# Patient Record
Sex: MALE | Race: WHITE | NOT HISPANIC OR LATINO | Employment: OTHER | URBAN - METROPOLITAN AREA
[De-identification: names, ages, dates, MRNs, and addresses within clinical notes are randomized per-mention and may not be internally consistent; named-entity substitution may affect disease eponyms.]

---

## 2023-04-28 LAB
APPEARANCE, URINE: ABNORMAL
BILIRUBIN, URINE: NEGATIVE
BLOOD, URINE: ABNORMAL
COLOR, URINE: ABNORMAL
GLUCOSE, URINE: NEGATIVE MG/DL
KETONES, URINE: NEGATIVE MG/DL
LEUKOCYTE ESTERASE, URINE: NEGATIVE
NITRITE, URINE: NEGATIVE
PH, URINE: 5 (ref 5–8)
PROTEIN, URINE: ABNORMAL MG/DL
RBC, URINE: 6375 /HPF (ref 0–5)
SPECIFIC GRAVITY, URINE: 1.02 (ref 1–1.03)
UROBILINOGEN, URINE: <2 MG/DL (ref 0–1.9)
WBC, URINE: ABNORMAL /HPF (ref 0–5)

## 2023-04-29 LAB — URINE CULTURE: NORMAL

## 2023-05-09 LAB
CREATININE (MG/DL) IN SER/PLAS: 0.81 MG/DL (ref 0.5–1.3)
GFR MALE: 88 ML/MIN/1.73M2

## 2023-05-31 ENCOUNTER — OFFICE VISIT (OUTPATIENT)
Dept: PRIMARY CARE | Facility: CLINIC | Age: 83
End: 2023-05-31
Payer: MEDICARE

## 2023-05-31 VITALS
DIASTOLIC BLOOD PRESSURE: 61 MMHG | HEART RATE: 60 BPM | SYSTOLIC BLOOD PRESSURE: 108 MMHG | BODY MASS INDEX: 27.84 KG/M2 | OXYGEN SATURATION: 94 % | WEIGHT: 194 LBS | TEMPERATURE: 97.7 F

## 2023-05-31 DIAGNOSIS — R73.9 HYPERGLYCEMIA: ICD-10-CM

## 2023-05-31 DIAGNOSIS — E78.00 ELEVATED LDL CHOLESTEROL LEVEL: ICD-10-CM

## 2023-05-31 DIAGNOSIS — D64.9 ANEMIA, UNSPECIFIED TYPE: ICD-10-CM

## 2023-05-31 DIAGNOSIS — I48.11 LONGSTANDING PERSISTENT ATRIAL FIBRILLATION (MULTI): Primary | ICD-10-CM

## 2023-05-31 DIAGNOSIS — E55.9 VITAMIN D DEFICIENCY: ICD-10-CM

## 2023-05-31 DIAGNOSIS — I77.810 AORTIC ROOT DILATATION (CMS-HCC): ICD-10-CM

## 2023-05-31 PROBLEM — I48.91 ATRIAL FIBRILLATION (MULTI): Status: ACTIVE | Noted: 2023-05-31

## 2023-05-31 PROBLEM — R97.20 ELEVATED PSA: Status: ACTIVE | Noted: 2023-05-31

## 2023-05-31 PROBLEM — R39.9 LOWER URINARY TRACT SYMPTOMS: Status: ACTIVE | Noted: 2023-05-31

## 2023-05-31 PROBLEM — R39.198 OTHER DIFFICULTIES WITH MICTURITION: Status: ACTIVE | Noted: 2023-05-31

## 2023-05-31 PROBLEM — R31.9 HEMATURIA: Status: ACTIVE | Noted: 2023-05-31

## 2023-05-31 PROBLEM — R97.20 INCREASED PROSTATE SPECIFIC ANTIGEN (PSA) VELOCITY: Status: ACTIVE | Noted: 2023-05-31

## 2023-05-31 PROBLEM — R39.9 UTI SYMPTOMS: Status: ACTIVE | Noted: 2023-05-31

## 2023-05-31 PROBLEM — H61.23 BILATERAL IMPACTED CERUMEN: Status: ACTIVE | Noted: 2023-05-31

## 2023-05-31 PROBLEM — R31.21 ASYMPTOMATIC MICROSCOPIC HEMATURIA: Status: ACTIVE | Noted: 2023-05-31

## 2023-05-31 PROBLEM — N39.0 SEPSIS SECONDARY TO UTI (MULTI): Status: ACTIVE | Noted: 2023-05-31

## 2023-05-31 PROBLEM — A41.9 SEPSIS SECONDARY TO UTI (MULTI): Status: ACTIVE | Noted: 2023-05-31

## 2023-05-31 PROBLEM — I25.10 CORONARY ARTERY DISEASE: Status: ACTIVE | Noted: 2023-05-31

## 2023-05-31 PROCEDURE — 1160F RVW MEDS BY RX/DR IN RCRD: CPT | Performed by: FAMILY MEDICINE

## 2023-05-31 PROCEDURE — 1036F TOBACCO NON-USER: CPT | Performed by: FAMILY MEDICINE

## 2023-05-31 PROCEDURE — 99214 OFFICE O/P EST MOD 30 MIN: CPT | Performed by: FAMILY MEDICINE

## 2023-05-31 PROCEDURE — 1159F MED LIST DOCD IN RCRD: CPT | Performed by: FAMILY MEDICINE

## 2023-05-31 RX ORDER — TAMSULOSIN HYDROCHLORIDE 0.4 MG/1
2 CAPSULE ORAL DAILY
COMMUNITY
Start: 2020-02-13 | End: 2023-11-08 | Stop reason: SDUPTHER

## 2023-05-31 RX ORDER — METOPROLOL TARTRATE 100 MG/1
1 TABLET ORAL 2 TIMES DAILY
COMMUNITY
End: 2024-01-22 | Stop reason: SDUPTHER

## 2023-05-31 RX ORDER — METOPROLOL TARTRATE 100 MG/1
100 TABLET ORAL
COMMUNITY
End: 2023-05-31 | Stop reason: SDUPTHER

## 2023-05-31 RX ORDER — DILTIAZEM HYDROCHLORIDE 240 MG/1
CAPSULE, EXTENDED RELEASE ORAL
COMMUNITY
Start: 2021-08-26 | End: 2023-05-31 | Stop reason: SDUPTHER

## 2023-05-31 RX ORDER — WARFARIN 2.5 MG/1
TABLET ORAL
COMMUNITY
End: 2023-05-31 | Stop reason: DRUGHIGH

## 2023-05-31 RX ORDER — DILTIAZEM HYDROCHLORIDE 240 MG/1
240 CAPSULE, COATED, EXTENDED RELEASE ORAL DAILY
COMMUNITY
Start: 2023-05-01

## 2023-05-31 RX ORDER — WARFARIN SODIUM 5 MG/1
1 TABLET ORAL DAILY
COMMUNITY
Start: 2012-10-11

## 2023-05-31 RX ORDER — ATORVASTATIN CALCIUM 40 MG/1
40 TABLET, FILM COATED ORAL NIGHTLY
COMMUNITY
End: 2024-02-23 | Stop reason: SDUPTHER

## 2023-05-31 RX ORDER — ASPIRIN 81 MG/1
1 TABLET ORAL DAILY
COMMUNITY
Start: 2019-07-10

## 2023-05-31 ASSESSMENT — LIFESTYLE VARIABLES
AUDIT-C TOTAL SCORE: 4
HOW OFTEN DO YOU HAVE SIX OR MORE DRINKS ON ONE OCCASION: MONTHLY
SKIP TO QUESTIONS 9-10: 0
HOW MANY STANDARD DRINKS CONTAINING ALCOHOL DO YOU HAVE ON A TYPICAL DAY: 1 OR 2
HOW OFTEN DO YOU HAVE A DRINK CONTAINING ALCOHOL: 2-4 TIMES A MONTH

## 2023-05-31 ASSESSMENT — ENCOUNTER SYMPTOMS
DEPRESSION: 0
LOSS OF SENSATION IN FEET: 0
OCCASIONAL FEELINGS OF UNSTEADINESS: 0

## 2023-05-31 ASSESSMENT — PATIENT HEALTH QUESTIONNAIRE - PHQ9
2. FEELING DOWN, DEPRESSED OR HOPELESS: NOT AT ALL
SUM OF ALL RESPONSES TO PHQ9 QUESTIONS 1 & 2: 0
1. LITTLE INTEREST OR PLEASURE IN DOING THINGS: NOT AT ALL

## 2023-05-31 NOTE — PROGRESS NOTES
Subjective   Patient ID: Ming Bunch is a 82 y.o. male who presents for FUV.    HPI   He complains of increased fatigue over the last few months. No sob or chest pain.  Just wears out earlier in the evening.  Due for labs    He went to urology for hematuria. He had lab test and imaging done. He increased water intake when he saw blood in his urine.     He does not monitor his blood pressure at home. He sees Dr Lee for Cardiology. He has a follow up appointment in October and will complete an echocardiogram at that time.  He saw NP in Jan.  He reports that he drinks a lot of water. He declines purchasing a blood pressure cuff for home monitoring, he has an old one of his father's which he has never used.     He denies need for medication refill at this time. He calls in when needs refills    Rechecked bp at end of pt visit in bp same as MA result, HR 64    Review of Systems    Objective   /61   Pulse 60   Temp 36.5 °C (97.7 °F)   Wt 88 kg (194 lb)   SpO2 94%   BMI 27.84 kg/m²     Physical Exam  Constitutional:       Appearance: Normal appearance.   Cardiovascular:      Rate and Rhythm: Normal rate. Rhythm irregular.      Pulses: Normal pulses.      Heart sounds: Normal heart sounds.      Comments: Irregular HR with controlled rate  Pulmonary:      Effort: Pulmonary effort is normal.      Breath sounds: Normal breath sounds.   Musculoskeletal:         General: Normal range of motion.      Cervical back: Normal range of motion and neck supple.   Skin:     General: Skin is warm and dry.   Neurological:      Mental Status: He is alert and oriented to person, place, and time.   Psychiatric:         Mood and Affect: Mood normal.         Thought Content: Thought content normal.         Judgment: Judgment normal.         Assessment/Plan   Problem List Items Addressed This Visit          Circulatory    Atrial fibrillation (CMS/HCC) - Primary     Seing cardiology regularly, compliant with anticoagulation and  monitoring         Relevant Medications    dilTIAZem CD (Cardizem CD) 240 mg 24 hr capsule    metoprolol tartrate (Lopressor) 100 mg tablet    Other Relevant Orders    TSH with reflex to Free T4 if abnormal    Aortic root dilatation (CMS/HCC)     Following q 9 months by cardiology            Endocrine/Metabolic    Vitamin D deficiency    Relevant Orders    Vitamin D, Total       Hematologic    Anemia    Relevant Orders    CBC and Auto Differential       Other    Elevated LDL cholesterol level    Relevant Orders    Lipid Panel    Hyperglycemia    Relevant Orders    Comprehensive Metabolic Panel    Hemoglobin A1C     Health Maintenance  Provided order for 6 month labs with fasting. Will call with result.     Fatigue  Advised to discuss low blood pressure and fatigue due to beta blocker, pt not very concerned but would like more energy, will discuss at next Cardiology appointment also and checking labs today     Follow up in 6 months, unless a visit is needed sooner          Scribe Attestation  By signing my name below, I, Kiya Neri , Emmanuelle   attest that this documentation has been prepared under the direction and in the presence of Kellee Burris DO.

## 2023-05-31 NOTE — PATIENT INSTRUCTIONS
Health Maintenance  Provided order for 6 month labs with fasting. Will call with result.   Patient will call this office, if he needs any medication refills.     Fatigue  Advised to discuss low blood pressure and fatigue at next Cardiology appointment.

## 2023-06-05 ENCOUNTER — LAB (OUTPATIENT)
Dept: LAB | Facility: LAB | Age: 83
End: 2023-06-05
Payer: MEDICARE

## 2023-06-05 DIAGNOSIS — D64.9 ANEMIA, UNSPECIFIED TYPE: ICD-10-CM

## 2023-06-05 DIAGNOSIS — I48.11 LONGSTANDING PERSISTENT ATRIAL FIBRILLATION (MULTI): ICD-10-CM

## 2023-06-05 DIAGNOSIS — E78.00 ELEVATED LDL CHOLESTEROL LEVEL: ICD-10-CM

## 2023-06-05 DIAGNOSIS — E55.9 VITAMIN D DEFICIENCY: ICD-10-CM

## 2023-06-05 DIAGNOSIS — R73.9 HYPERGLYCEMIA: ICD-10-CM

## 2023-06-05 LAB
ALANINE AMINOTRANSFERASE (SGPT) (U/L) IN SER/PLAS: 14 U/L (ref 10–52)
ALBUMIN (G/DL) IN SER/PLAS: 3.8 G/DL (ref 3.4–5)
ALKALINE PHOSPHATASE (U/L) IN SER/PLAS: 81 U/L (ref 33–136)
ANION GAP IN SER/PLAS: 10 MMOL/L (ref 10–20)
ASPARTATE AMINOTRANSFERASE (SGOT) (U/L) IN SER/PLAS: 8 U/L (ref 9–39)
BASOPHILS (10*3/UL) IN BLOOD BY AUTOMATED COUNT: 0.03 X10E9/L (ref 0–0.1)
BASOPHILS/100 LEUKOCYTES IN BLOOD BY AUTOMATED COUNT: 0.6 % (ref 0–2)
BILIRUBIN TOTAL (MG/DL) IN SER/PLAS: 1.1 MG/DL (ref 0–1.2)
CALCIDIOL (25 OH VITAMIN D3) (NG/ML) IN SER/PLAS: 51 NG/ML
CALCIUM (MG/DL) IN SER/PLAS: 8.8 MG/DL (ref 8.6–10.6)
CARBON DIOXIDE, TOTAL (MMOL/L) IN SER/PLAS: 29 MMOL/L (ref 21–32)
CHLORIDE (MMOL/L) IN SER/PLAS: 102 MMOL/L (ref 98–107)
CHOLESTEROL (MG/DL) IN SER/PLAS: 110 MG/DL (ref 0–199)
CHOLESTEROL IN HDL (MG/DL) IN SER/PLAS: 59.8 MG/DL
CHOLESTEROL/HDL RATIO: 1.8
CREATININE (MG/DL) IN SER/PLAS: 0.84 MG/DL (ref 0.5–1.3)
EOSINOPHILS (10*3/UL) IN BLOOD BY AUTOMATED COUNT: 0.08 X10E9/L (ref 0–0.4)
EOSINOPHILS/100 LEUKOCYTES IN BLOOD BY AUTOMATED COUNT: 1.7 % (ref 0–6)
ERYTHROCYTE DISTRIBUTION WIDTH (RATIO) BY AUTOMATED COUNT: 13.2 % (ref 11.5–14.5)
ERYTHROCYTE MEAN CORPUSCULAR HEMOGLOBIN CONCENTRATION (G/DL) BY AUTOMATED: 32.3 G/DL (ref 32–36)
ERYTHROCYTE MEAN CORPUSCULAR VOLUME (FL) BY AUTOMATED COUNT: 103 FL (ref 80–100)
ERYTHROCYTES (10*6/UL) IN BLOOD BY AUTOMATED COUNT: 3.9 X10E12/L (ref 4.5–5.9)
ESTIMATED AVERAGE GLUCOSE FOR HBA1C: 108 MG/DL
GFR MALE: 87 ML/MIN/1.73M2
GLUCOSE (MG/DL) IN SER/PLAS: 93 MG/DL (ref 74–99)
HEMATOCRIT (%) IN BLOOD BY AUTOMATED COUNT: 40.3 % (ref 41–52)
HEMOGLOBIN (G/DL) IN BLOOD: 13 G/DL (ref 13.5–17.5)
HEMOGLOBIN A1C/HEMOGLOBIN TOTAL IN BLOOD: 5.4 %
IMMATURE GRANULOCYTES/100 LEUKOCYTES IN BLOOD BY AUTOMATED COUNT: 0.2 % (ref 0–0.9)
LDL: 37 MG/DL (ref 0–99)
LEUKOCYTES (10*3/UL) IN BLOOD BY AUTOMATED COUNT: 4.6 X10E9/L (ref 4.4–11.3)
LYMPHOCYTES (10*3/UL) IN BLOOD BY AUTOMATED COUNT: 1.31 X10E9/L (ref 0.8–3)
LYMPHOCYTES/100 LEUKOCYTES IN BLOOD BY AUTOMATED COUNT: 28.3 % (ref 13–44)
MONOCYTES (10*3/UL) IN BLOOD BY AUTOMATED COUNT: 0.52 X10E9/L (ref 0.05–0.8)
MONOCYTES/100 LEUKOCYTES IN BLOOD BY AUTOMATED COUNT: 11.2 % (ref 2–10)
NEUTROPHILS (10*3/UL) IN BLOOD BY AUTOMATED COUNT: 2.68 X10E9/L (ref 1.6–5.5)
NEUTROPHILS/100 LEUKOCYTES IN BLOOD BY AUTOMATED COUNT: 58 % (ref 40–80)
NRBC (PER 100 WBCS) BY AUTOMATED COUNT: 0 /100 WBC (ref 0–0)
PLATELETS (10*3/UL) IN BLOOD AUTOMATED COUNT: 130 X10E9/L (ref 150–450)
POTASSIUM (MMOL/L) IN SER/PLAS: 4 MMOL/L (ref 3.5–5.3)
PROTEIN TOTAL: 6.6 G/DL (ref 6.4–8.2)
SODIUM (MMOL/L) IN SER/PLAS: 137 MMOL/L (ref 136–145)
THYROTROPIN (MIU/L) IN SER/PLAS BY DETECTION LIMIT <= 0.05 MIU/L: 2.73 MIU/L (ref 0.44–3.98)
TRIGLYCERIDE (MG/DL) IN SER/PLAS: 65 MG/DL (ref 0–149)
UREA NITROGEN (MG/DL) IN SER/PLAS: 18 MG/DL (ref 6–23)
VLDL: 13 MG/DL (ref 0–40)

## 2023-06-05 PROCEDURE — 80061 LIPID PANEL: CPT

## 2023-06-05 PROCEDURE — 84443 ASSAY THYROID STIM HORMONE: CPT

## 2023-06-05 PROCEDURE — 82306 VITAMIN D 25 HYDROXY: CPT

## 2023-06-05 PROCEDURE — 83036 HEMOGLOBIN GLYCOSYLATED A1C: CPT

## 2023-06-05 PROCEDURE — 80053 COMPREHEN METABOLIC PANEL: CPT

## 2023-06-05 PROCEDURE — 85025 COMPLETE CBC W/AUTO DIFF WBC: CPT

## 2023-06-05 PROCEDURE — 36415 COLL VENOUS BLD VENIPUNCTURE: CPT

## 2023-06-08 ENCOUNTER — TELEPHONE (OUTPATIENT)
Dept: PRIMARY CARE | Facility: CLINIC | Age: 83
End: 2023-06-08
Payer: MEDICARE
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
## 2023-06-12 DIAGNOSIS — D64.9 ANEMIA, UNSPECIFIED TYPE: Primary | ICD-10-CM

## 2023-06-21 ENCOUNTER — LAB (OUTPATIENT)
Dept: LAB | Facility: LAB | Age: 83
End: 2023-06-21
Payer: MEDICARE

## 2023-06-21 DIAGNOSIS — D64.9 ANEMIA, UNSPECIFIED TYPE: ICD-10-CM

## 2023-06-21 LAB
BASOPHILS (10*3/UL) IN BLOOD BY AUTOMATED COUNT: 0.03 X10E9/L (ref 0–0.1)
BASOPHILS/100 LEUKOCYTES IN BLOOD BY AUTOMATED COUNT: 0.5 % (ref 0–2)
COBALAMIN (VITAMIN B12) (PG/ML) IN SER/PLAS: 339 PG/ML (ref 211–911)
EOSINOPHILS (10*3/UL) IN BLOOD BY AUTOMATED COUNT: 0.06 X10E9/L (ref 0–0.4)
EOSINOPHILS/100 LEUKOCYTES IN BLOOD BY AUTOMATED COUNT: 1.1 % (ref 0–6)
ERYTHROCYTE DISTRIBUTION WIDTH (RATIO) BY AUTOMATED COUNT: 15.6 % (ref 11.5–14.5)
ERYTHROCYTE MEAN CORPUSCULAR HEMOGLOBIN CONCENTRATION (G/DL) BY AUTOMATED: 32.2 G/DL (ref 32–36)
ERYTHROCYTE MEAN CORPUSCULAR VOLUME (FL) BY AUTOMATED COUNT: 106 FL (ref 80–100)
ERYTHROCYTES (10*6/UL) IN BLOOD BY AUTOMATED COUNT: 3.47 X10E12/L (ref 4.5–5.9)
FERRITIN (UG/LL) IN SER/PLAS: 201 UG/L (ref 20–300)
HEMATOCRIT (%) IN BLOOD BY AUTOMATED COUNT: 36.7 % (ref 41–52)
HEMOGLOBIN (G/DL) IN BLOOD: 11.8 G/DL (ref 13.5–17.5)
IMMATURE GRANULOCYTES/100 LEUKOCYTES IN BLOOD BY AUTOMATED COUNT: 0.4 % (ref 0–0.9)
IRON (UG/DL) IN SER/PLAS: 68 UG/DL (ref 35–150)
IRON BINDING CAPACITY (UG/DL) IN SER/PLAS: 338 UG/DL (ref 240–445)
IRON SATURATION (%) IN SER/PLAS: 20 % (ref 25–45)
LEUKOCYTES (10*3/UL) IN BLOOD BY AUTOMATED COUNT: 5.6 X10E9/L (ref 4.4–11.3)
LYMPHOCYTES (10*3/UL) IN BLOOD BY AUTOMATED COUNT: 1.3 X10E9/L (ref 0.8–3)
LYMPHOCYTES/100 LEUKOCYTES IN BLOOD BY AUTOMATED COUNT: 23.1 % (ref 13–44)
MONOCYTES (10*3/UL) IN BLOOD BY AUTOMATED COUNT: 0.62 X10E9/L (ref 0.05–0.8)
MONOCYTES/100 LEUKOCYTES IN BLOOD BY AUTOMATED COUNT: 11 % (ref 2–10)
NEUTROPHILS (10*3/UL) IN BLOOD BY AUTOMATED COUNT: 3.6 X10E9/L (ref 1.6–5.5)
NEUTROPHILS/100 LEUKOCYTES IN BLOOD BY AUTOMATED COUNT: 63.9 % (ref 40–80)
NRBC (PER 100 WBCS) BY AUTOMATED COUNT: 0 /100 WBC (ref 0–0)
PLATELETS (10*3/UL) IN BLOOD AUTOMATED COUNT: 260 X10E9/L (ref 150–450)

## 2023-06-21 PROCEDURE — 83550 IRON BINDING TEST: CPT

## 2023-06-21 PROCEDURE — 36415 COLL VENOUS BLD VENIPUNCTURE: CPT

## 2023-06-21 PROCEDURE — 82607 VITAMIN B-12: CPT

## 2023-06-21 PROCEDURE — 82728 ASSAY OF FERRITIN: CPT

## 2023-06-21 PROCEDURE — 83540 ASSAY OF IRON: CPT

## 2023-06-21 PROCEDURE — 85025 COMPLETE CBC W/AUTO DIFF WBC: CPT

## 2023-07-06 ENCOUNTER — APPOINTMENT (OUTPATIENT)
Dept: PRIMARY CARE | Facility: CLINIC | Age: 83
End: 2023-07-06
Payer: MEDICARE

## 2023-07-06 RX ORDER — TRAMADOL HYDROCHLORIDE 50 MG/1
50 TABLET ORAL EVERY 6 HOURS PRN
COMMUNITY
Start: 2015-01-27 | End: 2023-07-07 | Stop reason: ALTCHOICE

## 2023-07-07 ENCOUNTER — OFFICE VISIT (OUTPATIENT)
Dept: PRIMARY CARE | Facility: CLINIC | Age: 83
End: 2023-07-07
Payer: MEDICARE

## 2023-07-07 VITALS
WEIGHT: 191 LBS | BODY MASS INDEX: 27.41 KG/M2 | SYSTOLIC BLOOD PRESSURE: 107 MMHG | HEART RATE: 67 BPM | TEMPERATURE: 97.5 F | DIASTOLIC BLOOD PRESSURE: 67 MMHG | OXYGEN SATURATION: 94 %
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
## 2023-07-07 DIAGNOSIS — Z12.11 COLON CANCER SCREENING: ICD-10-CM

## 2023-07-07 DIAGNOSIS — I48.91 ATRIAL FIBRILLATION, UNSPECIFIED TYPE (MULTI): ICD-10-CM

## 2023-07-07 DIAGNOSIS — D64.9 ANEMIA, UNSPECIFIED TYPE: ICD-10-CM

## 2023-07-07 DIAGNOSIS — R53.83 FATIGUE, UNSPECIFIED TYPE: ICD-10-CM

## 2023-07-07 PROCEDURE — 1126F AMNT PAIN NOTED NONE PRSNT: CPT | Performed by: FAMILY MEDICINE

## 2023-07-07 PROCEDURE — 99214 OFFICE O/P EST MOD 30 MIN: CPT | Performed by: FAMILY MEDICINE

## 2023-07-07 PROCEDURE — 1160F RVW MEDS BY RX/DR IN RCRD: CPT | Performed by: FAMILY MEDICINE

## 2023-07-07 PROCEDURE — 1036F TOBACCO NON-USER: CPT | Performed by: FAMILY MEDICINE

## 2023-07-07 PROCEDURE — 1159F MED LIST DOCD IN RCRD: CPT | Performed by: FAMILY MEDICINE
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
## 2023-07-07 NOTE — PATIENT INSTRUCTIONS
ER Follow up.   Will repeat labs in August. Will call with results.   Discussed Anemia likely due to blood loss during fall.   Provided order for Cologuard. Will call with results.     Fatigue  Advised discussing with Cardiology.   Do not drink caffeine in the afternoon or evening.   Try not to nap during the day.

## 2023-07-18 LAB — NONINV COLON CA DNA+OCC BLD SCRN STL QL: NEGATIVE

## 2023-07-19 ENCOUNTER — TELEPHONE (OUTPATIENT)
Dept: PRIMARY CARE | Facility: CLINIC | Age: 83
End: 2023-07-19
Payer: MEDICARE

## 2023-07-19 NOTE — TELEPHONE ENCOUNTER
----- Message from Kellee Burris DO sent at 7/19/2023 12:03 PM EDT -----  Report to pt his cologuard is negative

## 2023-08-23 ENCOUNTER — LAB (OUTPATIENT)
Dept: LAB | Facility: LAB | Age: 83
End: 2023-08-23
Payer: MEDICARE

## 2023-08-23 DIAGNOSIS — D64.9 ANEMIA, UNSPECIFIED TYPE: ICD-10-CM

## 2023-08-23 LAB
BASOPHILS (10*3/UL) IN BLOOD BY AUTOMATED COUNT: 0.05 X10E9/L (ref 0–0.1)
BASOPHILS/100 LEUKOCYTES IN BLOOD BY AUTOMATED COUNT: 0.8 % (ref 0–2)
EOSINOPHILS (10*3/UL) IN BLOOD BY AUTOMATED COUNT: 0.1 X10E9/L (ref 0–0.4)
EOSINOPHILS/100 LEUKOCYTES IN BLOOD BY AUTOMATED COUNT: 1.6 % (ref 0–6)
ERYTHROCYTE DISTRIBUTION WIDTH (RATIO) BY AUTOMATED COUNT: 13.3 % (ref 11.5–14.5)
ERYTHROCYTE MEAN CORPUSCULAR HEMOGLOBIN CONCENTRATION (G/DL) BY AUTOMATED: 31.7 G/DL (ref 32–36)
ERYTHROCYTE MEAN CORPUSCULAR VOLUME (FL) BY AUTOMATED COUNT: 106 FL (ref 80–100)
ERYTHROCYTES (10*6/UL) IN BLOOD BY AUTOMATED COUNT: 3.93 X10E12/L (ref 4.5–5.9)
HEMATOCRIT (%) IN BLOOD BY AUTOMATED COUNT: 41.7 % (ref 41–52)
HEMOGLOBIN (G/DL) IN BLOOD: 13.2 G/DL (ref 13.5–17.5)
IMMATURE GRANULOCYTES/100 LEUKOCYTES IN BLOOD BY AUTOMATED COUNT: 0.3 % (ref 0–0.9)
LEUKOCYTES (10*3/UL) IN BLOOD BY AUTOMATED COUNT: 6.3 X10E9/L (ref 4.4–11.3)
LYMPHOCYTES (10*3/UL) IN BLOOD BY AUTOMATED COUNT: 1.41 X10E9/L (ref 0.8–3)
LYMPHOCYTES/100 LEUKOCYTES IN BLOOD BY AUTOMATED COUNT: 22.6 % (ref 13–44)
MONOCYTES (10*3/UL) IN BLOOD BY AUTOMATED COUNT: 0.71 X10E9/L (ref 0.05–0.8)
MONOCYTES/100 LEUKOCYTES IN BLOOD BY AUTOMATED COUNT: 11.4 % (ref 2–10)
NEUTROPHILS (10*3/UL) IN BLOOD BY AUTOMATED COUNT: 3.96 X10E9/L (ref 1.6–5.5)
NEUTROPHILS/100 LEUKOCYTES IN BLOOD BY AUTOMATED COUNT: 63.3 % (ref 40–80)
NRBC (PER 100 WBCS) BY AUTOMATED COUNT: 0 /100 WBC (ref 0–0)
PLATELETS (10*3/UL) IN BLOOD AUTOMATED COUNT: 153 X10E9/L (ref 150–450)

## 2023-08-23 PROCEDURE — 36415 COLL VENOUS BLD VENIPUNCTURE: CPT

## 2023-08-23 PROCEDURE — 85025 COMPLETE CBC W/AUTO DIFF WBC: CPT

## 2023-09-20 DIAGNOSIS — I48.91 ATRIAL FIBRILLATION, UNSPECIFIED TYPE (MULTI): Primary | ICD-10-CM

## 2023-09-24 PROBLEM — Z87.438 HISTORY OF BPH: Status: ACTIVE | Noted: 2023-09-24

## 2023-09-24 PROBLEM — Z86.2 HISTORY OF THROMBOCYTOPENIA: Status: ACTIVE | Noted: 2023-09-24

## 2023-09-24 PROBLEM — R06.02 SHORTNESS OF BREATH ON EXERTION: Status: ACTIVE | Noted: 2023-09-24

## 2023-09-24 PROBLEM — Z79.01 ON WARFARIN THERAPY: Status: ACTIVE | Noted: 2023-09-24

## 2023-09-24 PROBLEM — R39.9 UTI SYMPTOMS: Status: RESOLVED | Noted: 2023-05-31 | Resolved: 2023-09-24

## 2023-10-18 ENCOUNTER — ANTICOAGULATION - WARFARIN VISIT (OUTPATIENT)
Dept: CARDIOLOGY | Facility: CLINIC | Age: 83
End: 2023-10-18
Payer: MEDICARE

## 2023-10-18 DIAGNOSIS — I48.91 ATRIAL FIBRILLATION, UNSPECIFIED TYPE (MULTI): Primary | ICD-10-CM

## 2023-10-18 LAB
POC INR: 2.4
POC PROTHROMBIN TIME: NORMAL

## 2023-10-18 PROCEDURE — 99211 OFF/OP EST MAY X REQ PHY/QHP: CPT | Performed by: INTERNAL MEDICINE

## 2023-10-18 PROCEDURE — 85610 PROTHROMBIN TIME: CPT | Mod: PO

## 2023-10-18 PROCEDURE — 85610 PROTHROMBIN TIME: CPT | Mod: QW | Performed by: INTERNAL MEDICINE

## 2023-10-18 NOTE — PROGRESS NOTES
Patient identification verified with 2 identifiers.    Location: [facility] - [id number] [address] [phone] option #1     Referring Physician: MEERA VILLATORO  Enrollment/ Re-enrollment date:    INR Goal: 2.0-3.0  INR monitoring is per [facility] protocol.  Anticoagulation Medication: warfarin  Indication: atrial fibrillation    Subjective   Bleeding signs/symptoms: No    Bruising: No   Major bleeding event: No  Thrombosis signs/symptoms: No  Thromboembolic event: No  Missed doses: No  Extra doses: No  Medication changes: No  Dietary changes: No  Change in health: No  Change in activity: No  Alcohol: No  Other concerns: No    Upcoming Surgeries:  Does the Patient Have any upcoming surgeries that require interruption in anticoagulation therapy? no  Does the patient require bridging? no      Anticoagulation Summary  As of 10/18/2023      INR goal:  2.0-3.0   TTR:  100.0 % (2.6 wk)   INR used for dosin.40 (10/18/2023)   Weekly warfarin total:  30 mg               Assessment/Plan   Therapeutic     1. New dose: no change    2. Next INR: 1 month      Education provided to patient during the visit:  Patient instructed to call in interim with questions, concerns and changes.   Patient educated on interactions between medications and warfarin.

## 2023-10-24 ENCOUNTER — OFFICE VISIT (OUTPATIENT)
Dept: CARDIOLOGY | Facility: CLINIC | Age: 83
End: 2023-10-24
Payer: MEDICARE

## 2023-10-24 ENCOUNTER — HOSPITAL ENCOUNTER (OUTPATIENT)
Dept: CARDIOLOGY | Facility: CLINIC | Age: 83
Discharge: HOME | End: 2023-10-24
Payer: MEDICARE

## 2023-10-24 VITALS
DIASTOLIC BLOOD PRESSURE: 77 MMHG | OXYGEN SATURATION: 93 % | BODY MASS INDEX: 25.86 KG/M2 | HEIGHT: 70 IN | SYSTOLIC BLOOD PRESSURE: 113 MMHG | WEIGHT: 180.6 LBS | HEART RATE: 65 BPM

## 2023-10-24 DIAGNOSIS — I48.91 ATRIAL FIBRILLATION, UNSPECIFIED TYPE (MULTI): Primary | ICD-10-CM

## 2023-10-24 DIAGNOSIS — I48.91 ATRIAL FIBRILLATION, UNSPECIFIED TYPE (MULTI): ICD-10-CM

## 2023-10-24 PROCEDURE — 1126F AMNT PAIN NOTED NONE PRSNT: CPT | Performed by: NURSE PRACTITIONER

## 2023-10-24 PROCEDURE — 1159F MED LIST DOCD IN RCRD: CPT | Performed by: NURSE PRACTITIONER

## 2023-10-24 PROCEDURE — 99214 OFFICE O/P EST MOD 30 MIN: CPT | Mod: PO | Performed by: NURSE PRACTITIONER

## 2023-10-24 PROCEDURE — 93306 TTE W/DOPPLER COMPLETE: CPT

## 2023-10-24 PROCEDURE — 1036F TOBACCO NON-USER: CPT | Performed by: NURSE PRACTITIONER

## 2023-10-24 PROCEDURE — 93306 TTE W/DOPPLER COMPLETE: CPT | Performed by: INTERNAL MEDICINE

## 2023-10-24 PROCEDURE — 1160F RVW MEDS BY RX/DR IN RCRD: CPT | Performed by: NURSE PRACTITIONER

## 2023-10-24 PROCEDURE — 99214 OFFICE O/P EST MOD 30 MIN: CPT | Performed by: NURSE PRACTITIONER

## 2023-10-24 ASSESSMENT — ENCOUNTER SYMPTOMS
OCCASIONAL FEELINGS OF UNSTEADINESS: 0
GASTROINTESTINAL NEGATIVE: 1
LOSS OF SENSATION IN FEET: 0
DEPRESSION: 0
RESPIRATORY NEGATIVE: 1
MUSCULOSKELETAL NEGATIVE: 1
CONSTITUTIONAL NEGATIVE: 1
CARDIOVASCULAR NEGATIVE: 1
NEUROLOGICAL NEGATIVE: 1

## 2023-10-24 ASSESSMENT — PAIN SCALES - GENERAL: PAINLEVEL: 0-NO PAIN

## 2023-10-24 NOTE — PROGRESS NOTES
"Chief Complaint:   Follow up    History Of Present Illness:    .Mr Bunch had an echo today which is not yet read.  He returns for follow up. Denies chest pain, sob, palpitations or pedal edema.          Last Recorded Vitals:  Blood pressure 113/77, pulse 65, height 1.778 m (5' 10\"), weight 81.9 kg (180 lb 9.6 oz), SpO2 93 %.     Past Medical History:  Past Medical History:   Diagnosis Date    Personal history of diseases of the blood and blood-forming organs and certain disorders involving the immune mechanism 06/09/2021    History of thrombocytopenia    Shortness of breath 02/19/2020    Shortness of breath on exertion        Past Surgical History:  Past Surgical History:   Procedure Laterality Date    APPENDECTOMY  09/11/2013    Appendectomy    COLONOSCOPY  06/25/2013    Complete Colonoscopy    OTHER SURGICAL HISTORY  04/22/2015    Removal Of Lesion    PILONIDAL CYST DRAINAGE  09/11/2013    Pilonidal Cyst Resection       Social History:  Social History     Socioeconomic History    Marital status:      Spouse name: None    Number of children: None    Years of education: None    Highest education level: None   Occupational History    None   Tobacco Use    Smoking status: Never    Smokeless tobacco: Never   Substance and Sexual Activity    Alcohol use: Yes    Drug use: Not Currently    Sexual activity: None   Other Topics Concern    None   Social History Narrative    None     Social Determinants of Health     Financial Resource Strain: Not on file   Food Insecurity: Not on file   Transportation Needs: Not on file   Physical Activity: Not on file   Stress: Not on file   Social Connections: Not on file   Intimate Partner Violence: Not on file   Housing Stability: Not on file       Family History:  Family History   Problem Relation Name Age of Onset    Heart failure Mother      Atrial fibrillation Sister           Allergies:  Patient has no known allergies.    Outpatient Medications:  Current Outpatient " Medications   Medication Sig Dispense Refill    aspirin 81 mg EC tablet Take 1 tablet (81 mg) by mouth once daily.      atorvastatin (Lipitor) 40 mg tablet Take 1 tablet (40 mg) by mouth once daily at bedtime.      dilTIAZem CD (Cardizem CD) 240 mg 24 hr capsule Take 1 capsule (240 mg) by mouth once daily.      metoprolol tartrate (Lopressor) 100 mg tablet Take 1 tablet (100 mg) by mouth twice a day.      tamsulosin (Flomax) 0.4 mg 24 hr capsule Take 2 capsules (0.8 mg) by mouth once daily.      warfarin (Coumadin) 5 mg tablet Take 1 tablet (5 mg) by mouth once daily.       No current facility-administered medications for this visit.        Physical Exam:  Cardiovascular:      PMI at left midclavicular line. Normal rate. Regular rhythm. Normal S1. Normal S2.       Murmurs: There is no murmur.      No gallop.  No click. No rub.   Pulses:     Intact distal pulses.   Edema:     Peripheral edema absent.         ROS:  Review of Systems   Constitutional: Negative.   Cardiovascular: Negative.    Respiratory: Negative.     Skin: Negative.    Musculoskeletal: Negative.    Gastrointestinal: Negative.    Genitourinary: Negative.    Neurological: Negative.           Last Labs:  CBC -  Lab Results   Component Value Date    WBC 6.3 08/23/2023    HGB 13.2 (L) 08/23/2023    HCT 41.7 08/23/2023     (H) 08/23/2023     08/23/2023       CMP -  Lab Results   Component Value Date    CALCIUM 8.8 06/05/2023    PROT 6.6 06/05/2023    ALBUMIN 3.8 06/05/2023    AST 8 (L) 06/05/2023    ALT 14 06/05/2023    ALKPHOS 81 06/05/2023    BILITOT 1.1 06/05/2023       LIPID PANEL -   Lab Results   Component Value Date    CHOL 110 06/05/2023    TRIG 65 06/05/2023    HDL 59.8 06/05/2023    CHHDL 1.8 06/05/2023    LDLF 37 06/05/2023    VLDL 13 06/05/2023       RENAL FUNCTION PANEL -   Lab Results   Component Value Date    GLUCOSE 93 06/05/2023     06/05/2023    K 4.0 06/05/2023     06/05/2023    CO2 29 06/05/2023    ANIONGAP 10  06/05/2023    BUN 18 06/05/2023    CREATININE 0.84 06/05/2023    GFRMALE 87 06/05/2023    CALCIUM 8.8 06/05/2023    ALBUMIN 3.8 06/05/2023        Lab Results   Component Value Date    HGBA1C 5.4 06/05/2023         Assessment/Plan   Problem List Items Addressed This Visit    None    Impressions     Assessment:     1. Low-grade CAD by cardiac cath, 09/15/2007. The patient is doing well with no unusual chest discomfort. At the time of his catheterization, he was found to have normal coronary arteries other than for minor 20% narrowing.of the diagonal branch. This patient was seen at urgent care on 9/7/2015 with atypical chest pain that occurred when every he would take a deep breath. EKG evidently did not demonstrate any ST segment abnormality. His chest x-ray showed mild interstitial prominence. The amylase and lipase values were normal and his liver function panel was also normal. Cardiac enzymes were negative. The SMA panel included a creatinine of 0.77 a CBC included hematocrit of 43.3. At this point will postpone repeat nuclear stress testing. Patient denies chest pain at today's office visit.   2. Chronic atrial fibrillation. At the time of a previous visit the patient was noted to have a somewhat rapid ventricular response to his atrial fibrillation despite being on metoprolol 100 mg twice a day. At that time the metoprolol dose was increased to 150 mg twice a day. Since that time the patient has remained generally fatigued with some vague muscle aching. Due to the perceived side effects the dose of metoprolol will be reduced back to 100 mg twice a day and he was started on Cardizem  mg daily to assist in ventricular rate control. e subsequently had the dose of Cardizem CD reduced to 120 mg daily. e did have an echocardiogram performed 11/4/2014 which demonstrated normal LV chamber size with low normal LV ejection fraction of 55%. There was moderate to severe dilatation of the left atrium. There is  moderate dilatation of the right-sided chambers with mild mitral valve regurgitation mild to moderate tricuspid valve regurgitation and mild pulmonary hypertension with an estimated PA systolic pressure of 40 mmHg. There is also mild dilatation of the aortic root and ascending thoracic aorta. The presence of underlying COPD would explain the mild to moderate right-sided chamber dilatation. The patient did have a repeat echocardiogram performed today on 01/23/2018 which demonstrates an LV ejection fraction of approximately 55% with mild aortic valve insufficiency, mild to moderate mitral valve regurgitation, moderate tricuspid regurgitation, mild pulmonic insufficiency along with moderate left atrial enlargement and mild to moderate right atrial enlargement. The patient had been essentially asymptomatic until he was admitted to [facility] in 2/2020 with a urinary tract infection with bacteremia. He was noted to have Proteus mirabilis bacteremia with acute emphysematous cystitis. During that admission he had atrial fibrillation rapid ventricular rate of all 110â€“1 140/m. Ultimately he was placed on a combination of both diltiazem and metoprolol. The dose of diltiazem was increased from 120-240 mg daily and he was kept on metoprolol tartrate 100 mg twice a day. He did have an echocardiogram that showed an LV ejection fraction of 55â€“60 percent with moderate left atrial enlargement 1+ aortic valve insufficiency and moderately dilated aortic root at 4.1 cm. The ventricular rate to the atrial fibrillation is now well controlled on the present combination of the Cardizem  mg daily along with metoprolol tartrate 100 mg twice daily.  He has declined trying metoprolol succinate.  3. Coumadin anticoagulation with one episode of significant epistaxis. The patient has had no recurrent episodes of epistaxis since his last visit. The patient prefers to remain on Coumadin rather than switching to a direct oral  anticoagulant.  4. Hyperlipidemia. The patient did have lab work performed on 11//2022 which included a lipid panel with cholesterol 119 LDL 51 HDL 57 triglyceride 52. The patient's panel is improved now on atorvastatin 40 mg daily rather than the previous simvastatin 40 mg daily.   5. Remote former smoker. Patient discontinued smoking 35 years ago.  6.? COPD.  7. History of urosepsis 2/2020. This patient was admitted to [facility] from 02/02/2020â€“02/05/2020 with sepsis due to a urinary tract infection. The patient presented with hematuria and burning upon urination. The patient initially had a Kirby catheter placed in the office which had subsequently been removed. At the time of his admission to [facility] he was noted to be in his atrial fibrillation but with a more rapid ventricular response. Serial troponins were negative. The patient had already been on Coumadin anticoagulation. The patient was placed on IV Zosyn and IV vancomycin for the urosepsis. He was initially placed on both diltiazem and metoprolol for ventricular rate control and the dose of diltiazem was increased from 120 mg daily to 240 mg daily. He also had an echocardiogram on 02/04/2020 that showed an LV ejection fraction 55â€“60 percent with moderate left atrial enlargement with 1+ aortic valve insufficiency and a moderately dilated aortic root at 4.1 cm. The patient was found to have Proteus mirabilis bacteremia with acute emphysematous cystitis. He ultimately was discharged home on ampicillin was also started on tamsulosin. The patient had a recent PSA of 2.36 on 5/20/2020. He had a visit with urology and had a postvoid residual of urine of only 29 cc on 7/9/2020.  8. Hx of covid-19 vaccine #1 and #2.       Kellen See, APRN-CNP

## 2023-10-25 LAB — EJECTION FRACTION APICAL 4 CHAMBER: 55.9

## 2023-11-08 DIAGNOSIS — N40.0 BPH (BENIGN PROSTATIC HYPERPLASIA): Primary | ICD-10-CM

## 2023-11-08 RX ORDER — TAMSULOSIN HYDROCHLORIDE 0.4 MG/1
0.8 CAPSULE ORAL DAILY
Qty: 180 CAPSULE | Refills: 3 | Status: SHIPPED | OUTPATIENT
Start: 2023-11-08 | End: 2023-11-09 | Stop reason: SDUPTHER

## 2023-11-09 DIAGNOSIS — N40.0 BPH (BENIGN PROSTATIC HYPERPLASIA): ICD-10-CM

## 2023-11-09 RX ORDER — TAMSULOSIN HYDROCHLORIDE 0.4 MG/1
0.8 CAPSULE ORAL DAILY
Qty: 180 CAPSULE | Refills: 3 | Status: SHIPPED | OUTPATIENT
Start: 2023-11-09 | End: 2024-11-08

## 2023-11-15 ENCOUNTER — ANTICOAGULATION - WARFARIN VISIT (OUTPATIENT)
Dept: CARDIOLOGY | Facility: CLINIC | Age: 83
End: 2023-11-15
Payer: MEDICARE

## 2023-11-15 DIAGNOSIS — I48.91 ATRIAL FIBRILLATION, UNSPECIFIED TYPE (MULTI): ICD-10-CM

## 2023-11-15 LAB
POC INR: 2.3
POC PROTHROMBIN TIME: NORMAL

## 2023-11-15 PROCEDURE — 99211 OFF/OP EST MAY X REQ PHY/QHP: CPT | Performed by: INTERNAL MEDICINE

## 2023-11-15 PROCEDURE — 85610 PROTHROMBIN TIME: CPT | Mod: QW | Performed by: INTERNAL MEDICINE

## 2023-11-15 NOTE — PROGRESS NOTES
Patient identification verified with 2 identifiers.    Location: [facility] at [facility] [address] [address] [phone] option #1     Referring Physician: SHAUNA  Enrollment/ Re-enrollment date: 24   INR Goal: 2.0-3.0  INR monitoring is per [facility] protocol.  Anticoagulation Medication: warfarin  Indication: Atrial Fibrillation/Atrial Flutter    Subjective   Bleeding signs/symptoms: No    Bruising: No   Major bleeding event: No  Thrombosis signs/symptoms: No  Thromboembolic event: No  Missed doses: No  Extra doses: No  Medication changes: No  Dietary changes: No  Change in health: No  Change in activity: No  Alcohol: No  Other concerns: No    Upcoming Surgeries:  Does the Patient Have any upcoming surgeries that require interruption in anticoagulation therapy? no  Does the patient require bridging? no      Anticoagulation Summary  As of 11/15/2023      INR goal:  2.0-3.0   TTR:  100.0 % (1.5 mo)   INR used for dosin.30 (11/15/2023)   Weekly warfarin total:  30 mg               Assessment/Plan   Therapeutic     1. New dose: no change    2. Next INR: 1 month      Education provided to patient during the visit:  Patient instructed to call in interim with questions, concerns and changes.

## 2023-11-17 ENCOUNTER — TELEPHONE (OUTPATIENT)
Dept: PRIMARY CARE | Facility: CLINIC | Age: 83
End: 2023-11-17

## 2023-11-17 ENCOUNTER — OFFICE VISIT (OUTPATIENT)
Dept: PRIMARY CARE | Facility: CLINIC | Age: 83
End: 2023-11-17
Payer: MEDICARE

## 2023-11-17 VITALS
WEIGHT: 196 LBS | DIASTOLIC BLOOD PRESSURE: 58 MMHG | SYSTOLIC BLOOD PRESSURE: 106 MMHG | OXYGEN SATURATION: 95 % | HEART RATE: 77 BPM | HEIGHT: 69 IN | BODY MASS INDEX: 29.03 KG/M2 | TEMPERATURE: 97.2 F

## 2023-11-17 DIAGNOSIS — R73.9 HYPERGLYCEMIA: ICD-10-CM

## 2023-11-17 DIAGNOSIS — Z00.00 MEDICARE ANNUAL WELLNESS VISIT, SUBSEQUENT: Primary | ICD-10-CM

## 2023-11-17 DIAGNOSIS — D64.9 ANEMIA, UNSPECIFIED TYPE: ICD-10-CM

## 2023-11-17 DIAGNOSIS — E78.00 ELEVATED LDL CHOLESTEROL LEVEL: ICD-10-CM

## 2023-11-17 DIAGNOSIS — I48.0 PAROXYSMAL ATRIAL FIBRILLATION (MULTI): ICD-10-CM

## 2023-11-17 PROCEDURE — 1126F AMNT PAIN NOTED NONE PRSNT: CPT | Performed by: FAMILY MEDICINE

## 2023-11-17 PROCEDURE — 1036F TOBACCO NON-USER: CPT | Performed by: FAMILY MEDICINE

## 2023-11-17 PROCEDURE — 1160F RVW MEDS BY RX/DR IN RCRD: CPT | Performed by: FAMILY MEDICINE

## 2023-11-17 PROCEDURE — 1159F MED LIST DOCD IN RCRD: CPT | Performed by: FAMILY MEDICINE

## 2023-11-17 PROCEDURE — 1170F FXNL STATUS ASSESSED: CPT | Performed by: FAMILY MEDICINE

## 2023-11-17 PROCEDURE — G0439 PPPS, SUBSEQ VISIT: HCPCS | Performed by: FAMILY MEDICINE

## 2023-11-17 ASSESSMENT — ACTIVITIES OF DAILY LIVING (ADL)
DRESSING: INDEPENDENT
DOING_HOUSEWORK: INDEPENDENT
GROCERY_SHOPPING: INDEPENDENT
MANAGING_FINANCES: INDEPENDENT
TAKING_MEDICATION: INDEPENDENT
BATHING: INDEPENDENT

## 2023-11-17 ASSESSMENT — PATIENT HEALTH QUESTIONNAIRE - PHQ9
1. LITTLE INTEREST OR PLEASURE IN DOING THINGS: NOT AT ALL
2. FEELING DOWN, DEPRESSED OR HOPELESS: NOT AT ALL
SUM OF ALL RESPONSES TO PHQ9 QUESTIONS 1 AND 2: 0

## 2023-11-17 NOTE — TELEPHONE ENCOUNTER
Pt asks why he needs to come back in 3 months in Feb instead of 6 months like he usually does? He will be on vacation in feb. Thank you!

## 2023-11-17 NOTE — PROGRESS NOTES
Subjective   Patient ID: Ming Bunch is a 82 y.o. male who presents for Medicare Annual Wellness Visit Subsequent.  Medicare Wellness Billing Compliance Satisfied    *This is a visual tool to show completion of required items on the day of the visit. Green checks will only appear on the date of visit.    Review all medications by prescribing practitioner or clinical pharmacist (such as prescriptions, OTCs, herbal therapies and supplements) documented in the medical record    Past Medical, Surgical, and Family History reviewed and updated in chart    Tobacco Use Reviewed    Alcohol Use Reviewed    Illicit Drug Use Reviewed    PHQ2/9    Falls in Last Year Reviewed    Home Safety Risk Factors Reviewed    Cognitive Impairment Reviewed    Patient Self Assessment and Health Status    Current Diet Reviewed    Exercise Frequency    ADL - Hearing Impairment    ADL - Bathing    ADL - Dressing    ADL - Walks in Home    IADL - Managing Finances    IADL - Grocery Shopping    IADL - Taking Medications    IADL - Doing Housework      HPI   The patient presents to the clinic for an annual medicare wellness. He has past medical history of atrial fibrillation, hypertension, CAD, and hyperlipidemia.    The patient states that he does not require any medication refills today.    The patient's blood pressure was 106/58 when checked in the clinic today. He has been using metoprolol tartrate 100 mg and diltiazem  mg to control his hypertension. He denies any side-effects to the medication and he does not report any symptoms associated with low BP (due to medication).    He reports that his wife requires physical therapy for weakness in her right shoulder/side of body.    His most recent labs were done in June 2023. He reports that he had the flu vaccine and RSV vaccine on 09/26/2023. He declined to receive the COVID-19 booster vaccine and/or Shingles vaccine.    Review of Systems    Objective   /58    "Pulse 77   Temp 36.2 °C (97.2 °F)   Ht 1.746 m (5' 8.75\")   Wt 88.9 kg (196 lb)   SpO2 95%   BMI 29.16 kg/m²     Physical Exam  Constitutional:       Appearance: Normal appearance.   HENT:      Head: Normocephalic.   Neck:      Vascular: No carotid bruit.   Cardiovascular:      Rate and Rhythm: Normal rate and regular rhythm.      Pulses: Normal pulses.      Heart sounds: Normal heart sounds.   Pulmonary:      Effort: Pulmonary effort is normal.      Breath sounds: Normal breath sounds.   Abdominal:      General: Bowel sounds are normal.      Palpations: Abdomen is soft.   Musculoskeletal:         General: Normal range of motion.      Cervical back: Normal range of motion.      Right lower leg: No edema.      Left lower leg: No edema.   Lymphadenopathy:      Cervical: No cervical adenopathy.   Skin:     General: Skin is warm and dry.   Neurological:      Mental Status: He is alert and oriented to person, place, and time.   Psychiatric:         Mood and Affect: Mood normal.         Thought Content: Thought content normal.         Judgment: Judgment normal.         Assessment/Plan          Labs (CBC, CMP, A1C, lipid panel) were ordered for the patient to complete before his next visit.    The patient's current medication and immunization lists were updated.    Discussed diet and exercise and safety to help pt meet his goals.  He does a lot to take care of his wife s/p CVA    In accordance with Medicare, a comprehensive physical exam was conducted on the patient.     The patient will follow-up in February 2024, unless otherwise needed.    Scribe Attestation  By signing my name below, IRacquel Scribe   attest that this documentation has been prepared under the direction and in the presence of Kellee Burris DO.    "

## 2023-11-19 ASSESSMENT — PATIENT HEALTH QUESTIONNAIRE - PHQ9
SUM OF ALL RESPONSES TO PHQ9 QUESTIONS 1 AND 2: 0
1. LITTLE INTEREST OR PLEASURE IN DOING THINGS: NOT AT ALL
2. FEELING DOWN, DEPRESSED OR HOPELESS: NOT AT ALL

## 2023-11-19 ASSESSMENT — ACTIVITIES OF DAILY LIVING (ADL)
DOING_HOUSEWORK: INDEPENDENT
TAKING_MEDICATION: INDEPENDENT
DRESSING: INDEPENDENT
MANAGING_FINANCES: INDEPENDENT
GROCERY_SHOPPING: INDEPENDENT
BATHING: INDEPENDENT

## 2023-12-13 ENCOUNTER — ANTICOAGULATION - WARFARIN VISIT (OUTPATIENT)
Dept: CARDIOLOGY | Facility: CLINIC | Age: 83
End: 2023-12-13
Payer: MEDICARE

## 2023-12-13 DIAGNOSIS — I48.91 ATRIAL FIBRILLATION, UNSPECIFIED TYPE (MULTI): ICD-10-CM

## 2023-12-13 LAB
POC INR: 2.2
POC PROTHROMBIN TIME: NORMAL

## 2023-12-13 PROCEDURE — 85610 PROTHROMBIN TIME: CPT | Mod: QW

## 2023-12-13 NOTE — PROGRESS NOTES
Patient identification verified with 2 identifiers.    Location: [facility] at [facility] - [facility] [address] [address] [phone] option #1     Referring Physician: SHAUNA  Enrollment/ Re-enrollment date: 24   INR Goal: 2.0-3.0  INR monitoring is per [facility] protocol.  Anticoagulation Medication: warfarin  Indication: Atrial Fibrillation/Atrial Flutter    Subjective   Bleeding signs/symptoms: No    Bruising: No   Major bleeding event: No  Thrombosis signs/symptoms: No  Thromboembolic event: No  Missed doses: No  Extra doses: No  Medication changes: No  Dietary changes: No  Change in health: No  Change in activity: No  Alcohol: No  Other concerns: No    Upcoming Surgeries:  Does the Patient Have any upcoming surgeries that require interruption in anticoagulation therapy? no  Does the patient require bridging? no      Anticoagulation Summary  As of 2023      INR goal:  2.0-3.0   TTR:  100.0 % (2.5 mo)   INR used for dosin.20 (2023)   Weekly warfarin total:  30 mg               Assessment/Plan   Therapeutic     1. New dose: no change    2. Next INR: 5 weeks as pt will be out of town in 4 weeks    Education provided to patient during the visit:  Patient instructed to call in interim with questions, concerns and changes.

## 2024-01-05 ENCOUNTER — LAB (OUTPATIENT)
Dept: LAB | Facility: LAB | Age: 84
End: 2024-01-05
Payer: MEDICARE

## 2024-01-05 DIAGNOSIS — D64.9 ANEMIA, UNSPECIFIED TYPE: ICD-10-CM

## 2024-01-05 DIAGNOSIS — R73.9 HYPERGLYCEMIA: ICD-10-CM

## 2024-01-05 LAB
BASOPHILS # BLD AUTO: 0.03 X10*3/UL (ref 0–0.1)
BASOPHILS NFR BLD AUTO: 0.6 %
EOSINOPHIL # BLD AUTO: 0.07 X10*3/UL (ref 0–0.4)
EOSINOPHIL NFR BLD AUTO: 1.4 %
ERYTHROCYTE [DISTWIDTH] IN BLOOD BY AUTOMATED COUNT: 13.8 % (ref 11.5–14.5)
EST. AVERAGE GLUCOSE BLD GHB EST-MCNC: 103 MG/DL
HBA1C MFR BLD: 5.2 %
HCT VFR BLD AUTO: 39.5 % (ref 41–52)
HGB BLD-MCNC: 13 G/DL (ref 13.5–17.5)
IMM GRANULOCYTES # BLD AUTO: 0.01 X10*3/UL (ref 0–0.5)
IMM GRANULOCYTES NFR BLD AUTO: 0.2 % (ref 0–0.9)
LYMPHOCYTES # BLD AUTO: 1.48 X10*3/UL (ref 0.8–3)
LYMPHOCYTES NFR BLD AUTO: 29.2 %
MCH RBC QN AUTO: 33.9 PG (ref 26–34)
MCHC RBC AUTO-ENTMCNC: 32.9 G/DL (ref 32–36)
MCV RBC AUTO: 103 FL (ref 80–100)
MONOCYTES # BLD AUTO: 0.51 X10*3/UL (ref 0.05–0.8)
MONOCYTES NFR BLD AUTO: 10.1 %
NEUTROPHILS # BLD AUTO: 2.96 X10*3/UL (ref 1.6–5.5)
NEUTROPHILS NFR BLD AUTO: 58.5 %
NRBC BLD-RTO: 0 /100 WBCS (ref 0–0)
PLATELET # BLD AUTO: 110 X10*3/UL (ref 150–450)
RBC # BLD AUTO: 3.84 X10*6/UL (ref 4.5–5.9)
WBC # BLD AUTO: 5.1 X10*3/UL (ref 4.4–11.3)

## 2024-01-05 PROCEDURE — 85025 COMPLETE CBC W/AUTO DIFF WBC: CPT

## 2024-01-05 PROCEDURE — 83036 HEMOGLOBIN GLYCOSYLATED A1C: CPT

## 2024-01-05 PROCEDURE — 36415 COLL VENOUS BLD VENIPUNCTURE: CPT

## 2024-01-07 DIAGNOSIS — D64.9 ANEMIA, UNSPECIFIED TYPE: Primary | ICD-10-CM

## 2024-01-10 ENCOUNTER — TELEPHONE (OUTPATIENT)
Dept: PRIMARY CARE | Facility: CLINIC | Age: 84
End: 2024-01-10
Payer: MEDICARE

## 2024-01-10 NOTE — TELEPHONE ENCOUNTER
----- Message from Kellee Burris DO sent at 1/7/2024  9:18 PM EST -----  Report to pt his hgb is up to 13.0 so still slightly anemic and his platelets are low.  Placed order for him to be redrawn in Feb prior to my seeing him 3/1/2024 and if still low will have him see hematologist

## 2024-01-17 ENCOUNTER — ANTICOAGULATION - WARFARIN VISIT (OUTPATIENT)
Dept: CARDIOLOGY | Facility: CLINIC | Age: 84
End: 2024-01-17
Payer: MEDICARE

## 2024-01-17 DIAGNOSIS — I48.91 ATRIAL FIBRILLATION, UNSPECIFIED TYPE (MULTI): ICD-10-CM

## 2024-01-17 LAB
POC INR: 2.4
POC PROTHROMBIN TIME: NORMAL

## 2024-01-17 PROCEDURE — 99211 OFF/OP EST MAY X REQ PHY/QHP: CPT | Performed by: INTERNAL MEDICINE

## 2024-01-17 PROCEDURE — 85610 PROTHROMBIN TIME: CPT | Mod: QW

## 2024-01-17 NOTE — PROGRESS NOTES
Patient identification verified with 2 identifiers.    Location: [facility] at [facility] - [facility] [address] [address] [phone] option #1     Referring Physician: SHAUNA  Enrollment/ Re-enrollment date: 24   INR Goal: 2.0-3.0  INR monitoring is per [facility] protocol.  Anticoagulation Medication: warfarin  Indication: Atrial Fibrillation/Atrial Flutter    Subjective   Bleeding signs/symptoms: No    Bruising: No   Major bleeding event: No  Thrombosis signs/symptoms: No  Thromboembolic event: No  Missed doses: No  Extra doses: No  Medication changes: No  Dietary changes: No  Change in health: No  Change in activity: No  Alcohol: No  Other concerns: No    Upcoming Surgeries:  Does the Patient Have any upcoming surgeries that require interruption in anticoagulation therapy? no  Does the patient require bridging? no      Anticoagulation Summary  As of 2024      INR goal:  2.0-3.0   TTR:  100.0 % (3.6 mo)   INR used for dosin.40 (2024)   Weekly warfarin total:  30 mg               Assessment/Plan   Therapeutic     1. New dose: no change    2. Next INR: 4 weeks     Education provided to patient during the visit:  Patient instructed to call in interim with questions, concerns and changes.

## 2024-01-22 DIAGNOSIS — I48.91 ATRIAL FIBRILLATION, UNSPECIFIED TYPE (MULTI): ICD-10-CM

## 2024-01-22 RX ORDER — METOPROLOL TARTRATE 100 MG/1
100 TABLET ORAL 2 TIMES DAILY
Qty: 90 TABLET | Refills: 3 | Status: SHIPPED | OUTPATIENT
Start: 2024-01-22 | End: 2024-01-23 | Stop reason: SDUPTHER

## 2024-01-23 DIAGNOSIS — I48.91 ATRIAL FIBRILLATION, UNSPECIFIED TYPE (MULTI): ICD-10-CM

## 2024-01-23 RX ORDER — METOPROLOL TARTRATE 100 MG/1
100 TABLET ORAL 2 TIMES DAILY
Qty: 180 TABLET | Refills: 3 | Status: SHIPPED | OUTPATIENT
Start: 2024-01-23

## 2024-02-08 ENCOUNTER — LAB (OUTPATIENT)
Dept: LAB | Facility: LAB | Age: 84
End: 2024-02-08
Payer: MEDICARE

## 2024-02-08 DIAGNOSIS — E78.00 ELEVATED LDL CHOLESTEROL LEVEL: ICD-10-CM

## 2024-02-08 DIAGNOSIS — D64.9 ANEMIA, UNSPECIFIED TYPE: ICD-10-CM

## 2024-02-08 DIAGNOSIS — R73.9 HYPERGLYCEMIA: ICD-10-CM

## 2024-02-08 LAB
ALBUMIN SERPL BCP-MCNC: 3.7 G/DL (ref 3.4–5)
ALP SERPL-CCNC: 120 U/L (ref 33–136)
ALT SERPL W P-5'-P-CCNC: 19 U/L (ref 10–52)
ANION GAP SERPL CALC-SCNC: 11 MMOL/L (ref 10–20)
AST SERPL W P-5'-P-CCNC: 8 U/L (ref 9–39)
BASOPHILS # BLD AUTO: 0.03 X10*3/UL (ref 0–0.1)
BASOPHILS NFR BLD AUTO: 0.5 %
BILIRUB SERPL-MCNC: 1.3 MG/DL (ref 0–1.2)
BUN SERPL-MCNC: 13 MG/DL (ref 6–23)
CALCIUM SERPL-MCNC: 9.1 MG/DL (ref 8.6–10.6)
CHLORIDE SERPL-SCNC: 102 MMOL/L (ref 98–107)
CHOLEST SERPL-MCNC: 109 MG/DL (ref 0–199)
CHOLESTEROL/HDL RATIO: 1.8
CO2 SERPL-SCNC: 30 MMOL/L (ref 21–32)
CREAT SERPL-MCNC: 0.75 MG/DL (ref 0.5–1.3)
EGFRCR SERPLBLD CKD-EPI 2021: 90 ML/MIN/1.73M*2
EOSINOPHIL # BLD AUTO: 0.07 X10*3/UL (ref 0–0.4)
EOSINOPHIL NFR BLD AUTO: 1.2 %
ERYTHROCYTE [DISTWIDTH] IN BLOOD BY AUTOMATED COUNT: 13.8 % (ref 11.5–14.5)
GLUCOSE SERPL-MCNC: 96 MG/DL (ref 74–99)
HCT VFR BLD AUTO: 39.9 % (ref 41–52)
HDLC SERPL-MCNC: 60 MG/DL
HGB BLD-MCNC: 12.9 G/DL (ref 13.5–17.5)
IMM GRANULOCYTES # BLD AUTO: 0.03 X10*3/UL (ref 0–0.5)
IMM GRANULOCYTES NFR BLD AUTO: 0.5 % (ref 0–0.9)
LDLC SERPL CALC-MCNC: 39 MG/DL
LYMPHOCYTES # BLD AUTO: 1.45 X10*3/UL (ref 0.8–3)
LYMPHOCYTES NFR BLD AUTO: 25.6 %
MCH RBC QN AUTO: 32.9 PG (ref 26–34)
MCHC RBC AUTO-ENTMCNC: 32.3 G/DL (ref 32–36)
MCV RBC AUTO: 102 FL (ref 80–100)
MONOCYTES # BLD AUTO: 0.55 X10*3/UL (ref 0.05–0.8)
MONOCYTES NFR BLD AUTO: 9.7 %
NEUTROPHILS # BLD AUTO: 3.53 X10*3/UL (ref 1.6–5.5)
NEUTROPHILS NFR BLD AUTO: 62.5 %
NON HDL CHOLESTEROL: 49 MG/DL (ref 0–149)
NRBC BLD-RTO: 0 /100 WBCS (ref 0–0)
PLATELET # BLD AUTO: 111 X10*3/UL (ref 150–450)
POTASSIUM SERPL-SCNC: 4 MMOL/L (ref 3.5–5.3)
PROT SERPL-MCNC: 6.7 G/DL (ref 6.4–8.2)
RBC # BLD AUTO: 3.92 X10*6/UL (ref 4.5–5.9)
SODIUM SERPL-SCNC: 139 MMOL/L (ref 136–145)
TRIGL SERPL-MCNC: 51 MG/DL (ref 0–149)
VLDL: 10 MG/DL (ref 0–40)
WBC # BLD AUTO: 5.7 X10*3/UL (ref 4.4–11.3)

## 2024-02-08 PROCEDURE — 85025 COMPLETE CBC W/AUTO DIFF WBC: CPT

## 2024-02-08 PROCEDURE — 80053 COMPREHEN METABOLIC PANEL: CPT

## 2024-02-08 PROCEDURE — 36415 COLL VENOUS BLD VENIPUNCTURE: CPT

## 2024-02-08 PROCEDURE — 80061 LIPID PANEL: CPT

## 2024-02-14 ENCOUNTER — ANTICOAGULATION - WARFARIN VISIT (OUTPATIENT)
Dept: CARDIOLOGY | Facility: CLINIC | Age: 84
End: 2024-02-14
Payer: MEDICARE

## 2024-02-14 DIAGNOSIS — I48.91 ATRIAL FIBRILLATION, UNSPECIFIED TYPE (MULTI): ICD-10-CM

## 2024-02-14 LAB
POC INR: 2.1
POC PROTHROMBIN TIME: NORMAL

## 2024-02-14 PROCEDURE — 85610 PROTHROMBIN TIME: CPT | Mod: QW

## 2024-02-14 PROCEDURE — 99211 OFF/OP EST MAY X REQ PHY/QHP: CPT | Performed by: INTERNAL MEDICINE

## 2024-02-14 NOTE — PROGRESS NOTES
Patient identification verified with 2 identifiers.    Location: [facility] at [facility] - [facility] [address] [address] [phone] option #1     Referring Physician: SHAUNA  Enrollment/ Re-enrollment date: 24   INR Goal: 2.0-3.0  INR monitoring is per [facility] protocol.  Anticoagulation Medication: warfarin  Indication: Atrial Fibrillation/Atrial Flutter    Subjective   Bleeding signs/symptoms: No    Bruising: No   Major bleeding event: No  Thrombosis signs/symptoms: No  Thromboembolic event: No  Missed doses: No  Extra doses: No  Medication changes: No  Dietary changes: No  Change in health: No  Change in activity: No  Alcohol: No  Other concerns: No    Upcoming Surgeries:  Does the Patient Have any upcoming surgeries that require interruption in anticoagulation therapy? no  Does the patient require bridging? no      Anticoagulation Summary  As of 2024      INR goal:  2.0-3.0   TTR:  100.0 % (4.6 mo)   INR used for dosin.10 (2024)   Weekly warfarin total:  30 mg               Assessment/Plan   Therapeutic     1. New dose: no change    2. Next INR: 4 weeks     Education provided to patient during the visit:  Patient instructed to call in interim with questions, concerns and changes.

## 2024-02-23 DIAGNOSIS — E78.00 ELEVATED LDL CHOLESTEROL LEVEL: Primary | ICD-10-CM

## 2024-02-23 RX ORDER — ATORVASTATIN CALCIUM 40 MG/1
40 TABLET, FILM COATED ORAL NIGHTLY
Qty: 90 TABLET | Refills: 3 | Status: SHIPPED | OUTPATIENT
Start: 2024-02-23 | End: 2025-02-22

## 2024-03-01 ENCOUNTER — OFFICE VISIT (OUTPATIENT)
Dept: PRIMARY CARE | Facility: CLINIC | Age: 84
End: 2024-03-01
Payer: MEDICARE

## 2024-03-01 VITALS
HEIGHT: 70 IN | WEIGHT: 190 LBS | RESPIRATION RATE: 20 BRPM | DIASTOLIC BLOOD PRESSURE: 80 MMHG | HEART RATE: 74 BPM | SYSTOLIC BLOOD PRESSURE: 126 MMHG | OXYGEN SATURATION: 94 % | TEMPERATURE: 97.5 F | BODY MASS INDEX: 27.2 KG/M2

## 2024-03-01 DIAGNOSIS — R97.20 ELEVATED PSA: ICD-10-CM

## 2024-03-01 DIAGNOSIS — Z79.01 ON WARFARIN THERAPY: ICD-10-CM

## 2024-03-01 DIAGNOSIS — D64.9 ANEMIA, UNSPECIFIED TYPE: ICD-10-CM

## 2024-03-01 DIAGNOSIS — R73.9 HYPERGLYCEMIA: ICD-10-CM

## 2024-03-01 DIAGNOSIS — R39.9 LOWER URINARY TRACT SYMPTOMS: ICD-10-CM

## 2024-03-01 DIAGNOSIS — J06.9 UPPER RESPIRATORY TRACT INFECTION, UNSPECIFIED TYPE: Primary | ICD-10-CM

## 2024-03-01 DIAGNOSIS — R05.1 ACUTE COUGH: ICD-10-CM

## 2024-03-01 DIAGNOSIS — I48.91 ATRIAL FIBRILLATION, UNSPECIFIED TYPE (MULTI): ICD-10-CM

## 2024-03-01 DIAGNOSIS — D69.6 LOW PLATELET COUNT (CMS-HCC): ICD-10-CM

## 2024-03-01 DIAGNOSIS — I77.810 AORTIC ROOT DILATATION (CMS-HCC): ICD-10-CM

## 2024-03-01 LAB
POC BINAX EXPIRATION: NORMAL
POC BINAX NOW COVID SERIAL NUMBER: NORMAL
POC SARS-COV-2 AG BINAX: NORMAL

## 2024-03-01 PROCEDURE — 1159F MED LIST DOCD IN RCRD: CPT | Performed by: FAMILY MEDICINE

## 2024-03-01 PROCEDURE — 1126F AMNT PAIN NOTED NONE PRSNT: CPT | Performed by: FAMILY MEDICINE

## 2024-03-01 PROCEDURE — 87811 SARS-COV-2 COVID19 W/OPTIC: CPT | Performed by: FAMILY MEDICINE

## 2024-03-01 PROCEDURE — 99214 OFFICE O/P EST MOD 30 MIN: CPT | Performed by: FAMILY MEDICINE

## 2024-03-01 PROCEDURE — 1036F TOBACCO NON-USER: CPT | Performed by: FAMILY MEDICINE

## 2024-03-01 PROCEDURE — 1123F ACP DISCUSS/DSCN MKR DOCD: CPT | Performed by: FAMILY MEDICINE

## 2024-03-01 ASSESSMENT — PATIENT HEALTH QUESTIONNAIRE - PHQ9
2. FEELING DOWN, DEPRESSED OR HOPELESS: NOT AT ALL
SUM OF ALL RESPONSES TO PHQ9 QUESTIONS 1 AND 2: 0
1. LITTLE INTEREST OR PLEASURE IN DOING THINGS: NOT AT ALL

## 2024-03-01 ASSESSMENT — PAIN SCALES - GENERAL: PAINLEVEL: 0-NO PAIN

## 2024-03-01 NOTE — PROGRESS NOTES
"Subjective   Patient ID: Ming Bunch is a 83 y.o. male who presents for 6 month f/up.    HPI   The patient presents to the clinic for a 6-month follow-up. He has past medical history of atrial fibrillation, CAD, thrombocytopenia, BPH, and anemia.    The patient had labs done on 02/08/2024 and requests for them to be reviewed.  His lipids are in good range, tolerating statin well    The patient has mild cold-like (URI-like) symptoms, prior onset 3 days ago. He states that his symptoms started with right eye irritation a few days prior which he hought was allergies and went away.  . He reports symptoms of raspy voice and some post nasal drainage and a little clear nasal drainage over past 3 days.. He is not taking any OTC medication to treat these symptoms at this time. A COVID-19 test was conducted on the patient in the clinic today and the result was negative.  Has not had chills or fever, no cough or chest congestion or sob or wheezing    His blood pressure (126/80) was within normal range when checked in the clinic today. He continues on metoprolol tartrate 100 mg (2 times daily) and diltiazem  mg (once daily). He also continues on warfarin 5 mg daily except 1/2 tab on Wed and Sat. He states that all of these medications are working well and he denies any side-effects. He continues to visit his cardiology specialist (WENDY See)/ Dr Lee regularly.    He sees urology regularly also.  On Tamulosin and owrks well per pt    His most recent Cologuard screening was done in July 2023 and was neg.     Review of Systems    Objective   /80   Pulse 74   Temp 36.4 °C (97.5 °F)   Resp 20   Ht 1.778 m (5' 10\")   Wt 86.2 kg (190 lb)   SpO2 94%   BMI 27.26 kg/m²     Physical Exam  Constitutional:       Appearance: Normal appearance.   HENT:      Head: Normocephalic.      Right Ear: Tympanic membrane normal.      Left Ear: Tympanic membrane normal.      Mouth/Throat:      Comments: PP w mild erythema " and small amount PND  Cardiovascular:      Rate and Rhythm: Normal rate and regular rhythm.      Pulses: Normal pulses.      Heart sounds: Normal heart sounds.   Pulmonary:      Effort: Pulmonary effort is normal.      Breath sounds: Normal breath sounds.   Abdominal:      General: Bowel sounds are normal.      Palpations: Abdomen is soft. There is no mass.      Tenderness: There is no abdominal tenderness.   Musculoskeletal:         General: Normal range of motion.      Cervical back: Normal range of motion.      Right lower leg: No edema.      Left lower leg: No edema.   Lymphadenopathy:      Cervical: No cervical adenopathy.   Skin:     General: Skin is warm and dry.   Neurological:      General: No focal deficit present.      Mental Status: He is alert and oriented to person, place, and time.   Psychiatric:         Mood and Affect: Mood normal.         Thought Content: Thought content normal.         Judgment: Judgment normal.         Assessment/Plan   Problem List Items Addressed This Visit             ICD-10-CM    Anemia D64.9    Atrial fibrillation (CMS/HCC) I48.91    Elevated PSA R97.20    Hyperglycemia R73.9    Lower urinary tract symptoms R39.9    Aortic root dilatation (CMS/HCC) I77.810     Sees cardiology regularly and following for pt         On warfarin therapy Z79.01    Low platelet count (CMS/HCC) D69.6     Rechecked with recent labs, has been stable.          Other Visit Diagnoses         Codes    Upper respiratory tract infection, unspecified type    -  Primary J06.9    Acute cough     R05.1    Relevant Orders    POCT Bin[employer]NOW Covid-19 Ag Card manually resulted (Completed)               The patient's labs (02/08/2024) were reviewed. Chemistries were WNL with FBS of 96. Kidney function markers were WNL. Liver enzymes were mostly WNL, but AST (8) was below normal range and bilirubin (1.3) was slightly elevated. Cholesterol results were WNL (total cholesterol 109, HDL 60, LDL 39, triglycerides 51).  Blood count was mostly WNL, but patient had some mild anemia (RBC 3.92, hemoglobin 12.9, hematocrit 39.9, platelets 111).    He does not require medication refills at this time. He was instructed to continue taking current medication as previously directed.    In regards to concerns with raspy voice and URI-like symptoms, the patient was advised to try taking Mucinex DM or Robitussin DM to alleviate symptoms especially if cough starts.  He feels he is getting better, discussed w pt symptoms viral and if they do not resovle over next 5-7 days or if worsen or change will have him back and recheck.  He will call if needed. Continue monitoring symptoms for improvement/exacerbation.    Did covid test in office and was negative    He will follow-up in 6 months and will do medicare wellness at that time also unless otherwise needed.    Scribe Attestation  By signing my name below, I, Emmanuelle Hamilton   attest that this documentation has been prepared under the direction and in the presence of Kellee Burris DO.

## 2024-03-13 ENCOUNTER — ANTICOAGULATION - WARFARIN VISIT (OUTPATIENT)
Dept: CARDIOLOGY | Facility: CLINIC | Age: 84
End: 2024-03-13
Payer: MEDICARE

## 2024-03-13 DIAGNOSIS — I48.91 ATRIAL FIBRILLATION, UNSPECIFIED TYPE (MULTI): Primary | ICD-10-CM

## 2024-03-13 LAB
POC INR: 2.8
POC PROTHROMBIN TIME: NORMAL

## 2024-03-13 PROCEDURE — 99211 OFF/OP EST MAY X REQ PHY/QHP: CPT | Performed by: INTERNAL MEDICINE

## 2024-03-13 PROCEDURE — 85610 PROTHROMBIN TIME: CPT | Mod: QW

## 2024-03-13 NOTE — PROGRESS NOTES
Patient identification verified with 2 identifiers.    Location: [facility] at [facility] - [facility] [address] [address] [phone] option #1     Referring Physician: SHAUNA  Enrollment/ Re-enrollment date: 24  (sent 3/13)  INR Goal: 2.0-3.0  INR monitoring is per [facility] protocol.  Anticoagulation Medication: warfarin  Indication: Atrial Fibrillation/Atrial Flutter    Subjective   Bleeding signs/symptoms: No    Bruising: No   Major bleeding event: No  Thrombosis signs/symptoms: No  Thromboembolic event: No  Missed doses: No  Extra doses: No  Medication changes: No  Dietary changes: No  Change in health: No  Change in activity: No  Alcohol: No  Other concerns: No    Upcoming Surgeries:  Does the Patient Have any upcoming surgeries that require interruption in anticoagulation therapy? no  Does the patient require bridging? no      Anticoagulation Summary  As of 3/13/2024      INR goal:  2.0-3.0   TTR:  100.0 % (5.5 mo)   INR used for dosin.80 (3/13/2024)   Weekly warfarin total:  30 mg               Assessment/Plan   Therapeutic     1. New dose: no change    2. Next INR: 4 weeks     Education provided to patient during the visit:  Patient instructed to call in interim with questions, concerns and changes.

## 2024-04-10 ENCOUNTER — ANTICOAGULATION - WARFARIN VISIT (OUTPATIENT)
Dept: CARDIOLOGY | Facility: CLINIC | Age: 84
End: 2024-04-10
Payer: MEDICARE

## 2024-04-10 DIAGNOSIS — I48.91 ATRIAL FIBRILLATION, UNSPECIFIED TYPE (MULTI): Primary | ICD-10-CM

## 2024-04-10 LAB
POC INR: 2.1
POC PROTHROMBIN TIME: NORMAL

## 2024-04-10 PROCEDURE — 85610 PROTHROMBIN TIME: CPT | Mod: QW

## 2024-04-10 PROCEDURE — 99211 OFF/OP EST MAY X REQ PHY/QHP: CPT | Performed by: INTERNAL MEDICINE

## 2024-04-10 NOTE — PROGRESS NOTES
Patient identification verified with 2 identifiers.    Location: [facility] at [facility] - [facility] [address] [address] [phone] option #1     Referring Physician: SHAUNA  Enrollment/ Re-enrollment date: 24  (sent 3/13)  INR Goal: 2.0-3.0  INR monitoring is per [facility] protocol.  Anticoagulation Medication: warfarin  Indication: Atrial Fibrillation/Atrial Flutter    Subjective   Bleeding signs/symptoms:      Bruising:     Major bleeding event:    Thrombosis signs/symptoms:    Thromboembolic event:    Missed doses:    Extra doses:    Medication changes:    Dietary changes:    Change in health:    Change in activity:    Alcohol:    Other concerns:      Upcoming Surgeries:  Does the Patient Have any upcoming surgeries that require interruption in anticoagulation therapy? no  Does the patient require bridging? no      Anticoagulation Summary  As of 4/10/2024      INR goal:  2.0-3.0   TTR:  100.0% (6.4 mo)   INR used for dosin.10 (4/10/2024)   Weekly warfarin total:  30 mg               Assessment/Plan   Therapeutic     1. New dose: no change    2. Next INR: 4 weeks     Education provided to patient during the visit:  Patient instructed to call in interim with questions, concerns and changes.

## 2024-05-08 ENCOUNTER — ANTICOAGULATION - WARFARIN VISIT (OUTPATIENT)
Dept: CARDIOLOGY | Facility: CLINIC | Age: 84
End: 2024-05-08
Payer: MEDICARE

## 2024-05-08 DIAGNOSIS — I48.91 ATRIAL FIBRILLATION, UNSPECIFIED TYPE (MULTI): Primary | ICD-10-CM

## 2024-05-08 LAB
POC INR: 1.9
POC PROTHROMBIN TIME: NORMAL

## 2024-05-08 PROCEDURE — 99211 OFF/OP EST MAY X REQ PHY/QHP: CPT

## 2024-05-08 NOTE — PROGRESS NOTES
Patient identification verified with 2 identifiers.    Location: [facility] at [facility] - [facility] [address] [address] [phone] option #1     Referring Physician: SHAUNA  Enrollment/ Re-enrollment date: 24  (sent 3/13)  INR Goal: 2.0-3.0  INR monitoring is per [facility] protocol.  Anticoagulation Medication: warfarin  Indication: Atrial Fibrillation/Atrial Flutter    Subjective   Bleeding signs/symptoms: No    Bruising: No   Major bleeding event: No  Thrombosis signs/symptoms: No  Thromboembolic event: No  Missed doses: No  Extra doses: No  Medication changes: No  Dietary changes: No  Change in health: No  Change in activity: No  Alcohol: No  Other concerns: No    Upcoming Surgeries:  Does the Patient Have any upcoming surgeries that require interruption in anticoagulation therapy? no  Does the patient require bridging? no      Anticoagulation Summary  As of 2024      INR goal:  2.0-3.0   TTR:  93.6% (7.4 mo)   INR used for dosin.90 (2024)   Weekly warfarin total:  30 mg               Assessment/Plan   Subtherapeutic   1. New dose: no change as pt has been monthly on this dose   2. Next INR: 1 week     Education provided to patient during the visit:  Patient instructed to call in interim with questions, concerns and changes.

## 2024-05-15 ENCOUNTER — ANTICOAGULATION - WARFARIN VISIT (OUTPATIENT)
Dept: CARDIOLOGY | Facility: CLINIC | Age: 84
End: 2024-05-15
Payer: MEDICARE

## 2024-05-15 DIAGNOSIS — I48.91 ATRIAL FIBRILLATION, UNSPECIFIED TYPE (MULTI): Primary | ICD-10-CM

## 2024-05-15 LAB
POC INR: 2.2
POC PROTHROMBIN TIME: NORMAL

## 2024-05-15 PROCEDURE — 99211 OFF/OP EST MAY X REQ PHY/QHP: CPT

## 2024-05-15 PROCEDURE — 85610 PROTHROMBIN TIME: CPT | Mod: QW

## 2024-05-15 NOTE — PROGRESS NOTES
Patient identification verified with 2 identifiers.    Location: [facility] at [facility] - [facility] [address] [address] [phone] option #1     Referring Physician: DR. VILLATORO  Enrollment/ Re-enrollment date: 24 RENEWAL ESENT ON 5/15/24   INR Goal: 2.0-3.0  INR monitoring is per [facility] protocol.  Anticoagulation Medication: warfarin  Indication: Atrial Fibrillation/Atrial Flutter    Subjective   Bleeding signs/symptoms: No    Bruising: No   Major bleeding event: No  Thrombosis signs/symptoms: No  Thromboembolic event: No  Missed doses: No  Extra doses: No  Medication changes: No  Dietary changes: No  Change in health: No  Change in activity: No  Alcohol: No  Other concerns: No    Upcoming Procedures:  Does the Patient Have any upcoming procedures that require interruption in anticoagulation therapy? no  Does the patient require bridging? no      Anticoagulation Summary  As of 5/15/2024      INR goal:  2.0-3.0   TTR:  92.7% (7.6 mo)   INR used for dosin.20 (5/15/2024)   Weekly warfarin total:  30 mg               Assessment/Plan   Therapeutic     1. New dose: no change    2. Next INR: 1 month      Education provided to patient during the visit:  Patient instructed to call in interim with questions, concerns and changes.   Patient educated on dietary consistency in vitamin k consumption.

## 2024-06-12 ENCOUNTER — ANTICOAGULATION - WARFARIN VISIT (OUTPATIENT)
Dept: CARDIOLOGY | Facility: CLINIC | Age: 84
End: 2024-06-12
Payer: MEDICARE

## 2024-06-12 DIAGNOSIS — I48.91 ATRIAL FIBRILLATION, UNSPECIFIED TYPE (MULTI): Primary | ICD-10-CM

## 2024-06-12 LAB
POC INR: 2.6
POC PROTHROMBIN TIME: NORMAL

## 2024-06-12 PROCEDURE — 99211 OFF/OP EST MAY X REQ PHY/QHP: CPT

## 2024-06-12 PROCEDURE — 85610 PROTHROMBIN TIME: CPT | Mod: QW

## 2024-06-12 NOTE — PROGRESS NOTES
Patient identification verified with 2 identifiers.    Location: [facility] at [facility] - [facility] [address] [address] [phone] option #1     Referring Physician: DR. VILLATORO  Enrollment/ Re-enrollment date: 25   INR Goal: 2.0-3.0  INR monitoring is per [facility] protocol.  Anticoagulation Medication: warfarin  Indication: Atrial Fibrillation/Atrial Flutter    Subjective   Bleeding signs/symptoms: No    Bruising: No   Major bleeding event: No  Thrombosis signs/symptoms: No  Thromboembolic event: No  Missed doses: No  Extra doses: No  Medication changes: No  Dietary changes: No  Change in health: No  Change in activity: No  Alcohol: No  Other concerns: No    Upcoming Procedures:  Does the Patient Have any upcoming procedures that require interruption in anticoagulation therapy? no  Does the patient require bridging? no      Anticoagulation Summary  As of 2024      INR goal:  2.0-3.0   TTR:  93.5% (8.5 mo)   INR used for dosin.60 (2024)   Weekly warfarin total:  30 mg               Assessment/Plan   Therapeutic     1. New dose: no change    2. Next INR: 1 month      Education provided to patient during the visit:  Patient instructed to call in interim with questions, concerns and changes.   Patient educated on dietary consistency in vitamin k consumption.

## 2024-07-10 ENCOUNTER — ANTICOAGULATION - WARFARIN VISIT (OUTPATIENT)
Dept: CARDIOLOGY | Facility: CLINIC | Age: 84
End: 2024-07-10
Payer: MEDICARE

## 2024-07-10 ENCOUNTER — APPOINTMENT (OUTPATIENT)
Dept: UROLOGY | Facility: HOSPITAL | Age: 84
End: 2024-07-10
Payer: MEDICARE

## 2024-07-10 DIAGNOSIS — I48.91 ATRIAL FIBRILLATION, UNSPECIFIED TYPE (MULTI): Primary | ICD-10-CM

## 2024-07-10 LAB
POC INR: 2
POC PROTHROMBIN TIME: NORMAL

## 2024-07-10 PROCEDURE — 99211 OFF/OP EST MAY X REQ PHY/QHP: CPT

## 2024-07-10 PROCEDURE — 85610 PROTHROMBIN TIME: CPT | Mod: QW

## 2024-07-10 NOTE — PROGRESS NOTES
Patient identification verified with 2 identifiers.    Location: [facility] at [facility] - [address] [address] [phone] option #1     Referring Physician: MEERA VILLATORO  Enrollment/ Re-enrollment date: 2025   INR Goal: 2.0-3.0  INR monitoring is per [facility] protocol.  Anticoagulation Medication: warfarin  Indication: Atrial Fibrillation/Atrial Flutter    Subjective   Bleeding signs/symptoms: No    Bruising: No   Major bleeding event: No  Thrombosis signs/symptoms: No  Thromboembolic event: No  Missed doses: No  Extra doses: No  Medication changes: No  Dietary changes: No  Change in health: No  Change in activity: No  Alcohol: No  Other concerns: No    Upcoming Procedures:  Does the Patient Have any upcoming procedures that require interruption in anticoagulation therapy? no  Does the patient require bridging? no      Anticoagulation Summary  As of 7/10/2024      INR goal:  2.0-3.0   TTR:  94.2% (9.5 mo)   INR used for dosin.00 (7/10/2024)   Weekly warfarin total:  30 mg               Assessment/Plan   Therapeutic     1. New dose: no change    2. Next INR: 5 WEEKS, PT WILL BE OUT OF STATE      Education provided to patient during the visit:  Patient instructed to call in interim with questions, concerns and changes.   Patient educated on interactions between medications and warfarin.   Patient educated on dietary consistency in vitamin k consumption.   Patient educated on affects of alcohol consumption while taking warfarin.   Patient educated on signs of bleeding/clotting.

## 2024-07-24 ENCOUNTER — OFFICE VISIT (OUTPATIENT)
Dept: CARDIOLOGY | Facility: CLINIC | Age: 84
End: 2024-07-24
Payer: MEDICARE

## 2024-07-24 VITALS
BODY MASS INDEX: 27.28 KG/M2 | HEART RATE: 75 BPM | OXYGEN SATURATION: 97 % | SYSTOLIC BLOOD PRESSURE: 120 MMHG | HEIGHT: 69 IN | DIASTOLIC BLOOD PRESSURE: 71 MMHG | WEIGHT: 184.2 LBS

## 2024-07-24 DIAGNOSIS — I48.91 ATRIAL FIBRILLATION, UNSPECIFIED TYPE (MULTI): Primary | ICD-10-CM

## 2024-07-24 DIAGNOSIS — R94.31 ABNORMAL ELECTROCARDIOGRAM (ECG) (EKG): ICD-10-CM

## 2024-07-24 PROCEDURE — 99214 OFFICE O/P EST MOD 30 MIN: CPT | Performed by: NURSE PRACTITIONER

## 2024-07-24 PROCEDURE — 1126F AMNT PAIN NOTED NONE PRSNT: CPT | Performed by: NURSE PRACTITIONER

## 2024-07-24 PROCEDURE — 1159F MED LIST DOCD IN RCRD: CPT | Performed by: NURSE PRACTITIONER

## 2024-07-24 PROCEDURE — 1123F ACP DISCUSS/DSCN MKR DOCD: CPT | Performed by: NURSE PRACTITIONER

## 2024-07-24 PROCEDURE — 1036F TOBACCO NON-USER: CPT | Performed by: NURSE PRACTITIONER

## 2024-07-24 PROCEDURE — 1160F RVW MEDS BY RX/DR IN RCRD: CPT | Performed by: NURSE PRACTITIONER

## 2024-07-24 ASSESSMENT — ENCOUNTER SYMPTOMS
NEUROLOGICAL NEGATIVE: 1
DEPRESSION: 0
CARDIOVASCULAR NEGATIVE: 1
RESPIRATORY NEGATIVE: 1
OCCASIONAL FEELINGS OF UNSTEADINESS: 0
GASTROINTESTINAL NEGATIVE: 1
LOSS OF SENSATION IN FEET: 0
MUSCULOSKELETAL NEGATIVE: 1

## 2024-07-24 ASSESSMENT — PAIN SCALES - GENERAL: PAINLEVEL: 0-NO PAIN

## 2024-07-24 NOTE — PROGRESS NOTES
"Chief Complaint:   Follow-up    History Of Present Illness:    .Mr Bunch returns in follow up.  Denies chest pain, sob, palpitations or pedal edema.  He is fatigued, but plays 18 holes of golf at least once a week.          Last Recorded Vitals:  Blood pressure 120/71, pulse 75, height 1.753 m (5' 9\"), weight 83.6 kg (184 lb 3.2 oz), SpO2 97%.     Past Medical History:  Past Medical History:   Diagnosis Date    Personal history of diseases of the blood and blood-forming organs and certain disorders involving the immune mechanism 06/09/2021    History of thrombocytopenia    Shortness of breath 02/19/2020    Shortness of breath on exertion        Past Surgical History:  Past Surgical History:   Procedure Laterality Date    APPENDECTOMY  09/11/2013    Appendectomy    COLONOSCOPY  06/25/2013    Complete Colonoscopy    OTHER SURGICAL HISTORY  04/22/2015    Removal Of Lesion    PILONIDAL CYST DRAINAGE  09/11/2013    Pilonidal Cyst Resection       Social History:  Social History     Socioeconomic History    Marital status:    Tobacco Use    Smoking status: Never    Smokeless tobacco: Never   Substance and Sexual Activity    Alcohol use: Yes     Alcohol/week: 10.0 standard drinks of alcohol     Types: 10 Standard drinks or equivalent per week    Drug use: Not Currently       Family History:  Family History   Problem Relation Name Age of Onset    Heart failure Mother      Atrial fibrillation Sister           Allergies:  Patient has no known allergies.    Outpatient Medications:  Current Outpatient Medications   Medication Sig Dispense Refill    aspirin 81 mg EC tablet Take 1 tablet (81 mg) by mouth once daily.      atorvastatin (Lipitor) 40 mg tablet Take 1 tablet (40 mg) by mouth once daily at bedtime. 90 tablet 3    dilTIAZem CD (Cardizem CD) 240 mg 24 hr capsule Take 1 capsule (240 mg) by mouth once daily.      metoprolol tartrate (Lopressor) 100 mg tablet Take 1 tablet (100 mg) by mouth 2 times a day. 180 tablet " 3    tamsulosin (Flomax) 0.4 mg 24 hr capsule Take 2 capsules (0.8 mg) by mouth once daily. 180 capsule 3    warfarin (Coumadin) 5 mg tablet Take 1 tablet (5 mg) by mouth once daily.       No current facility-administered medications for this visit.        Physical Exam:  Cardiovascular:      PMI at left midclavicular line. Normal rate. Regular rhythm. Normal S1. Normal S2.       Murmurs: There is no murmur.      No gallop.  No click. No rub.   Pulses:     Intact distal pulses.   Edema:     Peripheral edema absent.         ROS:  Review of Systems   Constitutional: Positive for malaise/fatigue.   Cardiovascular: Negative.    Respiratory: Negative.     Skin: Negative.    Musculoskeletal: Negative.    Gastrointestinal: Negative.    Genitourinary: Negative.    Neurological: Negative.           Last Labs:  CBC -  Lab Results   Component Value Date    WBC 5.7 02/08/2024    HGB 12.9 (L) 02/08/2024    HCT 39.9 (L) 02/08/2024     (H) 02/08/2024     (L) 02/08/2024       CMP -  Lab Results   Component Value Date    CALCIUM 9.1 02/08/2024    PROT 6.7 02/08/2024    ALBUMIN 3.7 02/08/2024    AST 8 (L) 02/08/2024    ALT 19 02/08/2024    ALKPHOS 120 02/08/2024    BILITOT 1.3 (H) 02/08/2024       LIPID PANEL -   Lab Results   Component Value Date    CHOL 109 02/08/2024    TRIG 51 02/08/2024    HDL 60.0 02/08/2024    CHHDL 1.8 02/08/2024    LDLF 37 06/05/2023    VLDL 10 02/08/2024    NHDL 49 02/08/2024       RENAL FUNCTION PANEL -   Lab Results   Component Value Date    GLUCOSE 96 02/08/2024     02/08/2024    K 4.0 02/08/2024     02/08/2024    CO2 30 02/08/2024    ANIONGAP 11 02/08/2024    BUN 13 02/08/2024    CREATININE 0.75 02/08/2024    GFRMALE 87 06/05/2023    CALCIUM 9.1 02/08/2024    ALBUMIN 3.7 02/08/2024        Lab Results   Component Value Date    HGBA1C 5.2 01/05/2024         Assessment/Plan   Problem List Items Addressed This Visit    None      Assessment:     1. Low-grade CAD by cardiac cath,  09/15/2007. The patient is doing well with no unusual chest discomfort. At the time of his catheterization, he was found to have normal coronary arteries other than for minor 20% narrowing.of the diagonal branch. This patient was seen at urgent care on 9/7/2015 with atypical chest pain that occurred when every he would take a deep breath. EKG evidently did not demonstrate any ST segment abnormality. His chest x-ray showed mild interstitial prominence. The amylase and lipase values were normal and his liver function panel was also normal. Cardiac enzymes were negative. The SMA panel included a creatinine of 0.77 a CBC included hematocrit of 43.3. At this point will postpone repeat nuclear stress testing. Patient denies chest pain at today's office visit.   2. Chronic atrial fibrillation. At the time of a previous visit the patient was noted to have a somewhat rapid ventricular response to his atrial fibrillation despite being on metoprolol 100 mg twice a day. At that time the metoprolol dose was increased to 150 mg twice a day. Since that time the patient has remained generally fatigued with some vague muscle aching. Due to the perceived side effects the dose of metoprolol will be reduced back to 100 mg twice a day and he was started on Cardizem  mg daily to assist in ventricular rate control. e subsequently had the dose of Cardizem CD reduced to 120 mg daily. e did have an echocardiogram performed 11/4/2014 which demonstrated normal LV chamber size with low normal LV ejection fraction of 55%. There was moderate to severe dilatation of the left atrium. There is moderate dilatation of the right-sided chambers with mild mitral valve regurgitation mild to moderate tricuspid valve regurgitation and mild pulmonary hypertension with an estimated PA systolic pressure of 40 mmHg. There is also mild dilatation of the aortic root and ascending thoracic aorta. The presence of underlying COPD would explain the mild to  moderate right-sided chamber dilatation. The patient did have a repeat echocardiogram performed today on 01/23/2018 which demonstrates an LV ejection fraction of approximately 55% with mild aortic valve insufficiency, mild to moderate mitral valve regurgitation, moderate tricuspid regurgitation, mild pulmonic insufficiency along with moderate left atrial enlargement and mild to moderate right atrial enlargement. The patient had been essentially asymptomatic until he was admitted to [facility] in 2/2020 with a urinary tract infection with bacteremia. He was noted to have Proteus mirabilis bacteremia with acute emphysematous cystitis. During that admission he had atrial fibrillation rapid ventricular rate of all 110â€“1 140/m. Ultimately he was placed on a combination of both diltiazem and metoprolol. The dose of diltiazem was increased from 120-240 mg daily and he was kept on metoprolol tartrate 100 mg twice a day. He did have an echocardiogram that showed an LV ejection fraction of 55â€“60 percent with moderate left atrial enlargement 1+ aortic valve insufficiency and moderately dilated aortic root at 4.1 cm. The ventricular rate to the atrial fibrillation is now well controlled on the present combination of the Cardizem  mg daily along with metoprolol tartrate 100 mg twice daily.  He has declined trying metoprolol succinate.  Echo 10/2023  CONCLUSIONS:   1. Left ventricular systolic function is low normal with a 50-55% estimated ejection fraction.   2. The left atrium is severely dilated.   3. The right atrium is severely dilated.   4. Moderate to severe mitral valve regurgitation.   5. The tricuspid valve annulus appears dilated.   6. Mildly elevated RVSP.   7. Severe tricuspid regurgitation visualized.   8. Moderate aortic valve regurgitation.   9. The estimated PASP is 42 mmHg.  10. There is moderate dilatation of the aortic root.  11. There is moderate dilatation of the ascending aorta.  3.  Coumadin anticoagulation with one episode of significant epistaxis. The patient has had no recurrent episodes of epistaxis since his last visit. The patient prefers to remain on Coumadin rather than switching to a direct oral anticoagulant.  4. Hyperlipidemia. The patient did have lab work performed on 11//2022 which included a lipid panel with cholesterol 119 LDL 51 HDL 57 triglyceride 52. The patient's panel is improved now on atorvastatin 40 mg daily rather than the previous simvastatin 40 mg daily.   5. Remote former smoker. Patient discontinued smoking 35 years ago.  6.? COPD.  7. History of urosepsis 2/2020. This patient was admitted to [facility] from 02/02/2020â€“02/05/2020 with sepsis due to a urinary tract infection. The patient presented with hematuria and burning upon urination. The patient initially had a Kirby catheter placed in the office which had subsequently been removed. At the time of his admission to [facility] he was noted to be in his atrial fibrillation but with a more rapid ventricular response. Serial troponins were negative. The patient had already been on Coumadin anticoagulation. The patient was placed on IV Zosyn and IV vancomycin for the urosepsis. He was initially placed on both diltiazem and metoprolol for ventricular rate control and the dose of diltiazem was increased from 120 mg daily to 240 mg daily. He also had an echocardiogram on 02/04/2020 that showed an LV ejection fraction 55â€“60 percent with moderate left atrial enlargement with 1+ aortic valve insufficiency and a moderately dilated aortic root at 4.1 cm. The patient was found to have Proteus mirabilis bacteremia with acute emphysematous cystitis. He ultimately was discharged home on ampicillin was also started on tamsulosin. The patient had a recent PSA of 2.36 on 5/20/2020. He had a visit with urology and had a postvoid residual of urine of only 29 cc on 7/9/2020.  8. Hx of covid-19 vaccine #1 and  #2.        Kellen See, APRN-CNP

## 2024-08-05 ENCOUNTER — TELEPHONE (OUTPATIENT)
Dept: CARDIOLOGY | Facility: CLINIC | Age: 84
End: 2024-08-05
Payer: MEDICARE

## 2024-08-05 DIAGNOSIS — I48.91 ATRIAL FIBRILLATION, UNSPECIFIED TYPE (MULTI): Primary | ICD-10-CM

## 2024-08-05 RX ORDER — WARFARIN SODIUM 5 MG/1
5 TABLET ORAL EVERY EVENING
Qty: 90 TABLET | Refills: 3 | Status: SHIPPED | OUTPATIENT
Start: 2024-08-05 | End: 2025-08-05

## 2024-08-09 ENCOUNTER — APPOINTMENT (OUTPATIENT)
Dept: UROLOGY | Facility: CLINIC | Age: 84
End: 2024-08-09
Payer: MEDICARE

## 2024-08-09 VITALS — BODY MASS INDEX: 25.77 KG/M2 | HEIGHT: 70 IN | TEMPERATURE: 96.4 F | WEIGHT: 180 LBS

## 2024-08-09 DIAGNOSIS — N40.1 BENIGN PROSTATIC HYPERPLASIA WITH LOWER URINARY TRACT SYMPTOMS, SYMPTOM DETAILS UNSPECIFIED: ICD-10-CM

## 2024-08-09 DIAGNOSIS — R97.20 ELEVATED PSA: ICD-10-CM

## 2024-08-09 DIAGNOSIS — N40.0 BPH (BENIGN PROSTATIC HYPERPLASIA): ICD-10-CM

## 2024-08-09 DIAGNOSIS — R31.9 HEMATURIA, UNSPECIFIED TYPE: Primary | ICD-10-CM

## 2024-08-09 LAB
POC APPEARANCE, URINE: CLEAR
POC BILIRUBIN, URINE: NEGATIVE
POC BLOOD, URINE: ABNORMAL
POC COLOR, URINE: ABNORMAL
POC GLUCOSE, URINE: NEGATIVE MG/DL
POC KETONES, URINE: NEGATIVE MG/DL
POC LEUKOCYTES, URINE: NEGATIVE
POC NITRITE,URINE: NEGATIVE
POC PH, URINE: 6 PH
POC PROTEIN, URINE: ABNORMAL MG/DL
POC SPECIFIC GRAVITY, URINE: 1.02
POC UROBILINOGEN, URINE: 1 EU/DL

## 2024-08-09 PROCEDURE — 1160F RVW MEDS BY RX/DR IN RCRD: CPT | Performed by: NURSE PRACTITIONER

## 2024-08-09 PROCEDURE — 1123F ACP DISCUSS/DSCN MKR DOCD: CPT | Performed by: NURSE PRACTITIONER

## 2024-08-09 PROCEDURE — 1036F TOBACCO NON-USER: CPT | Performed by: NURSE PRACTITIONER

## 2024-08-09 PROCEDURE — 51798 US URINE CAPACITY MEASURE: CPT | Performed by: NURSE PRACTITIONER

## 2024-08-09 PROCEDURE — 81002 URINALYSIS NONAUTO W/O SCOPE: CPT | Performed by: NURSE PRACTITIONER

## 2024-08-09 PROCEDURE — G2211 COMPLEX E/M VISIT ADD ON: HCPCS | Performed by: NURSE PRACTITIONER

## 2024-08-09 PROCEDURE — 99213 OFFICE O/P EST LOW 20 MIN: CPT | Performed by: NURSE PRACTITIONER

## 2024-08-09 PROCEDURE — 1126F AMNT PAIN NOTED NONE PRSNT: CPT | Performed by: NURSE PRACTITIONER

## 2024-08-09 PROCEDURE — 1159F MED LIST DOCD IN RCRD: CPT | Performed by: NURSE PRACTITIONER

## 2024-08-09 RX ORDER — TAMSULOSIN HYDROCHLORIDE 0.4 MG/1
0.8 CAPSULE ORAL DAILY
Qty: 180 CAPSULE | Refills: 3 | Status: SHIPPED | OUTPATIENT
Start: 2024-08-09 | End: 2025-08-09

## 2024-08-09 ASSESSMENT — PAIN SCALES - GENERAL: PAINLEVEL: 0-NO PAIN

## 2024-08-09 NOTE — PROGRESS NOTES
[facility] [place]  Outpatient Clinic Note    Subjective   Ming Bunch is a 83 y.o. male    History of Present Illness     Patient presenting to clinic today for annual FUV. History of Elevated PSA, Gross Hematuria with benign workup, Afib on Warfarin, HLD, UTI's. He is taking tamsulosin 0.8 mg daily. He has no complaints or concerns today. Denies dysuria, nocturia, flank pain, and bothersome frequency or urgency. Patient denies fevers, chills, nausea, and vomiting. Patient is a former smoker.     Urinalysis, Microscopic 04/28/2023: RBC 6375/HPF  Urine Culture Negative   Cystoscopy with Dr. Juan 06/06/2023 with no cause for hematuria identified     CTU 05/23/2023 negative for stones, hydroureteronephrosis, tumor, or mass bilaterally.   Non GYN cytology Negative    Lab Results   Component Value Date    PSA 0.37 07/14/2022    PSA 0.38 07/13/2021    PSA 0.53 12/29/2020       Past Medical History and Surgical History   Past Medical History:   Diagnosis Date    Personal history of diseases of the blood and blood-forming organs and certain disorders involving the immune mechanism 06/09/2021    History of thrombocytopenia    Shortness of breath 02/19/2020    Shortness of breath on exertion     Past Surgical History:   Procedure Laterality Date    APPENDECTOMY  09/11/2013    Appendectomy    COLONOSCOPY  06/25/2013    Complete Colonoscopy    OTHER SURGICAL HISTORY  04/22/2015    Removal Of Lesion    PILONIDAL CYST DRAINAGE  09/11/2013    Pilonidal Cyst Resection       Medications  Current Outpatient Medications on File Prior to Visit   Medication Sig Dispense Refill    aspirin 81 mg EC tablet Take 1 tablet (81 mg) by mouth once daily.      atorvastatin (Lipitor) 40 mg tablet Take 1 tablet (40 mg) by mouth once daily at bedtime. 90 tablet 3    dilTIAZem CD (Cardizem CD) 240 mg 24 hr capsule Take 1 capsule (240 mg) by mouth once daily.      metoprolol tartrate (Lopressor) 100 mg tablet Take 1 tablet (100 mg) by mouth  2 times a day. 180 tablet 3    tamsulosin (Flomax) 0.4 mg 24 hr capsule Take 2 capsules (0.8 mg) by mouth once daily. 180 capsule 3    warfarin (Coumadin) 5 mg tablet Take 1 tablet (5 mg) by mouth once daily in the evening. 90 tablet 3    [DISCONTINUED] warfarin (Coumadin) 5 mg tablet Take 1 tablet (5 mg) by mouth once daily.       No current facility-administered medications on file prior to visit.       Objective   Physicial Exam  General: Well developed, well nourished, alert and cooperative, appears in no acute distress  Eyes: Non-injected conjunctiva, sclera clear, no proptosis  Cardiac: Extremities are warm and well perfused. No edema, cyanosis or pallor.   Lungs: Breathing is easy, non-labored. Speaking in clear and complete sentences. Normal diaphragmatic movement.  MSK: Ambulatory with steady gait, unassisted  Neuro: alert and oriented to person, place and time  Psych: Demonstrates good judgement and reason, without hallucinations, abnormal affect or abnormal behaviors.  Skin: no obvious lesions, no rashes.    Office Visit on 08/09/2024   Component Date Value Ref Range Status    POC Color, Urine 08/09/2024 Red-brown (A)  Straw, Yellow, Light-Yellow Final    POC Appearance, Urine 08/09/2024 Clear  Clear Final    POC Glucose, Urine 08/09/2024 NEGATIVE  NEGATIVE mg/dl Final    POC Bilirubin, Urine 08/09/2024 NEGATIVE  NEGATIVE Final    POC Ketones, Urine 08/09/2024 NEGATIVE  NEGATIVE mg/dl Final    POC Specific Gravity, Urine 08/09/2024 1.025  1.005 - 1.035 Final    POC Blood, Urine 08/09/2024 LARGE (3+) (A)  NEGATIVE Final    POC PH, Urine 08/09/2024 6.0  No Reference Range Established PH Final    POC Protein, Urine 08/09/2024 30 (1+)  NEGATIVE, 30 (1+) mg/dl Final    POC Urobilinogen, Urine 08/09/2024 1.0  0.2, 1.0 EU/DL Final    Poc Nitrite, Urine 08/09/2024 NEGATIVE  NEGATIVE Final    POC Leukocytes, Urine 08/09/2024 NEGATIVE  NEGATIVE Final      Review of Systems  All other systems have been reviewed and  are negative for complaint.      Assessment and Plan   - History of Gross Hematuria.   Hematuria work up unremarkable. Consider repeat workup in 3 to 5 years.   Continue Tamsulosin 0.8 mg daily      All questions and concerns were addressed. Patient verbalizes understanding and has no other questions at this time.     Sarah Kathleen-- RIGO SIMMS  Office Phone:  [phone]

## 2024-08-14 ENCOUNTER — ANTICOAGULATION - WARFARIN VISIT (OUTPATIENT)
Dept: CARDIOLOGY | Facility: CLINIC | Age: 84
End: 2024-08-14
Payer: MEDICARE

## 2024-08-14 DIAGNOSIS — I48.91 ATRIAL FIBRILLATION, UNSPECIFIED TYPE (MULTI): Primary | ICD-10-CM

## 2024-08-14 LAB
POC INR: 2.8
POC PROTHROMBIN TIME: NORMAL

## 2024-08-14 PROCEDURE — 99211 OFF/OP EST MAY X REQ PHY/QHP: CPT

## 2024-08-14 PROCEDURE — 85610 PROTHROMBIN TIME: CPT | Mod: QW

## 2024-08-14 NOTE — PROGRESS NOTES
Patient identification verified with 2 identifiers.    Location: [facility] at [facility] - [address] [address] [phone] option #1     Referring Physician: MEERA VILLATORO  Enrollment/ Re-enrollment date: 2025   INR Goal: 2.0-3.0  INR monitoring is per [facility] protocol.  Anticoagulation Medication: warfarin  Indication: Atrial Fibrillation/Atrial Flutter    Subjective   Bleeding signs/symptoms: No    Bruising: No   Major bleeding event: No  Thrombosis signs/symptoms: No  Thromboembolic event: No  Missed doses: No  Extra doses: No  Medication changes: No  Dietary changes: No  Change in health: No  Change in activity: No  Alcohol: Yes  Other concerns: No    Upcoming Procedures:  Does the Patient Have any upcoming procedures that require interruption in anticoagulation therapy? no  Does the patient require bridging? no      Anticoagulation Summary  As of 2024      INR goal:  2.0-3.0   TTR:  94.8% (10.6 mo)   INR used for dosin.80 (2024)   Weekly warfarin total:  30 mg               Assessment/Plan   Therapeutic     1. New dose: no change    2. Next INR: 1 month      Education provided to patient during the visit:  Patient instructed to call in interim with questions, concerns and changes.   Patient educated on interactions between medications and warfarin.   Patient educated on dietary consistency in vitamin k consumption.   Patient educated on affects of alcohol consumption while taking warfarin.   Patient educated on signs of bleeding/clotting.   Patient educated on compliance with dosing, follow up appointments, and prescribed plan of care.

## 2024-09-11 ENCOUNTER — ANTICOAGULATION - WARFARIN VISIT (OUTPATIENT)
Dept: CARDIOLOGY | Facility: CLINIC | Age: 84
End: 2024-09-11
Payer: MEDICARE

## 2024-09-11 DIAGNOSIS — I48.91 ATRIAL FIBRILLATION, UNSPECIFIED TYPE (MULTI): Primary | ICD-10-CM

## 2024-09-11 LAB
POC INR: 2.6
POC PROTHROMBIN TIME: NORMAL

## 2024-09-11 PROCEDURE — 85610 PROTHROMBIN TIME: CPT | Mod: QW

## 2024-09-11 PROCEDURE — 99211 OFF/OP EST MAY X REQ PHY/QHP: CPT

## 2024-09-11 NOTE — PROGRESS NOTES
Patient identification verified with 2 identifiers.    Location: [facility] at [facility] - [address] [address] [phone] option #1     Referring Physician: MEERA VILLATORO  Enrollment/ Re-enrollment date: 2025   INR Goal: 2.0-3.0  INR monitoring is per [facility] protocol.  Anticoagulation Medication: warfarin  Indication: Atrial Fibrillation/Atrial Flutter    Subjective   Bleeding signs/symptoms: No    Bruising: No   Major bleeding event: No  Thrombosis signs/symptoms: No  Thromboembolic event: No  Missed doses: No  Extra doses: No  Medication changes: No  Dietary changes: No  Change in health: No  Change in activity: No  Alcohol: No  Other concerns: No    Upcoming Procedures:  Does the Patient Have any upcoming procedures that require interruption in anticoagulation therapy? no  Does the patient require bridging? no      Anticoagulation Summary  As of 2024      INR goal:  2.0-3.0   TTR:  95.2% (11.6 mo)   INR used for dosin.60 (2024)   Weekly warfarin total:  30 mg               Assessment/Plan   Therapeutic     1. New dose: no change    2. Next INR: 1 month      Education provided to patient during the visit:  Patient instructed to call in interim with questions, concerns and changes.   Patient educated on interactions between medications and warfarin.   Patient educated on dietary consistency in vitamin k consumption.   Patient educated on affects of alcohol consumption while taking warfarin.   Patient educated on signs of bleeding/clotting.   Patient educated on compliance with dosing, follow up appointments, and prescribed plan of care.

## 2024-09-25 ENCOUNTER — APPOINTMENT (OUTPATIENT)
Dept: PRIMARY CARE | Facility: CLINIC | Age: 84
End: 2024-09-25
Payer: MEDICARE

## 2024-09-25 VITALS
HEIGHT: 70 IN | WEIGHT: 188 LBS | TEMPERATURE: 97.5 F | BODY MASS INDEX: 26.92 KG/M2 | SYSTOLIC BLOOD PRESSURE: 108 MMHG | DIASTOLIC BLOOD PRESSURE: 66 MMHG | HEART RATE: 79 BPM | OXYGEN SATURATION: 96 % | RESPIRATION RATE: 18 BRPM

## 2024-09-25 DIAGNOSIS — E78.5 HYPERLIPIDEMIA, UNSPECIFIED HYPERLIPIDEMIA TYPE: ICD-10-CM

## 2024-09-25 DIAGNOSIS — I10 ESSENTIAL HYPERTENSION: ICD-10-CM

## 2024-09-25 DIAGNOSIS — Z87.438 HISTORY OF BPH: ICD-10-CM

## 2024-09-25 DIAGNOSIS — Z79.01 ON WARFARIN THERAPY: ICD-10-CM

## 2024-09-25 DIAGNOSIS — R35.1 NOCTURIA: ICD-10-CM

## 2024-09-25 DIAGNOSIS — Z23 ENCOUNTER FOR IMMUNIZATION: ICD-10-CM

## 2024-09-25 DIAGNOSIS — Z00.00 MEDICARE ANNUAL WELLNESS VISIT, SUBSEQUENT: Primary | ICD-10-CM

## 2024-09-25 DIAGNOSIS — E55.9 VITAMIN D DEFICIENCY: ICD-10-CM

## 2024-09-25 DIAGNOSIS — R53.83 FATIGUE, UNSPECIFIED TYPE: ICD-10-CM

## 2024-09-25 DIAGNOSIS — I48.91 ATRIAL FIBRILLATION, UNSPECIFIED TYPE (MULTI): ICD-10-CM

## 2024-09-25 PROCEDURE — 3074F SYST BP LT 130 MM HG: CPT | Performed by: FAMILY MEDICINE

## 2024-09-25 PROCEDURE — 99214 OFFICE O/P EST MOD 30 MIN: CPT | Performed by: FAMILY MEDICINE

## 2024-09-25 PROCEDURE — 1036F TOBACCO NON-USER: CPT | Performed by: FAMILY MEDICINE

## 2024-09-25 PROCEDURE — 1126F AMNT PAIN NOTED NONE PRSNT: CPT | Performed by: FAMILY MEDICINE

## 2024-09-25 PROCEDURE — 1123F ACP DISCUSS/DSCN MKR DOCD: CPT | Performed by: FAMILY MEDICINE

## 2024-09-25 PROCEDURE — 1159F MED LIST DOCD IN RCRD: CPT | Performed by: FAMILY MEDICINE

## 2024-09-25 PROCEDURE — 3078F DIAST BP <80 MM HG: CPT | Performed by: FAMILY MEDICINE

## 2024-09-25 PROCEDURE — 1170F FXNL STATUS ASSESSED: CPT | Performed by: FAMILY MEDICINE

## 2024-09-25 PROCEDURE — 1160F RVW MEDS BY RX/DR IN RCRD: CPT | Performed by: FAMILY MEDICINE

## 2024-09-25 PROCEDURE — G0008 ADMIN INFLUENZA VIRUS VAC: HCPCS | Performed by: FAMILY MEDICINE

## 2024-09-25 PROCEDURE — 90662 IIV NO PRSV INCREASED AG IM: CPT | Performed by: FAMILY MEDICINE

## 2024-09-25 ASSESSMENT — ANXIETY QUESTIONNAIRES
2. NOT BEING ABLE TO STOP OR CONTROL WORRYING: NOT AT ALL
7. FEELING AFRAID AS IF SOMETHING AWFUL MIGHT HAPPEN: NOT AT ALL
5. BEING SO RESTLESS THAT IT IS HARD TO SIT STILL: NOT AT ALL
IF YOU CHECKED OFF ANY PROBLEMS ON THIS QUESTIONNAIRE, HOW DIFFICULT HAVE THESE PROBLEMS MADE IT FOR YOU TO DO YOUR WORK, TAKE CARE OF THINGS AT HOME, OR GET ALONG WITH OTHER PEOPLE: NOT DIFFICULT AT ALL
6. BECOMING EASILY ANNOYED OR IRRITABLE: SEVERAL DAYS
1. FEELING NERVOUS, ANXIOUS, OR ON EDGE: NOT AT ALL
3. WORRYING TOO MUCH ABOUT DIFFERENT THINGS: NOT AT ALL
4. TROUBLE RELAXING: NOT AT ALL
GAD7 TOTAL SCORE: 1

## 2024-09-25 ASSESSMENT — ENCOUNTER SYMPTOMS
DEPRESSION: 0
LOSS OF SENSATION IN FEET: 0
OCCASIONAL FEELINGS OF UNSTEADINESS: 0

## 2024-09-25 ASSESSMENT — PATIENT HEALTH QUESTIONNAIRE - PHQ9
3. TROUBLE FALLING OR STAYING ASLEEP OR SLEEPING TOO MUCH: NOT AT ALL
1. LITTLE INTEREST OR PLEASURE IN DOING THINGS: NOT AT ALL
SUM OF ALL RESPONSES TO PHQ9 QUESTIONS 1 AND 2: 0
9. THOUGHTS THAT YOU WOULD BE BETTER OFF DEAD, OR OF HURTING YOURSELF: NOT AT ALL
8. MOVING OR SPEAKING SO SLOWLY THAT OTHER PEOPLE COULD HAVE NOTICED. OR THE OPPOSITE, BEING SO FIGETY OR RESTLESS THAT YOU HAVE BEEN MOVING AROUND A LOT MORE THAN USUAL: NOT AT ALL
7. TROUBLE CONCENTRATING ON THINGS, SUCH AS READING THE NEWSPAPER OR WATCHING TELEVISION: NOT AT ALL
10. IF YOU CHECKED OFF ANY PROBLEMS, HOW DIFFICULT HAVE THESE PROBLEMS MADE IT FOR YOU TO DO YOUR WORK, TAKE CARE OF THINGS AT HOME, OR GET ALONG WITH OTHER PEOPLE: NOT DIFFICULT AT ALL
6. FEELING BAD ABOUT YOURSELF - OR THAT YOU ARE A FAILURE OR HAVE LET YOURSELF OR YOUR FAMILY DOWN: NOT AT ALL
2. FEELING DOWN, DEPRESSED OR HOPELESS: NOT AT ALL
5. POOR APPETITE OR OVEREATING: NOT AT ALL
SUM OF ALL RESPONSES TO PHQ9 QUESTIONS 1 AND 2: 0
SUM OF ALL RESPONSES TO PHQ QUESTIONS 1-9: 0
1. LITTLE INTEREST OR PLEASURE IN DOING THINGS: NOT AT ALL
4. FEELING TIRED OR HAVING LITTLE ENERGY: NOT AT ALL
2. FEELING DOWN, DEPRESSED OR HOPELESS: NOT AT ALL

## 2024-09-25 ASSESSMENT — ACTIVITIES OF DAILY LIVING (ADL)
TAKING_MEDICATION: INDEPENDENT
BATHING: INDEPENDENT
MANAGING_FINANCES: INDEPENDENT
GROCERY_SHOPPING: INDEPENDENT
DOING_HOUSEWORK: INDEPENDENT
DRESSING: INDEPENDENT

## 2024-09-25 ASSESSMENT — PAIN SCALES - GENERAL: PAINLEVEL: 0-NO PAIN

## 2024-09-25 NOTE — PROGRESS NOTES
Medicare Wellness Billing Compliance Satisfied    *This is a visual tool to show completion of required items on the day of the visit. Green checks will only appear on the date of visit.    Review all medications by prescribing practitioner or clinical pharmacist (such as prescriptions, OTCs, herbal therapies and supplements) documented in the medical record    Past Medical, Surgical, and Family History reviewed and updated in chart    Tobacco Use Reviewed    Alcohol Use Reviewed    Illicit Drug Use Reviewed    PHQ2/9    Falls in Last Year Reviewed    Home Safety Risk Factors Reviewed    Cognitive Impairment Reviewed    Patient Self Assessment and Health Status    Current Diet Reviewed    Exercise Frequency    ADL - Hearing Impairment    ADL - Bathing    ADL - Dressing    ADL - Walks in Home    IADL - Managing Finances    IADL - Grocery Shopping    IADL - Taking Medications    IADL - Doing Housework    Subjective   Patient ID: Ming Bunch is a 83 y.o. male who presents for Medicare Annual Wellness Visit Subsequent.    HPI   The patient presents to the clinic for an annual medicare wellness visit. He has past medical history of atrial fibrillation, CAD, thrombocytopenia, BPH, and anemia.     He denies any medical issues/concerns at this time other than fatigue, runs out of energy.    Wife 's dementia has progressed, has made things more difficult.  Dealing ok w it per pt.  Pt active does all care at home in and outside of house.      The patient does report lack of energy. He wonders if this condition is associated with his age (83 years old). He usually sleeps well at night, but notes that he wakes up to urinate ~1-2 times per night (nocturia). The patient is on tamsulosin 0.8 mg daily for treatment of BPH (monitored by his urology specialist). He continues to consult with his urology specialist (WENDY Kathleen) regularly.  Was seen in August and had refills for 1 year    His blood pressure  "(108/66) was within normal range when checked in the clinic today. He denies any chest pain, SOB, and/or dizziness symptoms. Notably, he is on metoprolol tartrate 100 mg (2 times daily) and warfarin 5 mg daily for treatment of chronic atrial fibrillation (monitored by his cardiology specialist). He may also be on diltiazem  mg daily for treatment of chronic atrial fibrillation but the patient is unsure if he is taking diltiazem CD medication on a regular basis (he will double check at home and with cardiology).  This is the only med on his chart that I see does not have 1 year refill given by cardiolgy/NP  He continues to consult with his cardiology specialist (WENDY See) regularly and Dr Lee    He does his PT/INR monthly at least.  No bleeding.  Denies racing heart or palpitations    Has not had labs, will return fasting and have them done, also will get PSA, it appears last had done in 2022 and was good range    His most recent Cologuard screening was done in July 2023 and was negative.      Disucssed immunizations, giving HD flu today.  Will get covid at [facility].  He declines shingles vaccine.  He did RSV in 2023.    Review of Systems    Objective   /66   Pulse 79   Temp 36.4 °C (97.5 °F)   Resp 18   Ht 1.765 m (5' 9.5\")   Wt 85.3 kg (188 lb)   SpO2 96%   BMI 27.36 kg/m²     Physical Exam  Constitutional:       Appearance: Normal appearance.   HENT:      Head: Normocephalic.      Right Ear: Tympanic membrane normal.      Left Ear: Tympanic membrane normal.      Mouth/Throat:      Pharynx: Oropharynx is clear.   Neck:      Vascular: No carotid bruit.   Cardiovascular:      Rate and Rhythm: Normal rate and regular rhythm.      Pulses: Normal pulses.      Heart sounds: Normal heart sounds.      Comments: Hx of afib, reg S1S2 today  Pulmonary:      Effort: Pulmonary effort is normal.      Breath sounds: Normal breath sounds.   Abdominal:      General: Bowel sounds are normal.      " Palpations: Abdomen is soft. There is no mass.      Tenderness: There is no abdominal tenderness.   Musculoskeletal:         General: Normal range of motion.      Cervical back: Normal range of motion.      Right lower leg: No edema.      Left lower leg: No edema.   Lymphadenopathy:      Cervical: No cervical adenopathy.   Skin:     General: Skin is warm and dry.   Neurological:      Mental Status: He is alert and oriented to person, place, and time.   Psychiatric:         Mood and Affect: Mood normal.         Thought Content: Thought content normal.         Judgment: Judgment normal.         Assessment/Plan   Problem List Items Addressed This Visit             ICD-10-CM    Atrial fibrillation (Multi) I48.91    Vitamin D deficiency E55.9    Relevant Orders    Vitamin D 25-Hydroxy,Total (for eval of Vitamin D levels)    History of BPH Z87.438    On warfarin therapy Z79.01     Other Visit Diagnoses         Codes    Medicare annual wellness visit, subsequent    -  Primary Z00.00    Essential hypertension     I10    Relevant Orders    CBC and Auto Differential    Hyperlipidemia, unspecified hyperlipidemia type     E78.5    Relevant Orders    Comprehensive Metabolic Panel    Lipid Panel    Fatigue, unspecified type     R53.83    Relevant Orders    TSH with reflex to Free T4 if abnormal    Nocturia     R35.1    Relevant Orders    Prostate Specific Antigen    Encounter for immunization     Z23    Relevant Orders    Flu vaccine, trivalent, preservative free, HIGH-DOSE, age 65y+ (Fluzone) (Completed)               Labs (CBC, CMP, lipid panel, TSH, vitamin D and PSA) were ordered for the patient. He intends to complete his labs soon. The clinic will contact the patient upon receiving his lab results.    In regards to concerns with fatigue and lack of energy, the patient was advised that he will be re-evaluated upon receiving his lab results. For now, he was instructed to continue monitoring symptoms for  improvement/exacerbation.  Discussed curbing fluids a few hours prior to bed time also.      Prospective immunizations were discussed with the patient. He received his annual flu vaccine in the clinic today. He declined to receive his Shingles vaccine.  He is going to pharmacy for covid in the next week.  Had RSV and pneummonia vaccines in the past    He is very active, eating good diet most of the time.  Compliant with meds and follow up w his specialists. Will continue.    He will follow-up in 6 months, unless otherwise needed.    Scribe Attestation  By signing my name below, I, Emmanuelle Hamilton   attest that this documentation has been prepared under the direction and in the presence of Kellee Burris DO.

## 2024-10-01 ENCOUNTER — LAB (OUTPATIENT)
Dept: LAB | Facility: LAB | Age: 84
End: 2024-10-01
Payer: MEDICARE

## 2024-10-01 DIAGNOSIS — E55.9 VITAMIN D DEFICIENCY: ICD-10-CM

## 2024-10-01 DIAGNOSIS — R35.1 NOCTURIA: ICD-10-CM

## 2024-10-01 DIAGNOSIS — D64.9 ANEMIA, UNSPECIFIED TYPE: Primary | ICD-10-CM

## 2024-10-01 DIAGNOSIS — D64.9 ANEMIA, UNSPECIFIED TYPE: ICD-10-CM

## 2024-10-01 DIAGNOSIS — E78.5 HYPERLIPIDEMIA, UNSPECIFIED HYPERLIPIDEMIA TYPE: ICD-10-CM

## 2024-10-01 DIAGNOSIS — I10 ESSENTIAL HYPERTENSION: ICD-10-CM

## 2024-10-01 DIAGNOSIS — R53.83 FATIGUE, UNSPECIFIED TYPE: ICD-10-CM

## 2024-10-01 LAB
25(OH)D3 SERPL-MCNC: 74 NG/ML (ref 30–100)
ALBUMIN SERPL BCP-MCNC: 3.7 G/DL (ref 3.4–5)
ALP SERPL-CCNC: 104 U/L (ref 33–136)
ALT SERPL W P-5'-P-CCNC: 12 U/L (ref 10–52)
ANION GAP SERPL CALC-SCNC: 14 MMOL/L (ref 10–20)
AST SERPL W P-5'-P-CCNC: 7 U/L (ref 9–39)
BASOPHILS # BLD AUTO: 0.03 X10*3/UL (ref 0–0.1)
BASOPHILS NFR BLD AUTO: 0.6 %
BILIRUB SERPL-MCNC: 1.2 MG/DL (ref 0–1.2)
BUN SERPL-MCNC: 13 MG/DL (ref 6–23)
CALCIUM SERPL-MCNC: 8.5 MG/DL (ref 8.6–10.6)
CHLORIDE SERPL-SCNC: 104 MMOL/L (ref 98–107)
CHOLEST SERPL-MCNC: 103 MG/DL (ref 0–199)
CHOLESTEROL/HDL RATIO: 1.9
CO2 SERPL-SCNC: 27 MMOL/L (ref 21–32)
CREAT SERPL-MCNC: 0.74 MG/DL (ref 0.5–1.3)
EGFRCR SERPLBLD CKD-EPI 2021: 90 ML/MIN/1.73M*2
EOSINOPHIL # BLD AUTO: 0.11 X10*3/UL (ref 0–0.4)
EOSINOPHIL NFR BLD AUTO: 2.3 %
ERYTHROCYTE [DISTWIDTH] IN BLOOD BY AUTOMATED COUNT: 13.5 % (ref 11.5–14.5)
FERRITIN SERPL-MCNC: 29 NG/ML (ref 20–300)
FOLATE SERPL-MCNC: 14.8 NG/ML
GLUCOSE SERPL-MCNC: 82 MG/DL (ref 74–99)
HCT VFR BLD AUTO: 35.8 % (ref 41–52)
HDLC SERPL-MCNC: 53 MG/DL
HGB BLD-MCNC: 11.5 G/DL (ref 13.5–17.5)
HGB RETIC QN: 33 PG (ref 28–38)
IMM GRANULOCYTES # BLD AUTO: 0.02 X10*3/UL (ref 0–0.5)
IMM GRANULOCYTES NFR BLD AUTO: 0.4 % (ref 0–0.9)
IMMATURE RETIC FRACTION: 12.1 %
IRON SATN MFR SERPL: 22 % (ref 25–45)
IRON SERPL-MCNC: 75 UG/DL (ref 35–150)
LDLC SERPL CALC-MCNC: 39 MG/DL
LYMPHOCYTES # BLD AUTO: 1.54 X10*3/UL (ref 0.8–3)
LYMPHOCYTES NFR BLD AUTO: 32.2 %
MCH RBC QN AUTO: 33 PG (ref 26–34)
MCHC RBC AUTO-ENTMCNC: 32.1 G/DL (ref 32–36)
MCV RBC AUTO: 103 FL (ref 80–100)
MONOCYTES # BLD AUTO: 0.5 X10*3/UL (ref 0.05–0.8)
MONOCYTES NFR BLD AUTO: 10.4 %
NEUTROPHILS # BLD AUTO: 2.59 X10*3/UL (ref 1.6–5.5)
NEUTROPHILS NFR BLD AUTO: 54.1 %
NON HDL CHOLESTEROL: 50 MG/DL (ref 0–149)
NRBC BLD-RTO: 0 /100 WBCS (ref 0–0)
PLATELET # BLD AUTO: 162 X10*3/UL (ref 150–450)
POTASSIUM SERPL-SCNC: 3.8 MMOL/L (ref 3.5–5.3)
PROT SERPL-MCNC: 6.4 G/DL (ref 6.4–8.2)
PSA SERPL-MCNC: 0.26 NG/ML
RBC # BLD AUTO: 3.49 X10*6/UL (ref 4.5–5.9)
RETICS #: 0.06 X10*6/UL (ref 0.02–0.11)
RETICS/RBC NFR AUTO: 1.7 % (ref 0.5–2)
SODIUM SERPL-SCNC: 141 MMOL/L (ref 136–145)
TIBC SERPL-MCNC: 343 UG/DL (ref 240–445)
TRIGL SERPL-MCNC: 56 MG/DL (ref 0–149)
TSH SERPL-ACNC: 2.7 MIU/L (ref 0.44–3.98)
UIBC SERPL-MCNC: 268 UG/DL (ref 110–370)
VIT B12 SERPL-MCNC: 334 PG/ML (ref 211–911)
VLDL: 11 MG/DL (ref 0–40)
WBC # BLD AUTO: 4.8 X10*3/UL (ref 4.4–11.3)

## 2024-10-01 PROCEDURE — 82306 VITAMIN D 25 HYDROXY: CPT

## 2024-10-01 PROCEDURE — 36415 COLL VENOUS BLD VENIPUNCTURE: CPT

## 2024-10-01 PROCEDURE — 82746 ASSAY OF FOLIC ACID SERUM: CPT

## 2024-10-01 PROCEDURE — 82607 VITAMIN B-12: CPT

## 2024-10-01 PROCEDURE — 85045 AUTOMATED RETICULOCYTE COUNT: CPT

## 2024-10-01 PROCEDURE — 82728 ASSAY OF FERRITIN: CPT

## 2024-10-01 PROCEDURE — 80061 LIPID PANEL: CPT

## 2024-10-01 PROCEDURE — 83550 IRON BINDING TEST: CPT

## 2024-10-01 PROCEDURE — 85025 COMPLETE CBC W/AUTO DIFF WBC: CPT

## 2024-10-01 PROCEDURE — 84153 ASSAY OF PSA TOTAL: CPT

## 2024-10-01 PROCEDURE — 80053 COMPREHEN METABOLIC PANEL: CPT

## 2024-10-01 PROCEDURE — 83540 ASSAY OF IRON: CPT

## 2024-10-01 PROCEDURE — 84443 ASSAY THYROID STIM HORMONE: CPT

## 2024-10-07 DIAGNOSIS — Z00.00 PHYSICAL EXAM: ICD-10-CM

## 2024-10-07 DIAGNOSIS — D64.9 ANEMIA, UNSPECIFIED TYPE: ICD-10-CM

## 2024-10-07 DIAGNOSIS — I10 ESSENTIAL HYPERTENSION: Primary | ICD-10-CM

## 2024-10-08 ENCOUNTER — TELEPHONE (OUTPATIENT)
Dept: PRIMARY CARE | Facility: CLINIC | Age: 84
End: 2024-10-08
Payer: MEDICARE

## 2024-10-08 NOTE — TELEPHONE ENCOUNTER
----- Message from Kellee Burris sent at 10/7/2024  5:01 PM EDT -----  Report to patient that his iron level is normal.  His chemistries are normal.  TSH for thyroid is normal.  PSA for prostate is normal.  Cholesterol is in good range.  Recheck blood count in 4 to 6 weeks due to anemia, orders in the chart.

## 2024-10-09 ENCOUNTER — ANTICOAGULATION - WARFARIN VISIT (OUTPATIENT)
Dept: CARDIOLOGY | Facility: CLINIC | Age: 84
End: 2024-10-09
Payer: MEDICARE

## 2024-10-09 DIAGNOSIS — I48.91 ATRIAL FIBRILLATION, UNSPECIFIED TYPE (MULTI): Primary | ICD-10-CM

## 2024-10-09 LAB
POC INR: 2.7
POC PROTHROMBIN TIME: NORMAL

## 2024-10-09 PROCEDURE — 99211 OFF/OP EST MAY X REQ PHY/QHP: CPT

## 2024-10-09 PROCEDURE — 85610 PROTHROMBIN TIME: CPT | Mod: QW

## 2024-10-09 NOTE — PROGRESS NOTES
Patient identification verified with 2 identifiers.    Location: [facility] - [address] [address] [phone] option #1     Referring Physician: MEERA VILLATORO  Enrollment/ Re-enrollment date: 2025   INR Goal: 2.0-3.0  INR monitoring is per [facility] protocol.  Anticoagulation Medication: warfarin  Indication: Atrial Fibrillation/Atrial Flutter    Subjective   Bleeding signs/symptoms: No    Bruising: No   Major bleeding event: No  Thrombosis signs/symptoms: No  Thromboembolic event: No  Missed doses: No  Extra doses: No  Medication changes: No  Dietary changes: No  Change in health: No  Change in activity: No  Alcohol: No  Other concerns: No    Upcoming Procedures:  Does the Patient Have any upcoming procedures that require interruption in anticoagulation therapy? no  Does the patient require bridging? no      Anticoagulation Summary  As of 10/9/2024      INR goal:  2.0-3.0   TTR:  95.6% (1 y)   INR used for dosin.70 (10/9/2024)   Weekly warfarin total:  30 mg               Assessment/Plan   Therapeutic     1. New dose: no change    2. Next INR: 1 month      Education provided to patient during the visit:  Patient instructed to call in interim with questions, concerns and changes.   Patient educated on compliance with dosing, follow up appointments, and prescribed plan of care.

## 2024-10-29 ENCOUNTER — LAB (OUTPATIENT)
Dept: LAB | Facility: LAB | Age: 84
End: 2024-10-29
Payer: MEDICARE

## 2024-10-29 DIAGNOSIS — D64.9 ANEMIA, UNSPECIFIED TYPE: ICD-10-CM

## 2024-10-29 LAB
BASOPHILS # BLD AUTO: 0.04 X10*3/UL (ref 0–0.1)
BASOPHILS NFR BLD AUTO: 0.7 %
EOSINOPHIL # BLD AUTO: 0.11 X10*3/UL (ref 0–0.4)
EOSINOPHIL NFR BLD AUTO: 2 %
ERYTHROCYTE [DISTWIDTH] IN BLOOD BY AUTOMATED COUNT: 13.5 % (ref 11.5–14.5)
HCT VFR BLD AUTO: 34.7 % (ref 41–52)
HGB BLD-MCNC: 11 G/DL (ref 13.5–17.5)
IMM GRANULOCYTES # BLD AUTO: 0.01 X10*3/UL (ref 0–0.5)
IMM GRANULOCYTES NFR BLD AUTO: 0.2 % (ref 0–0.9)
LYMPHOCYTES # BLD AUTO: 1.53 X10*3/UL (ref 0.8–3)
LYMPHOCYTES NFR BLD AUTO: 28.1 %
MCH RBC QN AUTO: 31.8 PG (ref 26–34)
MCHC RBC AUTO-ENTMCNC: 31.7 G/DL (ref 32–36)
MCV RBC AUTO: 100 FL (ref 80–100)
MONOCYTES # BLD AUTO: 0.62 X10*3/UL (ref 0.05–0.8)
MONOCYTES NFR BLD AUTO: 11.4 %
NEUTROPHILS # BLD AUTO: 3.13 X10*3/UL (ref 1.6–5.5)
NEUTROPHILS NFR BLD AUTO: 57.6 %
NRBC BLD-RTO: 0 /100 WBCS (ref 0–0)
PLATELET # BLD AUTO: 163 X10*3/UL (ref 150–450)
RBC # BLD AUTO: 3.46 X10*6/UL (ref 4.5–5.9)
WBC # BLD AUTO: 5.4 X10*3/UL (ref 4.4–11.3)

## 2024-10-29 PROCEDURE — 36415 COLL VENOUS BLD VENIPUNCTURE: CPT

## 2024-10-29 PROCEDURE — 85025 COMPLETE CBC W/AUTO DIFF WBC: CPT

## 2024-11-06 ENCOUNTER — ANTICOAGULATION - WARFARIN VISIT (OUTPATIENT)
Dept: CARDIOLOGY | Facility: CLINIC | Age: 84
End: 2024-11-06
Payer: MEDICARE

## 2024-11-06 DIAGNOSIS — I48.91 ATRIAL FIBRILLATION, UNSPECIFIED TYPE (MULTI): Primary | ICD-10-CM

## 2024-11-06 LAB
POC INR: 2.5
POC PROTHROMBIN TIME: NORMAL

## 2024-11-06 PROCEDURE — 85610 PROTHROMBIN TIME: CPT | Mod: QW

## 2024-11-06 PROCEDURE — 99211 OFF/OP EST MAY X REQ PHY/QHP: CPT

## 2024-11-06 NOTE — PROGRESS NOTES
Patient identification verified with 2 identifiers.    Location: [facility] [address] [address] [phone] option #1     Referring Physician: MEERA VILLATORO  Enrollment/ Re-enrollment date: 2025   INR Goal: 2.0-3.0  INR monitoring is per [facility] protocol.  Anticoagulation Medication: warfarin  Indication: Atrial Fibrillation/Atrial Flutter    Subjective   Bleeding signs/symptoms: No    Bruising: No   Major bleeding event: No  Thrombosis signs/symptoms: No  Thromboembolic event: No  Missed doses: No  Extra doses: No  Medication changes: No  Dietary changes: No  Change in health: No  Change in activity: No  Alcohol: No  Other concerns: No    Upcoming Procedures:  Does the Patient Have any upcoming procedures that require interruption in anticoagulation therapy? no  Does the patient require bridging? no      Anticoagulation Summary  As of 2024      INR goal:  2.0-3.0   TTR:  95.9% (1.1 y)   INR used for dosin.50 (2024)   Weekly warfarin total:  30 mg               Assessment/Plan   Therapeutic     1. New dose: no change    2. Next INR: 1 month      Education provided to patient during the visit:  Patient instructed to call in interim with questions, concerns and changes.   Patient educated on compliance with dosing, follow up appointments, and prescribed plan of care.

## 2024-11-13 ENCOUNTER — APPOINTMENT (OUTPATIENT)
Dept: CARDIOLOGY | Facility: CLINIC | Age: 84
End: 2024-11-13
Payer: MEDICARE

## 2024-11-18 ENCOUNTER — APPOINTMENT (OUTPATIENT)
Dept: PRIMARY CARE | Facility: CLINIC | Age: 84
End: 2024-11-18
Payer: MEDICARE

## 2024-11-18 VITALS
RESPIRATION RATE: 18 BRPM | SYSTOLIC BLOOD PRESSURE: 110 MMHG | WEIGHT: 195 LBS | OXYGEN SATURATION: 98 % | BODY MASS INDEX: 27.92 KG/M2 | DIASTOLIC BLOOD PRESSURE: 68 MMHG | HEIGHT: 70 IN | HEART RATE: 87 BPM | TEMPERATURE: 96.2 F

## 2024-11-18 DIAGNOSIS — D64.9 ANEMIA, UNSPECIFIED TYPE: Primary | ICD-10-CM

## 2024-11-18 DIAGNOSIS — Z79.01 ON WARFARIN THERAPY: ICD-10-CM

## 2024-11-18 DIAGNOSIS — I48.91 ATRIAL FIBRILLATION, UNSPECIFIED TYPE (MULTI): ICD-10-CM

## 2024-11-18 DIAGNOSIS — R31.9 HEMATURIA, UNSPECIFIED TYPE: ICD-10-CM

## 2024-11-18 PROCEDURE — 1160F RVW MEDS BY RX/DR IN RCRD: CPT | Performed by: FAMILY MEDICINE

## 2024-11-18 PROCEDURE — 1036F TOBACCO NON-USER: CPT | Performed by: FAMILY MEDICINE

## 2024-11-18 PROCEDURE — 1123F ACP DISCUSS/DSCN MKR DOCD: CPT | Performed by: FAMILY MEDICINE

## 2024-11-18 PROCEDURE — 1159F MED LIST DOCD IN RCRD: CPT | Performed by: FAMILY MEDICINE

## 2024-11-18 PROCEDURE — G0439 PPPS, SUBSEQ VISIT: HCPCS | Performed by: FAMILY MEDICINE

## 2024-11-18 PROCEDURE — 99214 OFFICE O/P EST MOD 30 MIN: CPT | Performed by: FAMILY MEDICINE

## 2024-11-18 PROCEDURE — 1126F AMNT PAIN NOTED NONE PRSNT: CPT | Performed by: FAMILY MEDICINE

## 2024-11-18 PROCEDURE — 1158F ADVNC CARE PLAN TLK DOCD: CPT | Performed by: FAMILY MEDICINE

## 2024-11-18 PROCEDURE — 1170F FXNL STATUS ASSESSED: CPT | Performed by: FAMILY MEDICINE

## 2024-11-18 ASSESSMENT — ANXIETY QUESTIONNAIRES
4. TROUBLE RELAXING: NOT AT ALL
2. NOT BEING ABLE TO STOP OR CONTROL WORRYING: NOT AT ALL
6. BECOMING EASILY ANNOYED OR IRRITABLE: NOT AT ALL
7. FEELING AFRAID AS IF SOMETHING AWFUL MIGHT HAPPEN: NOT AT ALL
IF YOU CHECKED OFF ANY PROBLEMS ON THIS QUESTIONNAIRE, HOW DIFFICULT HAVE THESE PROBLEMS MADE IT FOR YOU TO DO YOUR WORK, TAKE CARE OF THINGS AT HOME, OR GET ALONG WITH OTHER PEOPLE: NOT DIFFICULT AT ALL
5. BEING SO RESTLESS THAT IT IS HARD TO SIT STILL: NOT AT ALL
1. FEELING NERVOUS, ANXIOUS, OR ON EDGE: NOT AT ALL
GAD7 TOTAL SCORE: 0
3. WORRYING TOO MUCH ABOUT DIFFERENT THINGS: NOT AT ALL

## 2024-11-18 ASSESSMENT — ACTIVITIES OF DAILY LIVING (ADL)
TAKING_MEDICATION: INDEPENDENT
DOING_HOUSEWORK: INDEPENDENT
MANAGING_FINANCES: INDEPENDENT
BATHING: INDEPENDENT
DRESSING: INDEPENDENT
GROCERY_SHOPPING: INDEPENDENT

## 2024-11-18 ASSESSMENT — PATIENT HEALTH QUESTIONNAIRE - PHQ9
7. TROUBLE CONCENTRATING ON THINGS, SUCH AS READING THE NEWSPAPER OR WATCHING TELEVISION: NOT AT ALL
8. MOVING OR SPEAKING SO SLOWLY THAT OTHER PEOPLE COULD HAVE NOTICED. OR THE OPPOSITE, BEING SO FIGETY OR RESTLESS THAT YOU HAVE BEEN MOVING AROUND A LOT MORE THAN USUAL: NOT AT ALL
3. TROUBLE FALLING OR STAYING ASLEEP OR SLEEPING TOO MUCH: NOT AT ALL
4. FEELING TIRED OR HAVING LITTLE ENERGY: NOT AT ALL
6. FEELING BAD ABOUT YOURSELF - OR THAT YOU ARE A FAILURE OR HAVE LET YOURSELF OR YOUR FAMILY DOWN: NOT AT ALL
2. FEELING DOWN, DEPRESSED OR HOPELESS: NOT AT ALL
SUM OF ALL RESPONSES TO PHQ QUESTIONS 1-9: 0
9. THOUGHTS THAT YOU WOULD BE BETTER OFF DEAD, OR OF HURTING YOURSELF: NOT AT ALL
SUM OF ALL RESPONSES TO PHQ9 QUESTIONS 1 AND 2: 0
1. LITTLE INTEREST OR PLEASURE IN DOING THINGS: NOT AT ALL
5. POOR APPETITE OR OVEREATING: NOT AT ALL

## 2024-11-18 ASSESSMENT — ENCOUNTER SYMPTOMS
LOSS OF SENSATION IN FEET: 0
DEPRESSION: 0
OCCASIONAL FEELINGS OF UNSTEADINESS: 0

## 2024-11-18 ASSESSMENT — PAIN SCALES - GENERAL: PAINLEVEL_OUTOF10: 0-NO PAIN

## 2024-11-18 NOTE — PROGRESS NOTES
Subjective   Reason for Visit: Ming Bunch is an 83 y.o. male here for a Medicare Wellness visit.     Past Medical, Surgical, and Family History reviewed and updated in chart.    Reviewed all medications by prescribing practitioner or clinical pharmacist (such as prescriptions, OTCs, herbal therapies and supplements) and documented in the medical record.    HPI  The patient presents to the clinic for an annual medicare wellness visit. He has past medical history of atrial fibrillation, CAD, thrombocytopenia, BPH, and anemia.    The patient had a CBC done on 10/29/2024 and he requests to have his CBC results reviewed. Consequently, the patient's CBC results (10/29/2024) were reviewed. The patient's CBC results revealed that he continues to suffer from anemia (RBC 3.46/hemoglobin 11/hematocrit 34.7). The patient's anemia has slowly worsened over time so he was informed that he is likely suffering from some form of blood loss. The patient reports that he noticed darker urine recently. He recalls that he consulted with a urology specialist (WENDY Kathleen) in early August 2024 for further evaluation of dark urine. Notably, a urinalysis done during the office visit with the urology specialist (08/09/2024) showed a large amount of blood in the urine (hematuria). It is of note that the patient is on tamsulosin 0.8 mg daily for treatment of BPH. His most recent Cologuard was done on 07/12/2023 (normal).   May need to send for colonoscopy.  He denies any acid reflux or heart burn or GI symptoms, feels well    He takes atorvastatin 40 mg nightly for treatment of hyperlipidemia/CAD. He is tolerating this dose of atorvastatin medication well. He denies any side-effects to his atorvastatin medication. His cholesterol profile was normal when checked in early October 2024.    His blood pressure (110/68) was within good range when checked in the clinic today. He continues on diltiazem  mg daily, warfarin 5 mg daily,  "and metoprolol tartrate 100 mg (2 times daily) for treatment of atrial fibrillation. He continues to consult with his cardiology specialist (WENDY See) regularly. He denies any chest pain and/or SOB symptoms.    The patient had a comprehensive lab work-up in early October 2024. His labs were mostly normal at the time.     Patient Care Team:  Kellee Burris DO as PCP - General  Kellee Burris DO as PCP - [facility] Attributed Provider     Review of Systems    Objective   Vitals:  /68   Pulse 87   Temp 35.7 °C (96.2 °F)   Resp 18   Ht 1.778 m (5' 10\")   Wt 88.5 kg (195 lb)   SpO2 98%   BMI 27.98 kg/m²       Physical Exam  Constitutional:       Appearance: Normal appearance.   Neck:      Vascular: No carotid bruit.   Cardiovascular:      Rate and Rhythm: Normal rate and regular rhythm.      Pulses: Normal pulses.      Heart sounds: Normal heart sounds.   Pulmonary:      Effort: Pulmonary effort is normal.      Breath sounds: Normal breath sounds.   Abdominal:      General: Bowel sounds are normal.      Palpations: Abdomen is soft. There is no mass.      Tenderness: There is no abdominal tenderness.   Musculoskeletal:         General: Normal range of motion.      Cervical back: Normal range of motion.      Right lower leg: No edema.      Left lower leg: No edema.   Lymphadenopathy:      Cervical: No cervical adenopathy.   Skin:     General: Skin is warm and dry.   Neurological:      Mental Status: He is alert and oriented to person, place, and time.   Psychiatric:         Mood and Affect: Mood normal.         Behavior: Behavior normal.         Thought Content: Thought content normal.         Judgment: Judgment normal.         Assessment & Plan  Anemia, unspecified type    Orders:    Urinalysis with Reflex Microscopic; Future    Hematuria, unspecified type    Orders:    Urinalysis with Reflex Microscopic; Future    Atrial fibrillation, unspecified type (Multi)         On warfarin " therapy                     In regards to concerns with anemia, a urinalysis was conducted on the patient in the clinic today although pt unable to go so order in chart for him to do at lab this week and will call w result as the patient continues to notice dark urine so it is possible that he is losing blood in the urine.  He was instructed to continue monitoring symptoms for improvement/exacerbation.  If still a lot of blood in urine will go back to urology.  Last iron levels normal, will recheck labs again in 1 month and have pt follow up.      He is up-to-date on  his immunizations.    He will follow-up in 3 months, unless otherwise needed.    Scribe Attestation  By signing my name below, I, Emmanuelle Hamilton   attest that this documentation has been prepared under the direction and in the presence of Kellee Burris DO.     No

## 2024-11-19 ENCOUNTER — LAB (OUTPATIENT)
Dept: LAB | Facility: LAB | Age: 84
End: 2024-11-19
Payer: MEDICARE

## 2024-11-19 DIAGNOSIS — D64.9 ANEMIA, UNSPECIFIED TYPE: ICD-10-CM

## 2024-11-19 DIAGNOSIS — R31.9 HEMATURIA, UNSPECIFIED TYPE: ICD-10-CM

## 2024-11-19 LAB
APPEARANCE UR: ABNORMAL
BILIRUB UR STRIP.AUTO-MCNC: NEGATIVE MG/DL
COLOR UR: ABNORMAL
GLUCOSE UR STRIP.AUTO-MCNC: NORMAL MG/DL
KETONES UR STRIP.AUTO-MCNC: ABNORMAL MG/DL
LEUKOCYTE ESTERASE UR QL STRIP.AUTO: NEGATIVE
MUCOUS THREADS #/AREA URNS AUTO: ABNORMAL /LPF
NITRITE UR QL STRIP.AUTO: NEGATIVE
PH UR STRIP.AUTO: 5 [PH]
PROT UR STRIP.AUTO-MCNC: ABNORMAL MG/DL
RBC # UR STRIP.AUTO: ABNORMAL /UL
RBC #/AREA URNS AUTO: >20 /HPF
SP GR UR STRIP.AUTO: 1.01
UROBILINOGEN UR STRIP.AUTO-MCNC: NORMAL MG/DL
WBC #/AREA URNS AUTO: ABNORMAL /HPF

## 2024-11-19 PROCEDURE — 81001 URINALYSIS AUTO W/SCOPE: CPT

## 2024-11-26 ENCOUNTER — TELEPHONE (OUTPATIENT)
Dept: PRIMARY CARE | Facility: CLINIC | Age: 84
End: 2024-11-26
Payer: MEDICARE

## 2024-11-26 NOTE — TELEPHONE ENCOUNTER
----- Message from Kellee Burris sent at 11/20/2024 12:51 PM EST -----  Report to pt he is still spilling a lot of blood in his urine and to follow up for this with urology as discussed

## 2024-12-04 ENCOUNTER — ANTICOAGULATION - WARFARIN VISIT (OUTPATIENT)
Dept: CARDIOLOGY | Facility: CLINIC | Age: 84
End: 2024-12-04
Payer: MEDICARE

## 2024-12-04 DIAGNOSIS — I48.91 ATRIAL FIBRILLATION, UNSPECIFIED TYPE (MULTI): Primary | ICD-10-CM

## 2024-12-04 LAB
POC INR: 2.9
POC PROTHROMBIN TIME: NORMAL

## 2024-12-04 PROCEDURE — 85610 PROTHROMBIN TIME: CPT | Mod: QW

## 2024-12-04 PROCEDURE — 99211 OFF/OP EST MAY X REQ PHY/QHP: CPT

## 2024-12-04 NOTE — PROGRESS NOTES
Patient identification verified with 2 identifiers.    Location: [facility] at [facility] - [address] [address] [phone] option #1     Referring Physician: MEERA VILLATORO  Enrollment/ Re-enrollment date: 2025   INR Goal: 2.0-3.0  INR monitoring is per [facility] protocol.  Anticoagulation Medication: warfarin  Indication: Atrial Fibrillation/Atrial Flutter    Subjective   Bleeding signs/symptoms: No    Bruising: No   Major bleeding event: No  Thrombosis signs/symptoms: No  Thromboembolic event: No  Missed doses: No  Extra doses: No  Medication changes: No  Dietary changes: No  Change in health: No  Change in activity: No  Alcohol: No  Other concerns: No    Upcoming Procedures:  Does the Patient Have any upcoming procedures that require interruption in anticoagulation therapy? no  Does the patient require bridging? no      Anticoagulation Summary  As of 2024      INR goal:  2.0-3.0   TTR:  96.2% (1.2 y)   INR used for dosin.90 (2024)   Weekly warfarin total:  30 mg               Assessment/Plan   Therapeutic     1. New dose: no change    2. Next INR: 1 month      Education provided to patient during the visit:  Patient instructed to call in interim with questions, concerns and changes.   Patient educated on compliance with dosing, follow up appointments, and prescribed plan of care.

## 2024-12-20 ENCOUNTER — APPOINTMENT (OUTPATIENT)
Dept: UROLOGY | Facility: CLINIC | Age: 84
End: 2024-12-20
Payer: MEDICARE

## 2024-12-20 DIAGNOSIS — R31.0 GROSS HEMATURIA: Primary | ICD-10-CM

## 2024-12-20 DIAGNOSIS — R31.9 HEMATURIA, UNSPECIFIED TYPE: ICD-10-CM

## 2024-12-20 DIAGNOSIS — N40.1 BENIGN PROSTATIC HYPERPLASIA WITH LOWER URINARY TRACT SYMPTOMS, SYMPTOM DETAILS UNSPECIFIED: ICD-10-CM

## 2024-12-20 LAB
POC APPEARANCE, URINE: CLEAR
POC BILIRUBIN, URINE: ABNORMAL
POC BLOOD, URINE: ABNORMAL
POC COLOR, URINE: ABNORMAL
POC GLUCOSE, URINE: ABNORMAL MG/DL
POC KETONES, URINE: ABNORMAL MG/DL
POC LEUKOCYTES, URINE: ABNORMAL
POC NITRITE,URINE: POSITIVE
POC PH, URINE: 6 PH
POC PROTEIN, URINE: ABNORMAL MG/DL
POC SPECIFIC GRAVITY, URINE: 1.02
POC UROBILINOGEN, URINE: 4 EU/DL

## 2024-12-20 PROCEDURE — 1159F MED LIST DOCD IN RCRD: CPT | Performed by: NURSE PRACTITIONER

## 2024-12-20 PROCEDURE — 81001 URINALYSIS AUTO W/SCOPE: CPT

## 2024-12-20 PROCEDURE — G2211 COMPLEX E/M VISIT ADD ON: HCPCS | Performed by: NURSE PRACTITIONER

## 2024-12-20 PROCEDURE — 1123F ACP DISCUSS/DSCN MKR DOCD: CPT | Performed by: NURSE PRACTITIONER

## 2024-12-20 PROCEDURE — 99213 OFFICE O/P EST LOW 20 MIN: CPT | Performed by: NURSE PRACTITIONER

## 2024-12-20 PROCEDURE — 81003 URINALYSIS AUTO W/O SCOPE: CPT | Performed by: NURSE PRACTITIONER

## 2024-12-20 PROCEDURE — 1036F TOBACCO NON-USER: CPT | Performed by: NURSE PRACTITIONER

## 2024-12-20 PROCEDURE — 87086 URINE CULTURE/COLONY COUNT: CPT

## 2024-12-20 PROCEDURE — 51798 US URINE CAPACITY MEASURE: CPT | Performed by: NURSE PRACTITIONER

## 2024-12-20 PROCEDURE — 1160F RVW MEDS BY RX/DR IN RCRD: CPT | Performed by: NURSE PRACTITIONER

## 2024-12-20 RX ORDER — TADALAFIL 5 MG/1
5 TABLET ORAL DAILY
Qty: 30 TABLET | Refills: 3 | Status: SHIPPED | OUTPATIENT
Start: 2024-12-20 | End: 2025-04-19

## 2024-12-20 NOTE — PROGRESS NOTES
"  [facility]  Outpatient Clinic Note    Subjective   Ming Bunch is a 84 y.o. male    History of Present Illness     Patient presenting to clinic today for FUV. History of Elevated PSA, Gross Hematuria with benign workup, Afib on Warfarin, HLD, UTI's. He is taking tamsulosin 0.8 mg daily. He has no complaints or concerns today. Denies dysuria, nocturia, flank pain, and bothersome frequency or urgency. Patient denies fevers, chills, nausea, and vomiting. Patient is a former smoker.   PVR today is 280 ml   Second void, unable to empty, feels \"bloated\"    Urinalysis, Microscopic 04/28/2023: RBC 6375/HPF  Urine Culture Negative   Cystoscopy with Dr. Juan 06/06/2023 with no cause for hematuria identified     CTU 05/23/2023 negative for stones, hydroureteronephrosis, tumor, or mass bilaterally.   Non GYN cytology Negative    Lab Results   Component Value Date    PSA 0.26 10/01/2024    PSA 0.37 07/14/2022    PSA 0.38 07/13/2021    PSA 0.53 12/29/2020       Past Medical History and Surgical History   Past Medical History:   Diagnosis Date    Personal history of diseases of the blood and blood-forming organs and certain disorders involving the immune mechanism 06/09/2021    History of thrombocytopenia    Shortness of breath 02/19/2020    Shortness of breath on exertion     Past Surgical History:   Procedure Laterality Date    APPENDECTOMY  09/11/2013    Appendectomy    COLONOSCOPY  06/25/2013    Complete Colonoscopy    OTHER SURGICAL HISTORY  04/22/2015    Removal Of Lesion    PILONIDAL CYST DRAINAGE  09/11/2013    Pilonidal Cyst Resection       Medications  Current Outpatient Medications on File Prior to Visit   Medication Sig Dispense Refill    atorvastatin (Lipitor) 40 mg tablet Take 1 tablet (40 mg) by mouth once daily at bedtime. 90 tablet 3    dilTIAZem CD (Cardizem CD) 240 mg 24 hr capsule Take 1 capsule (240 mg) by mouth once daily.      metoprolol tartrate (Lopressor) 100 mg tablet Take 1 tablet (100 " mg) by mouth 2 times a day. 180 tablet 3    tamsulosin (Flomax) 0.4 mg 24 hr capsule Take 2 capsules (0.8 mg) by mouth once daily. 180 capsule 3    warfarin (Coumadin) 5 mg tablet Take 1 tablet (5 mg) by mouth once daily in the evening. 90 tablet 3     No current facility-administered medications on file prior to visit.       Objective   Physicial Exam  General: Well developed, well nourished, alert and cooperative, appears in no acute distress  Eyes: Non-injected conjunctiva, sclera clear, no proptosis  Cardiac: Extremities are warm and well perfused. No edema, cyanosis or pallor.   Lungs: Breathing is easy, non-labored. Speaking in clear and complete sentences. Normal diaphragmatic movement.  MSK: Ambulatory with steady gait, unassisted  Neuro: alert and oriented to person, place and time  Psych: Demonstrates good judgement and reason, without hallucinations, abnormal affect or abnormal behaviors.  Skin: no obvious lesions, no rashes.    Office Visit on 12/20/2024   Component Date Value Ref Range Status    POC Color, Urine 12/20/2024 Red (A)  Straw, Yellow, Light-Yellow Final    POC Appearance, Urine 12/20/2024 Clear  Clear Final    POC Glucose, Urine 12/20/2024 100 (1+) (A)  NEGATIVE mg/dl Final    POC Bilirubin, Urine 12/20/2024 LARGE (3+) (A)  NEGATIVE Final    POC Ketones, Urine 12/20/2024 15 (1+) (A)  NEGATIVE mg/dl Final    POC Specific Gravity, Urine 12/20/2024 1.020  1.005 - 1.035 Final    POC Blood, Urine 12/20/2024 LARGE (3+) (A)  NEGATIVE Final    POC PH, Urine 12/20/2024 6.0  No Reference Range Established PH Final    POC Protein, Urine 12/20/2024 300 (3+) (A)  NEGATIVE mg/dl Final    POC Urobilinogen, Urine 12/20/2024 4.0 (A)  0.2, 1.0 EU/DL Final    Poc Nitrite, Urine 12/20/2024 POSITIVE (A)  NEGATIVE Final    POC Leukocytes, Urine 12/20/2024 LARGE (3+) (A)  NEGATIVE Final      Review of Systems  All other systems have been reviewed and are negative for complaint.      Assessment and Plan     1)  Gross Hematuria.   Hematuria work up unremarkable 2023  Sending Urine for Culture    2) BPH with Retention   We discussed urinary retention in great detail.  We discussed management of urinary retention to include indwelling catheter or CIC. We discussed risks of unmanaged urinary retention including irreversible kidney injury and increased risk of UTI.   Patient declines Catheter and CIC.     Today we discussed BPH. BPH is the benign growth of prostate tissue that may cause bothersome urinary symptoms. The mechanism of action as well as the risks, benefits, common side effects, and alternatives to all prescribed medications were discussed with the patient at length. The patient had the opportunity to ask questions and all questions were answered. Additionally, if you continue to experience bothersome symptoms despite medication, there are minimally invasive procedures to alleviate these symptoms.    Continue Tamsulosin 0.8 mg daily  Start low dose Tadalafil 5 mg daily, explained     RTC in 6 weeks for UA and PVR check     All questions and concerns were addressed. Patient verbalizes understanding and has no other questions at this time.     Sarah Kathleen-- RIGO SIMMS  Office Phone:  [phone]

## 2024-12-21 LAB
APPEARANCE UR: ABNORMAL
BILIRUB UR STRIP.AUTO-MCNC: NEGATIVE MG/DL
COLOR UR: ABNORMAL
GLUCOSE UR STRIP.AUTO-MCNC: NORMAL MG/DL
KETONES UR STRIP.AUTO-MCNC: ABNORMAL MG/DL
LEUKOCYTE ESTERASE UR QL STRIP.AUTO: ABNORMAL
MUCOUS THREADS #/AREA URNS AUTO: ABNORMAL /LPF
NITRITE UR QL STRIP.AUTO: NEGATIVE
PH UR STRIP.AUTO: 6 [PH]
PROT UR STRIP.AUTO-MCNC: ABNORMAL MG/DL
RBC # UR STRIP.AUTO: ABNORMAL /UL
RBC #/AREA URNS AUTO: >20 /HPF
SP GR UR STRIP.AUTO: 1.02
UROBILINOGEN UR STRIP.AUTO-MCNC: NORMAL MG/DL
WBC #/AREA URNS AUTO: ABNORMAL /HPF

## 2024-12-22 LAB — BACTERIA UR CULT: NORMAL

## 2024-12-31 ENCOUNTER — ANTICOAGULATION - WARFARIN VISIT (OUTPATIENT)
Dept: CARDIOLOGY | Facility: CLINIC | Age: 84
End: 2024-12-31
Payer: MEDICARE

## 2024-12-31 ENCOUNTER — OFFICE VISIT (OUTPATIENT)
Dept: URGENT CARE | Age: 84
End: 2024-12-31
Payer: MEDICARE

## 2024-12-31 VITALS — TEMPERATURE: 97.7 F | OXYGEN SATURATION: 96 % | HEART RATE: 112 BPM

## 2024-12-31 DIAGNOSIS — I48.91 ATRIAL FIBRILLATION, UNSPECIFIED TYPE (MULTI): Primary | ICD-10-CM

## 2024-12-31 DIAGNOSIS — K59.00 CONSTIPATION, UNSPECIFIED CONSTIPATION TYPE: Primary | ICD-10-CM

## 2024-12-31 LAB
POC INR: 2.2
POC PROTHROMBIN TIME: NORMAL

## 2024-12-31 PROCEDURE — 99211 OFF/OP EST MAY X REQ PHY/QHP: CPT

## 2024-12-31 PROCEDURE — 1123F ACP DISCUSS/DSCN MKR DOCD: CPT

## 2024-12-31 PROCEDURE — 1036F TOBACCO NON-USER: CPT

## 2024-12-31 PROCEDURE — 1159F MED LIST DOCD IN RCRD: CPT

## 2024-12-31 PROCEDURE — 85610 PROTHROMBIN TIME: CPT | Mod: QW

## 2024-12-31 PROCEDURE — 99213 OFFICE O/P EST LOW 20 MIN: CPT

## 2024-12-31 NOTE — PROGRESS NOTES
Subjective   History of Present Illness: Ming Bunch is a 84 y.o. male. They present today with a chief complaint of Constipation (3 days). Took dulcolax. Denies abdominal pain, N/V, hematochezia. Able to pass gas.            Past Medical History  Allergies as of 12/31/2024    (No Known Allergies)       (Not in a hospital admission)       Past Medical History:   Diagnosis Date    Personal history of diseases of the blood and blood-forming organs and certain disorders involving the immune mechanism 06/09/2021    History of thrombocytopenia    Shortness of breath 02/19/2020    Shortness of breath on exertion       Past Surgical History:   Procedure Laterality Date    APPENDECTOMY  09/11/2013    Appendectomy    COLONOSCOPY  06/25/2013    Complete Colonoscopy    OTHER SURGICAL HISTORY  04/22/2015    Removal Of Lesion    PILONIDAL CYST DRAINAGE  09/11/2013    Pilonidal Cyst Resection        reports that he has never smoked. He has never used smokeless tobacco. He reports current alcohol use of about 10.0 standard drinks of alcohol per week. He reports that he does not currently use drugs.    Review of Systems  Review of Systems                               Objective    Vitals:    12/31/24 1333   Pulse: (!) 112   Temp: 36.5 °C (97.7 °F)   SpO2: 96%     No LMP for male patient.    Physical Exam  Vitals reviewed.   HENT:      Head: Normocephalic and atraumatic.      Nose: Nose normal.      Mouth/Throat:      Mouth: Mucous membranes are moist.   Eyes:      Extraocular Movements: Extraocular movements intact.      Conjunctiva/sclera: Conjunctivae normal.      Pupils: Pupils are equal, round, and reactive to light.   Cardiovascular:      Rate and Rhythm: Normal rate and regular rhythm.   Pulmonary:      Effort: Pulmonary effort is normal.      Breath sounds: Normal breath sounds.   Abdominal:      General: Bowel sounds are normal.      Palpations: Abdomen is soft.      Tenderness: There is no abdominal tenderness.    Skin:     General: Skin is warm.   Neurological:      Mental Status: He is alert and oriented to person, place, and time.   Psychiatric:         Mood and Affect: Mood normal.         Behavior: Behavior normal.             Point of Care Test & Imaging Results from this visit  No results found for this visit on 12/31/24.   No results found.    Diagnostic study results (if any) were reviewed by Haile Ortiz PA-C.    Assessment/Plan   Allergies, medications, history, and pertinent labs/EKGs/Imaging reviewed by Haile Ortiz PA-C.     Medical Decision Making  - Advised pt to start on Miralax.   -         Patient is educated about their diagnoses.     -          Discussed medications benefits and adverse effects.     -          Answered all patient’s questions.     -          Patient will call 911 or go to the nearest ED if worsen symptoms .     -          Patient is agreeable to the plan of care and is deemed stable upon discharge.     -          Follow up with your primary care provider in two days.      Orders and Diagnoses  Diagnoses and all orders for this visit:  Constipation, unspecified constipation type      Medical Admin Record      Patient disposition: Home    Electronically signed by Haile Ortiz PA-C  1:58 PM

## 2024-12-31 NOTE — PROGRESS NOTES
Patient identification verified with 2 identifiers.    Location: [facility] at [facility] - [facility] [id number] [address] [phone] option #1     Referring Physician: DR. VILLATORO  Enrollment/ Re-enrollment date: 25   INR Goal: 2.0-3.0  INR monitoring is per [facility] protocol.  Anticoagulation Medication: warfarin  Indication: Atrial Fibrillation/Atrial Flutter    Subjective   Bleeding signs/symptoms: No    Bruising: No   Major bleeding event: No  Thrombosis signs/symptoms: No  Thromboembolic event: No  Missed doses: No  Extra doses: No  Medication changes: No  Dietary changes: No  Change in health: No  Change in activity: No  Alcohol: No  Other concerns: No    Upcoming Procedures:  Does the Patient Have any upcoming procedures that require interruption in anticoagulation therapy? no  Does the patient require bridging? no      Anticoagulation Summary  As of 2024      INR goal:  2.0-3.0   TTR:  96.4% (1.3 y)   INR used for dosin.20 (2024)   Weekly warfarin total:  30 mg               Assessment/Plan   Therapeutic     1. New dose: no change    2. Next INR: 1 month      Education provided to patient during the visit:  Patient instructed to call in interim with questions, concerns and changes.   Patient educated on dietary consistency in vitamin k consumption.   Patient educated on signs of bleeding/clotting.

## 2025-01-22 ENCOUNTER — TELEPHONE (OUTPATIENT)
Dept: CARDIOLOGY | Facility: CLINIC | Age: 85
End: 2025-01-22
Payer: MEDICARE

## 2025-01-22 DIAGNOSIS — I48.91 ATRIAL FIBRILLATION, UNSPECIFIED TYPE (MULTI): ICD-10-CM

## 2025-01-22 RX ORDER — METOPROLOL TARTRATE 100 MG/1
100 TABLET ORAL 2 TIMES DAILY
Qty: 180 TABLET | Refills: 3 | Status: SHIPPED | OUTPATIENT
Start: 2025-01-22

## 2025-01-24 ENCOUNTER — APPOINTMENT (OUTPATIENT)
Dept: UROLOGY | Facility: CLINIC | Age: 85
End: 2025-01-24
Payer: MEDICARE

## 2025-01-24 VITALS
HEART RATE: 88 BPM | DIASTOLIC BLOOD PRESSURE: 84 MMHG | TEMPERATURE: 97.7 F | BODY MASS INDEX: 29.49 KG/M2 | HEIGHT: 70 IN | WEIGHT: 206 LBS | SYSTOLIC BLOOD PRESSURE: 134 MMHG

## 2025-01-24 DIAGNOSIS — R31.0 GROSS HEMATURIA: Primary | ICD-10-CM

## 2025-01-24 PROCEDURE — 52000 CYSTOURETHROSCOPY: CPT | Performed by: SURGERY

## 2025-01-24 ASSESSMENT — PAIN SCALES - GENERAL: PAINLEVEL_OUTOF10: 0-NO PAIN

## 2025-01-24 NOTE — PROGRESS NOTES
Patient ID: Ming Bunch is a 84 y.o. male.  His is here for hematuria.  Had a CTU in 2023, normal upper tracts.  Cystoscopy at that time unremarkable.  He does have BPH w/ LUTS.          Cystoscopy    Date/Time: 1/24/2025 9:26 AM    Performed by: Valencia Rock MD  Authorized by: Valencia Rock MD    Procedure - Bladder Cystoscopy:     Procedure details: cystoscopy    Post-procedure:     Patient tolerance: Patient tolerated the procedure well with no immediate complications      Comments:      The patient was placed in a supine position.  His genitalia were prepped and draped.  We introduced viscous lidocaine per urethra.  A cystoscope was passed per urethra, and we entered the bladder.  The orifices were identified and appeared normal with clear efflux.  The bladder mucosa was inspected.  No tumors or stones seen.  Prostate is enlarged, mildly obstructive.  No strictures seen.          Plan:  Will follow up with our FLORINA Sarah Kathleen.  Consider Finasteride for Bph, or perhaps PAE.

## 2025-01-29 ENCOUNTER — ANTICOAGULATION - WARFARIN VISIT (OUTPATIENT)
Dept: CARDIOLOGY | Facility: CLINIC | Age: 85
End: 2025-01-29
Payer: MEDICARE

## 2025-01-29 DIAGNOSIS — I48.91 ATRIAL FIBRILLATION, UNSPECIFIED TYPE (MULTI): ICD-10-CM

## 2025-01-29 LAB
POC INR: 2.6
POC PROTHROMBIN TIME: NORMAL

## 2025-01-29 PROCEDURE — 85610 PROTHROMBIN TIME: CPT | Mod: QW

## 2025-01-29 PROCEDURE — 99211 OFF/OP EST MAY X REQ PHY/QHP: CPT

## 2025-01-29 NOTE — PROGRESS NOTES
Patient identification verified with 2 identifiers.    Location: [facility] at [facility] - [facility] [address] [address] [phone] option #1     Referring Physician: DR. VILLATORO  Enrollment/ Re-enrollment date: 25   INR Goal: 2.0-3.0  INR monitoring is per [facility] protocol.  Anticoagulation Medication: warfarin  Indication: Atrial Fibrillation/Atrial Flutter    Subjective   Bleeding signs/symptoms: No    Bruising: No   Major bleeding event: No  Thrombosis signs/symptoms: No  Thromboembolic event: No  Missed doses: No  Extra doses: No  Medication changes: No  Dietary changes: No  Change in health: No  Change in activity: No  Alcohol: No  Other concerns: No    Upcoming Procedures:  Does the Patient Have any upcoming procedures that require interruption in anticoagulation therapy? no  Does the patient require bridging? no      Anticoagulation Summary  As of 2024      INR goal:  2.0-3.0   TTR:  96.4% (1.3 y)   INR used for dosin.6   Weekly warfarin total:  30 mg               Assessment/Plan   Therapeutic     1. New dose: no change    2. Next INR: 1 month      Education provided to patient during the visit:  Patient instructed to call in interim with questions, concerns and changes.   Patient educated on dietary consistency in vitamin k consumption.   Patient educated on signs of bleeding/clotting.

## 2025-01-31 ENCOUNTER — HOSPITAL ENCOUNTER (EMERGENCY)
Facility: HOSPITAL | Age: 85
Discharge: HOME | End: 2025-01-31
Attending: EMERGENCY MEDICINE
Payer: MEDICARE

## 2025-01-31 ENCOUNTER — APPOINTMENT (OUTPATIENT)
Dept: UROLOGY | Facility: CLINIC | Age: 85
End: 2025-01-31
Payer: MEDICARE

## 2025-01-31 VITALS — HEIGHT: 69 IN | BODY MASS INDEX: 30.42 KG/M2 | TEMPERATURE: 97.6 F

## 2025-01-31 VITALS
RESPIRATION RATE: 18 BRPM | DIASTOLIC BLOOD PRESSURE: 91 MMHG | BODY MASS INDEX: 30.36 KG/M2 | HEART RATE: 95 BPM | WEIGHT: 205 LBS | TEMPERATURE: 97.1 F | SYSTOLIC BLOOD PRESSURE: 137 MMHG | HEIGHT: 69 IN | OXYGEN SATURATION: 96 %

## 2025-01-31 DIAGNOSIS — Z46.6 URINARY CATHETER (FOLEY) CHANGE REQUIRED: ICD-10-CM

## 2025-01-31 DIAGNOSIS — R33.8 BENIGN PROSTATIC HYPERPLASIA WITH URINARY RETENTION: Primary | ICD-10-CM

## 2025-01-31 DIAGNOSIS — R31.0 GROSS HEMATURIA: Primary | ICD-10-CM

## 2025-01-31 DIAGNOSIS — N40.1 BENIGN PROSTATIC HYPERPLASIA WITH URINARY RETENTION: Primary | ICD-10-CM

## 2025-01-31 DIAGNOSIS — R31.9 HEMATURIA, UNSPECIFIED TYPE: ICD-10-CM

## 2025-01-31 LAB
ALBUMIN SERPL BCP-MCNC: 3.8 G/DL (ref 3.4–5)
ALP SERPL-CCNC: 76 U/L (ref 33–136)
ALT SERPL W P-5'-P-CCNC: 9 U/L (ref 10–52)
ANION GAP SERPL CALC-SCNC: 11 MMOL/L (ref 10–20)
APPEARANCE UR: ABNORMAL
AST SERPL W P-5'-P-CCNC: 7 U/L (ref 9–39)
BASOPHILS # BLD AUTO: 0.04 X10*3/UL (ref 0–0.1)
BASOPHILS NFR BLD AUTO: 0.5 %
BILIRUB DIRECT SERPL-MCNC: 0.3 MG/DL (ref 0–0.3)
BILIRUB SERPL-MCNC: 1.2 MG/DL (ref 0–1.2)
BILIRUB UR STRIP.AUTO-MCNC: NEGATIVE MG/DL
BUN SERPL-MCNC: 14 MG/DL (ref 6–23)
CALCIUM SERPL-MCNC: 8.3 MG/DL (ref 8.6–10.3)
CHLORIDE SERPL-SCNC: 103 MMOL/L (ref 98–107)
CO2 SERPL-SCNC: 25 MMOL/L (ref 21–32)
COLOR UR: ABNORMAL
CREAT SERPL-MCNC: 0.75 MG/DL (ref 0.5–1.3)
EGFRCR SERPLBLD CKD-EPI 2021: 89 ML/MIN/1.73M*2
EOSINOPHIL # BLD AUTO: 0.04 X10*3/UL (ref 0–0.4)
EOSINOPHIL NFR BLD AUTO: 0.5 %
ERYTHROCYTE [DISTWIDTH] IN BLOOD BY AUTOMATED COUNT: 14.6 % (ref 11.5–14.5)
GLUCOSE SERPL-MCNC: 124 MG/DL (ref 74–99)
GLUCOSE UR STRIP.AUTO-MCNC: NEGATIVE MG/DL
HCT VFR BLD AUTO: 28.1 % (ref 41–52)
HGB BLD-MCNC: 8.8 G/DL (ref 13.5–17.5)
IMM GRANULOCYTES # BLD AUTO: 0.02 X10*3/UL (ref 0–0.5)
IMM GRANULOCYTES NFR BLD AUTO: 0.3 % (ref 0–0.9)
INR PPP: 2.9 (ref 0.9–1.1)
KETONES UR STRIP.AUTO-MCNC: NEGATIVE MG/DL
LEUKOCYTE ESTERASE UR QL STRIP.AUTO: ABNORMAL
LYMPHOCYTES # BLD AUTO: 1.29 X10*3/UL (ref 0.8–3)
LYMPHOCYTES NFR BLD AUTO: 16.5 %
MCH RBC QN AUTO: 28.7 PG (ref 26–34)
MCHC RBC AUTO-ENTMCNC: 31.3 G/DL (ref 32–36)
MCV RBC AUTO: 92 FL (ref 80–100)
MONOCYTES # BLD AUTO: 0.86 X10*3/UL (ref 0.05–0.8)
MONOCYTES NFR BLD AUTO: 11 %
NEUTROPHILS # BLD AUTO: 5.56 X10*3/UL (ref 1.6–5.5)
NEUTROPHILS NFR BLD AUTO: 71.2 %
NITRITE UR QL STRIP.AUTO: NEGATIVE
NRBC BLD-RTO: 0 /100 WBCS (ref 0–0)
PH UR STRIP.AUTO: 5.5 [PH]
PLATELET # BLD AUTO: 218 X10*3/UL (ref 150–450)
POC APPEARANCE, URINE: CLEAR
POC BILIRUBIN, URINE: ABNORMAL
POC BLOOD, URINE: ABNORMAL
POC COLOR, URINE: YELLOW
POC GLUCOSE, URINE: ABNORMAL MG/DL
POC KETONES, URINE: ABNORMAL MG/DL
POC LEUKOCYTES, URINE: ABNORMAL
POC NITRITE,URINE: POSITIVE
POC PH, URINE: 8.5 PH
POC PROTEIN, URINE: ABNORMAL MG/DL
POC SPECIFIC GRAVITY, URINE: 1.01
POC UROBILINOGEN, URINE: >=8 EU/DL
POTASSIUM SERPL-SCNC: 3.9 MMOL/L (ref 3.5–5.3)
PROT SERPL-MCNC: 6.5 G/DL (ref 6.4–8.2)
PROT UR STRIP.AUTO-MCNC: ABNORMAL MG/DL
PROTHROMBIN TIME: 33.2 SECONDS (ref 9.8–12.8)
RBC # BLD AUTO: 3.07 X10*6/UL (ref 4.5–5.9)
RBC # UR STRIP.AUTO: ABNORMAL /UL
RBC #/AREA URNS AUTO: >20 /HPF
SODIUM SERPL-SCNC: 135 MMOL/L (ref 136–145)
SP GR UR STRIP.AUTO: 1.03
UROBILINOGEN UR STRIP.AUTO-MCNC: ABNORMAL MG/DL
WBC # BLD AUTO: 7.8 X10*3/UL (ref 4.4–11.3)
WBC #/AREA URNS AUTO: ABNORMAL /HPF

## 2025-01-31 PROCEDURE — 51702 INSERT TEMP BLADDER CATH: CPT

## 2025-01-31 PROCEDURE — 81003 URINALYSIS AUTO W/O SCOPE: CPT | Performed by: NURSE PRACTITIONER

## 2025-01-31 PROCEDURE — 80076 HEPATIC FUNCTION PANEL: CPT | Performed by: EMERGENCY MEDICINE

## 2025-01-31 PROCEDURE — 85025 COMPLETE CBC W/AUTO DIFF WBC: CPT | Performed by: EMERGENCY MEDICINE

## 2025-01-31 PROCEDURE — 36415 COLL VENOUS BLD VENIPUNCTURE: CPT | Performed by: EMERGENCY MEDICINE

## 2025-01-31 PROCEDURE — 51798 US URINE CAPACITY MEASURE: CPT | Performed by: NURSE PRACTITIONER

## 2025-01-31 PROCEDURE — 1159F MED LIST DOCD IN RCRD: CPT | Performed by: NURSE PRACTITIONER

## 2025-01-31 PROCEDURE — 1123F ACP DISCUSS/DSCN MKR DOCD: CPT | Performed by: NURSE PRACTITIONER

## 2025-01-31 PROCEDURE — 1160F RVW MEDS BY RX/DR IN RCRD: CPT | Performed by: NURSE PRACTITIONER

## 2025-01-31 PROCEDURE — 1126F AMNT PAIN NOTED NONE PRSNT: CPT | Performed by: NURSE PRACTITIONER

## 2025-01-31 PROCEDURE — 81001 URINALYSIS AUTO W/SCOPE: CPT | Performed by: EMERGENCY MEDICINE

## 2025-01-31 PROCEDURE — 99283 EMERGENCY DEPT VISIT LOW MDM: CPT | Mod: 25 | Performed by: EMERGENCY MEDICINE

## 2025-01-31 PROCEDURE — 2500000005 HC RX 250 GENERAL PHARMACY W/O HCPCS: Performed by: EMERGENCY MEDICINE

## 2025-01-31 PROCEDURE — 87086 URINE CULTURE/COLONY COUNT: CPT | Mod: AHULAB | Performed by: EMERGENCY MEDICINE

## 2025-01-31 PROCEDURE — 99213 OFFICE O/P EST LOW 20 MIN: CPT | Performed by: NURSE PRACTITIONER

## 2025-01-31 PROCEDURE — G2211 COMPLEX E/M VISIT ADD ON: HCPCS | Performed by: NURSE PRACTITIONER

## 2025-01-31 PROCEDURE — 82374 ASSAY BLOOD CARBON DIOXIDE: CPT | Performed by: EMERGENCY MEDICINE

## 2025-01-31 PROCEDURE — 85610 PROTHROMBIN TIME: CPT | Performed by: EMERGENCY MEDICINE

## 2025-01-31 RX ORDER — LIDOCAINE HYDROCHLORIDE 20 MG/ML
1 JELLY TOPICAL ONCE
Status: COMPLETED | OUTPATIENT
Start: 2025-01-31 | End: 2025-01-31

## 2025-01-31 RX ADMIN — LIDOCAINE HYDROCHLORIDE 1 APPLICATION: 20 JELLY TOPICAL at 15:44

## 2025-01-31 ASSESSMENT — PAIN SCALES - GENERAL
PAINLEVEL_OUTOF10: 0 - NO PAIN
PAINLEVEL_OUTOF10: 0-NO PAIN
PAINLEVEL_OUTOF10: 0 - NO PAIN

## 2025-01-31 ASSESSMENT — COLUMBIA-SUICIDE SEVERITY RATING SCALE - C-SSRS
2. HAVE YOU ACTUALLY HAD ANY THOUGHTS OF KILLING YOURSELF?: NO
1. IN THE PAST MONTH, HAVE YOU WISHED YOU WERE DEAD OR WISHED YOU COULD GO TO SLEEP AND NOT WAKE UP?: NO
6. HAVE YOU EVER DONE ANYTHING, STARTED TO DO ANYTHING, OR PREPARED TO DO ANYTHING TO END YOUR LIFE?: NO

## 2025-01-31 ASSESSMENT — PAIN - FUNCTIONAL ASSESSMENT: PAIN_FUNCTIONAL_ASSESSMENT: 0-10

## 2025-01-31 NOTE — PROGRESS NOTES
[facility]  Outpatient Clinic Note    Subjective   Ming Bunch is a 84 y.o. male    History of Present Illness     Patient presenting to clinic today for FUV. History of Elevated PSA, Gross Hematuria with benign workup, Afib on Warfarin, HLD, UTI's. He is taking tamsulosin 0.8 mg daily. He has no complaints or concerns today. Denies dysuria, nocturia, flank pain, and bothersome frequency or urgency. Patient denies fevers, chills, nausea, and vomiting. Patient is a former smoker.   PVR today is 737 ml     Cystoscopy with Dr. Juan 06/06/2023 with no cause for hematuria identified     CTU 05/23/2023 negative for stones, hydroureteronephrosis, tumor, or mass bilaterally.   Non GYN cytology Negative    1/24/2025 Cystoscopy with Dr. Rock:   The patient was placed in a supine position. His genitalia were prepped and draped. We introduced viscous lidocaine per urethra. A cystoscope was passed per urethra, and we entered the bladder. The orifices were identified and appeared normal with clear efflux. The bladder mucosa was inspected. No tumors or stones seen. Prostate is enlarged, mildly obstructive. No strictures seen.       Lab Results   Component Value Date    PSA 0.26 10/01/2024    PSA 0.37 07/14/2022    PSA 0.38 07/13/2021    PSA 0.53 12/29/2020       Past Medical History and Surgical History   Past Medical History:   Diagnosis Date    Personal history of diseases of the blood and blood-forming organs and certain disorders involving the immune mechanism 06/09/2021    History of thrombocytopenia    Shortness of breath 02/19/2020    Shortness of breath on exertion     Past Surgical History:   Procedure Laterality Date    APPENDECTOMY  09/11/2013    Appendectomy    COLONOSCOPY  06/25/2013    Complete Colonoscopy    OTHER SURGICAL HISTORY  04/22/2015    Removal Of Lesion    PILONIDAL CYST DRAINAGE  09/11/2013    Pilonidal Cyst Resection       Medications  Current Outpatient Medications on File Prior to  Visit   Medication Sig Dispense Refill    atorvastatin (Lipitor) 40 mg tablet Take 1 tablet (40 mg) by mouth once daily at bedtime. 90 tablet 3    dilTIAZem CD (Cardizem CD) 240 mg 24 hr capsule Take 1 capsule (240 mg) by mouth once daily.      metoprolol tartrate (Lopressor) 100 mg tablet Take 1 tablet (100 mg) by mouth 2 times a day. 180 tablet 3    tadalafil (Cialis) 5 mg tablet Take 1 tablet (5 mg) by mouth once daily. 30 tablet 3    tamsulosin (Flomax) 0.4 mg 24 hr capsule Take 2 capsules (0.8 mg) by mouth once daily. 180 capsule 3    warfarin (Coumadin) 5 mg tablet Take 1 tablet (5 mg) by mouth once daily in the evening. 90 tablet 3     No current facility-administered medications on file prior to visit.       Objective   Physicial Exam  General: Well developed, well nourished, alert and cooperative, appears in no acute distress  Eyes: Non-injected conjunctiva, sclera clear, no proptosis  Cardiac: Extremities are warm and well perfused. No edema, cyanosis or pallor.   Lungs: Breathing is easy, non-labored. Speaking in clear and complete sentences. Normal diaphragmatic movement.  MSK: Ambulatory with steady gait, unassisted  Neuro: alert and oriented to person, place and time  Psych: Demonstrates good judgement and reason, without hallucinations, abnormal affect or abnormal behaviors.  Skin: no obvious lesions, no rashes.    No visits with results within 1 Day(s) from this visit.   Latest known visit with results is:   Anticoagulation - Warfarin Visit on 01/29/2025   Component Date Value Ref Range Status    POC INR 01/29/2025 2.60  Normal Range: .9-1.1 Final      Review of Systems  All other systems have been reviewed and are negative for complaint.      Assessment and Plan     1) Gross Hematuria.   Hematuria work up unremarkable 2023    Cystoscopy with Dr. Rock 1/24/2023  The orifices were identified and appeared normal with clear efflux.  The bladder mucosa was inspected.  No tumors or stones seen.   Prostate is enlarged, mildly obstructive.  No strictures seen.    Consider Finasteride, or PAE.     2) BPH with Retention     PVR today 737 ml     We discussed urinary retention in great detail.  We discussed management of urinary retention to include indwelling catheter or CIC. We discussed risks of unmanaged urinary retention including irreversible kidney injury and increased risk of UTI.   Patient declines Catheter and CIC. Elects to have Kirby catheter placed.     Patient was prepped in sterile fashion and 18 F catheter was inserted with sterile technique without complication. There was return of clear-blood tinged urine.    Catheter stopped draining. Irrigated >500 ml sterile water, with minimal output, some clots, and continued blockage.  Instructed patient to proceed to ED for further evaluation and irrigation. Message to Dr. Valdez who is on-call for [facility] today.     IR referral, Dr. Nicolas, consider PAE     Continue Tamsulosin 0.8 mg daily  Continue low dose Tadalafil 5 mg daily, explained     All questions and concerns were addressed. Patient verbalizes understanding and has no other questions at this time.     Sarah Kathleen-- RIGO SIMMS  Office Phone:  [phone]

## 2025-01-31 NOTE — ED TRIAGE NOTES
Pt presents with the c/o bleeding from urethra. Pt states that he went to the urology office this afternoon and they placed a quintanilla catheter and now he is bleeding. Pt states that the catheter is now occluded and he is not passing urine.

## 2025-02-01 LAB
APPEARANCE UR: ABNORMAL
BACTERIA #/AREA URNS HPF: ABNORMAL /HPF
BILIRUB UR QL STRIP: ABNORMAL
COLOR UR: ABNORMAL
GLUCOSE UR QL STRIP: NEGATIVE
HGB UR QL STRIP: ABNORMAL
HOLD SPECIMEN: NORMAL
HYALINE CASTS #/AREA URNS LPF: ABNORMAL /LPF
KETONES UR QL STRIP: NEGATIVE
LEUKOCYTE ESTERASE UR QL STRIP: ABNORMAL
NITRITE UR QL STRIP: POSITIVE
PH UR STRIP: ABNORMAL [PH]
PROT UR QL STRIP: ABNORMAL
RBC #/AREA URNS HPF: ABNORMAL /HPF
SERVICE CMNT-IMP: ABNORMAL
SP GR UR STRIP: 1.02 (ref 1–1.03)
SQUAMOUS #/AREA URNS HPF: ABNORMAL /HPF
WBC #/AREA URNS HPF: ABNORMAL /HPF

## 2025-02-01 NOTE — ED PROVIDER NOTES
HPI   No chief complaint on file.      HPI: []  84-year-old white male with history of hypertension, A-fib on Coumadin, BPH/hematuria recent cystoscopy went to see his urologist today and Kirby catheter placed discharged home comes in with a blocked Kirby catheter and hematuria.  Has abdominal discomfort.  No fever chills nausea vomit diarrhea cough congestion incontinence seizures syncope or near syncope, no trauma no falls.    Past history: Hypertension, A-fib, hematuria  Social: Patient denies current tobacco alcohol drug abuse.  REVIEW OF SYSTEMS:    GENERAL.: No weight loss, fatigue, anorexia, insomnia, fever.    EYES: No vision loss, double vision, drainage, eye pain.    ENT: No pharyngitis, dry mouth.    CARDIOPULMONARY: No chest pain, palpitations, syncope, near syncope. No shortness of breath, cough, hemoptysis.    GI: No abdominal pain, change in bowel habits, melena, hematemesis, hematochezia, nausea, vomiting, diarrhea.    : No discharge, dysuria, frequency, urgency, positive for hematuria.  Positive for urinary retention.    MS: No limb pain, joint pain, joint swelling.    SKIN: No rashes.    PSYCH: No depression, anxiety, suicidality, homicidality.    Review of systems is otherwise negative unless stated above or in history of present illness.  Social history, family history, allergies reviewed.  PHYSICAL EXAM:    GENERAL: Vitals noted, no distress. Alert and oriented  x 3. Non-toxic.      EENT: TMs clear. Posterior oropharynx unremarkable. No meningismus. No LAD.     NECK: Supple. Nontender. No midline tenderness.     CARDIAC: Regular, rate, rhythm. No murmurs rubs or gallops. No JVD    PULMONARY: Lungs clear bilaterally with good aeration. No wheezes rales or rhonchi. No respiratory distress fine bibasilar crackles..     ABDOMEN: Soft, nonsurgical. Nontender. No peritoneal signs. Normoactive bowel sounds. No pulsatile masses.   : Kirby inguinal areas no inguinal hernias normal testicles appears  to be uncircumcised and Kirby catheter in place.  EXTREMITIES: 1+ bilateral symmetric pitting peripheral edema. Negative Homans bilaterally, no cords.  2+ bounding pulses well-perfused.    SKIN: No rash. Intact.     NEURO: No focal neurologic deficits, NIH score of 0. Cranial nerves normal as tested from II through XII.     MEDICAL DECISION MAKING:  CBC with shows no leukocytosis, hemoglobin about 8.8, chemistries unremarkable INR 2.9.  UA shows blood no infection.    Treatment ED: IV established, will change Kirby catheter with a 22 Lebanese three-way Kirby catheter, and initiate CBI.    ED course: Repeat assessment feeling much better urine is nice and pink cleared up nicely feels much better wants to go home.  Secure message sent to the patient's urologist and urology CNP.    Impression: #1 hematuria, #2 blood Kirby catheter #3 anemia  Plan/MDM: 84-year-old male comes in with blood Kirby catheter and gross hematuria which has been since relieved by replacement with a three-way Kirby catheter and CBI currently urine is nice and clear flowing nicely, no signs of renal failure or urosepsis or hemorrhagic shock, patient was to go home will be discharged home advised and outpatient follow-up primary doctor and his urologist with strict return precaution.                  Patient History   Past Medical History:   Diagnosis Date    Personal history of diseases of the blood and blood-forming organs and certain disorders involving the immune mechanism 06/09/2021    History of thrombocytopenia    Shortness of breath 02/19/2020    Shortness of breath on exertion     Past Surgical History:   Procedure Laterality Date    APPENDECTOMY  09/11/2013    Appendectomy    COLONOSCOPY  06/25/2013    Complete Colonoscopy    OTHER SURGICAL HISTORY  04/22/2015    Removal Of Lesion    PILONIDAL CYST DRAINAGE  09/11/2013    Pilonidal Cyst Resection     Family History   Problem Relation Name Age of Onset    Heart failure Mother      Atrial  fibrillation Sister       Social History     Tobacco Use    Smoking status: Never    Smokeless tobacco: Never   Substance Use Topics    Alcohol use: Yes     Alcohol/week: 10.0 standard drinks of alcohol     Types: 10 Standard drinks or equivalent per week    Drug use: Not Currently       Physical Exam   ED Triage Vitals   Temperature Heart Rate Respirations BP   01/31/25 1459 01/31/25 1459 01/31/25 1459 01/31/25 1500   36.2 °C (97.1 °F) 97 20 132/82      Pulse Ox Temp src Heart Rate Source Patient Position   01/31/25 1459 -- -- --   97 %         BP Location FiO2 (%)     -- --             Physical Exam      ED Course & [facility]   ED Course as of 01/31/25 1918 Fri Jan 31, 2025 1911 We replaced the Kirby catheter with three-way Kirby catheter, CBI initiated with good results the hematuria cleared up with nice pink urine, patient's CBC with differential shows a anemia hemoglobin 8.8, INR 2.9, LFTs unremarkable, patient be discharged home with fever Kirby catheter leg bag advised urgent option follow-up with primary doctor and urologist with strict return precaution. [MT]      ED Course User Index  [MT] Tyrese Rankin MD         Diagnoses as of 01/31/25 1918   Gross hematuria   Urinary catheter (Kirby) change required                 No data recorded                                 Medical Decision Making      Procedure  Procedures     Tyrese Rankin MD  01/31/25 1922

## 2025-02-02 LAB
BACTERIA UR CULT: NORMAL
BACTERIA UR CULT: NORMAL

## 2025-02-03 ENCOUNTER — APPOINTMENT (OUTPATIENT)
Dept: RADIOLOGY | Facility: HOSPITAL | Age: 85
End: 2025-02-03
Payer: MEDICARE

## 2025-02-03 ENCOUNTER — HOSPITAL ENCOUNTER (INPATIENT)
Facility: HOSPITAL | Age: 85
End: 2025-02-03
Attending: EMERGENCY MEDICINE | Admitting: STUDENT IN AN ORGANIZED HEALTH CARE EDUCATION/TRAINING PROGRAM
Payer: MEDICARE

## 2025-02-03 ENCOUNTER — APPOINTMENT (OUTPATIENT)
Dept: CARDIOLOGY | Facility: HOSPITAL | Age: 85
End: 2025-02-03
Payer: MEDICARE

## 2025-02-03 DIAGNOSIS — R18.8 CIRRHOSIS OF LIVER WITH ASCITES, UNSPECIFIED HEPATIC CIRRHOSIS TYPE (MULTI): ICD-10-CM

## 2025-02-03 DIAGNOSIS — D50.9 IRON DEFICIENCY ANEMIA, UNSPECIFIED IRON DEFICIENCY ANEMIA TYPE: ICD-10-CM

## 2025-02-03 DIAGNOSIS — I50.43 ACUTE ON CHRONIC COMBINED SYSTOLIC AND DIASTOLIC CONGESTIVE HEART FAILURE: Primary | ICD-10-CM

## 2025-02-03 DIAGNOSIS — T83.9XXA PROBLEM WITH FOLEY CATHETER, INITIAL ENCOUNTER (CMS-HCC): ICD-10-CM

## 2025-02-03 DIAGNOSIS — J90 PLEURAL EFFUSION, NOT ELSEWHERE CLASSIFIED: ICD-10-CM

## 2025-02-03 DIAGNOSIS — K74.60 CIRRHOSIS OF LIVER WITH ASCITES, UNSPECIFIED HEPATIC CIRRHOSIS TYPE (MULTI): ICD-10-CM

## 2025-02-03 DIAGNOSIS — R31.0 GROSS HEMATURIA: ICD-10-CM

## 2025-02-03 LAB
ALBUMIN SERPL BCP-MCNC: 3.4 G/DL (ref 3.4–5)
ALP SERPL-CCNC: 67 U/L (ref 33–136)
ALT SERPL W P-5'-P-CCNC: 10 U/L (ref 10–52)
ANION GAP SERPL CALC-SCNC: 9 MMOL/L (ref 10–20)
APPEARANCE UR: ABNORMAL
AST SERPL W P-5'-P-CCNC: 8 U/L (ref 9–39)
BACTERIA #/AREA URNS AUTO: ABNORMAL /HPF
BACTERIA UR CULT: ABNORMAL
BASOPHILS # BLD AUTO: 0.03 X10*3/UL (ref 0–0.1)
BASOPHILS NFR BLD AUTO: 0.4 %
BILIRUB DIRECT SERPL-MCNC: 0.5 MG/DL (ref 0–0.3)
BILIRUB SERPL-MCNC: 1.1 MG/DL (ref 0–1.2)
BILIRUB UR STRIP.AUTO-MCNC: NEGATIVE MG/DL
BNP SERPL-MCNC: 267 PG/ML (ref 0–99)
BUN SERPL-MCNC: 12 MG/DL (ref 6–23)
CALCIUM SERPL-MCNC: 8.1 MG/DL (ref 8.6–10.3)
CHLORIDE SERPL-SCNC: 102 MMOL/L (ref 98–107)
CO2 SERPL-SCNC: 29 MMOL/L (ref 21–32)
COLOR UR: ABNORMAL
CREAT SERPL-MCNC: 0.65 MG/DL (ref 0.5–1.3)
EGFRCR SERPLBLD CKD-EPI 2021: >90 ML/MIN/1.73M*2
EOSINOPHIL # BLD AUTO: 0.05 X10*3/UL (ref 0–0.4)
EOSINOPHIL NFR BLD AUTO: 0.6 %
ERYTHROCYTE [DISTWIDTH] IN BLOOD BY AUTOMATED COUNT: 14.6 % (ref 11.5–14.5)
FERRITIN SERPL-MCNC: 31 NG/ML (ref 20–300)
GLUCOSE SERPL-MCNC: 113 MG/DL (ref 74–99)
GLUCOSE UR STRIP.AUTO-MCNC: NORMAL MG/DL
HCT VFR BLD AUTO: 26.5 % (ref 41–52)
HGB BLD-MCNC: 8.4 G/DL (ref 13.5–17.5)
IMM GRANULOCYTES # BLD AUTO: 0.04 X10*3/UL (ref 0–0.5)
IMM GRANULOCYTES NFR BLD AUTO: 0.5 % (ref 0–0.9)
INR PPP: 3.4 (ref 0.9–1.1)
IRON SATN MFR SERPL: 8 % (ref 25–45)
IRON SERPL-MCNC: 25 UG/DL (ref 35–150)
KETONES UR STRIP.AUTO-MCNC: NEGATIVE MG/DL
LEUKOCYTE ESTERASE UR QL STRIP.AUTO: ABNORMAL
LYMPHOCYTES # BLD AUTO: 0.76 X10*3/UL (ref 0.8–3)
LYMPHOCYTES NFR BLD AUTO: 9.8 %
MCH RBC QN AUTO: 28.8 PG (ref 26–34)
MCHC RBC AUTO-ENTMCNC: 31.7 G/DL (ref 32–36)
MCV RBC AUTO: 91 FL (ref 80–100)
MONOCYTES # BLD AUTO: 0.91 X10*3/UL (ref 0.05–0.8)
MONOCYTES NFR BLD AUTO: 11.7 %
MUCOUS THREADS #/AREA URNS AUTO: ABNORMAL /LPF
NEUTROPHILS # BLD AUTO: 5.96 X10*3/UL (ref 1.6–5.5)
NEUTROPHILS NFR BLD AUTO: 77 %
NITRITE UR QL STRIP.AUTO: NEGATIVE
NRBC BLD-RTO: 0 /100 WBCS (ref 0–0)
PH UR STRIP.AUTO: 6 [PH]
PLATELET # BLD AUTO: 205 X10*3/UL (ref 150–450)
POTASSIUM SERPL-SCNC: 3.7 MMOL/L (ref 3.5–5.3)
PROT SERPL-MCNC: 6.1 G/DL (ref 6.4–8.2)
PROT UR STRIP.AUTO-MCNC: ABNORMAL MG/DL
PROTHROMBIN TIME: 39.2 SECONDS (ref 9.8–12.8)
Q ONSET: 225 MS
QRS COUNT: 13 BEATS
QRS DURATION: 96 MS
QT INTERVAL: 366 MS
QTC CALCULATION(BAZETT): 425 MS
QTC FREDERICIA: 404 MS
R AXIS: -21 DEGREES
RBC # BLD AUTO: 2.92 X10*6/UL (ref 4.5–5.9)
RBC # UR STRIP.AUTO: ABNORMAL /UL
RBC #/AREA URNS AUTO: >20 /HPF
SODIUM SERPL-SCNC: 136 MMOL/L (ref 136–145)
SP GR UR STRIP.AUTO: 1.02
T AXIS: -34 DEGREES
T OFFSET: 408 MS
TIBC SERPL-MCNC: 330 UG/DL (ref 240–445)
UIBC SERPL-MCNC: 305 UG/DL (ref 110–370)
UROBILINOGEN UR STRIP.AUTO-MCNC: NORMAL MG/DL
VENTRICULAR RATE: 81 BPM
WBC # BLD AUTO: 7.8 X10*3/UL (ref 4.4–11.3)
WBC #/AREA URNS AUTO: >50 /HPF
WBC CLUMPS #/AREA URNS AUTO: ABNORMAL /HPF

## 2025-02-03 PROCEDURE — 2500000001 HC RX 250 WO HCPCS SELF ADMINISTERED DRUGS (ALT 637 FOR MEDICARE OP)

## 2025-02-03 PROCEDURE — 83550 IRON BINDING TEST: CPT | Performed by: INTERNAL MEDICINE

## 2025-02-03 PROCEDURE — 51798 US URINE CAPACITY MEASURE: CPT

## 2025-02-03 PROCEDURE — 81001 URINALYSIS AUTO W/SCOPE: CPT | Performed by: EMERGENCY MEDICINE

## 2025-02-03 PROCEDURE — G0378 HOSPITAL OBSERVATION PER HR: HCPCS

## 2025-02-03 PROCEDURE — 85025 COMPLETE CBC W/AUTO DIFF WBC: CPT | Performed by: EMERGENCY MEDICINE

## 2025-02-03 PROCEDURE — 87077 CULTURE AEROBIC IDENTIFY: CPT | Mod: AHULAB | Performed by: EMERGENCY MEDICINE

## 2025-02-03 PROCEDURE — 2500000004 HC RX 250 GENERAL PHARMACY W/ HCPCS (ALT 636 FOR OP/ED)

## 2025-02-03 PROCEDURE — 93005 ELECTROCARDIOGRAM TRACING: CPT

## 2025-02-03 PROCEDURE — 83880 ASSAY OF NATRIURETIC PEPTIDE: CPT | Performed by: EMERGENCY MEDICINE

## 2025-02-03 PROCEDURE — 83540 ASSAY OF IRON: CPT | Performed by: INTERNAL MEDICINE

## 2025-02-03 PROCEDURE — 82728 ASSAY OF FERRITIN: CPT | Performed by: INTERNAL MEDICINE

## 2025-02-03 PROCEDURE — 2500000004 HC RX 250 GENERAL PHARMACY W/ HCPCS (ALT 636 FOR OP/ED): Performed by: EMERGENCY MEDICINE

## 2025-02-03 PROCEDURE — 36415 COLL VENOUS BLD VENIPUNCTURE: CPT | Performed by: EMERGENCY MEDICINE

## 2025-02-03 PROCEDURE — 74176 CT ABD & PELVIS W/O CONTRAST: CPT

## 2025-02-03 PROCEDURE — 71045 X-RAY EXAM CHEST 1 VIEW: CPT

## 2025-02-03 PROCEDURE — 85610 PROTHROMBIN TIME: CPT | Performed by: EMERGENCY MEDICINE

## 2025-02-03 PROCEDURE — 2500000002 HC RX 250 W HCPCS SELF ADMINISTERED DRUGS (ALT 637 FOR MEDICARE OP, ALT 636 FOR OP/ED)

## 2025-02-03 PROCEDURE — 71045 X-RAY EXAM CHEST 1 VIEW: CPT | Performed by: RADIOLOGY

## 2025-02-03 PROCEDURE — 74176 CT ABD & PELVIS W/O CONTRAST: CPT | Performed by: STUDENT IN AN ORGANIZED HEALTH CARE EDUCATION/TRAINING PROGRAM

## 2025-02-03 PROCEDURE — 81003 URINALYSIS AUTO W/O SCOPE: CPT | Performed by: EMERGENCY MEDICINE

## 2025-02-03 PROCEDURE — 99291 CRITICAL CARE FIRST HOUR: CPT | Mod: 25 | Performed by: EMERGENCY MEDICINE

## 2025-02-03 PROCEDURE — 99222 1ST HOSP IP/OBS MODERATE 55: CPT | Performed by: NURSE PRACTITIONER

## 2025-02-03 PROCEDURE — 80048 BASIC METABOLIC PNL TOTAL CA: CPT | Performed by: EMERGENCY MEDICINE

## 2025-02-03 PROCEDURE — 87086 URINE CULTURE/COLONY COUNT: CPT | Mod: AHULAB | Performed by: EMERGENCY MEDICINE

## 2025-02-03 PROCEDURE — 2500000001 HC RX 250 WO HCPCS SELF ADMINISTERED DRUGS (ALT 637 FOR MEDICARE OP): Performed by: INTERNAL MEDICINE

## 2025-02-03 PROCEDURE — 82248 BILIRUBIN DIRECT: CPT | Performed by: EMERGENCY MEDICINE

## 2025-02-03 PROCEDURE — 80053 COMPREHEN METABOLIC PANEL: CPT | Performed by: EMERGENCY MEDICINE

## 2025-02-03 PROCEDURE — 99222 1ST HOSP IP/OBS MODERATE 55: CPT

## 2025-02-03 PROCEDURE — 2500000004 HC RX 250 GENERAL PHARMACY W/ HCPCS (ALT 636 FOR OP/ED): Performed by: INTERNAL MEDICINE

## 2025-02-03 PROCEDURE — 2500000005 HC RX 250 GENERAL PHARMACY W/O HCPCS: Performed by: NURSE PRACTITIONER

## 2025-02-03 RX ORDER — CEFTRIAXONE 1 G/50ML
1 INJECTION, SOLUTION INTRAVENOUS DAILY
Status: DISCONTINUED | OUTPATIENT
Start: 2025-02-03 | End: 2025-02-03

## 2025-02-03 RX ORDER — ATORVASTATIN CALCIUM 40 MG/1
40 TABLET, FILM COATED ORAL NIGHTLY
Status: DISPENSED | OUTPATIENT
Start: 2025-02-03

## 2025-02-03 RX ORDER — ONDANSETRON 4 MG/1
4 TABLET, FILM COATED ORAL EVERY 8 HOURS PRN
Status: ACTIVE | OUTPATIENT
Start: 2025-02-03

## 2025-02-03 RX ORDER — ONDANSETRON HYDROCHLORIDE 2 MG/ML
4 INJECTION, SOLUTION INTRAVENOUS EVERY 8 HOURS PRN
Status: ACTIVE | OUTPATIENT
Start: 2025-02-03

## 2025-02-03 RX ORDER — DILTIAZEM HYDROCHLORIDE 240 MG/1
240 CAPSULE, COATED, EXTENDED RELEASE ORAL DAILY
Status: DISPENSED | OUTPATIENT
Start: 2025-02-03

## 2025-02-03 RX ORDER — ACETAMINOPHEN 325 MG/1
650 TABLET ORAL EVERY 4 HOURS PRN
Status: ACTIVE | OUTPATIENT
Start: 2025-02-03

## 2025-02-03 RX ORDER — POLYETHYLENE GLYCOL 3350 17 G/17G
17 POWDER, FOR SOLUTION ORAL DAILY
Status: DISPENSED | OUTPATIENT
Start: 2025-02-03

## 2025-02-03 RX ORDER — CIPROFLOXACIN 500 MG/1
500 TABLET ORAL EVERY 12 HOURS SCHEDULED
Status: DISPENSED | OUTPATIENT
Start: 2025-02-03

## 2025-02-03 RX ORDER — ASPIRIN 81 MG/1
81 TABLET ORAL DAILY
Status: ON HOLD | COMMUNITY

## 2025-02-03 RX ORDER — ACETAMINOPHEN 160 MG/5ML
650 SOLUTION ORAL EVERY 4 HOURS PRN
Status: ACTIVE | OUTPATIENT
Start: 2025-02-03

## 2025-02-03 RX ORDER — FUROSEMIDE 10 MG/ML
40 INJECTION INTRAMUSCULAR; INTRAVENOUS ONCE
Status: COMPLETED | OUTPATIENT
Start: 2025-02-03 | End: 2025-02-03

## 2025-02-03 RX ORDER — METOPROLOL TARTRATE 50 MG/1
100 TABLET ORAL 2 TIMES DAILY
Status: DISPENSED | OUTPATIENT
Start: 2025-02-03

## 2025-02-03 RX ORDER — SODIUM CHLORIDE 0.9 G/100ML
3000 INJECTION, SOLUTION IRRIGATION ONCE
Status: COMPLETED | OUTPATIENT
Start: 2025-02-03 | End: 2025-02-03

## 2025-02-03 RX ORDER — ACETAMINOPHEN 650 MG/1
650 SUPPOSITORY RECTAL EVERY 4 HOURS PRN
Status: ACTIVE | OUTPATIENT
Start: 2025-02-03

## 2025-02-03 RX ORDER — TAMSULOSIN HYDROCHLORIDE 0.4 MG/1
0.8 CAPSULE ORAL DAILY
Status: DISPENSED | OUTPATIENT
Start: 2025-02-03

## 2025-02-03 RX ADMIN — SODIUM CHLORIDE 3000 ML: 900 IRRIGANT IRRIGATION at 18:24

## 2025-02-03 RX ADMIN — POLYETHYLENE GLYCOL 3350 17 G: 17 POWDER, FOR SOLUTION ORAL at 17:11

## 2025-02-03 RX ADMIN — IRON SUCROSE 200 MG: 20 INJECTION, SOLUTION INTRAVENOUS at 16:54

## 2025-02-03 RX ADMIN — TAMSULOSIN HYDROCHLORIDE 0.8 MG: 0.4 CAPSULE ORAL at 17:11

## 2025-02-03 RX ADMIN — DILTIAZEM HYDROCHLORIDE 240 MG: 120 CAPSULE, COATED, EXTENDED RELEASE ORAL at 17:11

## 2025-02-03 RX ADMIN — METOPROLOL TARTRATE 100 MG: 50 TABLET, FILM COATED ORAL at 21:45

## 2025-02-03 RX ADMIN — CIPROFLOXACIN 500 MG: 500 TABLET ORAL at 21:45

## 2025-02-03 RX ADMIN — ATORVASTATIN CALCIUM 40 MG: 40 TABLET, FILM COATED ORAL at 21:45

## 2025-02-03 RX ADMIN — FUROSEMIDE 40 MG: 10 INJECTION, SOLUTION INTRAMUSCULAR; INTRAVENOUS at 16:55

## 2025-02-03 SDOH — SOCIAL STABILITY: SOCIAL INSECURITY: HAVE YOU HAD ANY THOUGHTS OF HARMING ANYONE ELSE?: NO

## 2025-02-03 SDOH — ECONOMIC STABILITY: FOOD INSECURITY: HOW HARD IS IT FOR YOU TO PAY FOR THE VERY BASICS LIKE FOOD, HOUSING, MEDICAL CARE, AND HEATING?: NOT HARD AT ALL

## 2025-02-03 SDOH — ECONOMIC STABILITY: FOOD INSECURITY: WITHIN THE PAST 12 MONTHS, YOU WORRIED THAT YOUR FOOD WOULD RUN OUT BEFORE YOU GOT THE MONEY TO BUY MORE.: NEVER TRUE

## 2025-02-03 SDOH — SOCIAL STABILITY: SOCIAL INSECURITY: WITHIN THE LAST YEAR, HAVE YOU BEEN AFRAID OF YOUR PARTNER OR EX-PARTNER?: NO

## 2025-02-03 SDOH — ECONOMIC STABILITY: HOUSING INSECURITY: IN THE PAST 12 MONTHS, HOW MANY TIMES HAVE YOU MOVED WHERE YOU WERE LIVING?: 0

## 2025-02-03 SDOH — SOCIAL STABILITY: SOCIAL INSECURITY: WITHIN THE LAST YEAR, HAVE YOU BEEN HUMILIATED OR EMOTIONALLY ABUSED IN OTHER WAYS BY YOUR PARTNER OR EX-PARTNER?: NO

## 2025-02-03 SDOH — ECONOMIC STABILITY: FOOD INSECURITY: WITHIN THE PAST 12 MONTHS, THE FOOD YOU BOUGHT JUST DIDN'T LAST AND YOU DIDN'T HAVE MONEY TO GET MORE.: NEVER TRUE

## 2025-02-03 SDOH — SOCIAL STABILITY: SOCIAL INSECURITY: ABUSE: ADULT

## 2025-02-03 SDOH — ECONOMIC STABILITY: HOUSING INSECURITY: AT ANY TIME IN THE PAST 12 MONTHS, WERE YOU HOMELESS OR LIVING IN A SHELTER (INCLUDING NOW)?: NO

## 2025-02-03 SDOH — SOCIAL STABILITY: SOCIAL INSECURITY: ARE YOU OR HAVE YOU BEEN THREATENED OR ABUSED PHYSICALLY, EMOTIONALLY, OR SEXUALLY BY ANYONE?: NO

## 2025-02-03 SDOH — ECONOMIC STABILITY: HOUSING INSECURITY: IN THE LAST 12 MONTHS, WAS THERE A TIME WHEN YOU WERE NOT ABLE TO PAY THE MORTGAGE OR RENT ON TIME?: NO

## 2025-02-03 SDOH — SOCIAL STABILITY: SOCIAL INSECURITY
WITHIN THE LAST YEAR, HAVE YOU BEEN RAPED OR FORCED TO HAVE ANY KIND OF SEXUAL ACTIVITY BY YOUR PARTNER OR EX-PARTNER?: NO

## 2025-02-03 SDOH — SOCIAL STABILITY: SOCIAL INSECURITY: DO YOU FEEL ANYONE HAS EXPLOITED OR TAKEN ADVANTAGE OF YOU FINANCIALLY OR OF YOUR PERSONAL PROPERTY?: NO

## 2025-02-03 SDOH — SOCIAL STABILITY: SOCIAL INSECURITY: DOES ANYONE TRY TO KEEP YOU FROM HAVING/CONTACTING OTHER FRIENDS OR DOING THINGS OUTSIDE YOUR HOME?: NO

## 2025-02-03 SDOH — SOCIAL STABILITY: SOCIAL INSECURITY
WITHIN THE LAST YEAR, HAVE YOU BEEN KICKED, HIT, SLAPPED, OR OTHERWISE PHYSICALLY HURT BY YOUR PARTNER OR EX-PARTNER?: NO

## 2025-02-03 SDOH — SOCIAL STABILITY: SOCIAL INSECURITY: ARE THERE ANY APPARENT SIGNS OF INJURIES/BEHAVIORS THAT COULD BE RELATED TO ABUSE/NEGLECT?: NO

## 2025-02-03 SDOH — ECONOMIC STABILITY: TRANSPORTATION INSECURITY: IN THE PAST 12 MONTHS, HAS LACK OF TRANSPORTATION KEPT YOU FROM MEDICAL APPOINTMENTS OR FROM GETTING MEDICATIONS?: NO

## 2025-02-03 SDOH — ECONOMIC STABILITY: INCOME INSECURITY: IN THE PAST 12 MONTHS HAS THE ELECTRIC, GAS, OIL, OR WATER COMPANY THREATENED TO SHUT OFF SERVICES IN YOUR HOME?: NO

## 2025-02-03 SDOH — SOCIAL STABILITY: SOCIAL INSECURITY: WERE YOU ABLE TO COMPLETE ALL THE BEHAVIORAL HEALTH SCREENINGS?: YES

## 2025-02-03 SDOH — SOCIAL STABILITY: SOCIAL INSECURITY: HAVE YOU HAD THOUGHTS OF HARMING ANYONE ELSE?: NO

## 2025-02-03 SDOH — SOCIAL STABILITY: SOCIAL INSECURITY: DO YOU FEEL UNSAFE GOING BACK TO THE PLACE WHERE YOU ARE LIVING?: NO

## 2025-02-03 SDOH — SOCIAL STABILITY: SOCIAL INSECURITY: HAS ANYONE EVER THREATENED TO HURT YOUR FAMILY OR YOUR PETS?: NO

## 2025-02-03 ASSESSMENT — ENCOUNTER SYMPTOMS
DIZZINESS: 0
FEVER: 0
ACTIVITY CHANGE: 1
DYSURIA: 1
DIAPHORESIS: 0
NERVOUS/ANXIOUS: 1
HEMATURIA: 1
VOMITING: 0
SHORTNESS OF BREATH: 1
PALPITATIONS: 0
CHILLS: 0
NAUSEA: 0
DIARRHEA: 0
BRUISES/BLEEDS EASILY: 0
BLOOD IN STOOL: 0
HEADACHES: 0
LIGHT-HEADEDNESS: 0
FATIGUE: 0
CHEST TIGHTNESS: 0
ABDOMINAL DISTENTION: 1
FLANK PAIN: 0

## 2025-02-03 ASSESSMENT — COGNITIVE AND FUNCTIONAL STATUS - GENERAL
DRESSING REGULAR UPPER BODY CLOTHING: A LITTLE
DRESSING REGULAR UPPER BODY CLOTHING: A LITTLE
PATIENT BASELINE BEDBOUND: NO
MOBILITY SCORE: 24
DRESSING REGULAR LOWER BODY CLOTHING: A LITTLE
MOBILITY SCORE: 24
DRESSING REGULAR LOWER BODY CLOTHING: A LITTLE
DAILY ACTIVITIY SCORE: 22
DAILY ACTIVITIY SCORE: 22

## 2025-02-03 ASSESSMENT — LIFESTYLE VARIABLES
SKIP TO QUESTIONS 9-10: 0
AUDIT-C TOTAL SCORE: 5
AUDIT-C TOTAL SCORE: 5
HOW MANY STANDARD DRINKS CONTAINING ALCOHOL DO YOU HAVE ON A TYPICAL DAY: 5 OR 6
HOW OFTEN DO YOU HAVE 6 OR MORE DRINKS ON ONE OCCASION: NEVER
HOW OFTEN DO YOU HAVE A DRINK CONTAINING ALCOHOL: 2-3 TIMES A WEEK

## 2025-02-03 ASSESSMENT — ACTIVITIES OF DAILY LIVING (ADL)
GROOMING: INDEPENDENT
FEEDING YOURSELF: INDEPENDENT
JUDGMENT_ADEQUATE_SAFELY_COMPLETE_DAILY_ACTIVITIES: YES
HEARING - RIGHT EAR: FUNCTIONAL
LACK_OF_TRANSPORTATION: NO
DRESSING YOURSELF: INDEPENDENT
LACK_OF_TRANSPORTATION: NO
HEARING - LEFT EAR: FUNCTIONAL
PATIENT'S MEMORY ADEQUATE TO SAFELY COMPLETE DAILY ACTIVITIES?: YES
TOILETING: INDEPENDENT
WALKS IN HOME: INDEPENDENT
ASSISTIVE_DEVICE: EYEGLASSES;DENTURES UPPER
BATHING: INDEPENDENT
ADEQUATE_TO_COMPLETE_ADL: YES

## 2025-02-03 ASSESSMENT — PAIN DESCRIPTION - LOCATION: LOCATION: PENIS

## 2025-02-03 ASSESSMENT — PAIN DESCRIPTION - DESCRIPTORS: DESCRIPTORS: BURNING

## 2025-02-03 ASSESSMENT — PAIN DESCRIPTION - PROGRESSION: CLINICAL_PROGRESSION: NOT CHANGED

## 2025-02-03 ASSESSMENT — PATIENT HEALTH QUESTIONNAIRE - PHQ9
1. LITTLE INTEREST OR PLEASURE IN DOING THINGS: NOT AT ALL
SUM OF ALL RESPONSES TO PHQ9 QUESTIONS 1 & 2: 0
2. FEELING DOWN, DEPRESSED OR HOPELESS: NOT AT ALL

## 2025-02-03 ASSESSMENT — PAIN DESCRIPTION - FREQUENCY: FREQUENCY: CONSTANT/CONTINUOUS

## 2025-02-03 ASSESSMENT — PAIN DESCRIPTION - ONSET: ONSET: GRADUAL

## 2025-02-03 ASSESSMENT — PAIN SCALES - GENERAL: PAINLEVEL_OUTOF10: 6

## 2025-02-03 ASSESSMENT — PAIN - FUNCTIONAL ASSESSMENT: PAIN_FUNCTIONAL_ASSESSMENT: 0-10

## 2025-02-03 ASSESSMENT — PAIN DESCRIPTION - PAIN TYPE: TYPE: ACUTE PAIN

## 2025-02-03 NOTE — Clinical Note
1L sersanguinous colored fluid drained during right side therapeutic para. Pt tolerated well, uneventful procedure, labs sent per order.

## 2025-02-03 NOTE — H&P
History Of Present Illness  Ming Bunch is a 84 y.o. male, with past medical history of hypertension, A-fib on Coumadin, BPH w/LUTS, hematuria recent cystoscopy, Quintanilla catheter placed discharged home on 1/31/2025, presented to [facility] ED on 2/3/2025 for hematuria. Patient states his quintanilla drains bloody urine on and off for couple of weeks. He states his urine was bright red today, prompt him to come to emergency department for evaluation. He endorses pain on urination, states he feels discomfort, when he has urge to urinate. He was discharged recently with quintanilla on 1/31/25. Daughter Isamar at bedside, states he has been complaining of pain on urination and feels like he is going tp pass out. He has history of Afib, on coumadin. INR today was supra therapeutic 3.4. Held coumadin, will recheck INR in am. Urology consulted for further management. Quintanilla was changed in ED. Continuous irrigation quintanilla was ordered per urology. Denies fever, chills, chest pain, palpitations, shortness of breath, and nausea/vomiting/diarrhea.   He recently seen by Dr Rock 2 weeks ago and had cystoscopy for hematuria.   He is a former smoker.     ED Course:   Chest XR reveals Cardiomegaly with likely new small bibasilar pleural effusions.   CMP unremarkable without electrolyte imbalance, renal impairement, PT / INR 39.2/3.4     Sodium 136;  Potassium 3.7;  BUN 12;  Cr 0.65;  EGFR >90  CBC remarkable for anemia  No leukocytosis, or thrombocytopenia   H&H 8.4/26.5; ; WBC 7.8, Iron 25  Urinalysis positive for proteinuria, bacteria, leukocyte esterase, and hematuria  Last cystoscopy on 1/24/25:  No tumors or stones seen.  Prostate is enlarged, mildly obstructive.  No strictures seen.    ED Medications:  Furosemide 40 mg IV X 1   Iron sucrose 200 mg IV      Patient History   PMH: He has a past medical history of Personal history of diseases of the blood and blood-forming organs and certain disorders involving the immune  "mechanism (06/09/2021) and Shortness of breath (02/19/2020).  PSH: He has a past surgical history that includes Colonoscopy (06/25/2013); Other surgical history (04/22/2015); Pilonidal cyst drainage (09/11/2013); and Appendectomy (09/11/2013).  SH: He reports that he has never smoked. He has never used smokeless tobacco. He reports current alcohol use of about 10.0 standard drinks of alcohol per week. He reports that he does not currently use drugs.  FH: family history includes Atrial fibrillation in his sister; Heart failure in his mother.     No Known Allergies  Current Outpatient Medications   Medication Instructions    aspirin 81 mg, oral, Daily    atorvastatin (LIPITOR) 40 mg, oral, Nightly    dilTIAZem CD (CARDIZEM CD) 240 mg, oral, Daily    metoprolol tartrate (LOPRESSOR) 100 mg, oral, 2 times daily    tadalafil (CIALIS) 5 mg, oral, Daily    tamsulosin (FLOMAX) 0.8 mg, oral, Daily    warfarin (COUMADIN) 5 mg, oral, Every evening     Review of Systems   ROS: 10-point review of systems was performed and is otherwise negative except as noted in HPI.        Heart Rate:  [78-99]   Temp:  [36.4 °C (97.6 °F)]   Resp:  [16-18]   BP: ()/()   Height:  [175.3 cm (5' 9\")]   Weight:  [93 kg (205 lb)]   SpO2:  [92 %-98 %]      0-10 (Numeric) Pain Score: 6   Vitals:    02/03/25 0944   Weight: 93 kg (205 lb)      Medications  cefTRIAXone, 1 g, intravenous, Daily  furosemide, 40 mg, intravenous, Once  iron sucrose, 200 mg, intravenous, Once  sodium chloride, 3,000 mL, irrigation, Once      Diagnostic Results   CBC- 2/3/2025: 12:15 PM  7.8 8.4 205    26.5      BMP- 2/3/2025: 12:15 PM  136 12 _ 113   3.7 0.65 29    Estimated Creatinine Clearance: 95.2 mL/min (by C-G formula based on SCr of 0.65 mg/dL).     CA: 8.1 PROTIEN: 6.1 ALT: 10 Total Bili: 1.1 Mg: _   PHOS: _ ALBUMIN: 3.4 AST: 8   Alk Phos: 67      COAGS- 2/3/2025: 12:15 PM  3.4   39.2 _     CV Labs  BNP   Date/Time Value Ref Range Status   02/03/2025 12:15 "  (H) 0 - 99 pg/mL Final     Hemoglobin A1C   Date/Time Value Ref Range Status   01/05/2024 08:43 AM 5.2 see below % Final   06/05/2023 08:24 AM 5.4 % Final     Comment:          Diagnosis of Diabetes-Adults   Non-Diabetic: < or = 5.6%   Increased risk for developing diabetes: 5.7-6.4%   Diagnostic of diabetes: > or = 6.5%  .       Monitoring of Diabetes                Age (y)     Therapeutic Goal (%)   Adults:          >18           <7.0   Pediatrics:    13-18           <7.5                   7-12           <8.0                   0- 6            7.5-8.5   American Diabetes Association. Diabetes Care 33(S1), Jan 2010.   11/17/2022 08:13 AM 5.1 % Final     Comment:          Diagnosis of Diabetes-Adults   Non-Diabetic: < or = 5.6%   Increased risk for developing diabetes: 5.7-6.4%   Diagnostic of diabetes: > or = 6.5%  .       Monitoring of Diabetes                Age (y)     Therapeutic Goal (%)   Adults:          >18           <7.0   Pediatrics:    13-18           <7.5                   7-12           <8.0                   0- 6            7.5-8.5   American Diabetes Association. Diabetes Care 33(S1), Jan 2010.       LDL Calculated   Date/Time Value Ref Range Status   10/01/2024 08:50 AM 39 <=99 mg/dL Final     Comment:                                 Near   Borderline      AGE      Desirable  Optimal    High     High     Very High     0-19 Y     0 - 109     ---    110-129   >/= 130     ----    20-24 Y     0 - 119     ---    120-159   >/= 160     ----      >24 Y     0 -  99   100-129  130-159   160-189     >/=190     02/08/2024 08:29 AM 39 <=99 mg/dL Final     Comment:                                 Near   Borderline      AGE      Desirable  Optimal    High     High     Very High     0-19 Y     0 - 109     ---    110-129   >/= 130     ----    20-24 Y     0 - 119     ---    120-159   >/= 160     ----      >24 Y     0 -  99   100-129  130-159   160-189     >/=190         Cardiovascular Testing:  EKG:  EKG 12  Lead    Echo:  Transthoracic Echocardiogram 10/24/23  1. Left ventricular systolic function is low normal with a 50-55% estimated ejection fraction.   2. The left atrium is severely dilated.   3. The right atrium is severely dilated.   4. Moderate to severe mitral valve regurgitation.   5. The tricuspid valve annulus appears dilated.   6. Mildly elevated RVSP.   7. Severe tricuspid regurgitation visualized.   8. Moderate aortic valve regurgitation.   9. The estimated PASP is 42 mmHg.  10. There is moderate dilatation of the aortic root.  11. There is moderate dilatation of the ascending aorta.     Allergies  Patient has no known allergies.    Review of Systems   Constitutional:  Positive for activity change. Negative for chills, diaphoresis, fatigue and fever.   HENT:  Negative for congestion.    Eyes:  Negative for visual disturbance.   Respiratory:  Positive for shortness of breath (ROMAN). Negative for chest tightness.    Cardiovascular:  Positive for leg swelling. Negative for chest pain and palpitations.   Gastrointestinal:  Positive for abdominal distention. Negative for blood in stool, diarrhea, nausea and vomiting.   Genitourinary:  Positive for dysuria, hematuria and penile pain (On urination). Negative for flank pain.   Skin:  Negative for pallor.   Neurological:  Negative for dizziness, light-headedness and headaches.   Hematological:  Does not bruise/bleed easily.   Psychiatric/Behavioral:  The patient is nervous/anxious.    All other systems reviewed and are negative.       Physical Exam  Vitals and nursing note reviewed.   Constitutional:       General: He is not in acute distress.     Appearance: He is not diaphoretic.   HENT:      Head: Atraumatic.      Mouth/Throat:      Mouth: Mucous membranes are dry.   Eyes:      General: No scleral icterus.     Pupils: Pupils are equal, round, and reactive to light.   Cardiovascular:      Rate and Rhythm: Normal rate. Rhythm irregular.      Pulses: Normal pulses.  "  Pulmonary:      Effort: Pulmonary effort is normal. No respiratory distress.   Chest:      Chest wall: No tenderness.   Abdominal:      General: There is no distension.      Palpations: Abdomen is soft.      Tenderness: There is no abdominal tenderness. There is no right CVA tenderness or left CVA tenderness.   Genitourinary:     Comments: Quintanilla intact, draining bloody urine   Musculoskeletal:         General: Swelling and tenderness present.      Cervical back: No tenderness.      Right lower leg: Tenderness present. 2+ Pitting Edema present.      Left lower leg: Tenderness present. 2+ Pitting Edema present.   Skin:     General: Skin is dry.      Capillary Refill: Capillary refill takes less than 2 seconds.   Neurological:      Mental Status: He is alert and oriented to person, place, and time.       Last Recorded Vitals  Blood pressure (!) 128/46, pulse 89, temperature 36.4 °C (97.6 °F), temperature source Tympanic, resp. rate 16, height 1.753 m (5' 9\"), weight 93 kg (205 lb), SpO2 94%.    Assessment & Plan  Hematuria    Ming Bunch is a 84 y.o. male, with past medical history of hypertension, A-fib on Coumadin, BPH w/LUTS, hematuria recent cystoscopy, Quintanilla catheter placed discharged home on 1/31/2025, presented to [facility] ED on 2/3/2025 for hematuria. Patient states his quintanilla drains bloody urine on and off for couple of weeks. He states his urine was bright red today, prompt him to come to emergency department for evaluation. He endorses pain on urination, states he feels discomfort, when he has urge to urinate. He was discharged recently with quintanilla on 1/31/25. Daughter Isamar at bedside, states he has been complaining of pain on urination and feels like he is going tp pass out. He has history of Afib, on coumadin. INR today was supra therapeutic 3.4. Held coumadin, will recheck INR in am. Urology consulted for further management. Quintanilla was changed in ED. Continuous irrigation quintanilla was ordered per urology. " Denies fever, chills, chest pain, palpitations, shortness of breath, and nausea/vomiting/diarrhea.   He recently seen by Dr Rock 2 weeks ago and had cystoscopy for hematuria.   He is a former smoker.      ED Course:   Chest XR reveals Cardiomegaly with likely new small bibasilar pleural effusions.   CMP unremarkable without electrolyte imbalance, renal impairement, PT / INR 39.2/3.4     Sodium 136;  Potassium 3.7;  BUN 12;  Cr 0.65;  EGFR >90  CBC remarkable for anemia  No leukocytosis, or thrombocytopenia   H&H 8.4/26.5; ; WBC 7.8, Iron 25  Urinalysis positive for proteinuria, bacteria, leukocyte esterase, and hematuria  Last cystoscopy on 1/24/25:  No tumors or stones seen.  Prostate is enlarged, mildly obstructive.  No strictures seen.    ED Medications:  Furosemide 40 mg IV X 1   Iron sucrose 200 mg IV      Hematuria  Supratherapeutic INR / On Warfarin   UTI  Urinary retention   Acute on chronic anemia   -coumadin on hold due to hematuria and supratherapeutic INR  -continuous bladder irrigation   -UA with bacteria, leuks, WBC, bacteria -> urine culture from 1/31/25-> colonized, no further work up needed-> repeat   -hgb 8.4-> trend; 11/17/22-> 14  -Type and screen   -Iron sucrose 200 mg IV   -Ciprofloxacin 500 mg PO BID for UTI  -Continue tamsulosin 0.8 mg daily   -urology consulted, appreciate input   -follows with Dr. Rock-> follow on DC as follow up     A fib  Hypertension    -controlled HR ranges 78-99  -continue metoprolol, cardizem  -hold coumadin 2/2 hematuria and supratherapeutic INR  -INR 3.4-> recheck in AM  -plan for pharmacy to dose when restarted   -CXR with cardiomegaly, small new bilateral effusions  -  -last echo 10/24/23 EF 50-55%  -Echocardiogram outpatient scheduled in April, 2025    CAD  Hyperlipidemia   -continue high intensity statin and aspirin     GI/VTE PPX:   -Warfarin on hold 2/2 supra therapeutic INR / Hematuria   -Bowel regimen placed     Code Status: Full  code, discussed with patient     Chart, medical history, and labs/testing reviewed in detail.   Case and plan of care to be discussed with attending provider.      Disposition: Discharge home once medically cleared and stable, pending urology input     WENDY Cervantes   Observation/Internal Med [place]  [facility]  02/03/25  4:28 PM  Total time of 45 minutes spent on professional and overall care, with >50% of time dedicated to counseling/coordination of care.    WENDY Cervantes

## 2025-02-03 NOTE — Clinical Note
950ml clear tony fluid drained during Right side Therapeutic Thora. Pt tolerated well, VSS. Labs sent per order.

## 2025-02-03 NOTE — H&P (VIEW-ONLY)
Reason For Consult  hematuria    History Of Present Illness  Ming Bunch is a 84 y.o. male presenting with hematuria and pain with urination. Pt was seen in the office by Sarah Kathleen on Friday- a quintanilla catheter was attempted but not able to be placed successfully. He was then sent to the ED and CBI was started. He was then discharged with the quintanilla catheter. Per the daughter at bedside he was doing ok over the weekend and then developed pain and decreased urine output from the quintanilla, along with painful urination. A 24Fr hematuria catheter was placed in the ED. Of note, he is on coumadin for hx of afib. He did see Dr Rock 2 weeks ago and had cystoscopy for hematuria. He denies fever, chills, CP, SOB and N/V. Does have some constipation.     In the ED, H/H 8.4/26.5 from 8.8/28.1 on 1/31, INR is 3.4     Past Medical History  He has a past medical history of Personal history of diseases of the blood and blood-forming organs and certain disorders involving the immune mechanism (06/09/2021) and Shortness of breath (02/19/2020).    Surgical History  He has a past surgical history that includes Colonoscopy (06/25/2013); Other surgical history (04/22/2015); Pilonidal cyst drainage (09/11/2013); and Appendectomy (09/11/2013).     Social History  He reports that he has never smoked. He has never used smokeless tobacco. He reports current alcohol use of about 10.0 standard drinks of alcohol per week. He reports that he does not currently use drugs.    Family History  Family History   Problem Relation Name Age of Onset    Heart failure Mother      Atrial fibrillation Sister          Allergies  Patient has no known allergies.    Review of Systems  Negative other than mentioned in the HPI     Physical Exam  Physical Exam:  Constitutional: Well developed, awake/alert/oriented x3, no distress, alert and cooperative. Sitting up in bed  Skin: Warm and dry, no lesions, no rashes  Eyes: PEERLA  ENMT: mucous membranes moist, no  "apparent injury, no lesions seen  Head/Neck: Neck supple, no apparent injury, trachea midline  Respiratory/Thorax: Patent airways, CTAB, normal breath sounds with good chest expansion, thorax symmetric  Cardiovascular: Regular, rate and rhythm, 2+ equal pulses of the extremities  Gastrointestinal: Non-distended, soft, non-tender, +BS  Genitourinary: 24 Fr hematuria catheter in place with cherry red urine in the bag.   Musculoskeletal: ROM intact, no joint swelling, normal strength.   Extremities: no cyanosis edema, contusions or wounds,   Neurological: alert and oriented x3  Psychological: Appropriate mood and behavior.       Last Recorded Vitals  Blood pressure 122/81, pulse 105, temperature 36.4 °C (97.5 °F), temperature source Temporal, resp. rate 16, height 1.753 m (5' 9\"), weight 93 kg (205 lb), SpO2 94%.    Relevant Results  Results for orders placed or performed during the hospital encounter of 02/03/25 (from the past 24 hours)   Urinalysis with Reflex Culture and Microscopic   Result Value Ref Range    Color, Urine Dark-Brown (N) Light-Yellow, Yellow, Dark-Yellow    Appearance, Urine Ex.Turbid (N) Clear    Specific Gravity, Urine 1.025 1.005 - 1.035    pH, Urine 6.0 5.0, 5.5, 6.0, 6.5, 7.0, 7.5, 8.0    Protein, Urine 100 (2+) (A) NEGATIVE, 10 (TRACE), 20 (TRACE) mg/dL    Glucose, Urine Normal Normal mg/dL    Blood, Urine OVER (3+) (A) NEGATIVE    Ketones, Urine NEGATIVE NEGATIVE mg/dL    Bilirubin, Urine NEGATIVE NEGATIVE    Urobilinogen, Urine Normal Normal mg/dL    Nitrite, Urine NEGATIVE NEGATIVE    Leukocyte Esterase, Urine 250 Lola/uL (A) NEGATIVE   Microscopic Only, Urine   Result Value Ref Range    WBC, Urine >50 (A) 1-5, NONE /HPF    WBC Clumps, Urine MANY Reference range not established. /HPF    RBC, Urine >20 (A) NONE, 1-2, 3-5 /HPF    Bacteria, Urine 4+ (A) NONE SEEN /HPF    Mucus, Urine 1+ Reference range not established. /LPF   ECG 12 lead   Result Value Ref Range    Ventricular Rate 81 BPM    " QRS Duration 96 ms    QT Interval 366 ms    QTC Calculation(Bazett) 425 ms    R Axis -21 degrees    T Axis -34 degrees    QRS Count 13 beats    Q Onset 225 ms    T Offset 408 ms    QTC Fredericia 404 ms   CBC and Auto Differential   Result Value Ref Range    WBC 7.8 4.4 - 11.3 x10*3/uL    nRBC 0.0 0.0 - 0.0 /100 WBCs    RBC 2.92 (L) 4.50 - 5.90 x10*6/uL    Hemoglobin 8.4 (L) 13.5 - 17.5 g/dL    Hematocrit 26.5 (L) 41.0 - 52.0 %    MCV 91 80 - 100 fL    MCH 28.8 26.0 - 34.0 pg    MCHC 31.7 (L) 32.0 - 36.0 g/dL    RDW 14.6 (H) 11.5 - 14.5 %    Platelets 205 150 - 450 x10*3/uL    Neutrophils % 77.0 40.0 - 80.0 %    Immature Granulocytes %, Automated 0.5 0.0 - 0.9 %    Lymphocytes % 9.8 13.0 - 44.0 %    Monocytes % 11.7 2.0 - 10.0 %    Eosinophils % 0.6 0.0 - 6.0 %    Basophils % 0.4 0.0 - 2.0 %    Neutrophils Absolute 5.96 (H) 1.60 - 5.50 x10*3/uL    Immature Granulocytes Absolute, Automated 0.04 0.00 - 0.50 x10*3/uL    Lymphocytes Absolute 0.76 (L) 0.80 - 3.00 x10*3/uL    Monocytes Absolute 0.91 (H) 0.05 - 0.80 x10*3/uL    Eosinophils Absolute 0.05 0.00 - 0.40 x10*3/uL    Basophils Absolute 0.03 0.00 - 0.10 x10*3/uL   Basic metabolic panel   Result Value Ref Range    Glucose 113 (H) 74 - 99 mg/dL    Sodium 136 136 - 145 mmol/L    Potassium 3.7 3.5 - 5.3 mmol/L    Chloride 102 98 - 107 mmol/L    Bicarbonate 29 21 - 32 mmol/L    Anion Gap 9 (L) 10 - 20 mmol/L    Urea Nitrogen 12 6 - 23 mg/dL    Creatinine 0.65 0.50 - 1.30 mg/dL    eGFR >90 >60 mL/min/1.73m*2    Calcium 8.1 (L) 8.6 - 10.3 mg/dL   Hepatic function panel   Result Value Ref Range    Albumin 3.4 3.4 - 5.0 g/dL    Bilirubin, Total 1.1 0.0 - 1.2 mg/dL    Bilirubin, Direct 0.5 (H) 0.0 - 0.3 mg/dL    Alkaline Phosphatase 67 33 - 136 U/L    ALT 10 10 - 52 U/L    AST 8 (L) 9 - 39 U/L    Total Protein 6.1 (L) 6.4 - 8.2 g/dL   Protime-INR   Result Value Ref Range    Protime 39.2 (H) 9.8 - 12.8 seconds    INR 3.4 (H) 0.9 - 1.1   B-Type Natriuretic Peptide   Result  Value Ref Range     (H) 0 - 99 pg/mL   Ferritin   Result Value Ref Range    Ferritin 31 20 - 300 ng/mL   Iron and TIBC   Result Value Ref Range    Iron 25 (L) 35 - 150 ug/dL    UIBC 305 110 - 370 ug/dL    TIBC 330 240 - 445 ug/dL    % Saturation 8 (L) 25 - 45 %     *Note: Due to a large number of results and/or encounters for the requested time period, some results have not been displayed. A complete set of results can be found in Results Review.     XR chest 1 view    Result Date: 2/3/2025  Interpreted By:  Matt Bowling, STUDY: XR CHEST 1 VIEW;  2/3/2025 12:07 pm   INDICATION: Signs/Symptoms:sob.     COMPARISON: 02/02/2020   ACCESSION NUMBER(S): [id number]   ORDERING CLINICIAN: SARANYA COOK   FINDINGS: AP portable view of the chest is obtained.  Limited exam due to portable nature. Magnified cardiac silhouette. Bibasilar blunting of both costophrenic angles may be due to pleural effusions and new since the prior exam. No pneumothorax..       Cardiomegaly with likely new small bibasilar pleural effusions. Follow-up is recommended.   MACRO: None   Signed by: Matt Bowling 2/3/2025 12:24 PM Dictation workstation:   [id number]    ECG 12 lead    Result Date: 2/3/2025  Atrial fibrillation Low voltage QRS Inferior infarct , age undetermined Cannot rule out Anterior infarct , age undetermined Abnormal ECG When compared with ECG of 03-FEB-2020 12:30, Questionable change in QRS axis        Assessment/Plan     Ming Bunch is an 83 yo male with hx of BPH and hematuria who presented to the ED with recurrent Hematuria and decreased urine output from quintanilla catheter. A new 24 Fr hematuria catheter was placed in the ED. I hand irrigated the quintanilla catheter with approximately 200cc of NS and was able to get clear urine with one small clot. RN is awaiting 3L NS bags and will be initiating CBI as soon as they arrive to the floor.     Plan: Hematuria  - CBI  - hand irrigate the quintanilla as needed  - flomax  - oxybutynin as  needed for spasm  - hold coumadin  - repeat CBC in the AM   - continue ABX and follow urine cx  - CT A/P pending   - supportive care per primary team     Dispo: no acute surgical urologic indications at this time. Urology will continue to follow. Dr Turcios aware of patient.     I spent 45 minutes in the professional and overall care of this patient.      Melisa Rowland, APRN-CNP

## 2025-02-03 NOTE — ED TRIAGE NOTES
"Pt here last Friday, catheter placed for hematuria. Pt having irritation and hematuria. Catheter is leaking \"where it goes in\". On warfarin.  "

## 2025-02-03 NOTE — ED PROVIDER NOTES
HPI   Chief Complaint   Patient presents with    Kriby cath pain       HPI: []  84-year-old white male with a history of A-fib on Coumadin, BPH, Kirby catheter placed last week seen in the ED past Friday which change the catheter three-way Kirby catheter 20 Kenyan due to CBI his urine cleared up he was discharged home returns with the blood Kirby catheter.  He states for the last 24 hours he has not severe pain with urination.  He also be somewhat short winded.  He has some orthopnea PND.  She denies any chest pain pressure heaviness fever chills nausea vomit diarrhea cough congestion incontinence seizures no syncope or near syncope.    Past history: Hypertension, CHF, A-fib, BPH  Social: Patient denies current tobacco alcohol drug use  REVIEW OF SYSTEMS:    GENERAL.: No weight loss, fatigue, anorexia, insomnia, fever.    EYES: No vision loss, double vision, drainage, eye pain.    ENT: No pharyngitis, dry mouth.    CARDIOPULMONARY: No chest pain, palpitations, syncope, near syncope.  Positive for shortness of breath, cough, hemoptysis.    GI: No abdominal pain, change in bowel habits, melena, hematemesis, hematochezia, nausea, vomiting, diarrhea.    : No discharge, dysuria, frequency, urgency, positive for hematuria.  Positive for blood Kirby catheter    MS: No limb pain, joint pain, joint swelling.    SKIN: No rashes.    PSYCH: No depression, anxiety, suicidality, homicidality.    Review of systems is otherwise negative unless stated above or in history of present illness.  Social history, family history, allergies reviewed.  PHYSICAL EXAM:    GENERAL: Vitals noted, no distress. Alert and oriented  x 3. Non-toxic.  Appears uncomfortable    EENT: TMs clear. Posterior oropharynx unremarkable. No meningismus. No LAD.     NECK: Supple. Nontender. No midline tenderness.     CARDIAC: Irregularly irregular rate and rhythm, 2 by sensation systolic murmur best heard at the left lower sternal border, no rubs or gallops.  6  cm JVD    PULMONARY: Lungs clear bilaterally with good aeration. No wheezes rales or rhonchi. No respiratory distress.  Fine bibasilar crackles no tachypnea stridor or retractions able to speak in full sentences    ABDOMEN: Soft, nonsurgical. Nontender. No peritoneal signs. Normoactive bowel sounds. No pulsatile masses.  Enlarged percussible urinary bladder  : Kirby catheter in place  EXTREMITIES: 2+ bilateral symmetric pitting peripheral edema. Negative Homans bilaterally, no cords.    SKIN: No rash. Intact.     NEURO: No focal neurologic deficits, NIH score of 0. Cranial nerves normal as tested from II through XII.     MEDICAL DECISION MAKING:  EKG on my interpretation shows atrial fibrillation normal axis rate in the mid 90s with no acute ischemic change.  Repeat CBC shows no leukocytosis hemoglobin is downtrending, chemistries unremarkable BNP elevated UA shows UTI chest ray shows pulm vessel congestion.    Treatment in ED: IV established we replaced Kirby catheter with a 24 Kittitian Kirby catheter without any complication three-way CBI initiated he also received IV Rocephin and IV Lasix.    ED course: Patient remained stable hemodynamic.    Impression: #1 decompensated CHF, #2 hematuria, #3 blood Kirby catheter, #4 iron deficiency anemia, #5 urine tract infection    Plan set MDM: 84-year-old white male history of A-fib on Coumadin BPH has a Kirby catheter which was placed in the ED on Friday at 3 Kirby catheter 22 Kittitian comes in with a blood Kirby catheter also is to be decompensated heart failure.  Clinical suspicion for STEMI NSTEMI dissection pulm embolism pulmonary edema septic shock sepsis or renal failure.  Currently low suspicion for hemorrhagic shock.  Patient will be hospital for IV and biotics IV diuresis CBI inpatient urology valuation and further care.              Patient History   Past Medical History:   Diagnosis Date    Personal history of diseases of the blood and blood-forming organs and  certain disorders involving the immune mechanism 06/09/2021    History of thrombocytopenia    Shortness of breath 02/19/2020    Shortness of breath on exertion     Past Surgical History:   Procedure Laterality Date    APPENDECTOMY  09/11/2013    Appendectomy    COLONOSCOPY  06/25/2013    Complete Colonoscopy    OTHER SURGICAL HISTORY  04/22/2015    Removal Of Lesion    PILONIDAL CYST DRAINAGE  09/11/2013    Pilonidal Cyst Resection     Family History   Problem Relation Name Age of Onset    Heart failure Mother      Atrial fibrillation Sister       Social History     Tobacco Use    Smoking status: Never    Smokeless tobacco: Never   Substance Use Topics    Alcohol use: Yes     Alcohol/week: 10.0 standard drinks of alcohol     Types: 10 Standard drinks or equivalent per week    Drug use: Not Currently       Physical Exam   ED Triage Vitals [02/03/25 0944]   Temperature Heart Rate Respirations BP   36.4 °C (97.6 °F) 96 18 134/84      Pulse Ox Temp Source Heart Rate Source Patient Position   98 % Tympanic -- Sitting      BP Location FiO2 (%)     Left arm --       Physical Exam      ED Course & [facility]   ED Course as of 02/03/25 1553   Mon Feb 03, 2025   1552 Patient's EKG shows A-fib controlled ventricular rate CBC with shows a worsening anemia hemoglobin 8.4, urinalysis shows possible UTI,  chest ray showed cardiomegaly mild congestion, we replaced Kirby catheter with a 24 Cypriot catheter we did initiate a CBI patient was given IV Lasix and IV Rocephin and patient will be hospitalized for further care. [MT]      ED Course User Index  [MT] Tyrese Rankin MD         Diagnoses as of 02/03/25 1553   Acute on chronic combined systolic and diastolic congestive heart failure   Gross hematuria   Problem with Kirby catheter, initial encounter (CMS-[facility])   Iron deficiency anemia, unspecified iron deficiency anemia type                 No data recorded     Mary Coma Scale Score: 15 (02/03/25 0946 : James Garcia, EMT)                            Medical Decision Making      Procedure  Critical Care    Performed by: Tyrese Rankin MD  Authorized by: Tyrese Rankin MD    Critical care provider statement:     Critical care time (minutes):  45    Critical care time was exclusive of:  Separately billable procedures and treating other patients    Critical care was necessary to treat or prevent imminent or life-threatening deterioration of the following conditions:  Cardiac failure    Critical care was time spent personally by me on the following activities:  Blood draw for specimens, development of treatment plan with patient or surrogate, discussions with primary provider, evaluation of patient's response to treatment, examination of patient, review of old charts, re-evaluation of patient's condition, pulse oximetry, ordering and review of radiographic studies, ordering and review of laboratory studies, ordering and performing treatments and interventions and obtaining history from patient or surrogate       Tyrese Rankin MD  02/03/25 1348

## 2025-02-03 NOTE — PROGRESS NOTES
Pharmacy Medication History     Source of Information: patient    Additional concerns with the patient's PTA list.   N/a  The following updates were made to the Prior to Admission medication list:     Medications ADDED:   aspirin  Medications CHANGED:  coumadin  Medications REMOVED:   N/a  Medications NOT TAKING:   N/a    Allergy reviewed : Yes    Meds 2 Beds : No    Outpatient pharmacy confirmed and updated in chart : Yes    [email or web address]    The list below reflectives the updated PTA list. Please review each medication in order reconciliation for additional clarification and justification.    Prior to Admission Medications   Prescriptions Last Dose Informant   aspirin 81 mg EC tablet Unknown Self   Sig: Take 1 tablet (81 mg) by mouth once daily.   atorvastatin (Lipitor) 40 mg tablet Unknown Self   Sig: Take 1 tablet (40 mg) by mouth once daily at bedtime.   dilTIAZem CD (Cardizem CD) 240 mg 24 hr capsule Unknown Self   Sig: Take 1 capsule (240 mg) by mouth once daily.   metoprolol tartrate (Lopressor) 100 mg tablet Unknown Self   Sig: Take 1 tablet (100 mg) by mouth 2 times a day.   tadalafil (Cialis) 5 mg tablet Unknown Self   Sig: Take 1 tablet (5 mg) by mouth once daily.   tamsulosin (Flomax) 0.4 mg 24 hr capsule Unknown Self   Sig: Take 2 capsules (0.8 mg) by mouth once daily.   warfarin (Coumadin) 5 mg tablet  Self   Sig: Take 1 tablet (5 mg) by mouth once daily in the evening.   Patient taking differently: Take 1 tablet (5 mg) by mouth once daily in the evening. Take 5 mg daily  5 days a week except wed and Thurs take 2.5 mg daily      Facility-Administered Medications: None       The list below reflectives the updated allergy list. Please review each documented allergy for additional clarification and justification.    No Known Allergies       02/03/25 at 4:26 PM - Samuel Hernandez

## 2025-02-03 NOTE — HOSPITAL COURSE
Has had hematuria before and seen urology       PMHX: a fib (on coumadin), BPH, anemia    Hematuria  -coumadin on hold due to hematuria and supratherapeutic INR  -CBI  -UA with bacteria, leuks, WBC, bacteria -> urine culture from 1/31/25-> colonized, no further work up needed-> repeat   -hgb 8.4-> trend  -urology consulted, appreciate recs  -follows with Dr. Rock-> follow on DC as follow up     A fib  -continue metoprolol, cardizem,  -hold coumadin 2/2 hematuria and supratherapeutic INR  -INR 3.4-> recheck in AM  -plan for pharmacy to dose when restarted   -CXR with cardiomegaly, small new bilateral effusions  -  -last echo 10/24/23 EF 50-55%    CAD  -continue statin and aspirin      DVT prophylaxis:  Coumadin held 2/2 to elevated INR/ hematuria, SCD    DC plan:  DC home when medically stable    Labs/Testing reviewed    Interdisciplinary team rounding completed with hospitalist, nurse, TCC    NP discussed plan and lab/testing results with Dr. Christian

## 2025-02-03 NOTE — CONSULTS
Reason For Consult  hematuria    History Of Present Illness  Ming Bunch is a 84 y.o. male presenting with hematuria and pain with urination. Pt was seen in the office by Sarah Kathleen on Friday- a quintanilla catheter was attempted but not able to be placed successfully. He was then sent to the ED and CBI was started. He was then discharged with the quintanilla catheter. Per the daughter at bedside he was doing ok over the weekend and then developed pain and decreased urine output from the quintanilla, along with painful urination. A 24Fr hematuria catheter was placed in the ED. Of note, he is on coumadin for hx of afib. He did see Dr Rock 2 weeks ago and had cystoscopy for hematuria. He denies fever, chills, CP, SOB and N/V. Does have some constipation.     In the ED, H/H 8.4/26.5 from 8.8/28.1 on 1/31, INR is 3.4     Past Medical History  He has a past medical history of Personal history of diseases of the blood and blood-forming organs and certain disorders involving the immune mechanism (06/09/2021) and Shortness of breath (02/19/2020).    Surgical History  He has a past surgical history that includes Colonoscopy (06/25/2013); Other surgical history (04/22/2015); Pilonidal cyst drainage (09/11/2013); and Appendectomy (09/11/2013).     Social History  He reports that he has never smoked. He has never used smokeless tobacco. He reports current alcohol use of about 10.0 standard drinks of alcohol per week. He reports that he does not currently use drugs.    Family History  Family History   Problem Relation Name Age of Onset    Heart failure Mother      Atrial fibrillation Sister          Allergies  Patient has no known allergies.    Review of Systems  Negative other than mentioned in the HPI     Physical Exam  Physical Exam:  Constitutional: Well developed, awake/alert/oriented x3, no distress, alert and cooperative. Sitting up in bed  Skin: Warm and dry, no lesions, no rashes  Eyes: PEERLA  ENMT: mucous membranes moist, no  "apparent injury, no lesions seen  Head/Neck: Neck supple, no apparent injury, trachea midline  Respiratory/Thorax: Patent airways, CTAB, normal breath sounds with good chest expansion, thorax symmetric  Cardiovascular: Regular, rate and rhythm, 2+ equal pulses of the extremities  Gastrointestinal: Non-distended, soft, non-tender, +BS  Genitourinary: 24 Fr hematuria catheter in place with cherry red urine in the bag.   Musculoskeletal: ROM intact, no joint swelling, normal strength.   Extremities: no cyanosis edema, contusions or wounds,   Neurological: alert and oriented x3  Psychological: Appropriate mood and behavior.       Last Recorded Vitals  Blood pressure 122/81, pulse 105, temperature 36.4 °C (97.5 °F), temperature source Temporal, resp. rate 16, height 1.753 m (5' 9\"), weight 93 kg (205 lb), SpO2 94%.    Relevant Results  Results for orders placed or performed during the hospital encounter of 02/03/25 (from the past 24 hours)   Urinalysis with Reflex Culture and Microscopic   Result Value Ref Range    Color, Urine Dark-Brown (N) Light-Yellow, Yellow, Dark-Yellow    Appearance, Urine Ex.Turbid (N) Clear    Specific Gravity, Urine 1.025 1.005 - 1.035    pH, Urine 6.0 5.0, 5.5, 6.0, 6.5, 7.0, 7.5, 8.0    Protein, Urine 100 (2+) (A) NEGATIVE, 10 (TRACE), 20 (TRACE) mg/dL    Glucose, Urine Normal Normal mg/dL    Blood, Urine OVER (3+) (A) NEGATIVE    Ketones, Urine NEGATIVE NEGATIVE mg/dL    Bilirubin, Urine NEGATIVE NEGATIVE    Urobilinogen, Urine Normal Normal mg/dL    Nitrite, Urine NEGATIVE NEGATIVE    Leukocyte Esterase, Urine 250 Lola/uL (A) NEGATIVE   Microscopic Only, Urine   Result Value Ref Range    WBC, Urine >50 (A) 1-5, NONE /HPF    WBC Clumps, Urine MANY Reference range not established. /HPF    RBC, Urine >20 (A) NONE, 1-2, 3-5 /HPF    Bacteria, Urine 4+ (A) NONE SEEN /HPF    Mucus, Urine 1+ Reference range not established. /LPF   ECG 12 lead   Result Value Ref Range    Ventricular Rate 81 BPM    " QRS Duration 96 ms    QT Interval 366 ms    QTC Calculation(Bazett) 425 ms    R Axis -21 degrees    T Axis -34 degrees    QRS Count 13 beats    Q Onset 225 ms    T Offset 408 ms    QTC Fredericia 404 ms   CBC and Auto Differential   Result Value Ref Range    WBC 7.8 4.4 - 11.3 x10*3/uL    nRBC 0.0 0.0 - 0.0 /100 WBCs    RBC 2.92 (L) 4.50 - 5.90 x10*6/uL    Hemoglobin 8.4 (L) 13.5 - 17.5 g/dL    Hematocrit 26.5 (L) 41.0 - 52.0 %    MCV 91 80 - 100 fL    MCH 28.8 26.0 - 34.0 pg    MCHC 31.7 (L) 32.0 - 36.0 g/dL    RDW 14.6 (H) 11.5 - 14.5 %    Platelets 205 150 - 450 x10*3/uL    Neutrophils % 77.0 40.0 - 80.0 %    Immature Granulocytes %, Automated 0.5 0.0 - 0.9 %    Lymphocytes % 9.8 13.0 - 44.0 %    Monocytes % 11.7 2.0 - 10.0 %    Eosinophils % 0.6 0.0 - 6.0 %    Basophils % 0.4 0.0 - 2.0 %    Neutrophils Absolute 5.96 (H) 1.60 - 5.50 x10*3/uL    Immature Granulocytes Absolute, Automated 0.04 0.00 - 0.50 x10*3/uL    Lymphocytes Absolute 0.76 (L) 0.80 - 3.00 x10*3/uL    Monocytes Absolute 0.91 (H) 0.05 - 0.80 x10*3/uL    Eosinophils Absolute 0.05 0.00 - 0.40 x10*3/uL    Basophils Absolute 0.03 0.00 - 0.10 x10*3/uL   Basic metabolic panel   Result Value Ref Range    Glucose 113 (H) 74 - 99 mg/dL    Sodium 136 136 - 145 mmol/L    Potassium 3.7 3.5 - 5.3 mmol/L    Chloride 102 98 - 107 mmol/L    Bicarbonate 29 21 - 32 mmol/L    Anion Gap 9 (L) 10 - 20 mmol/L    Urea Nitrogen 12 6 - 23 mg/dL    Creatinine 0.65 0.50 - 1.30 mg/dL    eGFR >90 >60 mL/min/1.73m*2    Calcium 8.1 (L) 8.6 - 10.3 mg/dL   Hepatic function panel   Result Value Ref Range    Albumin 3.4 3.4 - 5.0 g/dL    Bilirubin, Total 1.1 0.0 - 1.2 mg/dL    Bilirubin, Direct 0.5 (H) 0.0 - 0.3 mg/dL    Alkaline Phosphatase 67 33 - 136 U/L    ALT 10 10 - 52 U/L    AST 8 (L) 9 - 39 U/L    Total Protein 6.1 (L) 6.4 - 8.2 g/dL   Protime-INR   Result Value Ref Range    Protime 39.2 (H) 9.8 - 12.8 seconds    INR 3.4 (H) 0.9 - 1.1   B-Type Natriuretic Peptide   Result  Value Ref Range     (H) 0 - 99 pg/mL   Ferritin   Result Value Ref Range    Ferritin 31 20 - 300 ng/mL   Iron and TIBC   Result Value Ref Range    Iron 25 (L) 35 - 150 ug/dL    UIBC 305 110 - 370 ug/dL    TIBC 330 240 - 445 ug/dL    % Saturation 8 (L) 25 - 45 %     *Note: Due to a large number of results and/or encounters for the requested time period, some results have not been displayed. A complete set of results can be found in Results Review.     XR chest 1 view    Result Date: 2/3/2025  Interpreted By:  Matt Bowling, STUDY: XR CHEST 1 VIEW;  2/3/2025 12:07 pm   INDICATION: Signs/Symptoms:sob.     COMPARISON: 02/02/2020   ACCESSION NUMBER(S): [id number]   ORDERING CLINICIAN: SARANYA COOK   FINDINGS: AP portable view of the chest is obtained.  Limited exam due to portable nature. Magnified cardiac silhouette. Bibasilar blunting of both costophrenic angles may be due to pleural effusions and new since the prior exam. No pneumothorax..       Cardiomegaly with likely new small bibasilar pleural effusions. Follow-up is recommended.   MACRO: None   Signed by: Matt Bowling 2/3/2025 12:24 PM Dictation workstation:   [id number]    ECG 12 lead    Result Date: 2/3/2025  Atrial fibrillation Low voltage QRS Inferior infarct , age undetermined Cannot rule out Anterior infarct , age undetermined Abnormal ECG When compared with ECG of 03-FEB-2020 12:30, Questionable change in QRS axis        Assessment/Plan     Ming Bunch is an 83 yo male with hx of BPH and hematuria who presented to the ED with recurrent Hematuria and decreased urine output from quintanilla catheter. A new 24 Fr hematuria catheter was placed in the ED. I hand irrigated the quintanilla catheter with approximately 200cc of NS and was able to get clear urine with one small clot. RN is awaiting 3L NS bags and will be initiating CBI as soon as they arrive to the floor.     Plan: Hematuria  - CBI  - hand irrigate the quintanilla as needed  - flomax  - oxybutynin as  needed for spasm  - hold coumadin  - repeat CBC in the AM   - continue ABX and follow urine cx  - CT A/P pending   - supportive care per primary team     Dispo: no acute surgical urologic indications at this time. Urology will continue to follow. Dr Turcios aware of patient.     I spent 45 minutes in the professional and overall care of this patient.      Melisa Rowland, APRN-CNP

## 2025-02-03 NOTE — PROGRESS NOTES
Ming Bunch is a 84 y.o. male admitted for hematuria. Pharmacy has been consulted for warfarin dosing and monitoring for Atrial Fibrillation/Atrial Flutter with goal INR of 2.0-3.0.     Home regimen   MON TUE WED THR FRI SAT SUN   Dose 5  mg 5  mg 2.5  mg 2.5  mg 5  mg 5  mg 5  mg   Total weekly dose: 30 mg  Source: Pt interview by med rec tech    Labs  INR: 3.4  Hgb/Hct/Plt  Lab Results   Component Value Date    HGB 8.4 (L) 02/03/2025    HGB 8.8 (L) 01/31/2025    HGB 11.0 (L) 10/29/2024    HGB 11.5 (L) 10/01/2024    HGB 12.9 (L) 02/08/2024        Lab Results   Component Value Date    HCT 26.5 (L) 02/03/2025    HCT 28.1 (L) 01/31/2025    HCT 34.7 (L) 10/29/2024    HCT 35.8 (L) 10/01/2024    HCT 39.9 (L) 02/08/2024        Platelets   Date Value Ref Range Status   02/03/2025 205 150 - 450 x10*3/uL Final   01/31/2025 218 150 - 450 x10*3/uL Final   10/29/2024 163 150 - 450 x10*3/uL Final   10/01/2024 162 150 - 450 x10*3/uL Final   02/08/2024 111 (L) 150 - 450 x10*3/uL Final      Warfarin Therapy   Current regimen: resuming home reigmen  Bridging: None needed  Interacting medications: no interacting medications    Dosing History During Current Admission  Date 2/3     INR 3.4     Dose  (mg) Hold dose       Assessment and Plan  Patient's INR of 3.4 today is Supratherapeutic. Hemoglobin/hematocrit/platelet stable  Warfarin inpatient plan: hold dose today.  Monitor s/sx of bleeding including epistaxis, hematuria, unusual bruising, hemoptysis, hematochezia as well as s/sx of stroke including impaired speech, unilateral paralysis, blurry vision.  Pharmacy will continue to monitor the patient and adjust therapy as needed.    Thank you for the consult. Please do not hesitate to contact a pharmacist with any questions.    Aide Piper, EmmanuelleD

## 2025-02-04 ENCOUNTER — APPOINTMENT (OUTPATIENT)
Dept: CARDIOLOGY | Facility: HOSPITAL | Age: 85
End: 2025-02-04
Payer: MEDICARE

## 2025-02-04 ENCOUNTER — APPOINTMENT (OUTPATIENT)
Dept: RADIOLOGY | Facility: HOSPITAL | Age: 85
End: 2025-02-04
Payer: MEDICARE

## 2025-02-04 LAB
ABO GROUP (TYPE) IN BLOOD: NORMAL
ALBUMIN SERPL BCP-MCNC: 3.3 G/DL (ref 3.4–5)
ALP SERPL-CCNC: 68 U/L (ref 33–136)
ALT SERPL W P-5'-P-CCNC: 10 U/L (ref 10–52)
ANION GAP SERPL CALC-SCNC: 12 MMOL/L (ref 10–20)
ANTIBODY SCREEN: NORMAL
AORTIC VALVE MEAN GRADIENT: 5 MMHG
AORTIC VALVE PEAK VELOCITY: 1.64 M/S
AST SERPL W P-5'-P-CCNC: 6 U/L (ref 9–39)
AV PEAK GRADIENT: 11 MMHG
AVA (PEAK VEL): 2.07 CM2
AVA (VTI): 1.76 CM2
BACTERIA UR CULT: ABNORMAL
BILIRUB DIRECT SERPL-MCNC: 0.5 MG/DL (ref 0–0.3)
BILIRUB SERPL-MCNC: 1.1 MG/DL (ref 0–1.2)
BUN SERPL-MCNC: 11 MG/DL (ref 6–23)
CALCIUM SERPL-MCNC: 7.8 MG/DL (ref 8.6–10.3)
CHLORIDE SERPL-SCNC: 102 MMOL/L (ref 98–107)
CO2 SERPL-SCNC: 28 MMOL/L (ref 21–32)
CREAT SERPL-MCNC: 0.71 MG/DL (ref 0.5–1.3)
EGFRCR SERPLBLD CKD-EPI 2021: 90 ML/MIN/1.73M*2
EJECTION FRACTION APICAL 4 CHAMBER: 59.3
EJECTION FRACTION: 62 %
ERYTHROCYTE [DISTWIDTH] IN BLOOD BY AUTOMATED COUNT: 14.6 % (ref 11.5–14.5)
GLUCOSE SERPL-MCNC: 104 MG/DL (ref 74–99)
HCT VFR BLD AUTO: 25.5 % (ref 41–52)
HGB BLD-MCNC: 8 G/DL (ref 13.5–17.5)
INR PPP: 3.4 (ref 0.9–1.1)
LDH SERPL L TO P-CCNC: 148 U/L (ref 84–246)
LEFT ATRIUM VOLUME AREA LENGTH INDEX BSA: 96.5 ML/M2
LEFT VENTRICLE INTERNAL DIMENSION DIASTOLE: 5.2 CM (ref 3.5–6)
LEFT VENTRICULAR OUTFLOW TRACT DIAMETER: 2.14 CM
MCH RBC QN AUTO: 28.7 PG (ref 26–34)
MCHC RBC AUTO-ENTMCNC: 31.4 G/DL (ref 32–36)
MCV RBC AUTO: 91 FL (ref 80–100)
MITRAL VALVE E/A RATIO: 3.42
NRBC BLD-RTO: 0 /100 WBCS (ref 0–0)
PLATELET # BLD AUTO: 203 X10*3/UL (ref 150–450)
POTASSIUM SERPL-SCNC: 3.6 MMOL/L (ref 3.5–5.3)
PROT SERPL-MCNC: 5.7 G/DL (ref 6.4–8.2)
PROT SERPL-MCNC: 5.7 G/DL (ref 6.4–8.2)
PROTHROMBIN TIME: 39 SECONDS (ref 9.8–12.8)
RBC # BLD AUTO: 2.79 X10*6/UL (ref 4.5–5.9)
RH FACTOR (ANTIGEN D): NORMAL
RIGHT VENTRICLE FREE WALL PEAK S': 7 CM/S
RIGHT VENTRICLE PEAK SYSTOLIC PRESSURE: 40.7 MMHG
SODIUM SERPL-SCNC: 138 MMOL/L (ref 136–145)
TRICUSPID ANNULAR PLANE SYSTOLIC EXCURSION: 1.7 CM
WBC # BLD AUTO: 7.8 X10*3/UL (ref 4.4–11.3)

## 2025-02-04 PROCEDURE — 76705 ECHO EXAM OF ABDOMEN: CPT

## 2025-02-04 PROCEDURE — 82374 ASSAY BLOOD CARBON DIOXIDE: CPT

## 2025-02-04 PROCEDURE — 82248 BILIRUBIN DIRECT: CPT

## 2025-02-04 PROCEDURE — 84075 ASSAY ALKALINE PHOSPHATASE: CPT

## 2025-02-04 PROCEDURE — 2500000002 HC RX 250 W HCPCS SELF ADMINISTERED DRUGS (ALT 637 FOR MEDICARE OP, ALT 636 FOR OP/ED)

## 2025-02-04 PROCEDURE — 83615 LACTATE (LD) (LDH) ENZYME: CPT

## 2025-02-04 PROCEDURE — 2500000004 HC RX 250 GENERAL PHARMACY W/ HCPCS (ALT 636 FOR OP/ED)

## 2025-02-04 PROCEDURE — 80053 COMPREHEN METABOLIC PANEL: CPT

## 2025-02-04 PROCEDURE — 36415 COLL VENOUS BLD VENIPUNCTURE: CPT

## 2025-02-04 PROCEDURE — 86901 BLOOD TYPING SEROLOGIC RH(D): CPT | Performed by: INTERNAL MEDICINE

## 2025-02-04 PROCEDURE — 93306 TTE W/DOPPLER COMPLETE: CPT | Performed by: INTERNAL MEDICINE

## 2025-02-04 PROCEDURE — 2500000001 HC RX 250 WO HCPCS SELF ADMINISTERED DRUGS (ALT 637 FOR MEDICARE OP)

## 2025-02-04 PROCEDURE — 85027 COMPLETE CBC AUTOMATED: CPT

## 2025-02-04 PROCEDURE — 85610 PROTHROMBIN TIME: CPT

## 2025-02-04 PROCEDURE — 99232 SBSQ HOSP IP/OBS MODERATE 35: CPT

## 2025-02-04 PROCEDURE — 84155 ASSAY OF PROTEIN SERUM: CPT

## 2025-02-04 PROCEDURE — 2500000001 HC RX 250 WO HCPCS SELF ADMINISTERED DRUGS (ALT 637 FOR MEDICARE OP): Performed by: INTERNAL MEDICINE

## 2025-02-04 PROCEDURE — 76705 ECHO EXAM OF ABDOMEN: CPT | Performed by: RADIOLOGY

## 2025-02-04 PROCEDURE — G0378 HOSPITAL OBSERVATION PER HR: HCPCS

## 2025-02-04 PROCEDURE — 86850 RBC ANTIBODY SCREEN: CPT | Performed by: INTERNAL MEDICINE

## 2025-02-04 PROCEDURE — 93306 TTE W/DOPPLER COMPLETE: CPT

## 2025-02-04 RX ORDER — FUROSEMIDE 10 MG/ML
40 INJECTION INTRAMUSCULAR; INTRAVENOUS ONCE
Status: COMPLETED | OUTPATIENT
Start: 2025-02-04 | End: 2025-02-04

## 2025-02-04 RX ORDER — OXYBUTYNIN CHLORIDE 5 MG/1
5 TABLET ORAL 2 TIMES DAILY PRN
Status: ACTIVE | OUTPATIENT
Start: 2025-02-04

## 2025-02-04 RX ADMIN — CIPROFLOXACIN 500 MG: 500 TABLET ORAL at 08:40

## 2025-02-04 RX ADMIN — ATORVASTATIN CALCIUM 40 MG: 40 TABLET, FILM COATED ORAL at 20:44

## 2025-02-04 RX ADMIN — POLYETHYLENE GLYCOL 3350 17 G: 17 POWDER, FOR SOLUTION ORAL at 08:42

## 2025-02-04 RX ADMIN — TAMSULOSIN HYDROCHLORIDE 0.8 MG: 0.4 CAPSULE ORAL at 08:40

## 2025-02-04 RX ADMIN — CIPROFLOXACIN 500 MG: 500 TABLET ORAL at 20:44

## 2025-02-04 RX ADMIN — FUROSEMIDE 40 MG: 10 INJECTION, SOLUTION INTRAMUSCULAR; INTRAVENOUS at 18:16

## 2025-02-04 RX ADMIN — DILTIAZEM HYDROCHLORIDE 240 MG: 120 CAPSULE, COATED, EXTENDED RELEASE ORAL at 08:40

## 2025-02-04 RX ADMIN — METOPROLOL TARTRATE 100 MG: 50 TABLET, FILM COATED ORAL at 08:40

## 2025-02-04 RX ADMIN — METOPROLOL TARTRATE 100 MG: 50 TABLET, FILM COATED ORAL at 20:44

## 2025-02-04 ASSESSMENT — COGNITIVE AND FUNCTIONAL STATUS - GENERAL
CLIMB 3 TO 5 STEPS WITH RAILING: A LITTLE
DAILY ACTIVITIY SCORE: 23
DRESSING REGULAR LOWER BODY CLOTHING: A LITTLE
DAILY ACTIVITIY SCORE: 23
DRESSING REGULAR LOWER BODY CLOTHING: A LITTLE
MOBILITY SCORE: 23
MOBILITY SCORE: 23
CLIMB 3 TO 5 STEPS WITH RAILING: A LITTLE

## 2025-02-04 ASSESSMENT — PAIN SCALES - GENERAL
PAINLEVEL_OUTOF10: 0 - NO PAIN
PAINLEVEL_OUTOF10: 0 - NO PAIN

## 2025-02-04 ASSESSMENT — ACTIVITIES OF DAILY LIVING (ADL): LACK_OF_TRANSPORTATION: NO

## 2025-02-04 NOTE — PROGRESS NOTES
Medicine NP follow up note    Addendum: US with Large R pleural effusion, RUQ ascites, will give IV lasix overnight and consulting IR.    Subjective: Resting in bed, feels comfortable, intermittent penis pain but otherwise no complaints.      Additional information:  HDS, controlled afib overnight on tele.  No leukocytosis, hgb stable at 8.0, BMP without concerns, INR remains elevated at 3.4.  T bili still elevated at .5  Will obtain echo and RUQ US today, waiting for urology to see.    Vitals (Last 24 Hours):  Heart Rate:  []   Temp:  [36.4 °C (97.5 °F)-37.4 °C (99.3 °F)]   Resp:  [16-18]   BP: ()/()   SpO2:  [90 %-96 %]       I have reviewed imaging reports and labs from this visit    PHYSICAL EXAM:  Constitutional: NAD, alert and cooperative  Eyes: no icterus  ENMT: mucous membranes moist, no lesions  Head/Neck: supple  Respiratory/Thorax: CTA bilaterally, non-labored breathing, no cough, on RA  Cardiovascular: irregular irregular rhythm, no murmurs heard  Gastrointestinal: Distended firm abdomen, NT  : indwelling quintanilla with sero-sanguinous  urine, no SP/flank discomfort  Musculoskeletal: no joint swelling, ROM intact  Extremities: +1 pitting edema LLE, +2 pitting edema RLE  Neurological: non-focal  Skin: warm and dry  Psych: calm, stable mood     Results for orders placed or performed during the hospital encounter of 02/03/25 (from the past 24 hours)   Urinalysis with Reflex Culture and Microscopic   Result Value Ref Range    Color, Urine Dark-Brown (N) Light-Yellow, Yellow, Dark-Yellow    Appearance, Urine Ex.Turbid (N) Clear    Specific Gravity, Urine 1.025 1.005 - 1.035    pH, Urine 6.0 5.0, 5.5, 6.0, 6.5, 7.0, 7.5, 8.0    Protein, Urine 100 (2+) (A) NEGATIVE, 10 (TRACE), 20 (TRACE) mg/dL    Glucose, Urine Normal Normal mg/dL    Blood, Urine OVER (3+) (A) NEGATIVE    Ketones, Urine NEGATIVE NEGATIVE mg/dL    Bilirubin, Urine NEGATIVE NEGATIVE    Urobilinogen, Urine Normal Normal mg/dL     Nitrite, Urine NEGATIVE NEGATIVE    Leukocyte Esterase, Urine 250 Lola/uL (A) NEGATIVE   Microscopic Only, Urine   Result Value Ref Range    WBC, Urine >50 (A) 1-5, NONE /HPF    WBC Clumps, Urine MANY Reference range not established. /HPF    RBC, Urine >20 (A) NONE, 1-2, 3-5 /HPF    Bacteria, Urine 4+ (A) NONE SEEN /HPF    Mucus, Urine 1+ Reference range not established. /LPF   ECG 12 lead   Result Value Ref Range    Ventricular Rate 81 BPM    QRS Duration 96 ms    QT Interval 366 ms    QTC Calculation(Bazett) 425 ms    R Axis -21 degrees    T Axis -34 degrees    QRS Count 13 beats    Q Onset 225 ms    T Offset 408 ms    QTC Fredericia 404 ms   CBC and Auto Differential   Result Value Ref Range    WBC 7.8 4.4 - 11.3 x10*3/uL    nRBC 0.0 0.0 - 0.0 /100 WBCs    RBC 2.92 (L) 4.50 - 5.90 x10*6/uL    Hemoglobin 8.4 (L) 13.5 - 17.5 g/dL    Hematocrit 26.5 (L) 41.0 - 52.0 %    MCV 91 80 - 100 fL    MCH 28.8 26.0 - 34.0 pg    MCHC 31.7 (L) 32.0 - 36.0 g/dL    RDW 14.6 (H) 11.5 - 14.5 %    Platelets 205 150 - 450 x10*3/uL    Neutrophils % 77.0 40.0 - 80.0 %    Immature Granulocytes %, Automated 0.5 0.0 - 0.9 %    Lymphocytes % 9.8 13.0 - 44.0 %    Monocytes % 11.7 2.0 - 10.0 %    Eosinophils % 0.6 0.0 - 6.0 %    Basophils % 0.4 0.0 - 2.0 %    Neutrophils Absolute 5.96 (H) 1.60 - 5.50 x10*3/uL    Immature Granulocytes Absolute, Automated 0.04 0.00 - 0.50 x10*3/uL    Lymphocytes Absolute 0.76 (L) 0.80 - 3.00 x10*3/uL    Monocytes Absolute 0.91 (H) 0.05 - 0.80 x10*3/uL    Eosinophils Absolute 0.05 0.00 - 0.40 x10*3/uL    Basophils Absolute 0.03 0.00 - 0.10 x10*3/uL   Basic metabolic panel   Result Value Ref Range    Glucose 113 (H) 74 - 99 mg/dL    Sodium 136 136 - 145 mmol/L    Potassium 3.7 3.5 - 5.3 mmol/L    Chloride 102 98 - 107 mmol/L    Bicarbonate 29 21 - 32 mmol/L    Anion Gap 9 (L) 10 - 20 mmol/L    Urea Nitrogen 12 6 - 23 mg/dL    Creatinine 0.65 0.50 - 1.30 mg/dL    eGFR >90 >60 mL/min/1.73m*2    Calcium 8.1 (L) 8.6  - 10.3 mg/dL   Hepatic function panel   Result Value Ref Range    Albumin 3.4 3.4 - 5.0 g/dL    Bilirubin, Total 1.1 0.0 - 1.2 mg/dL    Bilirubin, Direct 0.5 (H) 0.0 - 0.3 mg/dL    Alkaline Phosphatase 67 33 - 136 U/L    ALT 10 10 - 52 U/L    AST 8 (L) 9 - 39 U/L    Total Protein 6.1 (L) 6.4 - 8.2 g/dL   Protime-INR   Result Value Ref Range    Protime 39.2 (H) 9.8 - 12.8 seconds    INR 3.4 (H) 0.9 - 1.1   B-Type Natriuretic Peptide   Result Value Ref Range     (H) 0 - 99 pg/mL   Ferritin   Result Value Ref Range    Ferritin 31 20 - 300 ng/mL   Iron and TIBC   Result Value Ref Range    Iron 25 (L) 35 - 150 ug/dL    UIBC 305 110 - 370 ug/dL    TIBC 330 240 - 445 ug/dL    % Saturation 8 (L) 25 - 45 %   CBC   Result Value Ref Range    WBC 7.8 4.4 - 11.3 x10*3/uL    nRBC 0.0 0.0 - 0.0 /100 WBCs    RBC 2.79 (L) 4.50 - 5.90 x10*6/uL    Hemoglobin 8.0 (L) 13.5 - 17.5 g/dL    Hematocrit 25.5 (L) 41.0 - 52.0 %    MCV 91 80 - 100 fL    MCH 28.7 26.0 - 34.0 pg    MCHC 31.4 (L) 32.0 - 36.0 g/dL    RDW 14.6 (H) 11.5 - 14.5 %    Platelets 203 150 - 450 x10*3/uL   Basic metabolic panel   Result Value Ref Range    Glucose 104 (H) 74 - 99 mg/dL    Sodium 138 136 - 145 mmol/L    Potassium 3.6 3.5 - 5.3 mmol/L    Chloride 102 98 - 107 mmol/L    Bicarbonate 28 21 - 32 mmol/L    Anion Gap 12 10 - 20 mmol/L    Urea Nitrogen 11 6 - 23 mg/dL    Creatinine 0.71 0.50 - 1.30 mg/dL    eGFR 90 >60 mL/min/1.73m*2    Calcium 7.8 (L) 8.6 - 10.3 mg/dL   Protime-INR   Result Value Ref Range    Protime 39.0 (H) 9.8 - 12.8 seconds    INR 3.4 (H) 0.9 - 1.1   Type And Screen   Result Value Ref Range    ABO TYPE A     Rh TYPE POS     ANTIBODY SCREEN NEG    Hepatic function panel   Result Value Ref Range    Albumin 3.3 (L) 3.4 - 5.0 g/dL    Bilirubin, Total 1.1 0.0 - 1.2 mg/dL    Bilirubin, Direct 0.5 (H) 0.0 - 0.3 mg/dL    Alkaline Phosphatase 68 33 - 136 U/L    ALT 10 10 - 52 U/L    AST 6 (L) 9 - 39 U/L    Total Protein 5.7 (L) 6.4 - 8.2 g/dL      *Note: Due to a large number of results and/or encounters for the requested time period, some results have not been displayed. A complete set of results can be found in Results Review.     MEDS:  Scheduled meds  atorvastatin, 40 mg, oral, Nightly  ciprofloxacin, 500 mg, oral, q12h SUE  dilTIAZem CD, 240 mg, oral, Daily  metoprolol tartrate, 100 mg, oral, BID  polyethylene glycol, 17 g, oral, Daily  tamsulosin, 0.8 mg, oral, Daily        Continuous meds       PRN meds  PRN medications: acetaminophen **OR** acetaminophen **OR** acetaminophen, ondansetron **OR** ondansetron, oxybutynin    ASSESSMENT/PLAN:    Ming Bunch is a 84 y.o. male, with past medical history of hypertension, A-fib on Coumadin, BPH w/LUTS, hematuria recent cystoscopy, Quintanilla catheter placed discharged home on 1/31/2025, presented to [facility] ED on 2/3/2025 for hematuria. Patient states his quintanilla drains bloody urine on and off for couple of weeks. He states his urine was bright red today, prompt him to come to emergency department for evaluation. He endorses pain on urination, states he feels discomfort, when he has urge to urinate. He was discharged recently with quintanilla on 1/31/25. Daughter Isamar at bedside, states he has been complaining of pain on urination and feels like he is going tp pass out. He has history of Afib, on coumadin. INR today was supra therapeutic 3.4. Held coumadin, will recheck INR in am. Urology consulted for further management. Quintanilla was changed in ED. Continuous irrigation quintanilla was ordered per urology. Denies fever, chills, chest pain, palpitations, shortness of breath, and nausea/vomiting/diarrhea.   He recently seen by Dr Rock 2 weeks ago and had cystoscopy for hematuria.   He is a former smoker.     ED Course:   Chest XR reveals Cardiomegaly with likely new small bibasilar pleural effusions.   CMP unremarkable without electrolyte imbalance, renal impairement, PT / INR 39.2/3.4     Sodium 136;  Potassium  3.7;  BUN 12;  Cr 0.65;  EGFR >90  CBC remarkable for anemia  No leukocytosis, or thrombocytopenia   H&H 8.4/26.5; ; WBC 7.8, Iron 25  Urinalysis positive for proteinuria, bacteria, leukocyte esterase, and hematuria  Last cystoscopy on 1/24/25:  No tumors or stones seen.  Prostate is enlarged, mildly obstructive.  No strictures seen.    ED Medications:  Furosemide 40 mg IV X 1   Iron sucrose 200 mg IV      Hematuria  Supratherapeutic INR / On Warfarin   Urinary retention   Acute on chronic anemia   Assessment:  -hematuria with supratherapeutic INR  Seen by urology, was getting, continuous bladder irrigation   -hgb 8.8 on admission, was 11 3 months ago, normocytic  -Iron 25, ferritin WNL, was given IV iron  Plan:  -Per Urology   :Ok to clamp CBI  :if urine remains clear, reasonable to do TOV once closer to DC   :Continue flomax   :continue to hold coumadin   :continue oxybutynin  -Pharmacy to dose coumadin and manage  -follows with Dr. Rock-> follow on DC as follow up     UTI  -UA with bacteria, leuks, WBC, bacteria -> urine culture from   1/31/25-> colonized, no further work up needed-> repeat   -No white count   -hgb 8.4-> trend; 11/17/22-> 14  -Ciprofloxacin 500 mg PO BID for UTI    A fib  Hypertension    -controlled HR ranges 78-99  -continue metoprolol, cardizem  -hold coumadin 2/2 hematuria and supratherapeutic INR  -INR 3.4-> recheck in AM  -plan for pharmacy to dose when restarted   -Continue trending H and H    Liver Cirrhosis  Ascites  Pleural Effusion  Assessment:  -CXR with cardiomegaly, small new bilateral effusions  -CT A/P with cirrhosis , mild-mod volume ascites with generalized mesenteric edema and bilateral pleural efussion  -  -T bili .5  -last echo 10/24/23 EF 50-55%  -Does endorse ROMAN x several weeks, and worsening distended abd x 1 month  Plan:  -Obtain echocardiogram  -Obtain RUQ US     CAD  Hyperlipidemia   -continue high intensity statin and aspirin     DVT Prophylaxis  -On  hold in setting of hematuria    DC Disposition  -Plans on returning home when medically cleared    Other comorbidities as above  -continue medications as ordered and adjust based on clinical course     Discussed with Dr. Buffy Pascual and the interdisciplinary team     MENDEZ Toledo-CNP

## 2025-02-04 NOTE — CARE PLAN
Problem: Pain - Adult  Goal: Verbalizes/displays adequate comfort level or baseline comfort level  Outcome: Progressing     Problem: Safety - Adult  Goal: Free from fall injury  Outcome: Progressing     Problem: Nutrition  Goal: Nutrient intake appropriate for maintaining nutritional needs  Outcome: Progressing   The patient's goals for the shift include      The clinical goals for the shift include pt will remain free of falls through end of shift    Over the shift, the patient did not make progress toward the following goals. Barriers to progression include . Recommendations to address these barriers include .

## 2025-02-04 NOTE — PROGRESS NOTES
02/04/25 0957   Discharge Planning   Living Arrangements Spouse/significant other   Support Systems Spouse/significant other;Children   Assistance Needed None   Type of Residence Private residence   Number of Stairs Within Residence 0   Home or Post Acute Services None   Expected Discharge Disposition Home   Does the patient need discharge transport arranged? Yes   Financial Resource Strain   How hard is it for you to pay for the very basics like food, housing, medical care, and heating? Not hard   Housing Stability   In the last 12 months, was there a time when you were not able to pay the mortgage or rent on time? N   At any time in the past 12 months, were you homeless or living in a shelter (including now)? N   Transportation Needs   In the past 12 months, has lack of transportation kept you from medical appointments or from getting medications? no   In the past 12 months, has lack of transportation kept you from meetings, work, or from getting things needed for daily living? No     Met with patient at bedside to  discuss his preferences for care upon discharge. Discussed how patient manages health at home. Lives with is wife in  a house with no stairs.  Independent in all ADLs.  No home O2, dialysis, or assistive devices.  Patient denies needing pt/ot at this time.  Asked TIMI Moulton to cancel therapy orders. Patient drives and is able to get to appointments and to  medications. No additional resources or needs identified. Reviewed today's plan of care.  Patient verbalized understanding as evidenced by teach back method. Patient plans to return home at discharge & follow up with his PCP.    CLARIBEL Wheeler RN [facility]

## 2025-02-04 NOTE — PROGRESS NOTES
"Mign Bunch is a 84 y.o. male on day 0 of admission presenting with Hematuria.    Subjective   Patient is awake in bed this morning. Catheter with light red urine. He reports no problems with clotting over night       Objective     Physical Exam  Constitutional:       Appearance: Normal appearance.   Cardiovascular:      Rate and Rhythm: Normal rate and regular rhythm.   Pulmonary:      Effort: Pulmonary effort is normal.      Comments: Non labored breathing on RA  Abdominal:      General: There is no distension.      Palpations: Abdomen is soft.      Tenderness: There is no abdominal tenderness.   Genitourinary:     Comments: Kirby in place light red urine, no clots  Skin:     General: Skin is warm and dry.   Neurological:      General: No focal deficit present.      Mental Status: He is alert and oriented to person, place, and time. Mental status is at baseline.   Psychiatric:         Attention and Perception: Attention normal.         Mood and Affect: Mood normal.         Speech: Speech normal.         Behavior: Behavior normal.         Cognition and Memory: Cognition normal.         Last Recorded Vitals  Blood pressure 113/58, pulse 87, temperature 37.1 °C (98.8 °F), temperature source Temporal, resp. rate 17, height 1.753 m (5' 9\"), weight 93 kg (205 lb), SpO2 95%.  Intake/Output last 3 Shifts:  I/O last 3 completed shifts:  In: - (0 mL/kg)   Out: 650 (7 mL/kg) [Urine:650 (0.2 mL/kg/hr)]  Weight: 93 kg     Relevant Results  Scheduled medications  atorvastatin, 40 mg, oral, Nightly  ciprofloxacin, 500 mg, oral, q12h SUE  dilTIAZem CD, 240 mg, oral, Daily  metoprolol tartrate, 100 mg, oral, BID  polyethylene glycol, 17 g, oral, Daily  tamsulosin, 0.8 mg, oral, Daily      Continuous medications     PRN medications  PRN medications: acetaminophen **OR** acetaminophen **OR** acetaminophen, ondansetron **OR** ondansetron, oxybutynin   Results for orders placed or performed during the hospital encounter of 02/03/25 " (from the past 24 hours)   Urinalysis with Reflex Culture and Microscopic   Result Value Ref Range    Color, Urine Dark-Brown (N) Light-Yellow, Yellow, Dark-Yellow    Appearance, Urine Ex.Turbid (N) Clear    Specific Gravity, Urine 1.025 1.005 - 1.035    pH, Urine 6.0 5.0, 5.5, 6.0, 6.5, 7.0, 7.5, 8.0    Protein, Urine 100 (2+) (A) NEGATIVE, 10 (TRACE), 20 (TRACE) mg/dL    Glucose, Urine Normal Normal mg/dL    Blood, Urine OVER (3+) (A) NEGATIVE    Ketones, Urine NEGATIVE NEGATIVE mg/dL    Bilirubin, Urine NEGATIVE NEGATIVE    Urobilinogen, Urine Normal Normal mg/dL    Nitrite, Urine NEGATIVE NEGATIVE    Leukocyte Esterase, Urine 250 Lola/uL (A) NEGATIVE   Microscopic Only, Urine   Result Value Ref Range    WBC, Urine >50 (A) 1-5, NONE /HPF    WBC Clumps, Urine MANY Reference range not established. /HPF    RBC, Urine >20 (A) NONE, 1-2, 3-5 /HPF    Bacteria, Urine 4+ (A) NONE SEEN /HPF    Mucus, Urine 1+ Reference range not established. /LPF   ECG 12 lead   Result Value Ref Range    Ventricular Rate 81 BPM    QRS Duration 96 ms    QT Interval 366 ms    QTC Calculation(Bazett) 425 ms    R Axis -21 degrees    T Axis -34 degrees    QRS Count 13 beats    Q Onset 225 ms    T Offset 408 ms    QTC Fredericia 404 ms   CBC and Auto Differential   Result Value Ref Range    WBC 7.8 4.4 - 11.3 x10*3/uL    nRBC 0.0 0.0 - 0.0 /100 WBCs    RBC 2.92 (L) 4.50 - 5.90 x10*6/uL    Hemoglobin 8.4 (L) 13.5 - 17.5 g/dL    Hematocrit 26.5 (L) 41.0 - 52.0 %    MCV 91 80 - 100 fL    MCH 28.8 26.0 - 34.0 pg    MCHC 31.7 (L) 32.0 - 36.0 g/dL    RDW 14.6 (H) 11.5 - 14.5 %    Platelets 205 150 - 450 x10*3/uL    Neutrophils % 77.0 40.0 - 80.0 %    Immature Granulocytes %, Automated 0.5 0.0 - 0.9 %    Lymphocytes % 9.8 13.0 - 44.0 %    Monocytes % 11.7 2.0 - 10.0 %    Eosinophils % 0.6 0.0 - 6.0 %    Basophils % 0.4 0.0 - 2.0 %    Neutrophils Absolute 5.96 (H) 1.60 - 5.50 x10*3/uL    Immature Granulocytes Absolute, Automated 0.04 0.00 - 0.50  x10*3/uL    Lymphocytes Absolute 0.76 (L) 0.80 - 3.00 x10*3/uL    Monocytes Absolute 0.91 (H) 0.05 - 0.80 x10*3/uL    Eosinophils Absolute 0.05 0.00 - 0.40 x10*3/uL    Basophils Absolute 0.03 0.00 - 0.10 x10*3/uL   Basic metabolic panel   Result Value Ref Range    Glucose 113 (H) 74 - 99 mg/dL    Sodium 136 136 - 145 mmol/L    Potassium 3.7 3.5 - 5.3 mmol/L    Chloride 102 98 - 107 mmol/L    Bicarbonate 29 21 - 32 mmol/L    Anion Gap 9 (L) 10 - 20 mmol/L    Urea Nitrogen 12 6 - 23 mg/dL    Creatinine 0.65 0.50 - 1.30 mg/dL    eGFR >90 >60 mL/min/1.73m*2    Calcium 8.1 (L) 8.6 - 10.3 mg/dL   Hepatic function panel   Result Value Ref Range    Albumin 3.4 3.4 - 5.0 g/dL    Bilirubin, Total 1.1 0.0 - 1.2 mg/dL    Bilirubin, Direct 0.5 (H) 0.0 - 0.3 mg/dL    Alkaline Phosphatase 67 33 - 136 U/L    ALT 10 10 - 52 U/L    AST 8 (L) 9 - 39 U/L    Total Protein 6.1 (L) 6.4 - 8.2 g/dL   Protime-INR   Result Value Ref Range    Protime 39.2 (H) 9.8 - 12.8 seconds    INR 3.4 (H) 0.9 - 1.1   B-Type Natriuretic Peptide   Result Value Ref Range     (H) 0 - 99 pg/mL   Ferritin   Result Value Ref Range    Ferritin 31 20 - 300 ng/mL   Iron and TIBC   Result Value Ref Range    Iron 25 (L) 35 - 150 ug/dL    UIBC 305 110 - 370 ug/dL    TIBC 330 240 - 445 ug/dL    % Saturation 8 (L) 25 - 45 %   CBC   Result Value Ref Range    WBC 7.8 4.4 - 11.3 x10*3/uL    nRBC 0.0 0.0 - 0.0 /100 WBCs    RBC 2.79 (L) 4.50 - 5.90 x10*6/uL    Hemoglobin 8.0 (L) 13.5 - 17.5 g/dL    Hematocrit 25.5 (L) 41.0 - 52.0 %    MCV 91 80 - 100 fL    MCH 28.7 26.0 - 34.0 pg    MCHC 31.4 (L) 32.0 - 36.0 g/dL    RDW 14.6 (H) 11.5 - 14.5 %    Platelets 203 150 - 450 x10*3/uL   Basic metabolic panel   Result Value Ref Range    Glucose 104 (H) 74 - 99 mg/dL    Sodium 138 136 - 145 mmol/L    Potassium 3.6 3.5 - 5.3 mmol/L    Chloride 102 98 - 107 mmol/L    Bicarbonate 28 21 - 32 mmol/L    Anion Gap 12 10 - 20 mmol/L    Urea Nitrogen 11 6 - 23 mg/dL    Creatinine 0.71  0.50 - 1.30 mg/dL    eGFR 90 >60 mL/min/1.73m*2    Calcium 7.8 (L) 8.6 - 10.3 mg/dL   Protime-INR   Result Value Ref Range    Protime 39.0 (H) 9.8 - 12.8 seconds    INR 3.4 (H) 0.9 - 1.1   Type And Screen   Result Value Ref Range    ABO TYPE A     Rh TYPE POS     ANTIBODY SCREEN NEG    Hepatic function panel   Result Value Ref Range    Albumin 3.3 (L) 3.4 - 5.0 g/dL    Bilirubin, Total 1.1 0.0 - 1.2 mg/dL    Bilirubin, Direct 0.5 (H) 0.0 - 0.3 mg/dL    Alkaline Phosphatase 68 33 - 136 U/L    ALT 10 10 - 52 U/L    AST 6 (L) 9 - 39 U/L    Total Protein 5.7 (L) 6.4 - 8.2 g/dL     *Note: Due to a large number of results and/or encounters for the requested time period, some results have not been displayed. A complete set of results can be found in Results Review.      CT abdomen pelvis wo IV contrast   Final Result   1.  Undulating hepatic contour consistent with cirrhosis along with   mild-to-moderate volume ascites and bilateral pleural effusions.             MACRO:   None        Signed by: Gunnar Goncalves 2/3/2025 10:23 PM   Dictation workstation:   [id number]      XR chest 1 view   Final Result   Cardiomegaly with likely new small bibasilar pleural effusions.   Follow-up is recommended.        MACRO:   None        Signed by: Matt Bowling 2/3/2025 12:24 PM   Dictation workstation:   [id number]      Transthoracic Echo (TTE) Complete    (Results Pending)   US right upper quadrant    (Results Pending)                           Assessment/Plan   Assessment & Plan  Hematuria      Plan: Hematuria  - Kirby in place okay to clamp CBI  - If urine continues to stay clear would be reasonable to do a TOV once closer to discharge  - Continue Flomax  - Continue to hold coumadin  - Continue oxybutynin  - h/h stable    Will continue to follow.         I spent 35 minutes in the professional and overall care of this patient.      Petar Nguyen PA-C       no

## 2025-02-04 NOTE — CARE PLAN
The patient's goals for the shift include  rest    The clinical goals for the shift include hds

## 2025-02-05 ENCOUNTER — APPOINTMENT (OUTPATIENT)
Dept: RADIOLOGY | Facility: HOSPITAL | Age: 85
End: 2025-02-05
Payer: MEDICARE

## 2025-02-05 PROBLEM — I50.43 ACUTE ON CHRONIC COMBINED SYSTOLIC AND DIASTOLIC CONGESTIVE HEART FAILURE: Status: ACTIVE | Noted: 2025-02-05

## 2025-02-05 LAB
ALBUMIN SERPL BCP-MCNC: 3 G/DL (ref 3.4–5)
ALP SERPL-CCNC: 63 U/L (ref 33–136)
ALT SERPL W P-5'-P-CCNC: 8 U/L (ref 10–52)
ANION GAP SERPL CALC-SCNC: 10 MMOL/L (ref 10–20)
AST SERPL W P-5'-P-CCNC: 5 U/L (ref 9–39)
BILIRUB SERPL-MCNC: 0.9 MG/DL (ref 0–1.2)
BUN SERPL-MCNC: 12 MG/DL (ref 6–23)
CALCIUM SERPL-MCNC: 8 MG/DL (ref 8.6–10.3)
CHLORIDE SERPL-SCNC: 103 MMOL/L (ref 98–107)
CO2 SERPL-SCNC: 29 MMOL/L (ref 21–32)
CREAT SERPL-MCNC: 0.68 MG/DL (ref 0.5–1.3)
EGFRCR SERPLBLD CKD-EPI 2021: >90 ML/MIN/1.73M*2
ERYTHROCYTE [DISTWIDTH] IN BLOOD BY AUTOMATED COUNT: 14.9 % (ref 11.5–14.5)
GLUCOSE SERPL-MCNC: 99 MG/DL (ref 74–99)
HCT VFR BLD AUTO: 24.3 % (ref 41–52)
HGB BLD-MCNC: 7.7 G/DL (ref 13.5–17.5)
HOLD SPECIMEN: NORMAL
INR PPP: 2.3 (ref 0.9–1.1)
MAGNESIUM SERPL-MCNC: 1.73 MG/DL (ref 1.6–2.4)
MCH RBC QN AUTO: 28.5 PG (ref 26–34)
MCHC RBC AUTO-ENTMCNC: 31.7 G/DL (ref 32–36)
MCV RBC AUTO: 90 FL (ref 80–100)
NRBC BLD-RTO: 0 /100 WBCS (ref 0–0)
PLATELET # BLD AUTO: 184 X10*3/UL (ref 150–450)
POTASSIUM SERPL-SCNC: 3.5 MMOL/L (ref 3.5–5.3)
PROT SERPL-MCNC: 5.5 G/DL (ref 6.4–8.2)
PROTHROMBIN TIME: 26.3 SECONDS (ref 9.8–12.8)
RBC # BLD AUTO: 2.7 X10*6/UL (ref 4.5–5.9)
SODIUM SERPL-SCNC: 138 MMOL/L (ref 136–145)
WBC # BLD AUTO: 6.4 X10*3/UL (ref 4.4–11.3)

## 2025-02-05 PROCEDURE — 99232 SBSQ HOSP IP/OBS MODERATE 35: CPT

## 2025-02-05 PROCEDURE — 80053 COMPREHEN METABOLIC PANEL: CPT

## 2025-02-05 PROCEDURE — 2500000004 HC RX 250 GENERAL PHARMACY W/ HCPCS (ALT 636 FOR OP/ED)

## 2025-02-05 PROCEDURE — 83735 ASSAY OF MAGNESIUM: CPT

## 2025-02-05 PROCEDURE — 87340 HEPATITIS B SURFACE AG IA: CPT | Mod: AHULAB | Performed by: NURSE PRACTITIONER

## 2025-02-05 PROCEDURE — 2500000001 HC RX 250 WO HCPCS SELF ADMINISTERED DRUGS (ALT 637 FOR MEDICARE OP)

## 2025-02-05 PROCEDURE — 2500000002 HC RX 250 W HCPCS SELF ADMINISTERED DRUGS (ALT 637 FOR MEDICARE OP, ALT 636 FOR OP/ED)

## 2025-02-05 PROCEDURE — 2500000001 HC RX 250 WO HCPCS SELF ADMINISTERED DRUGS (ALT 637 FOR MEDICARE OP): Performed by: INTERNAL MEDICINE

## 2025-02-05 PROCEDURE — 36415 COLL VENOUS BLD VENIPUNCTURE: CPT

## 2025-02-05 PROCEDURE — 85610 PROTHROMBIN TIME: CPT

## 2025-02-05 PROCEDURE — 2060000001 HC INTERMEDIATE ICU ROOM DAILY

## 2025-02-05 PROCEDURE — 85027 COMPLETE CBC AUTOMATED: CPT

## 2025-02-05 RX ORDER — FUROSEMIDE 10 MG/ML
40 INJECTION INTRAMUSCULAR; INTRAVENOUS ONCE
Status: COMPLETED | OUTPATIENT
Start: 2025-02-05 | End: 2025-02-05

## 2025-02-05 RX ADMIN — ATORVASTATIN CALCIUM 40 MG: 40 TABLET, FILM COATED ORAL at 21:37

## 2025-02-05 RX ADMIN — DILTIAZEM HYDROCHLORIDE 240 MG: 120 CAPSULE, COATED, EXTENDED RELEASE ORAL at 08:36

## 2025-02-05 RX ADMIN — CIPROFLOXACIN 500 MG: 500 TABLET ORAL at 08:36

## 2025-02-05 RX ADMIN — TAMSULOSIN HYDROCHLORIDE 0.8 MG: 0.4 CAPSULE ORAL at 08:36

## 2025-02-05 RX ADMIN — FUROSEMIDE 40 MG: 10 INJECTION, SOLUTION INTRAMUSCULAR; INTRAVENOUS at 10:28

## 2025-02-05 RX ADMIN — METOPROLOL TARTRATE 100 MG: 50 TABLET, FILM COATED ORAL at 21:37

## 2025-02-05 RX ADMIN — METOPROLOL TARTRATE 100 MG: 50 TABLET, FILM COATED ORAL at 08:36

## 2025-02-05 RX ADMIN — CIPROFLOXACIN 500 MG: 500 TABLET ORAL at 21:37

## 2025-02-05 ASSESSMENT — COGNITIVE AND FUNCTIONAL STATUS - GENERAL
CLIMB 3 TO 5 STEPS WITH RAILING: A LITTLE
MOBILITY SCORE: 23
CLIMB 3 TO 5 STEPS WITH RAILING: A LITTLE
DAILY ACTIVITIY SCORE: 23
MOBILITY SCORE: 23
DRESSING REGULAR LOWER BODY CLOTHING: A LITTLE

## 2025-02-05 ASSESSMENT — PAIN SCALES - GENERAL
PAINLEVEL_OUTOF10: 0 - NO PAIN

## 2025-02-05 ASSESSMENT — PAIN - FUNCTIONAL ASSESSMENT
PAIN_FUNCTIONAL_ASSESSMENT: 0-10

## 2025-02-05 NOTE — DOCUMENTATION CLARIFICATION NOTE
"    PATIENT:               EVERETTE IQBAL  ACCT #:                  [id number]  MRN:                       [id number]  :                       1940  ADMIT DATE:       2/3/2025 11:06 AM  DISCH DATE:  RESPONDING PROVIDER #:        [id number]          PROVIDER RESPONSE TEXT:    UTI unrelated to present on admission Kirby catheter    CDI QUERY TEXT:    Clarification    Instruction:    Based on your assessment of the patient and the clinical information, please provide the requested documentation by clicking on the appropriate radio button and enter any additional information if prompted.    Question: Please further clarify the relationship between the UTI and the urinary device    When answering this query, please exercise your independent professional judgment. The fact that a question is being asked, does not imply that any particular answer is desired or expected.    The patient's clinical indicators include:  Clinical Information: 84 year old male, BMI 30.26, presenting with a Kirby catheter and UTI.    Clinical Indicators:  2/3 UC: \">=100,000 CFU/mL Coagulase negative staphylococcus\"    2/3 UA:  Color Dark-Brown  Appearance Ex. Turbid  Protein 100 (2+)  Blood Over (3+)  Leukocytes 250  WBC >50  RBC >20  Bacteria 4+     ED Note (prior ED visit, not admitted): \"UA shows blood no infection...change Kirby catheter with a 22 Latvian three-way Kirby catheter...patient be discharged home with...Kirby catheter leg bag\"    2/3 ED Triage Note: \"Pt here last Friday, catheter placed for hematuria. Pt having irritation and hematuria. Catheter is leaking \"where it goes in\"\"    2/3 ED Note: \"Kirby catheter placed last week seen in the ED past Friday which change the catheter three-way Kirby catheter 20 Latvian due to CBI his urine cleared up he was discharged home returns with the blood Kirby catheter.  He states for the last 24 hours he has not severe pain with urination...Problem with Kirby catheter, initial encounter\"    2/3 " "H/P: \"Kirby catheter placed discharged home on 1/31/2025...UTI\"    2/3 Urology Note: \"A new 24 Fr hematuria catheter was placed in the ED\"    Treatment:  Urology consult, Kirby catheter changed in ED, CBI initiated, Cipro 500mg po q12hrs (2/3-current), NS 3000ml IR x1 (2/3), repeated labs, repeated VS, supportive care.    Risk Factors: 84 year old male being treated for a UTI; Kirby catheter was present on admission.  Options provided:  -- UTI related to present on admission Kirby catheter  -- UTI unrelated to present on admission Kirby catheter  -- Other - I will add my own diagnosis  -- Refer to Clinical Documentation Reviewer    Query created by: Sara Rob on 2/5/2025 2:23 PM      Electronically signed by:  AUSTIN KURTZ PA-C 2/5/2025 2:28 PM          "

## 2025-02-05 NOTE — CARE PLAN
The patient's goals for the shift include      The clinical goals for the shift include decreas in hematuria and increased clarity/color in urine    Problem: Pain - Adult  Goal: Verbalizes/displays adequate comfort level or baseline comfort level  Outcome: Progressing     Problem: Safety - Adult  Goal: Free from fall injury  Outcome: Progressing     Problem: Chronic Conditions and Co-morbidities  Goal: Patient's chronic conditions and co-morbidity symptoms are monitored and maintained or improved  Outcome: Progressing

## 2025-02-05 NOTE — PROGRESS NOTES
02/05/25 1444   Discharge Planning   Home or Post Acute Services None   Expected Discharge Disposition Home     Patient to have thoracentesis but his INR is still too elevated.  Holding patient's coumadin.  Urology following.  Xray chest.  Plan remains for patient to return home upon discharge.  No home going needs identified at this time.  Will continue to follow for discharge planning needs.    Patient was upgraded to inpatient status on 2/5/25

## 2025-02-05 NOTE — PROGRESS NOTES
"Ming Bunch is a 84 y.o. male on day 0 of admission presenting with Hematuria.    Subjective   Patient is awake in bed this morning. He reports he has been doing well. Very eager to leave.        Objective     Physical Exam  Constitutional:       Appearance: Normal appearance.   Cardiovascular:      Rate and Rhythm: Normal rate and regular rhythm.   Pulmonary:      Effort: Pulmonary effort is normal.      Comments: Non labored breathing on RA  Abdominal:      General: There is no distension.      Palpations: Abdomen is soft.      Tenderness: There is no abdominal tenderness.   Genitourinary:     Comments: Kirby in place red urine, no clots  Skin:     General: Skin is warm and dry.   Neurological:      General: No focal deficit present.      Mental Status: He is alert and oriented to person, place, and time. Mental status is at baseline.   Psychiatric:         Attention and Perception: Attention normal.         Mood and Affect: Mood normal.         Speech: Speech normal.         Behavior: Behavior normal.         Cognition and Memory: Cognition normal.         Last Recorded Vitals  Blood pressure 117/73, pulse 79, temperature 35.4 °C (95.8 °F), temperature source Temporal, resp. rate 17, height 1.753 m (5' 9\"), weight 93 kg (205 lb), SpO2 93%.  Intake/Output last 3 Shifts:  I/O last 3 completed shifts:  In: - (0 mL/kg)   Out: 2975 (32 mL/kg) [Urine:2975 (0.9 mL/kg/hr)]  Weight: 93 kg     Relevant Results  Scheduled medications  atorvastatin, 40 mg, oral, Nightly  ciprofloxacin, 500 mg, oral, q12h SUE  dilTIAZem CD, 240 mg, oral, Daily  metoprolol tartrate, 100 mg, oral, BID  polyethylene glycol, 17 g, oral, Daily  tamsulosin, 0.8 mg, oral, Daily      Continuous medications     PRN medications  PRN medications: acetaminophen **OR** acetaminophen **OR** acetaminophen, ondansetron **OR** ondansetron, oxybutynin   Results for orders placed or performed during the hospital encounter of 02/03/25 (from the past 24 hours) "   Transthoracic Echo (TTE) Complete   Result Value Ref Range    AV pk can 1.64 m/s    AV mn grad 5 mmHg    LVOT diam 2.14 cm    MV E/A ratio 3.42     LA vol index A/L 96.5 ml/m2    Tricuspid annular plane systolic excursion 1.7 cm    LV EF 62 %    RV free wall pk S' 7.00 cm/s    LVIDd 5.20 cm    RVSP 40.7 mmHg    Aortic Valve Area by Continuity of VTI 1.76 cm2    Aortic Valve Area by Continuity of Peak Velocity 2.07 cm2    AV pk grad 11 mmHg    LV A4C EF 59.3    Comprehensive metabolic panel   Result Value Ref Range    Glucose 99 74 - 99 mg/dL    Sodium 138 136 - 145 mmol/L    Potassium 3.5 3.5 - 5.3 mmol/L    Chloride 103 98 - 107 mmol/L    Bicarbonate 29 21 - 32 mmol/L    Anion Gap 10 10 - 20 mmol/L    Urea Nitrogen 12 6 - 23 mg/dL    Creatinine 0.68 0.50 - 1.30 mg/dL    eGFR >90 >60 mL/min/1.73m*2    Calcium 8.0 (L) 8.6 - 10.3 mg/dL    Albumin 3.0 (L) 3.4 - 5.0 g/dL    Alkaline Phosphatase 63 33 - 136 U/L    Total Protein 5.5 (L) 6.4 - 8.2 g/dL    AST 5 (L) 9 - 39 U/L    Bilirubin, Total 0.9 0.0 - 1.2 mg/dL    ALT 8 (L) 10 - 52 U/L   CBC   Result Value Ref Range    WBC 6.4 4.4 - 11.3 x10*3/uL    nRBC 0.0 0.0 - 0.0 /100 WBCs    RBC 2.70 (L) 4.50 - 5.90 x10*6/uL    Hemoglobin 7.7 (L) 13.5 - 17.5 g/dL    Hematocrit 24.3 (L) 41.0 - 52.0 %    MCV 90 80 - 100 fL    MCH 28.5 26.0 - 34.0 pg    MCHC 31.7 (L) 32.0 - 36.0 g/dL    RDW 14.9 (H) 11.5 - 14.5 %    Platelets 184 150 - 450 x10*3/uL   Magnesium   Result Value Ref Range    Magnesium 1.73 1.60 - 2.40 mg/dL     *Note: Due to a large number of results and/or encounters for the requested time period, some results have not been displayed. A complete set of results can be found in Results Review.      Transthoracic Echo (TTE) Complete   Final Result      US right upper quadrant   Final Result   Cirrhosis of the liver. Right upper quadrant ascites.        Large right pleural effusion.        Obscuration of the pancreas by bowel gas and body habitus.        MACRO:   None         Signed by: Kenney Samuel 2/4/2025 3:35 PM   Dictation workstation:   [id number]      CT abdomen pelvis wo IV contrast   Final Result   1.  Undulating hepatic contour consistent with cirrhosis along with   mild-to-moderate volume ascites and bilateral pleural effusions.             MACRO:   None        Signed by: Gunnar Goncalves 2/3/2025 10:23 PM   Dictation workstation:   [id number]      XR chest 1 view   Final Result   Cardiomegaly with likely new small bibasilar pleural effusions.   Follow-up is recommended.        MACRO:   None        Signed by: Matt Bowling 2/3/2025 12:24 PM   Dictation workstation:   [id number]      Consult to Interventional Radiology    (Results Pending)   XR chest 2 views    (Results Pending)                           Assessment/Plan   Assessment & Plan  Hematuria      Plan: Hematuria  - Quintanilla in place okay to clamp CBI  - Urine darker red without clots  - Okay to remove quintanilla and do TOV on DOD if h/h remains stable  - Continue Flomax  - Continue to hold coumadin  - Continue oxybutynin  - No indication for urology procedure at this time.     Urology will sign off. Will need outpatient follow up with possible cystoscopy to determine cause of his hematuria.         I spent 35 minutes in the professional and overall care of this patient.      Petar Nguyen PA-C

## 2025-02-05 NOTE — SIGNIFICANT EVENT
Thoracentesis was ordered on Ming. INR from yesterday remained elevated at 3.4. INR would need to be less than 3.0 prior to performing a thoracentesis, coumadin being held. Discussed with Dr. Gee. Per our discussion, patient has mild SOB but is not hypoxic and not requiring supplemental O2. She will plan to diurese today and reassess symptoms tomorrow to determine if we should move forward with a thoracentesis.     Repeat INR from today 2.3. We will discuss POC with the attending provider tomorrow morning.

## 2025-02-05 NOTE — CARE PLAN
The patient's goals for the shift include  rest  Problem: Pain - Adult  Goal: Verbalizes/displays adequate comfort level or baseline comfort level  Outcome: Progressing     Problem: Safety - Adult  Goal: Free from fall injury  Outcome: Progressing     Problem: Discharge Planning  Goal: Discharge to home or other facility with appropriate resources  Outcome: Progressing     Problem: Chronic Conditions and Co-morbidities  Goal: Patient's chronic conditions and co-morbidity symptoms are monitored and maintained or improved  Outcome: Progressing     Problem: Nutrition  Goal: Nutrient intake appropriate for maintaining nutritional needs  Outcome: Progressing       The clinical goals for the shift include remain hds and free of falls

## 2025-02-05 NOTE — NURSING NOTE
Patient moderate falls risk; refusing bed alarm. Educated patient importance of bed alarm but patient still refusing. Patient says he will use his call light to call before getting out of bed. Notified Marci Mendoza.

## 2025-02-06 ENCOUNTER — APPOINTMENT (OUTPATIENT)
Dept: RADIOLOGY | Facility: HOSPITAL | Age: 85
End: 2025-02-06
Payer: MEDICARE

## 2025-02-06 LAB
AFP SERPL-MCNC: <4 NG/ML (ref 0–9)
ALBUMIN SERPL BCP-MCNC: 3.1 G/DL (ref 3.4–5)
ALP SERPL-CCNC: 63 U/L (ref 33–136)
ALT SERPL W P-5'-P-CCNC: 9 U/L (ref 10–52)
ANION GAP SERPL CALC-SCNC: 10 MMOL/L (ref 10–20)
AST SERPL W P-5'-P-CCNC: 6 U/L (ref 9–39)
BASOPHILS NFR FLD MANUAL: 0 %
BILIRUB SERPL-MCNC: 0.9 MG/DL (ref 0–1.2)
BLASTS NFR FLD MANUAL: 0 %
BUN SERPL-MCNC: 15 MG/DL (ref 6–23)
CALCIUM SERPL-MCNC: 7.9 MG/DL (ref 8.6–10.3)
CERULOPLASMIN SERPL-MCNC: 28.8 MG/DL (ref 20–60)
CHLORIDE SERPL-SCNC: 102 MMOL/L (ref 98–107)
CLARITY FLD: ABNORMAL
CO2 SERPL-SCNC: 32 MMOL/L (ref 21–32)
COLOR FLD: ABNORMAL
CREAT SERPL-MCNC: 0.72 MG/DL (ref 0.5–1.3)
EGFRCR SERPLBLD CKD-EPI 2021: 90 ML/MIN/1.73M*2
EOSINOPHIL NFR FLD MANUAL: 0 %
ERYTHROCYTE [DISTWIDTH] IN BLOOD BY AUTOMATED COUNT: 14.8 % (ref 11.5–14.5)
GLUCOSE SERPL-MCNC: 98 MG/DL (ref 74–99)
HAV AB SER QL IA: REACTIVE
HAV IGM SER QL: NONREACTIVE
HBV CORE IGM SER QL: NONREACTIVE
HBV SURFACE AB SER-ACNC: 4.7 MIU/ML
HBV SURFACE AG SERPL QL IA: NONREACTIVE
HBV SURFACE AG SERPL QL IA: NONREACTIVE
HCT VFR BLD AUTO: 25.5 % (ref 41–52)
HCV AB SER QL: NONREACTIVE
HCV AB SER QL: NONREACTIVE
HGB BLD-MCNC: 7.7 G/DL (ref 13.5–17.5)
IMMATURE GRANULOCYTES IN FLUID: 0 %
INR PPP: 2 (ref 0.9–1.1)
LDH FLD L TO P-CCNC: 67 U/L
LYMPHOCYTES NFR FLD MANUAL: 9 %
MAGNESIUM SERPL-MCNC: 1.66 MG/DL (ref 1.6–2.4)
MCH RBC QN AUTO: 27.5 PG (ref 26–34)
MCHC RBC AUTO-ENTMCNC: 30.2 G/DL (ref 32–36)
MCV RBC AUTO: 91 FL (ref 80–100)
MONOS+MACROS NFR FLD MANUAL: 89 %
NEUTROPHILS NFR FLD MANUAL: 2 %
NRBC BLD-RTO: 0 /100 WBCS (ref 0–0)
OTHER CELLS NFR FLD MANUAL: 0 %
PLASMA CELLS NFR FLD MANUAL: 0 %
PLATELET # BLD AUTO: 202 X10*3/UL (ref 150–450)
POTASSIUM SERPL-SCNC: 3.6 MMOL/L (ref 3.5–5.3)
PROT FLD-MCNC: 2.1 G/DL
PROT SERPL-MCNC: 5.4 G/DL (ref 6.4–8.2)
PROTHROMBIN TIME: 23.2 SECONDS (ref 9.8–12.8)
RBC # BLD AUTO: 2.8 X10*6/UL (ref 4.5–5.9)
RBC # FLD AUTO: 8000 /UL
SODIUM SERPL-SCNC: 140 MMOL/L (ref 136–145)
TOTAL CELLS COUNTED FLD: 100
WBC # BLD AUTO: 6.7 X10*3/UL (ref 4.4–11.3)
WBC # FLD AUTO: 588 /UL

## 2025-02-06 PROCEDURE — 86235 NUCLEAR ANTIGEN ANTIBODY: CPT | Performed by: NURSE PRACTITIONER

## 2025-02-06 PROCEDURE — 2500000001 HC RX 250 WO HCPCS SELF ADMINISTERED DRUGS (ALT 637 FOR MEDICARE OP): Performed by: INTERNAL MEDICINE

## 2025-02-06 PROCEDURE — 86803 HEPATITIS C AB TEST: CPT | Mod: AHULAB | Performed by: NURSE PRACTITIONER

## 2025-02-06 PROCEDURE — 82390 ASSAY OF CERULOPLASMIN: CPT | Mod: AHULAB | Performed by: NURSE PRACTITIONER

## 2025-02-06 PROCEDURE — 71046 X-RAY EXAM CHEST 2 VIEWS: CPT

## 2025-02-06 PROCEDURE — 88112 CYTOPATH CELL ENHANCE TECH: CPT | Mod: TC

## 2025-02-06 PROCEDURE — 71045 X-RAY EXAM CHEST 1 VIEW: CPT | Performed by: RADIOLOGY

## 2025-02-06 PROCEDURE — 2500000004 HC RX 250 GENERAL PHARMACY W/ HCPCS (ALT 636 FOR OP/ED): Performed by: NURSE PRACTITIONER

## 2025-02-06 PROCEDURE — 83735 ASSAY OF MAGNESIUM: CPT

## 2025-02-06 PROCEDURE — 88112 CYTOPATH CELL ENHANCE TECH: CPT | Performed by: PATHOLOGY

## 2025-02-06 PROCEDURE — 87205 SMEAR GRAM STAIN: CPT | Mod: AHULAB

## 2025-02-06 PROCEDURE — 32555 ASPIRATE PLEURA W/ IMAGING: CPT

## 2025-02-06 PROCEDURE — 82104 ALPHA-1-ANTITRYPSIN PHENO: CPT | Mod: AHULAB | Performed by: NURSE PRACTITIONER

## 2025-02-06 PROCEDURE — 87116 MYCOBACTERIA CULTURE: CPT | Mod: AHULAB

## 2025-02-06 PROCEDURE — 85027 COMPLETE CBC AUTOMATED: CPT

## 2025-02-06 PROCEDURE — 86015 ACTIN ANTIBODY EACH: CPT | Mod: AHULAB | Performed by: NURSE PRACTITIONER

## 2025-02-06 PROCEDURE — 36415 COLL VENOUS BLD VENIPUNCTURE: CPT

## 2025-02-06 PROCEDURE — 99232 SBSQ HOSP IP/OBS MODERATE 35: CPT | Performed by: INTERNAL MEDICINE

## 2025-02-06 PROCEDURE — 2500000004 HC RX 250 GENERAL PHARMACY W/ HCPCS (ALT 636 FOR OP/ED)

## 2025-02-06 PROCEDURE — 82105 ALPHA-FETOPROTEIN SERUM: CPT | Mod: AHULAB | Performed by: NURSE PRACTITIONER

## 2025-02-06 PROCEDURE — 86706 HEP B SURFACE ANTIBODY: CPT | Mod: AHULAB | Performed by: NURSE PRACTITIONER

## 2025-02-06 PROCEDURE — 86708 HEPATITIS A ANTIBODY: CPT | Mod: AHULAB | Performed by: NURSE PRACTITIONER

## 2025-02-06 PROCEDURE — 88305 TISSUE EXAM BY PATHOLOGIST: CPT | Performed by: PATHOLOGY

## 2025-02-06 PROCEDURE — 71045 X-RAY EXAM CHEST 1 VIEW: CPT

## 2025-02-06 PROCEDURE — 86381 MITOCHONDRIAL ANTIBODY EACH: CPT | Mod: AHULAB | Performed by: NURSE PRACTITIONER

## 2025-02-06 PROCEDURE — 89051 BODY FLUID CELL COUNT: CPT

## 2025-02-06 PROCEDURE — 83986 ASSAY PH BODY FLUID NOS: CPT | Mod: AHULAB

## 2025-02-06 PROCEDURE — 2500000002 HC RX 250 W HCPCS SELF ADMINISTERED DRUGS (ALT 637 FOR MEDICARE OP, ALT 636 FOR OP/ED)

## 2025-02-06 PROCEDURE — 87070 CULTURE OTHR SPECIMN AEROBIC: CPT | Mod: AHULAB

## 2025-02-06 PROCEDURE — 83615 LACTATE (LD) (LDH) ENZYME: CPT | Mod: AHULAB

## 2025-02-06 PROCEDURE — 80074 ACUTE HEPATITIS PANEL: CPT | Mod: AHULAB

## 2025-02-06 PROCEDURE — 87015 SPECIMEN INFECT AGNT CONCNTJ: CPT | Mod: AHULAB

## 2025-02-06 PROCEDURE — 87075 CULTR BACTERIA EXCEPT BLOOD: CPT | Mod: AHULAB

## 2025-02-06 PROCEDURE — 2060000001 HC INTERMEDIATE ICU ROOM DAILY

## 2025-02-06 PROCEDURE — 0W993ZZ DRAINAGE OF RIGHT PLEURAL CAVITY, PERCUTANEOUS APPROACH: ICD-10-PCS | Performed by: NURSE PRACTITIONER

## 2025-02-06 PROCEDURE — 84075 ASSAY ALKALINE PHOSPHATASE: CPT

## 2025-02-06 PROCEDURE — 71046 X-RAY EXAM CHEST 2 VIEWS: CPT | Performed by: RADIOLOGY

## 2025-02-06 PROCEDURE — 99221 1ST HOSP IP/OBS SF/LOW 40: CPT | Performed by: INTERNAL MEDICINE

## 2025-02-06 PROCEDURE — 84157 ASSAY OF PROTEIN OTHER: CPT | Mod: AHULAB

## 2025-02-06 PROCEDURE — 2500000001 HC RX 250 WO HCPCS SELF ADMINISTERED DRUGS (ALT 637 FOR MEDICARE OP)

## 2025-02-06 PROCEDURE — 85610 PROTHROMBIN TIME: CPT

## 2025-02-06 PROCEDURE — 99232 SBSQ HOSP IP/OBS MODERATE 35: CPT

## 2025-02-06 PROCEDURE — 86256 FLUORESCENT ANTIBODY TITER: CPT | Mod: AHULAB | Performed by: NURSE PRACTITIONER

## 2025-02-06 PROCEDURE — 88305 TISSUE EXAM BY PATHOLOGIST: CPT | Mod: TC

## 2025-02-06 PROCEDURE — 87102 FUNGUS ISOLATION CULTURE: CPT | Mod: AHULAB

## 2025-02-06 PROCEDURE — 86038 ANTINUCLEAR ANTIBODIES: CPT | Mod: AHULAB | Performed by: NURSE PRACTITIONER

## 2025-02-06 RX ORDER — LORAZEPAM 1 MG/1
1 TABLET ORAL EVERY 2 HOUR PRN
Status: ACTIVE | OUTPATIENT
Start: 2025-02-06

## 2025-02-06 RX ORDER — LIDOCAINE HYDROCHLORIDE 10 MG/ML
INJECTION, SOLUTION EPIDURAL; INFILTRATION; INTRACAUDAL; PERINEURAL
Status: COMPLETED | OUTPATIENT
Start: 2025-02-06 | End: 2025-02-06

## 2025-02-06 RX ORDER — THIAMINE HYDROCHLORIDE 100 MG/ML
100 INJECTION, SOLUTION INTRAMUSCULAR; INTRAVENOUS DAILY
Status: COMPLETED | OUTPATIENT
Start: 2025-02-06 | End: 2025-02-08

## 2025-02-06 RX ORDER — THIAMINE HYDROCHLORIDE 100 MG/ML
100 INJECTION, SOLUTION INTRAMUSCULAR; INTRAVENOUS DAILY
Status: DISCONTINUED | OUTPATIENT
Start: 2025-02-06 | End: 2025-02-06

## 2025-02-06 RX ORDER — LANOLIN ALCOHOL/MO/W.PET/CERES
100 CREAM (GRAM) TOPICAL DAILY
Status: DISPENSED | OUTPATIENT
Start: 2025-02-09

## 2025-02-06 RX ORDER — LORAZEPAM 0.5 MG/1
0.5 TABLET ORAL EVERY 2 HOUR PRN
Status: ACTIVE | OUTPATIENT
Start: 2025-02-06

## 2025-02-06 RX ORDER — FOLIC ACID 1 MG/1
1 TABLET ORAL DAILY
Status: DISPENSED | OUTPATIENT
Start: 2025-02-06

## 2025-02-06 RX ORDER — WARFARIN SODIUM 5 MG/1
5 TABLET ORAL
Status: ACTIVE | OUTPATIENT
Start: 2025-02-07

## 2025-02-06 RX ORDER — LORAZEPAM 1 MG/1
2 TABLET ORAL EVERY 2 HOUR PRN
Status: ACTIVE | OUTPATIENT
Start: 2025-02-06

## 2025-02-06 RX ORDER — LANOLIN ALCOHOL/MO/W.PET/CERES
100 CREAM (GRAM) TOPICAL DAILY
Status: DISCONTINUED | OUTPATIENT
Start: 2025-02-09 | End: 2025-02-06

## 2025-02-06 RX ORDER — MULTIVIT-MIN/IRON FUM/FOLIC AC 7.5 MG-4
1 TABLET ORAL DAILY
Status: DISPENSED | OUTPATIENT
Start: 2025-02-06

## 2025-02-06 RX ORDER — WARFARIN 2.5 MG/1
2.5 TABLET ORAL
Status: ACTIVE | OUTPATIENT
Start: 2025-02-06

## 2025-02-06 RX ADMIN — POLYETHYLENE GLYCOL 3350 17 G: 17 POWDER, FOR SOLUTION ORAL at 09:12

## 2025-02-06 RX ADMIN — ATORVASTATIN CALCIUM 40 MG: 40 TABLET, FILM COATED ORAL at 20:08

## 2025-02-06 RX ADMIN — LIDOCAINE HYDROCHLORIDE 10 ML: 10 INJECTION, SOLUTION EPIDURAL; INFILTRATION; INTRACAUDAL; PERINEURAL at 14:58

## 2025-02-06 RX ADMIN — CIPROFLOXACIN 500 MG: 500 TABLET ORAL at 09:12

## 2025-02-06 RX ADMIN — MULTIPLE VITAMINS W/ MINERALS TAB 1 TABLET: TAB ORAL at 11:36

## 2025-02-06 RX ADMIN — METOPROLOL TARTRATE 100 MG: 50 TABLET, FILM COATED ORAL at 20:08

## 2025-02-06 RX ADMIN — THIAMINE HYDROCHLORIDE 100 MG: 100 INJECTION, SOLUTION INTRAMUSCULAR; INTRAVENOUS at 11:36

## 2025-02-06 RX ADMIN — DILTIAZEM HYDROCHLORIDE 240 MG: 120 CAPSULE, COATED, EXTENDED RELEASE ORAL at 09:12

## 2025-02-06 RX ADMIN — CIPROFLOXACIN 500 MG: 500 TABLET ORAL at 20:08

## 2025-02-06 RX ADMIN — TAMSULOSIN HYDROCHLORIDE 0.8 MG: 0.4 CAPSULE ORAL at 09:12

## 2025-02-06 RX ADMIN — FOLIC ACID 1 MG: 1 TABLET ORAL at 11:37

## 2025-02-06 ASSESSMENT — LIFESTYLE VARIABLES
PULSE: 97
NAUSEA AND VOMITING: NO NAUSEA AND NO VOMITING
PAROXYSMAL SWEATS: NO SWEAT VISIBLE
ORIENTATION AND CLOUDING OF SENSORIUM: ORIENTED AND CAN DO SERIAL ADDITIONS
NAUSEA AND VOMITING: NO NAUSEA AND NO VOMITING
NAUSEA AND VOMITING: NO NAUSEA AND NO VOMITING
PAROXYSMAL SWEATS: NO SWEAT VISIBLE
AUDITORY DISTURBANCES: NOT PRESENT
HEADACHE, FULLNESS IN HEAD: NOT PRESENT
AGITATION: NORMAL ACTIVITY
VISUAL DISTURBANCES: NOT PRESENT
TOTAL SCORE: 0
VISUAL DISTURBANCES: NOT PRESENT
ORIENTATION AND CLOUDING OF SENSORIUM: ORIENTED AND CAN DO SERIAL ADDITIONS
ANXIETY: 3
AUDITORY DISTURBANCES: NOT PRESENT
AGITATION: NORMAL ACTIVITY
NAUSEA AND VOMITING: NO NAUSEA AND NO VOMITING
PAROXYSMAL SWEATS: NO SWEAT VISIBLE
TOTAL SCORE: 0
TREMOR: NO TREMOR
VISUAL DISTURBANCES: NOT PRESENT
NAUSEA AND VOMITING: NO NAUSEA AND NO VOMITING
TREMOR: NO TREMOR
TREMOR: NO TREMOR
AGITATION: NORMAL ACTIVITY
HEADACHE, FULLNESS IN HEAD: NOT PRESENT
AUDITORY DISTURBANCES: NOT PRESENT
TREMOR: NO TREMOR
TOTAL SCORE: 0
ORIENTATION AND CLOUDING OF SENSORIUM: ORIENTED AND CAN DO SERIAL ADDITIONS
AGITATION: NORMAL ACTIVITY
AUDITORY DISTURBANCES: NOT PRESENT
ANXIETY: NO ANXIETY, AT EASE
ANXIETY: NO ANXIETY, AT EASE
ORIENTATION AND CLOUDING OF SENSORIUM: ORIENTED AND CAN DO SERIAL ADDITIONS
AGITATION: SOMEWHAT MORE THAN NORMAL ACTIVITY
TOTAL SCORE: 0
TOTAL SCORE: 1
ANXIETY: NO ANXIETY, AT EASE
PAROXYSMAL SWEATS: NO SWEAT VISIBLE
TREMOR: NO TREMOR
ANXIETY: MILDLY ANXIOUS
PAROXYSMAL SWEATS: NO SWEAT VISIBLE
NAUSEA AND VOMITING: NO NAUSEA AND NO VOMITING
HEADACHE, FULLNESS IN HEAD: NOT PRESENT
AUDITORY DISTURBANCES: NOT PRESENT
TOTAL SCORE: 4
HEADACHE, FULLNESS IN HEAD: NOT PRESENT
NAUSEA AND VOMITING: NO NAUSEA AND NO VOMITING
VISUAL DISTURBANCES: NOT PRESENT
HEADACHE, FULLNESS IN HEAD: NOT PRESENT
PAROXYSMAL SWEATS: NO SWEAT VISIBLE
PAROXYSMAL SWEATS: NO SWEAT VISIBLE
ORIENTATION AND CLOUDING OF SENSORIUM: ORIENTED AND CAN DO SERIAL ADDITIONS
AUDITORY DISTURBANCES: NOT PRESENT
VISUAL DISTURBANCES: NOT PRESENT
ANXIETY: NO ANXIETY, AT EASE
AGITATION: NORMAL ACTIVITY
ANXIETY: NO ANXIETY, AT EASE
TREMOR: NO TREMOR
TREMOR: NO TREMOR
AGITATION: NORMAL ACTIVITY
AUDITORY DISTURBANCES: NOT PRESENT
ORIENTATION AND CLOUDING OF SENSORIUM: ORIENTED AND CAN DO SERIAL ADDITIONS
ORIENTATION AND CLOUDING OF SENSORIUM: ORIENTED AND CAN DO SERIAL ADDITIONS

## 2025-02-06 ASSESSMENT — COGNITIVE AND FUNCTIONAL STATUS - GENERAL
DAILY ACTIVITIY SCORE: 24
DAILY ACTIVITIY SCORE: 24
MOBILITY SCORE: 24
MOBILITY SCORE: 24

## 2025-02-06 ASSESSMENT — PAIN SCALES - GENERAL
PAINLEVEL_OUTOF10: 0 - NO PAIN

## 2025-02-06 ASSESSMENT — PAIN - FUNCTIONAL ASSESSMENT
PAIN_FUNCTIONAL_ASSESSMENT: 0-10

## 2025-02-06 NOTE — PROGRESS NOTES
Medicine PA follow up note    Subjective:  Patient lying in bed in no acute distress. Family at bedside. Patient expresses no concerns this am. IV lasix one time today. Coumadin still on hold. IR to do possible thora tomorrow.    Vitals (Last 24 Hours):  Heart Rate:  [74-97]   Temp:  [35.4 °C (95.8 °F)-37.6 °C (99.7 °F)]   Resp:  [16-17]   BP: (106-125)/(60-74)   SpO2:  [92 %-97 %]       I have reviewed all imaging reports and labs pertinent to this visit / presenting problem    PHYSICAL EXAM:  Constitutional: NAD, alert and cooperative  Eyes: no icterus  ENMT: mucous membranes moist, no lesions  Head/Neck: supple  Respiratory/Thorax: CTA bilaterally, non-labored breathing, no cough, on RA  Cardiovascular: RRR, no murmurs heard  Gastrointestinal: abdomen distended and firm, non tender to palpation  : Quintanilla (red blood seen, no clots), no SP/flank discomfort  Musculoskeletal: no joint swelling, ROM intact  Extremities: 1+ pitting  edema  Neurological: non-focal  Skin: warm and dry  Psych: calm, stable mood     MEDS:  Scheduled meds  atorvastatin, 40 mg, oral, Nightly  ciprofloxacin, 500 mg, oral, q12h SUE  dilTIAZem CD, 240 mg, oral, Daily  metoprolol tartrate, 100 mg, oral, BID  polyethylene glycol, 17 g, oral, Daily  tamsulosin, 0.8 mg, oral, Daily        Continuous meds       PRN meds  PRN medications: acetaminophen **OR** acetaminophen **OR** acetaminophen, ondansetron **OR** ondansetron, oxybutynin      ASSESSMENT/PLAN:  Ming Bunch is a 84 y.o. male, with past medical history of hypertension, A-fib on Coumadin, BPH w/LUTS, hematuria recent cystoscopy, Quintanilla catheter placed discharged home on 1/31/2025, presented to [facility] ED on 2/3/2025 for hematuria. Patient states his quintanilla drains bloody urine on and off for couple of weeks. He states his urine was bright red today, prompt him to come to emergency department for evaluation. He endorses pain on urination, states he feels discomfort, when he has urge to  urinate. He was discharged recently with quintanilla on 1/31/25. Daughter Isamar at bedside, states he has been complaining of pain on urination and feels like he is going tp pass out. He has history of Afib, on coumadin.  Urology consulted for further management. Quintanilla was changed in ED. Continuous irrigation quintanilla was ordered per urology. Denies fever, chills, chest pain, palpitations, shortness of breath, and nausea/vomiting/diarrhea.   He recently seen by Dr Rock 2 weeks ago and had cystoscopy for hematuria.   He is a former smoker.      ED Course:   Chest XR reveals Cardiomegaly with likely new small bibasilar pleural effusions.   CMP unremarkable without electrolyte imbalance, renal impairement, PT / INR 39.2/3.4     Sodium 136;  Potassium 3.7;  BUN 12;  Cr 0.65;  EGFR >90  CBC remarkable for anemia  No leukocytosis, or thrombocytopenia   H&H 8.4/26.5; ; WBC 7.8, Iron 25  Urinalysis positive for proteinuria, bacteria, leukocyte esterase, and hematuria  Last cystoscopy on 1/24/25:  No tumors or stones seen.  Prostate is enlarged, mildly obstructive.  No strictures seen.    ED Medications:  Furosemide 40 mg IV X 1   Iron sucrose 200 mg IV       Hematuria  Urinary retention   Acute on chronic anemia   -hematuria with supratherapeutic INR  Seen by urology, was getting, continuous bladder irrigation, clamped, now stopped   -urology reports can remove quintanilla on day of discharge and do TOV  -hgb 8.8 on admission, was 11 3 months ago, normocytic  -Iron 25, ferritin WNL, was given IV iron  -Continue flomax .8 mg daily  -INR: 2.3 today, continue to hold coumadin given possible thora tomorrow   -continue oxybutynin 5 mg BID prn for bladder spasm  -Pharmacy to dose coumadin and manage  -follows with Dr. Rock-> follow on DC as follow up      UTI  -UA with bacteria, leuks, WBC, bacteria -> urine culture growing coagulase negative staph  -No white count   -Continue Ciprofloxacin 500 mg PO BID for UTI     A  fib  Hypertension    -controlled HR ranges 78-99  -continue metoprolol 100 mg BID, cardizem 240 mg daily  -holding coumadin for now, plan for pharmacy to dose when restarted     Liver Cirrhosis  Ascites  Pleural Effusion  -Does endorse ROMAN x several weeks, and worsening distended abd x 1 month  -CXR with cardiomegaly, small new bilateral effusions  -CT A/P with cirrhosis , mild-mod volume ascites with generalized mesenteric edema and bilateral pleural effusion  -RUQ US showing cirrhosis of the liver. Right upper quadrant ascites. Large right pleural effusion.   -  -T bili .5  -echo: EF 62%, similar findings compared to last echo 10/24/23  -IV lasix 40 mg one time, reassess in am  -IR following for possible thora tomorrow, could not be completed today due to INR resulting later and decision to diuresis today     Hyperlipidemia   -continue atorvastatin 40 mg nightly     Other comorbidities as above  -continue medications as ordered and adjust based on clinical course     VTE / GI prophylaxis   -coumadin on hold for now, bowel regimen in place     Discharge planning  -HNN when medically ready    Discussed with Dr. Gee and the interdisciplinary team     Upgraded from obs to inpatient, Dr. Domingo to assume care, notified via epic secure chat     Andree Kerr PA-C

## 2025-02-06 NOTE — PROGRESS NOTES
Ming Bunch is a 84 y.o. male     Patient continues to have a little bit of blood in his Kirby catheter  We discussed patient's diagnosis of cirrhosis and he was surprised by the diagnosis  Was unaware  Admits to drinking about 10 drinks a week  Denies any melena or hematochezia    Review of Systems     Constitutional: no fever, no chills, not feeling poorly, not feeling tired   Cardiovascular: no chest pain   Respiratory: no cough, wheezing or shortness of breath a  Gastrointestinal: no abdominal pain, no constipation, no melena, no nausea, no diarrhea, no vomiting and no blood in stools.   Neurological: no headache,   All other systems have been reviewed and are negative for complaint.       Vitals:    02/06/25 1504   BP: (!) 125/49   Pulse: 109   Resp: 18   Temp:    SpO2: 97%        Scheduled medications  atorvastatin, 40 mg, oral, Nightly  ciprofloxacin, 500 mg, oral, q12h SUE  dilTIAZem CD, 240 mg, oral, Daily  folic acid, 1 mg, oral, Daily  metoprolol tartrate, 100 mg, oral, BID  multivitamin with minerals, 1 tablet, oral, Daily  polyethylene glycol, 17 g, oral, Daily  tamsulosin, 0.8 mg, oral, Daily  [START ON 2/9/2025] thiamine, 100 mg, oral, Daily  thiamine, 100 mg, intravenous, Daily  [Held by provider] warfarin, 2.5 mg, oral, Once per day on Wednesday Thursday  [Held by provider] warfarin, 5 mg, oral, Once per day on Sunday Monday Tuesday Friday Saturday      Continuous medications     PRN medications  PRN medications: acetaminophen **OR** acetaminophen **OR** acetaminophen, LORazepam **OR** LORazepam **OR** LORazepam, ondansetron **OR** ondansetron, oxybutynin    Lab Review   Results from last 7 days   Lab Units 02/06/25 0453 02/05/25 0445 02/04/25  0421   WBC AUTO x10*3/uL 6.7 6.4 7.8   HEMOGLOBIN g/dL 7.7* 7.7* 8.0*   HEMATOCRIT % 25.5* 24.3* 25.5*   PLATELETS AUTO x10*3/uL 202 184 203     Results from last 7 days   Lab Units 02/06/25 0453 02/05/25 0445 02/04/25  0421   SODIUM mmol/L 140 138 138    POTASSIUM mmol/L 3.6 3.5 3.6   CHLORIDE mmol/L 102 103 102   CO2 mmol/L 32 29 28   BUN mg/dL 15 12 11   CREATININE mg/dL 0.72 0.68 0.71   CALCIUM mg/dL 7.9* 8.0* 7.8*   PROTEIN TOTAL g/dL 5.4* 5.5* 5.7*  5.7*   BILIRUBIN TOTAL mg/dL 0.9 0.9 1.1   ALK PHOS U/L 63 63 68   ALT U/L 9* 8* 10   AST U/L 6* 5* 6*   GLUCOSE mg/dL 98 99 104*            XR chest 1 view   Final Result   Redemonstration of small right pleural effusion, without significant   change.        Cardiomegaly, which is unchanged.                  MACRO:   None        Signed by: Mirela Dia 2/6/2025 11:47 AM   Dictation workstation:   [id number]      Transthoracic Echo (TTE) Complete   Final Result      US right upper quadrant   Final Result   Cirrhosis of the liver. Right upper quadrant ascites.        Large right pleural effusion.        Obscuration of the pancreas by bowel gas and body habitus.        MACRO:   None        Signed by: Kenney Samuel 2/4/2025 3:35 PM   Dictation workstation:   [id number]      CT abdomen pelvis wo IV contrast   Final Result   1.  Undulating hepatic contour consistent with cirrhosis along with   mild-to-moderate volume ascites and bilateral pleural effusions.             MACRO:   None        Signed by: Gunnar Goncalves 2/3/2025 10:23 PM   Dictation workstation:   [id number]      XR chest 1 view   Final Result   Cardiomegaly with likely new small bibasilar pleural effusions.   Follow-up is recommended.        MACRO:   None        Signed by: Matt Bowling 2/3/2025 12:24 PM   Dictation workstation:   [id number]      XR chest 2 views    (Results Pending)   US thoracentesis    (Results Pending)   Consult to Interventional Radiology    (Results Pending)         Physical Exam    Constitutional   General appearance: Alert and in no acute distress.   Pulmonary   Respiratory assessment: No respiratory distress, normal respiratory rhythm and effort.    Auscultation of Lungs: Clear bilateral breath sounds.   Cardiovascular    Auscultation of heart: Apical pulse normal, heart rate and rhythm normal, normal S1 and S2, no murmurs and no pericardial rub.    Exam for edema: No peripheral edema.   Abdomen   Abdominal Exam: No bruits, normal bowel sounds, soft, non-tender, no abdominal mass palpated.    Liver and Spleen exam: No hepato-splenomegaly.   Musculoskeletal     Inspection/palpation of joints, bones and muscles: No joint swelling. Normal movement of all extremities.   Neurologic   Cranial nerves: Nerves 2-12 were intact, no focal neuro defects.         Assessment/Plan      #Hematuria  #Urinary tract infection  Hematuria appears to be resolving  Continuing ciprofloxacin    #Cirrhosis of the liver  Likely alcohol induced  Drinks about 10 drinks a week  LFTs are on the low side which might indicate the chronicity of the cirrhosis  Does have ascites present on the ultrasound  We will start [facility] protocol  Consult gastroenterology  Check hepatitis panel    #Atrial fibrillation  On Coumadin which is on hold at the moment    #Hypertension  Low blood pressure readings  Monitor    #Dyslipidemia  On statins at 40 mg every day

## 2025-02-06 NOTE — PROGRESS NOTES
02/06/25 1512   Discharge Planning   Expected Discharge Disposition Home         Patient is diuresing. His INR this morning is 2.0. Plan is for right thoracentesis. He was placed on CIWA precautions. When patient is medically ready will go home with his wife, no needs identified.

## 2025-02-06 NOTE — PROGRESS NOTES
Medicine PA follow up note    Subjective:  Patient lying in bed in no acute distress. Family at bedside. IR plans for paracentesis tomorrow.     Vitals (Last 24 Hours):  Heart Rate:  []   Temp:  [36.6 °C (97.9 °F)-37.6 °C (99.7 °F)]   Resp:  [16-18]   BP: ()/(49-72)   SpO2:  [93 %-97 %]       I have reviewed all imaging reports and labs pertinent to this visit / presenting problem    PHYSICAL EXAM:  Constitutional: NAD, alert and cooperative  Eyes: no icterus  ENMT: mucous membranes moist, no lesions  Head/Neck: supple  Respiratory/Thorax: CTA bilaterally, non-labored breathing, no cough, on RA  Cardiovascular: RRR, no murmurs heard  Gastrointestinal: abdomen distended, firm, nontender to palpation   : Kirby (still has hematuria, no clots) no SP/flank discomfort  Musculoskeletal: no joint swelling, ROM intact  Extremities: 2 + pitting edema  Neurological: non-focal  Skin: warm and dry  Psych: calm, stable mood     MEDS:  Scheduled meds  atorvastatin, 40 mg, oral, Nightly  ciprofloxacin, 500 mg, oral, q12h SUE  dilTIAZem CD, 240 mg, oral, Daily  folic acid, 1 mg, oral, Daily  metoprolol tartrate, 100 mg, oral, BID  multivitamin with minerals, 1 tablet, oral, Daily  polyethylene glycol, 17 g, oral, Daily  tamsulosin, 0.8 mg, oral, Daily  [START ON 2/9/2025] thiamine, 100 mg, oral, Daily  thiamine, 100 mg, intravenous, Daily  [Held by provider] warfarin, 2.5 mg, oral, Once per day on Wednesday Thursday  [Held by provider] warfarin, 5 mg, oral, Once per day on Sunday Monday Tuesday Friday Saturday        Continuous meds       PRN meds  PRN medications: acetaminophen **OR** acetaminophen **OR** acetaminophen, LORazepam **OR** LORazepam **OR** LORazepam, ondansetron **OR** ondansetron, oxybutynin      ASSESSMENT/PLAN:  Ming Bunch is a 84 y.o. male, with past medical history of hypertension, A-fib on Coumadin, BPH w/LUTS, hematuria recent cystoscopy, Kirby catheter placed discharged home on 1/31/2025,  presented to [facility] ED on 2/3/2025 for hematuria. Patient states his quintanilla drains bloody urine on and off for couple of weeks. He states his urine was bright red today, prompt him to come to emergency department for evaluation. He endorses pain on urination, states he feels discomfort, when he has urge to urinate. He was discharged recently with quintanilla on 1/31/25. Daughter Isamar at bedside, states he has been complaining of pain on urination and feels like he is going tp pass out. He has history of Afib, on coumadin.  Urology consulted for further management. Quintanilla was changed in ED. Continuous irrigation quintanilla was ordered per urology. Denies fever, chills, chest pain, palpitations, shortness of breath, and nausea/vomiting/diarrhea.   He recently seen by Dr Rock 2 weeks ago and had cystoscopy for hematuria.   He is a former smoker.      ED Course:   Chest XR reveals Cardiomegaly with likely new small bibasilar pleural effusions.   CMP unremarkable without electrolyte imbalance, renal impairement, PT / INR 39.2/3.4     Sodium 136;  Potassium 3.7;  BUN 12;  Cr 0.65;  EGFR >90  CBC remarkable for anemia  No leukocytosis, or thrombocytopenia   H&H 8.4/26.5; ; WBC 7.8, Iron 25  Urinalysis positive for proteinuria, bacteria, leukocyte esterase, and hematuria  Last cystoscopy on 1/24/25:  No tumors or stones seen.  Prostate is enlarged, mildly obstructive.  No strictures seen.    ED Medications:  Furosemide 40 mg IV X 1   Iron sucrose 200 mg IV       Hematuria  Urinary retention   Acute on chronic anemia   -Seen by urology, was getting, continuous bladder irrigation, clamped, now stopped   -urology reports can remove quintanilla on day of discharge and do TOV  -hgb 8.8 on admission, was 11 3 months ago, normocytic  -Iron 25, ferritin WNL, was given IV iron  -Continue flomax .8 mg daily  -INR: 2.0 today, continue to hold coumadin   -continue oxybutynin 5 mg BID prn for bladder spasm  -follows with Dr. Rock->  follow on DC as follow up      UTI  -UA with bacteria, leuks, WBC, bacteria -> urine culture growing coagulase negative staph  -No white count   -Continue Ciprofloxacin 500 mg PO BID for UTI    BPH w/LUTS  -continue flomax .8 mg daily      A fib  Hypertension    -controlled HR ranges 78-99  -continue metoprolol 100 mg BID, cardizem 240 mg daily  -holding coumadin for now, plan for pharmacy to dose when restarted     Liver Cirrhosis  Ascites  Pleural Effusion  -Does endorse ROMAN x several weeks, and worsening distended abd x 1 month  -CT A/P with cirrhosis , mild-mod volume ascites with generalized mesenteric edema and bilateral pleural effusion  -RUQ US showing cirrhosis of the liver. Right upper quadrant ascites. Large right pleural effusion.   -  -AST/ALT: 6/9, T bili .5  -GI consulted, recommend paracentesis, labs for chronic liver disease/hepatitis, low sodium diet    Alcohol abuse   -pt admits to drinking 10 drinks a week  -CIWA protocol initiated  -continue IV thiamine 100 mg daily x 3 doses then thiamine 100 mg po x 3 doses   -continue folic acid 1 mg daily   -educated both pt and family in alcohol cessation        Right pleural effusion  -possibly 2/2 cirrhosis    -repeat CXR today showing redemonstration of small right pleural effusion, without significant change   -IR completed thora today, 950 mL of clear yellow fluid drained      Hyperlipidemia   -continue atorvastatin 40 mg nightly     Other comorbidities as above  -continue medications as ordered and adjust based on clinical course     VTE / GI prophylaxis   -coumadin on hold, bowel regimen in place     Discharge planning  -HNN when medically ready    Discussed with Dr. Domingo and the interdisciplinary team     Andree Kerr PA-C

## 2025-02-06 NOTE — POST-PROCEDURE NOTE
Interventional Radiology Brief Postprocedure Note    Attending: Ivy Alva CNP    Assistant: None    Diagnosis: Pleural effusion    Description of procedure: Ultrasound guided thoracentesis was preformed on the right.  950mL of clear yellow fluid was drained.      Anesthesia:  Local    Complications: None    Estimated Blood Loss: minimal    Specimens: Yes     See detailed result report with images in PACS.    The patient tolerated the procedure well without incident or complication and is in stable condition.

## 2025-02-06 NOTE — CONSULTS
Reason For Consult  Cirrhosis of the liver, RUQ ascites.    History Of Present Illness  Ming Bunch is a 84 y.o. male with h/o CAD, HTN, A-fib on Coumadin, BPH w/LUTS, hematuria recent cystoscopy, Kirby catheter placed discharged home on 1/31/2025, presented on 2/3/2025 for hematuria. Patient reported hematuria on and off for a couple of weeks, urology was consulted, Kirby catheter was changed in ED, continuous irrigation was ordered by urology.  As a part of workup in ED, patient had CT A/P done.    CT showed nodular hepatic contour consistent with cirrhosis, mild to moderate volume ascites, bilateral pleural effusions.  RUQ U/S showed cirrhosis of the liver, right upper quadrant ascites, large right pleural effusion.  IR consulted for possible thoracentesis.  It was held due to supratherapeutic INR.  GI consulted for cirrhosis.  Patient denies prior history of problem with his liver, however noticed increased abdominal distention and discomfort which is unusual for him.  Denies diarrhea or constipation.  He denies history of blood transfusion, viral hepatitis, IV drug use.  Reports bilateral lower extremities edema.  He drinks approximately 10 drinks a week for years.  He had colonoscopy in the past, no reports available for review.    Past Medical History  He has a past medical history of Personal history of diseases of the blood and blood-forming organs and certain disorders involving the immune mechanism (06/09/2021) and Shortness of breath (02/19/2020).    Surgical History  He has a past surgical history that includes Colonoscopy (06/25/2013); Other surgical history (04/22/2015); Pilonidal cyst drainage (09/11/2013); and Appendectomy (09/11/2013).     Social History  He reports that he has never smoked. He has never used smokeless tobacco. He reports current alcohol use of about 10.0 standard drinks of alcohol per week. He reports that he does not currently use drugs.    Family History  Family History  "  Problem Relation Name Age of Onset    Heart failure Mother      Atrial fibrillation Sister       Denies any known GI malignancies or liver disease in the family    Allergies  Patient has no known allergies.    Review of Systems  10 systms reviewed and negative other than HPI     Physical Exam  Physical Exam  Vitals reviewed.   Constitutional:       Appearance: Normal appearance.   HENT:      Head: Normocephalic and atraumatic.   Eyes:      Conjunctiva/sclera: Conjunctivae normal.   Cardiovascular:      Rate and Rhythm: Normal rate. Rhythm irregular.      Pulses: Normal pulses.      Heart sounds: Normal heart sounds.   Pulmonary:      Effort: Pulmonary effort is normal.      Breath sounds: Normal breath sounds.   Abdominal:      General: Bowel sounds are normal. There is distension.      Palpations: Abdomen is soft.      Tenderness: There is no abdominal tenderness. There is no guarding.   Musculoskeletal:      Right lower leg: Edema present.      Left lower leg: Edema present.   Neurological:      General: No focal deficit present.      Mental Status: He is alert and oriented to person, place, and time.   Psychiatric:         Mood and Affect: Mood normal.         Behavior: Behavior normal.            Last Recorded Vitals  Blood pressure 98/71, pulse 87, temperature 37 °C (98.6 °F), temperature source Skin, resp. rate 18, height 1.753 m (5' 9\"), weight 93 kg (205 lb), SpO2 95%.    Relevant Results      Scheduled medications  atorvastatin, 40 mg, oral, Nightly  ciprofloxacin, 500 mg, oral, q12h USE  dilTIAZem CD, 240 mg, oral, Daily  folic acid, 1 mg, oral, Daily  metoprolol tartrate, 100 mg, oral, BID  multivitamin with minerals, 1 tablet, oral, Daily  polyethylene glycol, 17 g, oral, Daily  tamsulosin, 0.8 mg, oral, Daily  [START ON 2/9/2025] thiamine, 100 mg, oral, Daily  thiamine, 100 mg, intravenous, Daily  [Held by provider] warfarin, 2.5 mg, oral, Once per day on Wednesday Thursday  [Held by provider] " warfarin, 5 mg, oral, Once per day on Sunday Monday Tuesday Friday Saturday      Continuous medications     PRN medications  PRN medications: acetaminophen **OR** acetaminophen **OR** acetaminophen, LORazepam **OR** LORazepam **OR** LORazepam, ondansetron **OR** ondansetron, oxybutynin  XR chest 1 view    Result Date: 2/6/2025  Interpreted By:  Mirela Dia, STUDY: XR CHEST 1 VIEW;  2/6/2025 11:23 am   INDICATION: Signs/Symptoms:evaluate right sided pleural effusion s/p diuresis.   COMPARISON: 02/03/2025   ACCESSION NUMBER(S): [id number]   ORDERING CLINICIAN: ELLIS ABBASI   FINDINGS: The heart is enlarged. Small right pleural effusion is noted which has not significantly changed since the prior exam. No definite pneumothorax is seen. No consolidation is noted.       Redemonstration of small right pleural effusion, without significant change.   Cardiomegaly, which is unchanged.       MACRO: None   Signed by: Mirela Dia 2/6/2025 11:47 AM Dictation workstation:   [id number]    Transthoracic Echo (TTE) Complete    Result Date: 2/4/2025   [facility], [address] [place] [address]              Tel [phone] and Fax [phone] TRANSTHORACIC ECHOCARDIOGRAM REPORT  Patient Name:       EVERETTE Kramer Physician:   [id number] Andrea Torres DO Study Date:         2/4/2025            Ordering Provider:   [id number] KENZIE SMITH MRN/PID:            [id number]            Fellow: Accession#:         [id number]        Nurse: Date of Birth/Age:  1940 / 84      Sonographer:         Selene Kline RDCS                     years Gender assigned at  M                   Additional Staff: Birth: Height:             175.00 cm           Admit Date:          2/4/2025 Weight:             93.00 kg            Admission Status:    Observation -                                                               Priority discharge BSA / BMI:          2.09 m2 / 30.37     Encounter#:          [id number]                     kg/m2 Blood Pressure:     119/72 mmHg         Department Location: [facility]                                                              Invasive Study Type:    TRANSTHORACIC ECHO (TTE) COMPLETE Diagnosis/ICD: Pleural Effusion-J90 Indication:    ascites, new pleural efussion CPT Code:      Echo Complete w Full Doppler-40780 Patient History: Pertinent History: A-Fib and CAD. Aortic root diltatiation. Study Detail: The following Echo studies were performed: 2D, M-Mode, Doppler and               color flow.  PHYSICIAN INTERPRETATION: Left Ventricle: Left ventricular ejection fraction is normal, calculated by Brandon's biplane at 62%. There are no regional left ventricular wall motion abnormalities. The left ventricular cavity size is normal. There is normal septal and mildly increased posterior left ventricular wall thickness. Spectral Doppler shows a Grade III (restrictive pattern) of left ventricular diastolic filling with an elevated left atrial pressure. Left Atrium: The left atrial size is severely dilated. Right Ventricle: The right ventricle is mildly enlarged. There is normal right ventricular global systolic function. Right Atrium: The right atrium is severely dilated. Aortic Valve: The aortic valve is trileaflet. The aortic valve dimensionless index is 0.49. There is moderate aortic valve regurgitation. The peak instantaneous gradient of the aortic valve is 11 mmHg. The mean gradient of the aortic valve is 5 mmHg. Mitral Valve: The mitral valve is normal in structure. There is mild to moderate mitral valve regurgitation which is centrally directed. The mitral regurgitant orifice area is 23 mm2. The mitral regurgitant volume is 32.03 ml. Tricuspid Valve: The tricuspid valve is structurally normal. There is severe tricuspid regurgitation. The tricuspid valve regurgitant  jet is eccentrically directed. The Doppler estimated RVSP is mildly elevated at 40.7 mmHg. Reported right ventricular systolic pressure may be underestimated due to severe tricuspid regurgitation. Pulmonic Valve: The pulmonic valve is structurally normal. There is mild pulmonic valve regurgitation. Pericardium: There is no pericardial effusion noted. Aorta: The aortic root is abnormal. The Ao Sinus is 4.30 cm. The Asc Ao is 3.90 cm. There is mild dilatation of the ascending aorta. There is mild dilatation of the aortic root. Systemic Veins: The inferior vena cava appears dilated. In comparison to the previous echocardiogram(s): Compared with study dated 10/24/2023,. The LVEF remains stable. The Biatrial dilation remains severe. RVSP is likely understimated due to eccentric TR.  CONCLUSIONS:  1. Left ventricular ejection fraction is normal, calculated by Brandon's biplane at 62%.  2. Spectral Doppler shows a Grade III (restrictive pattern) of left ventricular diastolic filling with an elevated left atrial pressure.  3. There is normal right ventricular global systolic function.  4. Mildly enlarged right ventricle.  5. The left atrial size is severely dilated.  6. The right atrium is severely dilated.  7. Mild to moderate mitral valve regurgitation.  8. Mildly elevated right ventricular systolic pressure.  9. Severe tricuspid regurgitation visualized. 10. Moderate aortic valve regurgitation. QUANTITATIVE DATA SUMMARY:  2D MEASUREMENTS:          Normal Ranges: LAs:             5.59 cm  (2.7-4.0cm) IVSd:            0.87 cm  (0.6-1.1cm) LVPWd:           1.09 cm  (0.6-1.1cm) LVIDd:           5.20 cm  (3.9-5.9cm) LVIDs:           3.73 cm LV Mass Index:   90 g/m2 LVEDV Index:     64 ml/m2 LV % FS          28.4 %  LEFT ATRIUM:                  Normal Ranges: LA Vol A4C:        179.5 ml   (22+/-6mL/m2) LA Vol A2C:        203.2 ml LA Vol BP:         201.3 ml LA Vol Index A4C:  86.1 ml/m2 LA Vol Index A2C:  97.4 ml/m2 LA Vol  Index BP:   96.5 ml/m2 LA Area A4C:       43.6 cm2 LA Area A2C:       44.0 cm2 LA Major Axis A4C: 9.0 cm LA Major Axis A2C: 8.1 cm LA Volume Index:   96.6 ml/m2 LA Vol A4C:        172.1 ml LA Vol A2C:        195.7 ml LA Vol Index BSA:  88.2 ml/m2  RIGHT ATRIUM:          Normal Ranges: RA Area A4C:  36.1 cm2  M-MODE MEASUREMENTS:         Normal Ranges: Ao Root:             4.00 cm (2.0-3.7cm) LAs:                 5.95 cm (2.7-4.0cm)  AORTA MEASUREMENTS:         Normal Ranges: Ao Sinus, d:        4.30 cm (2.1-3.5cm) Ao STJ, d:          3.90 cm (1.7-3.4cm) Asc Ao, d:          3.90 cm (2.1-3.4cm)  LV SYSTOLIC FUNCTION:                      Normal Ranges: EF-A4C View:    59 % (>=55%) EF-A2C View:    69 % EF-Biplane:     62 % LV EF Reported: 62 %  LV DIASTOLIC FUNCTION:            Normal Ranges: MV Peak E:             0.89 m/s   (0.7-1.2 m/s) MV Peak A:             0.26 m/s   (0.42-0.7 m/s) E/A Ratio:             3.42       (1.0-2.2) MV e'                  0.110 m/s  (>8.0) MV lateral e'          0.12 m/s MV medial e'           0.10 m/s E/e' Ratio:            8.10       (<8.0) PulmV Sys Jose L:         34.48 cm/s PulmV Riddle Jose L:        80.10 cm/s PulmV S/D Jose L:         0.43 PulmV A Revs Jose L:      13.17 cm/s PulmV A Revs Dur:      53.28 msec  MITRAL VALVE:          Normal Ranges: MV DT:        129 msec (150-240msec)  MITRAL INSUFFICIENCY:             Normal Ranges: PISA Radius:          0.7 cm MR VTI:               138.37 cm MR Vmax:              453.68 cm/s MR Alias Jose L:         30.7 cm/s MR Volume:            32.03 ml MR Flow Rt:           105.02 ml/s MR EROA:              23 mm2  AORTIC VALVE:                      Normal Ranges: AoV Vmax:                1.64 m/s  (<=1.7m/s) AoV Peak PG:             10.7 mmHg (<20mmHg) AoV Mean P.9 mmHg  (1.7-11.5mmHg) LVOT Max Jose L:            0.94 m/s  (<=1.1m/s) AoV VTI:                 32.03 cm  (18-25cm) LVOT VTI:                15.62 cm LVOT Diameter:           2.14 cm    (1.8-2.4cm) AoV Area, VTI:           1.76 cm2  (2.5-5.5cm2) AoV Area,Vmax:           2.07 cm2  (2.5-4.5cm2) AoV Dimensionless Index: 0.49  AORTIC INSUFFICIENCY: AI Vmax:       3.75 m/s AI Half-time:  331 msec AI Decel Time: 1141 msec AI Decel Rate: 330.18 cm/s2  RIGHT VENTRICLE: RV Basal 4.70 cm RV Mid   4.10 cm RV Major 8.9 cm TAPSE:   17.0 mm RV s'    0.07 m/s  TRICUSPID VALVE/RVSP:          Normal Ranges: Peak TR Velocity:     3.07 m/s Est. RA Pressure:     3 mmHg RV Syst Pressure:     41 mmHg  (< 30mmHg) IVC Diam:             2.50 cm  PULMONIC VALVE:          Normal Ranges: PV Accel Time:  95 msec  (>120ms) PV Max Jose L:     0.9 m/s  (0.6-0.9m/s) PV Max PG:      3.0 mmHg  PULMONARY VEINS: PulmV A Revs Dur: 53.28 msec PulmV A Revs Jose L: 13.17 cm/s PulmV Riddle Jose L:   80.10 cm/s PulmV S/D Jose L:    0.43 PulmV Sys Jose L:    34.48 cm/s  [id number] Andrea Torres DO Electronically signed on 2/4/2025 at 4:52:23 PM  ** Final **     US right upper quadrant    Result Date: 2/4/2025  Interpreted By:  Kenzie Samuel, STUDY: US RIGHT UPPER QUADRANT  2/4/2025 3:27 pm   INDICATION: 83 y/o   M with  Signs/Symptoms:Cirhosis, ascites.   COMPARISON: Correlation with CT scan from 02/03/2025.   ACCESSION NUMBER(S): [id number]   ORDERING CLINICIAN: KENZIE SMITH   TECHNIQUE: Routine ultrasound of the right upper quadrant was performed using grayscale imaging, color Doppler, and spectral Doppler.   FINDINGS: LIVER: Craniocaudal length: 14.1cm.  This is  within normal limits. Echogenicity:  Mildly increased and coarsened with lobulated liver margins.. Mass:  None   GALLBLADDER: No shadowing stone, wall thickening, or adjacent edema  . The gallbladder wall thickness is 0.3cm. Sonographic Koenig's sign is negative.   BILE DUCTS: No intrahepatic biliary ductal dilatation. Common bile duct measured 0.25cm   in diameter. This is within the limits of normal.   PANCREAS: The pancreas was obscured by bowel gas and body habitus..   RIGHT KIDNEY:  Craniocaudal length:  11.7 , Within normal limits of size for age. Echogenicity was normal. No hydronephrosis, shadowing stone, or perinephric edema. No gross right renal mass.   PERITONEAL FLUID: Mild right upper quadrant ascites. There is also a large right pleural effusion.         Cirrhosis of the liver. Right upper quadrant ascites.   Large right pleural effusion.   Obscuration of the pancreas by bowel gas and body habitus.   MACRO: None   Signed by: Kenney Samuel 2/4/2025 3:35 PM Dictation workstation:   [id number]    CT abdomen pelvis wo IV contrast    Result Date: 2/3/2025  Interpreted By:  Gunnar Goncalves, STUDY: CT ABDOMEN PELVIS WO IV CONTRAST;  2/3/2025 9:22 pm   INDICATION: Signs/Symptoms:hematuria, abd distension.     COMPARISON: CT 05/16/2023   ACCESSION NUMBER(S): [id number]   ORDERING CLINICIAN: IFRAH SHRESTHA   TECHNIQUE: CT of the abdomen and pelvis was performed. Contiguous axial images were obtained at 3 mm slice thickness through the abdomen and pelvis. Coronal and sagittal reconstructions at 3 mm slice thickness were performed.  No intravenous contrast was administered; positive oral contrast was given.   FINDINGS: Please note that the evaluation of vessels, lymph nodes and organs is limited without intravenous contrast.   LOWER CHEST: Bilateral pleural effusions, right greater than left. Bibasilar compressive atelectasis.   ABDOMEN:   LIVER: The liver demonstrates irregular nodular contour and parenchyma consistent with cirrhosis.   BILE DUCTS: The intrahepatic and extrahepatic ducts are not dilated.   GALLBLADDER: No calcified stones. No wall thickening.   PANCREAS: The pancreas appears unremarkable without evidence of ductal dilatation or masses.   SPLEEN: Within normal limits.   ADRENAL GLANDS: Bilateral adrenal glands are diffusely enlarged without focal nodules or masses.   KIDNEYS AND URETERS: The kidneys are normal in size and unremarkable in appearance.  No hydroureteronephrosis or  nephroureterolithiasis is identified. Left midpole fluid density likely representing a cyst.   PELVIS:   BLADDER: Kirby catheter within a mildly thickened urinary bladder wall.   REPRODUCTIVE ORGANS: No pelvic masses.   BOWEL: Gastric distention with ingested material. The small and large bowel are normal in caliber and demonstrate no wall thickening.  The appendix is not definitely visualized. There is however no pericecal stranding.   VESSELS: Calcifications are seen in a nonaneurysmal aorta.   PERITONEUM/RETROPERITONEUM/LYMPH NODES: Mild-to-moderate volume ascites with generalized mesenteric edema. No enlarged mesenteric lymph nodes.   ABDOMINAL WALL: The abdominal wall soft tissues appear normal.   BONES: No suspicious osseous lesions are identified. Degenerative discogenic disease is noted in the lower thoracic and lumbar spine.       1.  Undulating hepatic contour consistent with cirrhosis along with mild-to-moderate volume ascites and bilateral pleural effusions.     MACRO: None   Signed by: Gunnar Goncalves 2/3/2025 10:23 PM Dictation workstation:   [id number]    XR chest 1 view    Result Date: 2/3/2025  Interpreted By:  Matt Bowling, STUDY: XR CHEST 1 VIEW;  2/3/2025 12:07 pm   INDICATION: Signs/Symptoms:sob.     COMPARISON: 02/02/2020   ACCESSION NUMBER(S): [id number]   ORDERING CLINICIAN: SARANYA COOK   FINDINGS: AP portable view of the chest is obtained.  Limited exam due to portable nature. Magnified cardiac silhouette. Bibasilar blunting of both costophrenic angles may be due to pleural effusions and new since the prior exam. No pneumothorax..       Cardiomegaly with likely new small bibasilar pleural effusions. Follow-up is recommended.   MACRO: None   Signed by: Matt Bowling 2/3/2025 12:24 PM Dictation workstation:   [id number]    ECG 12 lead    Result Date: 2/3/2025  Atrial fibrillation Low voltage QRS Inferior infarct , age undetermined Cannot rule out Anterior infarct , age undetermined  Abnormal ECG When compared with ECG of 03-FEB-2020 12:30, Questionable change in QRS axis   Results for orders placed or performed during the hospital encounter of 02/03/25 (from the past 24 hours)   Comprehensive metabolic panel   Result Value Ref Range    Glucose 98 74 - 99 mg/dL    Sodium 140 136 - 145 mmol/L    Potassium 3.6 3.5 - 5.3 mmol/L    Chloride 102 98 - 107 mmol/L    Bicarbonate 32 21 - 32 mmol/L    Anion Gap 10 10 - 20 mmol/L    Urea Nitrogen 15 6 - 23 mg/dL    Creatinine 0.72 0.50 - 1.30 mg/dL    eGFR 90 >60 mL/min/1.73m*2    Calcium 7.9 (L) 8.6 - 10.3 mg/dL    Albumin 3.1 (L) 3.4 - 5.0 g/dL    Alkaline Phosphatase 63 33 - 136 U/L    Total Protein 5.4 (L) 6.4 - 8.2 g/dL    AST 6 (L) 9 - 39 U/L    Bilirubin, Total 0.9 0.0 - 1.2 mg/dL    ALT 9 (L) 10 - 52 U/L   CBC   Result Value Ref Range    WBC 6.7 4.4 - 11.3 x10*3/uL    nRBC 0.0 0.0 - 0.0 /100 WBCs    RBC 2.80 (L) 4.50 - 5.90 x10*6/uL    Hemoglobin 7.7 (L) 13.5 - 17.5 g/dL    Hematocrit 25.5 (L) 41.0 - 52.0 %    MCV 91 80 - 100 fL    MCH 27.5 26.0 - 34.0 pg    MCHC 30.2 (L) 32.0 - 36.0 g/dL    RDW 14.8 (H) 11.5 - 14.5 %    Platelets 202 150 - 450 x10*3/uL   Magnesium   Result Value Ref Range    Magnesium 1.66 1.60 - 2.40 mg/dL   Protime-INR   Result Value Ref Range    Protime 23.2 (H) 9.8 - 12.8 seconds    INR 2.0 (H) 0.9 - 1.1     *Note: Due to a large number of results and/or encounters for the requested time period, some results have not been displayed. A complete set of results can be found in Results Review.          Assessment/Plan     84 y.o. male with h/o CAD, HTN, A-fib on Coumadin, BPH w/LUTS, hematuria recent cystoscopy, Kirby catheter placed discharged home on 1/31/2025, presented on with c/o.  Imaging during this admission showed nodular liver consistent with cirrhosis, right upper quadrant ascites, right pleural effusion.  GI consulted for cirrhosis.  Patient with worsening of abdominal distention and bilateral lower extremities edema  for the past couple of weeks.  He has history of chronic alcohol use for years.    Nodular liver and ascites, likely cirrhosis with possible etiologies is likely chronic alcohol use, hepatic steatosis, could be ARCOS.    -Recommend paracentesis, will send fluid for analysis, SAAG and cytology  -Labs for chronic liver disease and hepatitis  -Low-sodium diet  -Recommend to monitor LFTs and coags daily while in the hospital  -Patient advised to discontinue alcohol use      MENDEZ Fontenot-CNP  I saw and evaluated the patient. I personally obtained the key and critical portions of the history and physical exam or was physically present for key and critical portions performed by the NP. I reviewed the NP's documentation and discussed the patient with the NP. I agree with the NP's medical decision making as documented in the note.

## 2025-02-06 NOTE — PROGRESS NOTES
Ming Bunch is a 84 y.o. male admitted for hematuria. Pharmacy has been consulted for warfarin dosing and monitoring for Atrial Fibrillation/Atrial Flutter with goal INR of 2.0-3.0.     Home regimen   MON TUE WED THR FRI SAT SUN   Dose 5  mg 5  mg 2.5  mg 2.5  mg 5  mg 5  mg 5  mg   Total weekly dose: 30 mg  Source: Pt interview by med rec tech    Labs  INR: 2.0  Hgb/Hct/Plt  Lab Results   Component Value Date    HGB 7.7 (L) 02/06/2025    HGB 7.7 (L) 02/05/2025    HGB 8.0 (L) 02/04/2025    HGB 8.4 (L) 02/03/2025    HGB 8.8 (L) 01/31/2025        Lab Results   Component Value Date    HCT 25.5 (L) 02/06/2025    HCT 24.3 (L) 02/05/2025    HCT 25.5 (L) 02/04/2025    HCT 26.5 (L) 02/03/2025    HCT 28.1 (L) 01/31/2025        Platelets   Date Value Ref Range Status   02/06/2025 202 150 - 450 x10*3/uL Final   02/05/2025 184 150 - 450 x10*3/uL Final   02/04/2025 203 150 - 450 x10*3/uL Final   02/03/2025 205 150 - 450 x10*3/uL Final   01/31/2025 218 150 - 450 x10*3/uL Final      Warfarin Therapy   Current regimen: resuming home reigmen  Bridging: None needed  Interacting medications: no interacting medications    Dosing History During Current Admission  Date 2/3 2/4 2/5 2/6   INR 3.4 3.4 2.3 2.0   Dose  (mg) Hold dose Hold Hold Hold     Assessment and Plan  Patient's INR of 2.0 today is Therapeutic. Hemoglobin/hematocrit/platelet stable but low.  Warfarin inpatient plan: hold dose today - plans to get thoracentesis in late afternoon today per IR and PA.  Monitor s/sx of bleeding including epistaxis, hematuria, unusual bruising, hemoptysis, hematochezia as well as s/sx of stroke including impaired speech, unilateral paralysis, blurry vision.  Pharmacy will continue to monitor the patient and adjust therapy as needed.    Thank you for the consult. Please do not hesitate to contact a pharmacist with any questions.    Stella Romero, EmmanuelleD

## 2025-02-06 NOTE — CARE PLAN
Problem: Pain - Adult  Goal: Verbalizes/displays adequate comfort level or baseline comfort level  Outcome: Progressing     Problem: Safety - Adult  Goal: Free from fall injury  Outcome: Progressing     Problem: Chronic Conditions and Co-morbidities  Goal: Patient's chronic conditions and co-morbidity symptoms are monitored and maintained or improved  Outcome: Progressing     Problem: Nutrition  Goal: Nutrient intake appropriate for maintaining nutritional needs  Outcome: Progressing   The patient's goals for the shift include      The clinical goals for the shift include pt will remain safe through end of shift    Over the shift, the patient did not make progress toward the following goals. Barriers to progression include . Recommendations to address these barriers include .

## 2025-02-07 ENCOUNTER — APPOINTMENT (OUTPATIENT)
Dept: RADIOLOGY | Facility: HOSPITAL | Age: 85
End: 2025-02-07
Payer: MEDICARE

## 2025-02-07 LAB
ALBUMIN FLD-MCNC: 2.1 G/DL
ALBUMIN SERPL BCP-MCNC: 3 G/DL (ref 3.4–5)
ALP SERPL-CCNC: 60 U/L (ref 33–136)
ALT SERPL W P-5'-P-CCNC: 9 U/L (ref 10–52)
ANA PATTERN: ABNORMAL
ANA SER QL HEP2 SUBST: POSITIVE
ANA TITR SER IF: ABNORMAL {TITER}
ANION GAP SERPL CALC-SCNC: 9 MMOL/L (ref 10–20)
AST SERPL W P-5'-P-CCNC: 5 U/L (ref 9–39)
BILIRUB SERPL-MCNC: 1 MG/DL (ref 0–1.2)
BUN SERPL-MCNC: 16 MG/DL (ref 6–23)
CALCIUM SERPL-MCNC: 7.9 MG/DL (ref 8.6–10.3)
CENTROMERE B AB SER-ACNC: <0.2 AI
CHLORIDE SERPL-SCNC: 104 MMOL/L (ref 98–107)
CHROMATIN AB SERPL-ACNC: <0.2 AI
CLARITY FLD: ABNORMAL
CO2 SERPL-SCNC: 28 MMOL/L (ref 21–32)
COLOR FLD: ABNORMAL
CREAT SERPL-MCNC: 0.65 MG/DL (ref 0.5–1.3)
DSDNA AB SER-ACNC: <1 IU/ML
EGFRCR SERPLBLD CKD-EPI 2021: >90 ML/MIN/1.73M*2
ENA JO1 AB SER QL IA: <0.2 AI
ENA RNP AB SER IA-ACNC: 0.4 AI
ENA SCL70 AB SER QL IA: 0.2 AI
ENA SM AB SER IA-ACNC: <0.2 AI
ENA SM+RNP AB SER QL IA: <0.2 AI
ENA SS-A AB SER IA-ACNC: <0.2 AI
ENA SS-B AB SER IA-ACNC: <0.2 AI
ERYTHROCYTE [DISTWIDTH] IN BLOOD BY AUTOMATED COUNT: 15.4 % (ref 11.5–14.5)
FUNGUS SPEC CULT: NORMAL
FUNGUS SPEC FUNGUS STN: NORMAL
GLUCOSE SERPL-MCNC: 97 MG/DL (ref 74–99)
HCT VFR BLD AUTO: 25 % (ref 41–52)
HGB BLD-MCNC: 7.8 G/DL (ref 13.5–17.5)
HOLD SPECIMEN: NORMAL
INR PPP: 1.8 (ref 0.9–1.1)
MAGNESIUM SERPL-MCNC: 1.66 MG/DL (ref 1.6–2.4)
MCH RBC QN AUTO: 28.6 PG (ref 26–34)
MCHC RBC AUTO-ENTMCNC: 31.2 G/DL (ref 32–36)
MCV RBC AUTO: 92 FL (ref 80–100)
NRBC BLD-RTO: 0 /100 WBCS (ref 0–0)
PH FLD: 7.24 [PH]
PLATELET # BLD AUTO: 191 X10*3/UL (ref 150–450)
POTASSIUM SERPL-SCNC: 3.2 MMOL/L (ref 3.5–5.3)
PROT FLD-MCNC: 3.3 G/DL
PROT SERPL-MCNC: 5.5 G/DL (ref 6.4–8.2)
PROTHROMBIN TIME: 20.3 SECONDS (ref 9.8–12.8)
RBC # BLD AUTO: 2.73 X10*6/UL (ref 4.5–5.9)
RBC # FLD MANUAL: 0.06 /UL
RIBOSOMAL P AB SER-ACNC: <0.2 AI
SODIUM SERPL-SCNC: 138 MMOL/L (ref 136–145)
TOTAL CELLS COUNTED PRT: 100
WBC # BLD AUTO: 6.4 X10*3/UL (ref 4.4–11.3)
WBC # FLD MANUAL: 1 /UL

## 2025-02-07 PROCEDURE — 2060000001 HC INTERMEDIATE ICU ROOM DAILY

## 2025-02-07 PROCEDURE — 89050 BODY FLUID CELL COUNT: CPT | Performed by: NURSE PRACTITIONER

## 2025-02-07 PROCEDURE — 85610 PROTHROMBIN TIME: CPT

## 2025-02-07 PROCEDURE — 2500000002 HC RX 250 W HCPCS SELF ADMINISTERED DRUGS (ALT 637 FOR MEDICARE OP, ALT 636 FOR OP/ED): Performed by: INTERNAL MEDICINE

## 2025-02-07 PROCEDURE — 99232 SBSQ HOSP IP/OBS MODERATE 35: CPT | Performed by: INTERNAL MEDICINE

## 2025-02-07 PROCEDURE — 89051 BODY FLUID CELL COUNT: CPT | Performed by: NURSE PRACTITIONER

## 2025-02-07 PROCEDURE — 2500000001 HC RX 250 WO HCPCS SELF ADMINISTERED DRUGS (ALT 637 FOR MEDICARE OP)

## 2025-02-07 PROCEDURE — 80053 COMPREHEN METABOLIC PANEL: CPT

## 2025-02-07 PROCEDURE — 88112 CYTOPATH CELL ENHANCE TECH: CPT | Performed by: PATHOLOGY

## 2025-02-07 PROCEDURE — 84157 ASSAY OF PROTEIN OTHER: CPT | Mod: AHULAB | Performed by: NURSE PRACTITIONER

## 2025-02-07 PROCEDURE — 2500000004 HC RX 250 GENERAL PHARMACY W/ HCPCS (ALT 636 FOR OP/ED)

## 2025-02-07 PROCEDURE — 85027 COMPLETE CBC AUTOMATED: CPT

## 2025-02-07 PROCEDURE — 2500000004 HC RX 250 GENERAL PHARMACY W/ HCPCS (ALT 636 FOR OP/ED): Performed by: NURSE PRACTITIONER

## 2025-02-07 PROCEDURE — 83735 ASSAY OF MAGNESIUM: CPT

## 2025-02-07 PROCEDURE — 2500000002 HC RX 250 W HCPCS SELF ADMINISTERED DRUGS (ALT 637 FOR MEDICARE OP, ALT 636 FOR OP/ED)

## 2025-02-07 PROCEDURE — 88112 CYTOPATH CELL ENHANCE TECH: CPT | Mod: TC | Performed by: NURSE PRACTITIONER

## 2025-02-07 PROCEDURE — 2500000001 HC RX 250 WO HCPCS SELF ADMINISTERED DRUGS (ALT 637 FOR MEDICARE OP): Performed by: INTERNAL MEDICINE

## 2025-02-07 PROCEDURE — 88305 TISSUE EXAM BY PATHOLOGIST: CPT | Performed by: PATHOLOGY

## 2025-02-07 PROCEDURE — 2720000007 HC OR 272 NO HCPCS: Performed by: NURSE PRACTITIONER

## 2025-02-07 PROCEDURE — 87070 CULTURE OTHR SPECIMN AEROBIC: CPT | Mod: AHULAB | Performed by: NURSE PRACTITIONER

## 2025-02-07 PROCEDURE — C1729 CATH, DRAINAGE: HCPCS | Performed by: NURSE PRACTITIONER

## 2025-02-07 PROCEDURE — 36415 COLL VENOUS BLD VENIPUNCTURE: CPT

## 2025-02-07 PROCEDURE — 49083 ABD PARACENTESIS W/IMAGING: CPT

## 2025-02-07 PROCEDURE — 82042 OTHER SOURCE ALBUMIN QUAN EA: CPT | Mod: AHULAB | Performed by: NURSE PRACTITIONER

## 2025-02-07 RX ORDER — LIDOCAINE HYDROCHLORIDE 10 MG/ML
INJECTION, SOLUTION EPIDURAL; INFILTRATION; INTRACAUDAL; PERINEURAL
Status: COMPLETED | OUTPATIENT
Start: 2025-02-07 | End: 2025-02-07

## 2025-02-07 RX ORDER — POTASSIUM CHLORIDE 20 MEQ/1
20 TABLET, EXTENDED RELEASE ORAL ONCE
Status: COMPLETED | OUTPATIENT
Start: 2025-02-07 | End: 2025-02-07

## 2025-02-07 RX ADMIN — CIPROFLOXACIN 500 MG: 500 TABLET ORAL at 10:49

## 2025-02-07 RX ADMIN — TAMSULOSIN HYDROCHLORIDE 0.8 MG: 0.4 CAPSULE ORAL at 10:49

## 2025-02-07 RX ADMIN — LIDOCAINE HYDROCHLORIDE 10 ML: 10 INJECTION, SOLUTION EPIDURAL; INFILTRATION; INTRACAUDAL; PERINEURAL at 10:05

## 2025-02-07 RX ADMIN — POLYETHYLENE GLYCOL 3350 17 G: 17 POWDER, FOR SOLUTION ORAL at 10:50

## 2025-02-07 RX ADMIN — CIPROFLOXACIN 500 MG: 500 TABLET ORAL at 20:03

## 2025-02-07 RX ADMIN — FOLIC ACID 1 MG: 1 TABLET ORAL at 10:51

## 2025-02-07 RX ADMIN — ATORVASTATIN CALCIUM 40 MG: 40 TABLET, FILM COATED ORAL at 20:03

## 2025-02-07 RX ADMIN — METOPROLOL TARTRATE 100 MG: 50 TABLET, FILM COATED ORAL at 10:48

## 2025-02-07 RX ADMIN — THIAMINE HYDROCHLORIDE 100 MG: 100 INJECTION, SOLUTION INTRAMUSCULAR; INTRAVENOUS at 10:50

## 2025-02-07 RX ADMIN — DILTIAZEM HYDROCHLORIDE 240 MG: 120 CAPSULE, COATED, EXTENDED RELEASE ORAL at 10:47

## 2025-02-07 RX ADMIN — POTASSIUM CHLORIDE 20 MEQ: 1500 TABLET, EXTENDED RELEASE ORAL at 10:53

## 2025-02-07 RX ADMIN — MULTIPLE VITAMINS W/ MINERALS TAB 1 TABLET: TAB ORAL at 10:51

## 2025-02-07 RX ADMIN — METOPROLOL TARTRATE 100 MG: 50 TABLET, FILM COATED ORAL at 20:03

## 2025-02-07 ASSESSMENT — LIFESTYLE VARIABLES
ORIENTATION AND CLOUDING OF SENSORIUM: ORIENTED AND CAN DO SERIAL ADDITIONS
HEADACHE, FULLNESS IN HEAD: NOT PRESENT
HEADACHE, FULLNESS IN HEAD: NOT PRESENT
PAROXYSMAL SWEATS: NO SWEAT VISIBLE
TOTAL SCORE: 0
ORIENTATION AND CLOUDING OF SENSORIUM: ORIENTED AND CAN DO SERIAL ADDITIONS
ANXIETY: NO ANXIETY, AT EASE
PULSE: 87
VISUAL DISTURBANCES: NOT PRESENT
AGITATION: NORMAL ACTIVITY
TREMOR: NO TREMOR
VISUAL DISTURBANCES: NOT PRESENT
TREMOR: NO TREMOR
ORIENTATION AND CLOUDING OF SENSORIUM: ORIENTED AND CAN DO SERIAL ADDITIONS
PAROXYSMAL SWEATS: NO SWEAT VISIBLE
PULSE: 87
PAROXYSMAL SWEATS: NO SWEAT VISIBLE
AGITATION: NORMAL ACTIVITY
VISUAL DISTURBANCES: NOT PRESENT
VISUAL DISTURBANCES: NOT PRESENT
ANXIETY: NO ANXIETY, AT EASE
NAUSEA AND VOMITING: NO NAUSEA AND NO VOMITING
TOTAL SCORE: 0
ANXIETY: NO ANXIETY, AT EASE
HEADACHE, FULLNESS IN HEAD: NOT PRESENT
AUDITORY DISTURBANCES: NOT PRESENT
TOTAL SCORE: 0
AGITATION: NORMAL ACTIVITY
TREMOR: NO TREMOR
ANXIETY: NO ANXIETY, AT EASE
AUDITORY DISTURBANCES: NOT PRESENT
VISUAL DISTURBANCES: NOT PRESENT
HEADACHE, FULLNESS IN HEAD: NOT PRESENT
NAUSEA AND VOMITING: NO NAUSEA AND NO VOMITING
AUDITORY DISTURBANCES: NOT PRESENT
NAUSEA AND VOMITING: NO NAUSEA AND NO VOMITING
ORIENTATION AND CLOUDING OF SENSORIUM: ORIENTED AND CAN DO SERIAL ADDITIONS
NAUSEA AND VOMITING: NO NAUSEA AND NO VOMITING
TREMOR: NO TREMOR
PAROXYSMAL SWEATS: NO SWEAT VISIBLE
AGITATION: NORMAL ACTIVITY
AUDITORY DISTURBANCES: NOT PRESENT
AGITATION: NORMAL ACTIVITY
HEADACHE, FULLNESS IN HEAD: NOT PRESENT
TREMOR: NO TREMOR
ORIENTATION AND CLOUDING OF SENSORIUM: ORIENTED AND CAN DO SERIAL ADDITIONS
TOTAL SCORE: 0
AUDITORY DISTURBANCES: NOT PRESENT
NAUSEA AND VOMITING: NO NAUSEA AND NO VOMITING
ANXIETY: NO ANXIETY, AT EASE
PAROXYSMAL SWEATS: NO SWEAT VISIBLE
PULSE: 73

## 2025-02-07 ASSESSMENT — PAIN - FUNCTIONAL ASSESSMENT
PAIN_FUNCTIONAL_ASSESSMENT: 0-10

## 2025-02-07 ASSESSMENT — PAIN SCALES - GENERAL
PAINLEVEL_OUTOF10: 0 - NO PAIN

## 2025-02-07 ASSESSMENT — COGNITIVE AND FUNCTIONAL STATUS - GENERAL
DAILY ACTIVITIY SCORE: 24
MOBILITY SCORE: 24
DAILY ACTIVITIY SCORE: 24
MOBILITY SCORE: 24

## 2025-02-07 NOTE — POST-PROCEDURE NOTE
Interventional Radiology Brief Postprocedure Note    Attending: Ivy Alva CNP    Assistant: none    Diagnosis: Ascites; cirrhosis    Description of procedure: Ultrasound guided paracentesis was preformed in the RLQ. A total of 1L of  serosanguineous   fluid was drained.      Anesthesia:  Local    Complications: None    Estimated Blood Loss: minimal    Specimens: Yes    See detailed result report with images in PACS.    The patient tolerated the procedure well without incident or complication and is in stable condition.

## 2025-02-07 NOTE — PROGRESS NOTES
02/07/25 1548   Discharge Planning   Expected Discharge Disposition Home         Patient had right thoracentesis done yesterday with 950 ml's of fluid taken out and he had one done today with 1L taken out. Fluid samples sent to lab. He is diuresing. Patient has chronic Kirby. Plan is to go home with his wife when he is medically cleared, no needs identified.

## 2025-02-07 NOTE — PROGRESS NOTES
Ming Bunch is a 84 y.o. male     The urine still looks bloody in the Kirby catheter  Had a liter of ascitic fluid removed  Fluid analysis pending  Continue to hold Coumadin today    Review of Systems     Constitutional: no fever, no chills, not feeling poorly, not feeling tired   Cardiovascular: no chest pain   Respiratory: no cough, wheezing or shortness of breath a  Gastrointestinal: no abdominal pain, no constipation, no melena, no nausea, no diarrhea, no vomiting and no blood in stools.   Neurological: no headache,   All other systems have been reviewed and are negative for complaint.       Vitals:    02/07/25 1540   BP: 119/72   Pulse:    Resp: 16   Temp: 37.2 °C (98.9 °F)   SpO2: 95%        Scheduled medications  atorvastatin, 40 mg, oral, Nightly  ciprofloxacin, 500 mg, oral, q12h SUE  dilTIAZem CD, 240 mg, oral, Daily  folic acid, 1 mg, oral, Daily  metoprolol tartrate, 100 mg, oral, BID  multivitamin with minerals, 1 tablet, oral, Daily  polyethylene glycol, 17 g, oral, Daily  tamsulosin, 0.8 mg, oral, Daily  [START ON 2/9/2025] thiamine, 100 mg, oral, Daily  thiamine, 100 mg, intravenous, Daily  [Held by provider] warfarin, 2.5 mg, oral, Once per day on Wednesday Thursday  [Held by provider] warfarin, 5 mg, oral, Once per day on Sunday Monday Tuesday Friday Saturday      Continuous medications     PRN medications  PRN medications: acetaminophen **OR** acetaminophen **OR** acetaminophen, LORazepam **OR** LORazepam **OR** LORazepam, ondansetron **OR** ondansetron, oxybutynin    Lab Review   Results from last 7 days   Lab Units 02/07/25 0522 02/06/25 0453 02/05/25  0445   WBC AUTO x10*3/uL 6.4 6.7 6.4   HEMOGLOBIN g/dL 7.8* 7.7* 7.7*   HEMATOCRIT % 25.0* 25.5* 24.3*   PLATELETS AUTO x10*3/uL 191 202 184     Results from last 7 days   Lab Units 02/07/25 0522 02/06/25 0453 02/05/25  0445   SODIUM mmol/L 138 140 138   POTASSIUM mmol/L 3.2* 3.6 3.5   CHLORIDE mmol/L 104 102 103   CO2 mmol/L 28 32 29   BUN  mg/dL 16 15 12   CREATININE mg/dL 0.65 0.72 0.68   CALCIUM mg/dL 7.9* 7.9* 8.0*   PROTEIN TOTAL g/dL 5.5* 5.4* 5.5*   BILIRUBIN TOTAL mg/dL 1.0 0.9 0.9   ALK PHOS U/L 60 63 63   ALT U/L 9* 9* 8*   AST U/L 5* 6* 5*   GLUCOSE mg/dL 97 98 99            US guided abdominal paracentesis   Final Result   Uneventful paracentesis, as detailed above. Right Hemiabdomen, 1 L        Performed and dictated at [facility].        Signed by: Ivy Alva 2/7/2025 1:59 PM   Dictation workstation:   [id number]      XR chest 2 views   Final Result   Cardiomegaly with mild basilar edema again noted. The previous right   pleural effusion decreased in size without pneumothorax.        Signed by: Yonas Sullivan 2/6/2025 6:18 PM   Dictation workstation:   [id number]      US thoracentesis   Final Result   Uneventful thoracentesis, as detailed above: Right Pleural space, 950   mL        Performed and dictated at [facility].        Signed by: Ivy Alva 2/7/2025 1:58 PM   Dictation workstation:   [id number]      XR chest 1 view   Final Result   Redemonstration of small right pleural effusion, without significant   change.        Cardiomegaly, which is unchanged.                  MACRO:   None        Signed by: Mirela Dia 2/6/2025 11:47 AM   Dictation workstation:   [id number]      Transthoracic Echo (TTE) Complete   Final Result      US right upper quadrant   Final Result   Cirrhosis of the liver. Right upper quadrant ascites.        Large right pleural effusion.        Obscuration of the pancreas by bowel gas and body habitus.        MACRO:   None        Signed by: Kenney Samuel 2/4/2025 3:35 PM   Dictation workstation:   [id number]      CT abdomen pelvis wo IV contrast   Final Result   1.  Undulating hepatic contour consistent with cirrhosis along with   mild-to-moderate volume ascites and bilateral pleural effusions.             MACRO:   None         Signed by: Gunnar Goncalves 2/3/2025 10:23 PM   Dictation workstation:   [id number]      XR chest 1 view   Final Result   Cardiomegaly with likely new small bibasilar pleural effusions.   Follow-up is recommended.        MACRO:   None        Signed by: Matt Bowling 2/3/2025 12:24 PM   Dictation workstation:   [id number]            Physical Exam    Constitutional   General appearance: Alert and in no acute distress.   Pulmonary   Respiratory assessment: No respiratory distress, normal respiratory rhythm and effort.    Auscultation of Lungs: Clear bilateral breath sounds.   Cardiovascular   Auscultation of heart: Apical pulse normal, heart rate and rhythm normal, normal S1 and S2, no murmurs and no pericardial rub.    Exam for edema: No peripheral edema.   Abdomen   Abdominal Exam: No bruits, normal bowel sounds, soft, non-tender, no abdominal mass palpated.    Liver and Spleen exam: No hepato-splenomegaly.   Musculoskeletal     Inspection/palpation of joints, bones and muscles: No joint swelling. Normal movement of all extremities.   Neurologic   Cranial nerves: Nerves 2-12 were intact, no focal neuro defects.         Assessment/Plan      #Hematuria  #Urinary tract infection  Still has blood in the Kirby  Continuing ciprofloxacin    #Cirrhosis of the liver  Hepatitis panel noted  Paracentesis done  Fluid cytology and lites pending    #Atrial fibrillation  On Coumadin which is on hold at the moment    #Hypertension  Low blood pressure readings  Monitor    #Dyslipidemia  On statins at 40 mg every day

## 2025-02-07 NOTE — CARE PLAN
"The patient's goals for the shift include \"get out of here!\"    The clinical goals for the shift include decreased hematuria by end of shift      "

## 2025-02-07 NOTE — CARE PLAN
Problem: Pain - Adult  Goal: Verbalizes/displays adequate comfort level or baseline comfort level  Outcome: Progressing     Problem: Safety - Adult  Goal: Free from fall injury  Outcome: Progressing     Problem: Discharge Planning  Goal: Discharge to home or other facility with appropriate resources  Outcome: Progressing     Problem: Chronic Conditions and Co-morbidities  Goal: Patient's chronic conditions and co-morbidity symptoms are monitored and maintained or improved  Outcome: Progressing     Problem: Nutrition  Goal: Nutrient intake appropriate for maintaining nutritional needs  Outcome: Progressing   The patient's goals for the shift include      The clinical goals for the shift include pt will remain safe through end of shift

## 2025-02-07 NOTE — PROGRESS NOTES
GI Daily Progress Note    Assessment/Plan:    Assessment & Plan  Hematuria    Acute on chronic combined systolic and diastolic congestive heart failure    84 y.o. male with h/o CAD, HTN, A-fib on Coumadin, BPH w/LUTS, hematuria recent cystoscopy, Kirby catheter , chronic alcohol use, presented with hematuria.  Imaging during this admission consistent with cirrhosis, right upper quadrant ascites, right pleural effusion.  GI consulted for cirrhosis. No evidence of SBP per cellcount, SAAG 0.9, not consistent with portal hypertension     Nodular liver and ascites, likely cirrhosis, likely due to chronic alcohol use. SAAG not consistent with portal HTN     -Recommend paracentesis, will send fluid for analysis, SAAG and cytology  -Labs for chronic liver disease and hepatitis  -Low-sodium diet  -consider low dose of diuretics  -Recommend to monitor LFTs and coags daily while in the hospital  -Patient advised to discontinue alcohol use  -Would need outpatient follow-up with hepatology,, referral placed.  GI will sign off, please call if any questions or further assistance needed.      I saw and evaluated the patient. I personally obtained the key and critical portions of the history and physical exam or was physically present for key and critical portions performed by the NP. I reviewed the NP's documentation and discussed the patient with the NP. I agree with the NP's medical decision making as documented in the note.     LOS: 2 days     Ming Bunch is a 84 y.o. male who was admitted with Hematuria. He reports his symptoms are unchanged. Status post 1 L paracentesis, serosanguineous fluid.    Subjective:    Patient expresses no additional complaints  Patient denies abdominal pain, nausea, vomiting    Objective:    Vital signs in last 24 hours:  Temp:  [36.9 °C (98.5 °F)-37.5 °C (99.5 °F)] 36.9 °C (98.5 °F)  Heart Rate:  [] 97  Resp:  [16-20] 18  BP: (109-133)/(49-72) 119/60    Intake/Output last 3 shifts:  I/O  last 3 completed shifts:  In: 240 (2.6 mL/kg) [P.O.:240]  Out: 920 (9.9 mL/kg) [Urine:920 (0.3 mL/kg/hr)]  Weight: 93 kg   Intake/Output this shift:  No intake/output data recorded.    Physical Exam  Constitutional:       Appearance: Normal appearance.   HENT:      Head: Atraumatic.   Cardiovascular:      Rate and Rhythm: Normal rate and regular rhythm.      Heart sounds: Normal heart sounds.   Pulmonary:      Effort: Pulmonary effort is normal.      Breath sounds: Normal breath sounds.   Abdominal:      General: Bowel sounds are normal. There is no distension.      Palpations: Abdomen is soft.      Tenderness: There is no abdominal tenderness. There is no guarding.   Skin:     General: Skin is warm and dry.   Neurological:      General: No focal deficit present.      Mental Status: He is alert and oriented to person, place, and time.          Results for orders placed or performed during the hospital encounter of 02/03/25 (from the past 24 hours)   Lactate Dehydrogenase, Body Fluid   Result Value Ref Range    LD, Fluid 67 Not established. U/L   Protein, Total, Body Fluid   Result Value Ref Range    Protein, Total Fluid 2.1 Not established g/dL   Body Fluid Cell Count   Result Value Ref Range    Color, Fluid Sara (A) Colorless, Straw, Yellow    Clarity, Fluid Cloudy (A) Clear    WBC, Fluid 588 See Comment /uL    RBC, Fluid 8,000 0  /uL /uL   Body Fluid Differential   Result Value Ref Range    Neutrophils %, Manual, Fluid 2 <25 % %    Lymphocytes %, Manual, Fluid 9 <75 % %    Mono/Macrophages %, Manual, Fluid 89 <70 % %    Eosinophils %, Manual, Fluid 0 0 % %    Basophils %, Manual, Fluid 0 0 % %    Immature Granulocytes %, Manual, Fluid 0 0 % %    Blasts %, Manual, Fluid 0 0 % %    Unclassified Cells %, Manual, Fluid 0 0 % %    Plasma Cells %, Manual, Fluid 0 0 % %    Total Cells Counted, Fluid 100    pH, Body Fluid   Result Value Ref Range    pH, Fluid 7.24 See Below   Sterile Fluid Culture/Smear    Specimen:  Pleural; Fluid   Result Value Ref Range    Sterile Fluid Culture/Smear No growth to date     Gram Stain (2+) Few Polymorphonuclear leukocytes     Gram Stain No organisms seen    Fungal Culture/Smear    Specimen: Pleural; Fluid   Result Value Ref Range    Fungal Culture/Smear       Culture in progress, a report will be issued when positive or after 2 weeks of incubation.    Fungal Smear No fungal elements seen    AFB Processed   Result Value Ref Range    Extra Tube Hold for add-ons.    Magnesium   Result Value Ref Range    Magnesium 1.66 1.60 - 2.40 mg/dL   Protime-INR   Result Value Ref Range    Protime 20.3 (H) 9.8 - 12.8 seconds    INR 1.8 (H) 0.9 - 1.1   CBC   Result Value Ref Range    WBC 6.4 4.4 - 11.3 x10*3/uL    nRBC 0.0 0.0 - 0.0 /100 WBCs    RBC 2.73 (L) 4.50 - 5.90 x10*6/uL    Hemoglobin 7.8 (L) 13.5 - 17.5 g/dL    Hematocrit 25.0 (L) 41.0 - 52.0 %    MCV 92 80 - 100 fL    MCH 28.6 26.0 - 34.0 pg    MCHC 31.2 (L) 32.0 - 36.0 g/dL    RDW 15.4 (H) 11.5 - 14.5 %    Platelets 191 150 - 450 x10*3/uL   Comprehensive metabolic panel   Result Value Ref Range    Glucose 97 74 - 99 mg/dL    Sodium 138 136 - 145 mmol/L    Potassium 3.2 (L) 3.5 - 5.3 mmol/L    Chloride 104 98 - 107 mmol/L    Bicarbonate 28 21 - 32 mmol/L    Anion Gap 9 (L) 10 - 20 mmol/L    Urea Nitrogen 16 6 - 23 mg/dL    Creatinine 0.65 0.50 - 1.30 mg/dL    eGFR >90 >60 mL/min/1.73m*2    Calcium 7.9 (L) 8.6 - 10.3 mg/dL    Albumin 3.0 (L) 3.4 - 5.0 g/dL    Alkaline Phosphatase 60 33 - 136 U/L    Total Protein 5.5 (L) 6.4 - 8.2 g/dL    AST 5 (L) 9 - 39 U/L    Bilirubin, Total 1.0 0.0 - 1.2 mg/dL    ALT 9 (L) 10 - 52 U/L   Body Fluid Cell Count   Result Value Ref Range    Color, Fluid Red-brown (A) Colorless, Straw, Yellow    Clarity, Fluid Turbid (A) Clear    WBC, Fluid 1 See Comment /uL    RBC, Fluid 0.064 0  /uL /uL   Body Fluid Differential   Result Value Ref Range    Total Cells Counted, Fluid 100      *Note: Due to a large number of results  and/or encounters for the requested time period, some results have not been displayed. A complete set of results can be found in Results Review.

## 2025-02-08 LAB
ACID FAST STN SPEC: NORMAL
ALBUMIN SERPL BCP-MCNC: 3.1 G/DL (ref 3.4–5)
ALP SERPL-CCNC: 66 U/L (ref 33–136)
ALT SERPL W P-5'-P-CCNC: 12 U/L (ref 10–52)
ANION GAP SERPL CALC-SCNC: 7 MMOL/L (ref 10–20)
AST SERPL W P-5'-P-CCNC: 8 U/L (ref 9–39)
BILIRUB SERPL-MCNC: 1 MG/DL (ref 0–1.2)
BUN SERPL-MCNC: 14 MG/DL (ref 6–23)
CALCIUM SERPL-MCNC: 8 MG/DL (ref 8.6–10.3)
CHLORIDE SERPL-SCNC: 105 MMOL/L (ref 98–107)
CO2 SERPL-SCNC: 29 MMOL/L (ref 21–32)
CREAT SERPL-MCNC: 0.64 MG/DL (ref 0.5–1.3)
EGFRCR SERPLBLD CKD-EPI 2021: >90 ML/MIN/1.73M*2
ERYTHROCYTE [DISTWIDTH] IN BLOOD BY AUTOMATED COUNT: 15.7 % (ref 11.5–14.5)
GLUCOSE SERPL-MCNC: 99 MG/DL (ref 74–99)
HCT VFR BLD AUTO: 26.1 % (ref 41–52)
HGB BLD-MCNC: 8.4 G/DL (ref 13.5–17.5)
INR PPP: 1.5 (ref 0.9–1.1)
MCH RBC QN AUTO: 29 PG (ref 26–34)
MCHC RBC AUTO-ENTMCNC: 32.2 G/DL (ref 32–36)
MCV RBC AUTO: 90 FL (ref 80–100)
MYCOBACTERIUM SPEC CULT: NORMAL
NRBC BLD-RTO: 0 /100 WBCS (ref 0–0)
PLATELET # BLD AUTO: 203 X10*3/UL (ref 150–450)
POTASSIUM SERPL-SCNC: 3.7 MMOL/L (ref 3.5–5.3)
PROT SERPL-MCNC: 5.9 G/DL (ref 6.4–8.2)
PROTHROMBIN TIME: 17 SECONDS (ref 9.8–12.8)
RBC # BLD AUTO: 2.9 X10*6/UL (ref 4.5–5.9)
SODIUM SERPL-SCNC: 137 MMOL/L (ref 136–145)
WBC # BLD AUTO: 6.5 X10*3/UL (ref 4.4–11.3)

## 2025-02-08 PROCEDURE — 2500000001 HC RX 250 WO HCPCS SELF ADMINISTERED DRUGS (ALT 637 FOR MEDICARE OP): Performed by: INTERNAL MEDICINE

## 2025-02-08 PROCEDURE — 2500000004 HC RX 250 GENERAL PHARMACY W/ HCPCS (ALT 636 FOR OP/ED)

## 2025-02-08 PROCEDURE — 84075 ASSAY ALKALINE PHOSPHATASE: CPT

## 2025-02-08 PROCEDURE — 1200000002 HC GENERAL ROOM WITH TELEMETRY DAILY

## 2025-02-08 PROCEDURE — 99232 SBSQ HOSP IP/OBS MODERATE 35: CPT | Performed by: INTERNAL MEDICINE

## 2025-02-08 PROCEDURE — 85027 COMPLETE CBC AUTOMATED: CPT

## 2025-02-08 PROCEDURE — 36415 COLL VENOUS BLD VENIPUNCTURE: CPT

## 2025-02-08 PROCEDURE — 80053 COMPREHEN METABOLIC PANEL: CPT

## 2025-02-08 PROCEDURE — 99232 SBSQ HOSP IP/OBS MODERATE 35: CPT | Performed by: STUDENT IN AN ORGANIZED HEALTH CARE EDUCATION/TRAINING PROGRAM

## 2025-02-08 PROCEDURE — 2500000002 HC RX 250 W HCPCS SELF ADMINISTERED DRUGS (ALT 637 FOR MEDICARE OP, ALT 636 FOR OP/ED)

## 2025-02-08 PROCEDURE — 85610 PROTHROMBIN TIME: CPT

## 2025-02-08 PROCEDURE — 2500000001 HC RX 250 WO HCPCS SELF ADMINISTERED DRUGS (ALT 637 FOR MEDICARE OP)

## 2025-02-08 RX ADMIN — POLYETHYLENE GLYCOL 3350 17 G: 17 POWDER, FOR SOLUTION ORAL at 09:44

## 2025-02-08 RX ADMIN — METOPROLOL TARTRATE 100 MG: 50 TABLET, FILM COATED ORAL at 21:09

## 2025-02-08 RX ADMIN — DILTIAZEM HYDROCHLORIDE 240 MG: 120 CAPSULE, COATED, EXTENDED RELEASE ORAL at 09:44

## 2025-02-08 RX ADMIN — CIPROFLOXACIN 500 MG: 500 TABLET ORAL at 21:09

## 2025-02-08 RX ADMIN — FOLIC ACID 1 MG: 1 TABLET ORAL at 09:44

## 2025-02-08 RX ADMIN — MULTIPLE VITAMINS W/ MINERALS TAB 1 TABLET: TAB ORAL at 09:44

## 2025-02-08 RX ADMIN — CIPROFLOXACIN 500 MG: 500 TABLET ORAL at 09:44

## 2025-02-08 RX ADMIN — TAMSULOSIN HYDROCHLORIDE 0.8 MG: 0.4 CAPSULE ORAL at 09:44

## 2025-02-08 RX ADMIN — METOPROLOL TARTRATE 100 MG: 50 TABLET, FILM COATED ORAL at 09:44

## 2025-02-08 RX ADMIN — ATORVASTATIN CALCIUM 40 MG: 40 TABLET, FILM COATED ORAL at 21:09

## 2025-02-08 RX ADMIN — THIAMINE HYDROCHLORIDE 100 MG: 100 INJECTION, SOLUTION INTRAMUSCULAR; INTRAVENOUS at 09:44

## 2025-02-08 ASSESSMENT — COGNITIVE AND FUNCTIONAL STATUS - GENERAL
DAILY ACTIVITIY SCORE: 24
MOBILITY SCORE: 24
MOBILITY SCORE: 24
DAILY ACTIVITIY SCORE: 24

## 2025-02-08 ASSESSMENT — LIFESTYLE VARIABLES
TREMOR: NO TREMOR
ANXIETY: NO ANXIETY, AT EASE
AUDITORY DISTURBANCES: NOT PRESENT
ORIENTATION AND CLOUDING OF SENSORIUM: ORIENTED AND CAN DO SERIAL ADDITIONS
NAUSEA AND VOMITING: NO NAUSEA AND NO VOMITING
VISUAL DISTURBANCES: NOT PRESENT
AGITATION: NORMAL ACTIVITY
TOTAL SCORE: 0
HEADACHE, FULLNESS IN HEAD: NOT PRESENT
PAROXYSMAL SWEATS: NO SWEAT VISIBLE

## 2025-02-08 ASSESSMENT — PAIN SCALES - GENERAL
PAINLEVEL_OUTOF10: 0 - NO PAIN

## 2025-02-08 ASSESSMENT — PAIN - FUNCTIONAL ASSESSMENT
PAIN_FUNCTIONAL_ASSESSMENT: 0-10

## 2025-02-08 NOTE — CARE PLAN
"The patient's goals for the shift include \"find out when I can go home\"    The clinical goals for the shift include maintain safety ambulating in room        "

## 2025-02-08 NOTE — PROGRESS NOTES
Ming Bunch is a 84 y.o. male on day 3 of admission presenting with Hematuria.      Subjective   Hematuria persists. Patient denies suprapubic pain, bladder spasms, obstructive symptoms. Denies clots.        Objective     PE:  Constitutional: A&Ox3, calm and cooperative, NAD  Eyes: EOMI, clear sclera   Cardiovascular: Normal rate and regular rhythm  Respiratory/Thorax: NLWOB  Gastrointestinal: abd soft, mildly distended, nontender  Genitourinary: Voiding via quintanilla- red wine colored, no clots appreciated   Musculoskeletal: ROM intact  Neurological: A&Ox3, No focal deficits   Psychological: Appropriate mood and behavior      Last Recorded Vitals  Vitals:    02/08/25 0700 02/08/25 0746 02/08/25 1207 02/08/25 1504   BP:  116/71 101/63 106/65   BP Location:  Right arm Right arm Right arm   Patient Position:  Lying Lying Lying   Pulse: 91      Resp:  17 17 17   Temp:  35.6 °C (96 °F) 36.2 °C (97.1 °F) 36.3 °C (97.3 °F)   TempSrc:  Temporal Temporal Temporal   SpO2:  94% 94% 93%   Weight:       Height:             Relevant Results    Imaging:     .=== 02/03/25 ===    XR CHEST 1 VIEW    - Impression -  Redemonstration of small right pleural effusion, without significant  change.    Cardiomegaly, which is unchanged.        MACRO:  None    Signed by: Mirela Dia 2/6/2025 11:47 AM  Dictation workstation:   [id number]   .=== 02/03/25 ===    CT ABDOMEN PELVIS WO IV CONTRAST    - Impression -  1.  Undulating hepatic contour consistent with cirrhosis along with  mild-to-moderate volume ascites and bilateral pleural effusions.      MACRO:  None    Signed by: Gunnar Goncalves 2/3/2025 10:23 PM  Dictation workstation:   [id number]         Lab Results:   Lab Results   Component Value Date    WBC 6.5 02/08/2025    HGB 8.4 (L) 02/08/2025    HCT 26.1 (L) 02/08/2025    MCV 90 02/08/2025     02/08/2025     Lab Results   Component Value Date    GLUCOSE 99 02/08/2025    CALCIUM 8.0 (L) 02/08/2025     02/08/2025    K 3.7  "02/08/2025    CO2 29 02/08/2025     02/08/2025    BUN 14 02/08/2025    CREATININE 0.64 02/08/2025     Results from last 72 hours   Lab Units 02/08/25  0624   ALK PHOS U/L 66   BILIRUBIN TOTAL mg/dL 1.0   PROTEIN TOTAL g/dL 5.9*   ALT U/L 12   AST U/L 8*   ALBUMIN g/dL 3.1*     Estimated Creatinine Clearance: 96.7 mL/min (by C-G formula based on SCr of 0.64 mg/dL).  No results found for: \"CRP\"      Assessment/Plan   Ming Bunch is a 84 y.o. male with PMH of BPH and  recurrent hematuria that underwent a cystoscopy 3 weeks ago (notable for mildly enlarged, obstructive prostate; no tumors, stones, or strictures) that presented to ED on 2/3 with decreased urine output from quintanilla and hematuria, was upsized to 24F hematuria quintanilla. Was clamped on the 4th but has continued to have hematuria.       - restart CBI  - manually irrigate as needed  - per patient medicine consulting IR for prostate embolization on Monday  - hold coumadin  - trend H/H  - Ucx with likely contaminated, continue empiric treatment     Will continue to follow      I spent 35 minutes in the professional and overall care of this patient.      Gladys Duvall PA-C           "

## 2025-02-08 NOTE — CARE PLAN
"  Problem: Pain - Adult  Goal: Verbalizes/displays adequate comfort level or baseline comfort level  Outcome: Progressing     Problem: Safety - Adult  Goal: Free from fall injury  Outcome: Progressing     Problem: Discharge Planning  Goal: Discharge to home or other facility with appropriate resources  Outcome: Progressing     Problem: Chronic Conditions and Co-morbidities  Goal: Patient's chronic conditions and co-morbidity symptoms are monitored and maintained or improved  Outcome: Progressing     Problem: Nutrition  Goal: Nutrient intake appropriate for maintaining nutritional needs  Outcome: Progressing     Problem: Heart Failure  Goal: Improved gas exchange this shift  Outcome: Progressing  Goal: Improved urinary output this shift  Outcome: Progressing  Goal: Reduction in peripheral edema within 24 hours  Outcome: Progressing  Goal: Report improvement of dyspnea/breathlessness this shift  Outcome: Progressing  Goal: Weight from fluid excess reduced over 2-3 days, then stabilize  Outcome: Progressing  Goal: Increase self care and/or family involvement in 24 hours  Outcome: Progressing   The patient's goals for the shift include \"get out of here!\"    The clinical goals for the shift include REMAIN HDS      "

## 2025-02-08 NOTE — PROGRESS NOTES
Ming Bunch is a 84 y.o. male     Patient's urine continues to look bloody  Continue to hold Coumadin  Will reconsult urology  Patient had a cystoscopy 3 weeks ago that was unremarkable  Patient does not want to leave with the Kirby catheter  Will start bladder irrigation again    Review of Systems     Constitutional: no fever, no chills, not feeling poorly, not feeling tired   Cardiovascular: no chest pain   Respiratory: no cough, wheezing or shortness of breath a  Gastrointestinal: no abdominal pain, no constipation, no melena, no nausea, no diarrhea, no vomiting and no blood in stools.   Neurological: no headache,   All other systems have been reviewed and are negative for complaint.       Vitals:    02/08/25 1207   BP: 101/63   Pulse:    Resp: 17   Temp: 36.2 °C (97.1 °F)   SpO2: 94%        Scheduled medications  atorvastatin, 40 mg, oral, Nightly  ciprofloxacin, 500 mg, oral, q12h SUE  dilTIAZem CD, 240 mg, oral, Daily  folic acid, 1 mg, oral, Daily  metoprolol tartrate, 100 mg, oral, BID  multivitamin with minerals, 1 tablet, oral, Daily  polyethylene glycol, 17 g, oral, Daily  tamsulosin, 0.8 mg, oral, Daily  [START ON 2/9/2025] thiamine, 100 mg, oral, Daily  [Held by provider] warfarin, 2.5 mg, oral, Once per day on Wednesday Thursday  [Held by provider] warfarin, 5 mg, oral, Once per day on Sunday Monday Tuesday Friday Saturday      Continuous medications     PRN medications  PRN medications: acetaminophen **OR** acetaminophen **OR** acetaminophen, LORazepam **OR** LORazepam **OR** LORazepam, ondansetron **OR** ondansetron, oxybutynin    Lab Review   Results from last 7 days   Lab Units 02/08/25 0624 02/07/25 0522 02/06/25  0453   WBC AUTO x10*3/uL 6.5 6.4 6.7   HEMOGLOBIN g/dL 8.4* 7.8* 7.7*   HEMATOCRIT % 26.1* 25.0* 25.5*   PLATELETS AUTO x10*3/uL 203 191 202     Results from last 7 days   Lab Units 02/08/25 0624 02/07/25 0522 02/06/25  0453   SODIUM mmol/L 137 138 140   POTASSIUM mmol/L 3.7 3.2*  3.6   CHLORIDE mmol/L 105 104 102   CO2 mmol/L 29 28 32   BUN mg/dL 14 16 15   CREATININE mg/dL 0.64 0.65 0.72   CALCIUM mg/dL 8.0* 7.9* 7.9*   PROTEIN TOTAL g/dL 5.9* 5.5* 5.4*   BILIRUBIN TOTAL mg/dL 1.0 1.0 0.9   ALK PHOS U/L 66 60 63   ALT U/L 12 9* 9*   AST U/L 8* 5* 6*   GLUCOSE mg/dL 99 97 98            US guided abdominal paracentesis   Final Result   Uneventful paracentesis, as detailed above. Right Hemiabdomen, 1 L        Performed and dictated at [facility].        Signed by: Ivy Alva 2/7/2025 1:59 PM   Dictation workstation:   [id number]      XR chest 2 views   Final Result   Cardiomegaly with mild basilar edema again noted. The previous right   pleural effusion decreased in size without pneumothorax.        Signed by: Yonas Sullivan 2/6/2025 6:18 PM   Dictation workstation:   [id number]      US thoracentesis   Final Result   Uneventful thoracentesis, as detailed above: Right Pleural space, 950   mL        Performed and dictated at [facility].        Signed by: Ivy Alva 2/7/2025 1:58 PM   Dictation workstation:   [id number]      XR chest 1 view   Final Result   Redemonstration of small right pleural effusion, without significant   change.        Cardiomegaly, which is unchanged.                  MACRO:   None        Signed by: Mirela Dia 2/6/2025 11:47 AM   Dictation workstation:   [id number]      Transthoracic Echo (TTE) Complete   Final Result      US right upper quadrant   Final Result   Cirrhosis of the liver. Right upper quadrant ascites.        Large right pleural effusion.        Obscuration of the pancreas by bowel gas and body habitus.        MACRO:   None        Signed by: Kenney Samuel 2/4/2025 3:35 PM   Dictation workstation:   [id number]      CT abdomen pelvis wo IV contrast   Final Result   1.  Undulating hepatic contour consistent with cirrhosis along with   mild-to-moderate volume ascites and  bilateral pleural effusions.             MACRO:   None        Signed by: Gunnar Goncalves 2/3/2025 10:23 PM   Dictation workstation:   [id number]      XR chest 1 view   Final Result   Cardiomegaly with likely new small bibasilar pleural effusions.   Follow-up is recommended.        MACRO:   None        Signed by: Matt Johnjuliette 2/3/2025 12:24 PM   Dictation workstation:   [id number]            Physical Exam    Constitutional   General appearance: Alert and in no acute distress.   Pulmonary   Respiratory assessment: No respiratory distress, normal respiratory rhythm and effort.    Auscultation of Lungs: Clear bilateral breath sounds.   Cardiovascular   Auscultation of heart: Apical pulse normal, heart rate and rhythm normal, normal S1 and S2, no murmurs and no pericardial rub.    Exam for edema: No peripheral edema.   Abdomen   Abdominal Exam: No bruits, normal bowel sounds, soft, non-tender, no abdominal mass palpated.    Liver and Spleen exam: No hepato-splenomegaly.   Musculoskeletal     Inspection/palpation of joints, bones and muscles: No joint swelling. Normal movement of all extremities.   Neurologic   Cranial nerves: Nerves 2-12 were intact, no focal neuro defects.         Assessment/Plan      #Hematuria  #Urinary tract infection  Still has blood in the Kirby  Continuing ciprofloxacin    #Cirrhosis of the liver  Hepatitis panel noted  Paracentesis done  SAAG greater than 1  Counseled on alcohol abstinence    #Atrial fibrillation  On Coumadin which is on hold at the moment    #Hypertension  Low blood pressure readings  Monitor    #Dyslipidemia  On statins at 40 mg every day

## 2025-02-09 VITALS
BODY MASS INDEX: 30.36 KG/M2 | SYSTOLIC BLOOD PRESSURE: 123 MMHG | RESPIRATION RATE: 18 BRPM | OXYGEN SATURATION: 98 % | HEART RATE: 81 BPM | WEIGHT: 205 LBS | HEIGHT: 69 IN | DIASTOLIC BLOOD PRESSURE: 77 MMHG | TEMPERATURE: 98.4 F

## 2025-02-09 LAB
ALBUMIN SERPL BCP-MCNC: 3 G/DL (ref 3.4–5)
ALP SERPL-CCNC: 64 U/L (ref 33–136)
ALT SERPL W P-5'-P-CCNC: 11 U/L (ref 10–52)
ANION GAP SERPL CALC-SCNC: 8 MMOL/L (ref 10–20)
AST SERPL W P-5'-P-CCNC: 7 U/L (ref 9–39)
BACTERIA FLD CULT: NORMAL
BACTERIA FLD CULT: NORMAL
BILIRUB SERPL-MCNC: 0.9 MG/DL (ref 0–1.2)
BUN SERPL-MCNC: 14 MG/DL (ref 6–23)
CALCIUM SERPL-MCNC: 7.7 MG/DL (ref 8.6–10.3)
CHLORIDE SERPL-SCNC: 104 MMOL/L (ref 98–107)
CO2 SERPL-SCNC: 29 MMOL/L (ref 21–32)
CREAT SERPL-MCNC: 0.65 MG/DL (ref 0.5–1.3)
EGFRCR SERPLBLD CKD-EPI 2021: >90 ML/MIN/1.73M*2
ERYTHROCYTE [DISTWIDTH] IN BLOOD BY AUTOMATED COUNT: 15.8 % (ref 11.5–14.5)
GLUCOSE SERPL-MCNC: 92 MG/DL (ref 74–99)
GRAM STN SPEC: NORMAL
HCT VFR BLD AUTO: 26.9 % (ref 41–52)
HGB BLD-MCNC: 8.1 G/DL (ref 13.5–17.5)
INR PPP: 1.5 (ref 0.9–1.1)
MCH RBC QN AUTO: 27.8 PG (ref 26–34)
MCHC RBC AUTO-ENTMCNC: 30.1 G/DL (ref 32–36)
MCV RBC AUTO: 92 FL (ref 80–100)
NRBC BLD-RTO: 0 /100 WBCS (ref 0–0)
PLATELET # BLD AUTO: 212 X10*3/UL (ref 150–450)
POTASSIUM SERPL-SCNC: 3.7 MMOL/L (ref 3.5–5.3)
PROT SERPL-MCNC: 5.4 G/DL (ref 6.4–8.2)
PROTHROMBIN TIME: 17.4 SECONDS (ref 9.8–12.8)
RBC # BLD AUTO: 2.91 X10*6/UL (ref 4.5–5.9)
SODIUM SERPL-SCNC: 137 MMOL/L (ref 136–145)
WBC # BLD AUTO: 6.3 X10*3/UL (ref 4.4–11.3)

## 2025-02-09 PROCEDURE — 99232 SBSQ HOSP IP/OBS MODERATE 35: CPT | Performed by: INTERNAL MEDICINE

## 2025-02-09 PROCEDURE — 36415 COLL VENOUS BLD VENIPUNCTURE: CPT

## 2025-02-09 PROCEDURE — 2500000005 HC RX 250 GENERAL PHARMACY W/O HCPCS: Performed by: STUDENT IN AN ORGANIZED HEALTH CARE EDUCATION/TRAINING PROGRAM

## 2025-02-09 PROCEDURE — 2500000001 HC RX 250 WO HCPCS SELF ADMINISTERED DRUGS (ALT 637 FOR MEDICARE OP): Performed by: INTERNAL MEDICINE

## 2025-02-09 PROCEDURE — 85610 PROTHROMBIN TIME: CPT

## 2025-02-09 PROCEDURE — 84075 ASSAY ALKALINE PHOSPHATASE: CPT

## 2025-02-09 PROCEDURE — 2500000004 HC RX 250 GENERAL PHARMACY W/ HCPCS (ALT 636 FOR OP/ED)

## 2025-02-09 PROCEDURE — 2500000002 HC RX 250 W HCPCS SELF ADMINISTERED DRUGS (ALT 637 FOR MEDICARE OP, ALT 636 FOR OP/ED)

## 2025-02-09 PROCEDURE — 99232 SBSQ HOSP IP/OBS MODERATE 35: CPT | Performed by: STUDENT IN AN ORGANIZED HEALTH CARE EDUCATION/TRAINING PROGRAM

## 2025-02-09 PROCEDURE — 2500000001 HC RX 250 WO HCPCS SELF ADMINISTERED DRUGS (ALT 637 FOR MEDICARE OP)

## 2025-02-09 PROCEDURE — 1100000001 HC PRIVATE ROOM DAILY

## 2025-02-09 PROCEDURE — 85027 COMPLETE CBC AUTOMATED: CPT

## 2025-02-09 RX ORDER — LIDOCAINE HYDROCHLORIDE 20 MG/ML
1 JELLY TOPICAL ONCE
Status: COMPLETED | OUTPATIENT
Start: 2025-02-09 | End: 2025-02-09

## 2025-02-09 RX ADMIN — Medication 100 MG: at 13:49

## 2025-02-09 RX ADMIN — CIPROFLOXACIN 500 MG: 500 TABLET ORAL at 21:23

## 2025-02-09 RX ADMIN — METOPROLOL TARTRATE 100 MG: 50 TABLET, FILM COATED ORAL at 08:31

## 2025-02-09 RX ADMIN — CIPROFLOXACIN 500 MG: 500 TABLET ORAL at 08:31

## 2025-02-09 RX ADMIN — METOPROLOL TARTRATE 100 MG: 50 TABLET, FILM COATED ORAL at 21:23

## 2025-02-09 RX ADMIN — MULTIPLE VITAMINS W/ MINERALS TAB 1 TABLET: TAB ORAL at 08:31

## 2025-02-09 RX ADMIN — LIDOCAINE HYDROCHLORIDE 1 APPLICATION: 20 JELLY TOPICAL at 11:56

## 2025-02-09 RX ADMIN — ATORVASTATIN CALCIUM 40 MG: 40 TABLET, FILM COATED ORAL at 21:23

## 2025-02-09 RX ADMIN — POLYETHYLENE GLYCOL 3350 17 G: 17 POWDER, FOR SOLUTION ORAL at 08:30

## 2025-02-09 RX ADMIN — DILTIAZEM HYDROCHLORIDE 240 MG: 120 CAPSULE, COATED, EXTENDED RELEASE ORAL at 08:31

## 2025-02-09 RX ADMIN — FOLIC ACID 1 MG: 1 TABLET ORAL at 08:31

## 2025-02-09 RX ADMIN — TAMSULOSIN HYDROCHLORIDE 0.8 MG: 0.4 CAPSULE ORAL at 08:31

## 2025-02-09 ASSESSMENT — COGNITIVE AND FUNCTIONAL STATUS - GENERAL
DAILY ACTIVITIY SCORE: 24
MOBILITY SCORE: 24
DAILY ACTIVITIY SCORE: 24
MOBILITY SCORE: 24
DAILY ACTIVITIY SCORE: 24
MOBILITY SCORE: 24

## 2025-02-09 ASSESSMENT — PAIN SCALES - GENERAL
PAINLEVEL_OUTOF10: 0 - NO PAIN

## 2025-02-09 ASSESSMENT — PAIN - FUNCTIONAL ASSESSMENT
PAIN_FUNCTIONAL_ASSESSMENT: 0-10

## 2025-02-09 ASSESSMENT — LIFESTYLE VARIABLES
PULSE: 81
AUDITORY DISTURBANCES: NOT PRESENT
TREMOR: NO TREMOR
ORIENTATION AND CLOUDING OF SENSORIUM: ORIENTED AND CAN DO SERIAL ADDITIONS
VISUAL DISTURBANCES: NOT PRESENT
AGITATION: NORMAL ACTIVITY
ANXIETY: NO ANXIETY, AT EASE
NAUSEA AND VOMITING: NO NAUSEA AND NO VOMITING
HEADACHE, FULLNESS IN HEAD: NOT PRESENT
TOTAL SCORE: 0
PAROXYSMAL SWEATS: NO SWEAT VISIBLE

## 2025-02-09 NOTE — CARE PLAN
"  Problem: Pain - Adult  Goal: Verbalizes/displays adequate comfort level or baseline comfort level  Outcome: Progressing     Problem: Safety - Adult  Goal: Free from fall injury  Outcome: Progressing     Problem: Discharge Planning  Goal: Discharge to home or other facility with appropriate resources  Outcome: Progressing     Problem: Chronic Conditions and Co-morbidities  Goal: Patient's chronic conditions and co-morbidity symptoms are monitored and maintained or improved  Outcome: Progressing     Problem: Nutrition  Goal: Nutrient intake appropriate for maintaining nutritional needs  Outcome: Progressing     Problem: Heart Failure  Goal: Improved gas exchange this shift  Outcome: Progressing  Goal: Improved urinary output this shift  Outcome: Progressing  Goal: Reduction in peripheral edema within 24 hours  Outcome: Progressing  Goal: Report improvement of dyspnea/breathlessness this shift  Outcome: Progressing  Goal: Weight from fluid excess reduced over 2-3 days, then stabilize  Outcome: Progressing  Goal: Increase self care and/or family involvement in 24 hours  Outcome: Progressing     Problem: Fall/Injury  Goal: Not fall by end of shift  Outcome: Progressing  Goal: Be free from injury by end of the shift  Outcome: Progressing  Goal: Verbalize understanding of personal risk factors for fall in the hospital  Outcome: Progressing  Goal: Verbalize understanding of risk factor reduction measures to prevent injury from fall in the home  Outcome: Progressing  Goal: Use assistive devices by end of the shift  Outcome: Progressing  Goal: Pace activities to prevent fatigue by end of the shift  Outcome: Progressing   The patient's goals for the shift include \"find out when I can go home\"    The clinical goals for the shift include pt will remain safe throughout shift      "

## 2025-02-09 NOTE — CARE PLAN
"The patient's goals for the shift include \"find out when I can go home\"    The clinical goals for the shift include Pt remain HDS and free from fall or injuery throughout the shift    Over the shift, the patient did  make progress toward the following goals.  "

## 2025-02-09 NOTE — PROGRESS NOTES
Ming Bunch is a 84 y.o. male on day 4 of admission presenting with Hematuria.      Subjective   CBI not started yet, issues with quintanilla this AM requiring exchange  Hematuria persists. Patient denies suprapubic pain, bladder spasms, obstructive symptoms. Denies clots.        Objective     PE:  Constitutional: A&Ox3, calm and cooperative, NAD  Eyes: EOMI, clear sclera   Cardiovascular: Normal rate and regular rhythm  Respiratory/Thorax: NLWOB  Genitourinary: Voiding via quintanilla- red wine colored, no clots appreciated   Musculoskeletal: ROM intact  Neurological: A&Ox3, No focal deficits   Psychological: Appropriate mood and behavior      Last Recorded Vitals  Vitals:    02/09/25 0400 02/09/25 0406 02/09/25 0622 02/09/25 0757   BP:  110/71 115/73 110/65   BP Location:  Left arm Left arm Left arm   Patient Position:  Lying Lying Lying   Pulse: 86 106 78    Resp:  18 16 16   Temp:  37.3 °C (99.1 °F) 36.6 °C (97.9 °F) 36.2 °C (97.2 °F)   TempSrc:  Temporal Temporal Oral   SpO2:  97% 98% 97%   Weight:       Height:             Relevant Results    Imaging:     .=== 02/03/25 ===    XR CHEST 1 VIEW    - Impression -  Redemonstration of small right pleural effusion, without significant  change.    Cardiomegaly, which is unchanged.        MACRO:  None    Signed by: Mirela Dia 2/6/2025 11:47 AM  Dictation workstation:   [id number]   .=== 02/03/25 ===    CT ABDOMEN PELVIS WO IV CONTRAST    - Impression -  1.  Undulating hepatic contour consistent with cirrhosis along with  mild-to-moderate volume ascites and bilateral pleural effusions.      MACRO:  None    Signed by: Gunnar Goncalves 2/3/2025 10:23 PM  Dictation workstation:   [id number]         Lab Results:   Lab Results   Component Value Date    WBC 6.3 02/09/2025    HGB 8.1 (L) 02/09/2025    HCT 26.9 (L) 02/09/2025    MCV 92 02/09/2025     02/09/2025     Lab Results   Component Value Date    GLUCOSE 92 02/09/2025    CALCIUM 7.7 (L) 02/09/2025     02/09/2025  "   K 3.7 02/09/2025    CO2 29 02/09/2025     02/09/2025    BUN 14 02/09/2025    CREATININE 0.65 02/09/2025     Results from last 72 hours   Lab Units 02/09/25  0526   ALK PHOS U/L 64   BILIRUBIN TOTAL mg/dL 0.9   PROTEIN TOTAL g/dL 5.4*   ALT U/L 11   AST U/L 7*   ALBUMIN g/dL 3.0*     Estimated Creatinine Clearance: 95.2 mL/min (by C-G formula based on SCr of 0.65 mg/dL).  No results found for: \"CRP\"      Assessment/Plan   Ming Bunch is a 84 y.o. male with PMH of BPH and  recurrent hematuria that underwent a cystoscopy 3 weeks ago (notable for mildly enlarged, obstructive prostate; no tumors, stones, or strictures) that presented to ED on 2/3 with decreased urine output from quintanilla and hematuria, was upsized to 24F hematuria quintanilla. Was clamped on the 4th but has continued to have hematuria. CBI was to restart yesterday. This morning had issues with set-up requiring exchange to another 24F. Plan remains the same     - restart CBI  - manually irrigate as needed  - per patient medicine consulting IR for prostate embolization on Monday  - hold coumadin  - trend H/H  - Ucx with likely contaminated, continue empiric treatment     Will continue to follow      I spent 35 minutes in the professional and overall care of this patient.      Gladys Duvall PA-C           "

## 2025-02-09 NOTE — PROGRESS NOTES
Ming Bunch is a 84 y.o. male     Continues to have bloody urine  CBI was not started yesterday  It is being hung as we speak  Continue to hold Coumadin    Review of Systems     Constitutional: no fever, no chills, not feeling poorly, not feeling tired   Cardiovascular: no chest pain   Respiratory: no cough, wheezing or shortness of breath a  Gastrointestinal: no abdominal pain, no constipation, no melena, no nausea, no diarrhea, no vomiting and no blood in stools.   Neurological: no headache,   All other systems have been reviewed and are negative for complaint.       Vitals:    02/09/25 1207   BP: 118/72   Pulse: 98   Resp: 16   Temp: 36.9 °C (98.4 °F)   SpO2: 99%        Scheduled medications  atorvastatin, 40 mg, oral, Nightly  ciprofloxacin, 500 mg, oral, q12h SUE  dilTIAZem CD, 240 mg, oral, Daily  folic acid, 1 mg, oral, Daily  metoprolol tartrate, 100 mg, oral, BID  multivitamin with minerals, 1 tablet, oral, Daily  polyethylene glycol, 17 g, oral, Daily  tamsulosin, 0.8 mg, oral, Daily  thiamine, 100 mg, oral, Daily  [Held by provider] warfarin, 2.5 mg, oral, Once per day on Wednesday Thursday  [Held by provider] warfarin, 5 mg, oral, Once per day on Sunday Monday Tuesday Friday Saturday      Continuous medications     PRN medications  PRN medications: acetaminophen **OR** acetaminophen **OR** acetaminophen, LORazepam **OR** LORazepam **OR** LORazepam, ondansetron **OR** ondansetron, oxybutynin    Lab Review   Results from last 7 days   Lab Units 02/09/25 0526 02/08/25 0624 02/07/25  0522   WBC AUTO x10*3/uL 6.3 6.5 6.4   HEMOGLOBIN g/dL 8.1* 8.4* 7.8*   HEMATOCRIT % 26.9* 26.1* 25.0*   PLATELETS AUTO x10*3/uL 212 203 191     Results from last 7 days   Lab Units 02/09/25 0526 02/08/25 0624 02/07/25  0522   SODIUM mmol/L 137 137 138   POTASSIUM mmol/L 3.7 3.7 3.2*   CHLORIDE mmol/L 104 105 104   CO2 mmol/L 29 29 28   BUN mg/dL 14 14 16   CREATININE mg/dL 0.65 0.64 0.65   CALCIUM mg/dL 7.7* 8.0* 7.9*    PROTEIN TOTAL g/dL 5.4* 5.9* 5.5*   BILIRUBIN TOTAL mg/dL 0.9 1.0 1.0   ALK PHOS U/L 64 66 60   ALT U/L 11 12 9*   AST U/L 7* 8* 5*   GLUCOSE mg/dL 92 99 97            US guided abdominal paracentesis   Final Result   Uneventful paracentesis, as detailed above. Right Hemiabdomen, 1 L        Performed and dictated at [facility].        Signed by: Ivy Alva 2/7/2025 1:59 PM   Dictation workstation:   [id number]      XR chest 2 views   Final Result   Cardiomegaly with mild basilar edema again noted. The previous right   pleural effusion decreased in size without pneumothorax.        Signed by: Yonas Sullivan 2/6/2025 6:18 PM   Dictation workstation:   [id number]      US thoracentesis   Final Result   Uneventful thoracentesis, as detailed above: Right Pleural space, 950   mL        Performed and dictated at [facility].        Signed by: Ivy Alva 2/7/2025 1:58 PM   Dictation workstation:   [id number]      XR chest 1 view   Final Result   Redemonstration of small right pleural effusion, without significant   change.        Cardiomegaly, which is unchanged.                  MACRO:   None        Signed by: Mirela Dia 2/6/2025 11:47 AM   Dictation workstation:   [id number]      Transthoracic Echo (TTE) Complete   Final Result      US right upper quadrant   Final Result   Cirrhosis of the liver. Right upper quadrant ascites.        Large right pleural effusion.        Obscuration of the pancreas by bowel gas and body habitus.        MACRO:   None        Signed by: Kenney Samuel 2/4/2025 3:35 PM   Dictation workstation:   [id number]      CT abdomen pelvis wo IV contrast   Final Result   1.  Undulating hepatic contour consistent with cirrhosis along with   mild-to-moderate volume ascites and bilateral pleural effusions.             MACRO:   None        Signed by: Gunnar Goncalves 2/3/2025 10:23 PM   Dictation workstation:   [id number]       XR chest 1 view   Final Result   Cardiomegaly with likely new small bibasilar pleural effusions.   Follow-up is recommended.        MACRO:   None        Signed by: Matt Bowling 2/3/2025 12:24 PM   Dictation workstation:   [id number]      Consult to Interventional Radiology    (Results Pending)         Physical Exam    Constitutional   General appearance: Alert and in no acute distress.   Pulmonary   Respiratory assessment: No respiratory distress, normal respiratory rhythm and effort.    Auscultation of Lungs: Clear bilateral breath sounds.   Cardiovascular   Auscultation of heart: Apical pulse normal, heart rate and rhythm normal, normal S1 and S2, no murmurs and no pericardial rub.    Exam for edema: No peripheral edema.   Abdomen   Abdominal Exam: No bruits, normal bowel sounds, soft, non-tender, no abdominal mass palpated.    Liver and Spleen exam: No hepato-splenomegaly.   Musculoskeletal     Inspection/palpation of joints, bones and muscles: No joint swelling. Normal movement of all extremities.   Neurologic   Cranial nerves: Nerves 2-12 were intact, no focal neuro defects.         Assessment/Plan      #Hematuria  #Urinary tract infection  Still has blood in the [place]  CBI  If does not improve will need IR to do prostate embolization  I am not sure they can do it inpatient but will ask    #Cirrhosis of the liver  Hepatitis panel noted  Paracentesis done  SAAG greater than 1  Counseled on alcohol abstinence    #Atrial fibrillation  On Coumadin which is on hold at the moment    #Hypertension  Low blood pressure readings  Monitor    #Dyslipidemia  On statins at 40 mg every day

## 2025-02-10 LAB
ALBUMIN SERPL BCP-MCNC: 3.3 G/DL (ref 3.4–5)
ALP SERPL-CCNC: 70 U/L (ref 33–136)
ALT SERPL W P-5'-P-CCNC: 12 U/L (ref 10–52)
ANION GAP SERPL CALC-SCNC: 10 MMOL/L (ref 10–20)
AST SERPL W P-5'-P-CCNC: 7 U/L (ref 9–39)
BACTERIA FLD CULT: NORMAL
BILIRUB SERPL-MCNC: 0.9 MG/DL (ref 0–1.2)
BUN SERPL-MCNC: 11 MG/DL (ref 6–23)
CALCIUM SERPL-MCNC: 8.3 MG/DL (ref 8.6–10.3)
CHLORIDE SERPL-SCNC: 104 MMOL/L (ref 98–107)
CO2 SERPL-SCNC: 27 MMOL/L (ref 21–32)
CREAT SERPL-MCNC: 0.64 MG/DL (ref 0.5–1.3)
EGFRCR SERPLBLD CKD-EPI 2021: >90 ML/MIN/1.73M*2
ERYTHROCYTE [DISTWIDTH] IN BLOOD BY AUTOMATED COUNT: 16 % (ref 11.5–14.5)
GLUCOSE SERPL-MCNC: 105 MG/DL (ref 74–99)
GRAM STN SPEC: NORMAL
GRAM STN SPEC: NORMAL
HCT VFR BLD AUTO: 27.9 % (ref 41–52)
HGB BLD-MCNC: 8.8 G/DL (ref 13.5–17.5)
INR PPP: 1.3 (ref 0.9–1.1)
MCH RBC QN AUTO: 28.8 PG (ref 26–34)
MCHC RBC AUTO-ENTMCNC: 31.5 G/DL (ref 32–36)
MCV RBC AUTO: 91 FL (ref 80–100)
MITOCHONDRIA AB SER QL IF: NEGATIVE
NRBC BLD-RTO: 0 /100 WBCS (ref 0–0)
PLATELET # BLD AUTO: 231 X10*3/UL (ref 150–450)
POTASSIUM SERPL-SCNC: 3.8 MMOL/L (ref 3.5–5.3)
PROT SERPL-MCNC: 6.3 G/DL (ref 6.4–8.2)
PROTHROMBIN TIME: 14.8 SECONDS (ref 9.8–12.8)
RBC # BLD AUTO: 3.06 X10*6/UL (ref 4.5–5.9)
SMOOTH MUSCLE AB SER QL IF: ABNORMAL
SODIUM SERPL-SCNC: 137 MMOL/L (ref 136–145)
WBC # BLD AUTO: 7.8 X10*3/UL (ref 4.4–11.3)

## 2025-02-10 PROCEDURE — 2500000002 HC RX 250 W HCPCS SELF ADMINISTERED DRUGS (ALT 637 FOR MEDICARE OP, ALT 636 FOR OP/ED)

## 2025-02-10 PROCEDURE — 2500000001 HC RX 250 WO HCPCS SELF ADMINISTERED DRUGS (ALT 637 FOR MEDICARE OP)

## 2025-02-10 PROCEDURE — 1200000002 HC GENERAL ROOM WITH TELEMETRY DAILY

## 2025-02-10 PROCEDURE — 2500000001 HC RX 250 WO HCPCS SELF ADMINISTERED DRUGS (ALT 637 FOR MEDICARE OP): Performed by: INTERNAL MEDICINE

## 2025-02-10 PROCEDURE — 36415 COLL VENOUS BLD VENIPUNCTURE: CPT

## 2025-02-10 PROCEDURE — 85027 COMPLETE CBC AUTOMATED: CPT

## 2025-02-10 PROCEDURE — 99232 SBSQ HOSP IP/OBS MODERATE 35: CPT

## 2025-02-10 PROCEDURE — 85610 PROTHROMBIN TIME: CPT

## 2025-02-10 PROCEDURE — 99221 1ST HOSP IP/OBS SF/LOW 40: CPT | Performed by: NURSE PRACTITIONER

## 2025-02-10 PROCEDURE — 80053 COMPREHEN METABOLIC PANEL: CPT

## 2025-02-10 PROCEDURE — 99231 SBSQ HOSP IP/OBS SF/LOW 25: CPT | Performed by: INTERNAL MEDICINE

## 2025-02-10 RX ORDER — DILTIAZEM HYDROCHLORIDE 120 MG/1
120 CAPSULE, COATED, EXTENDED RELEASE ORAL DAILY
Status: DISCONTINUED | OUTPATIENT
Start: 2025-02-10 | End: 2025-02-13 | Stop reason: HOSPADM

## 2025-02-10 RX ADMIN — DILTIAZEM HYDROCHLORIDE 120 MG: 120 CAPSULE, COATED, EXTENDED RELEASE ORAL at 08:45

## 2025-02-10 RX ADMIN — FOLIC ACID 1 MG: 1 TABLET ORAL at 08:45

## 2025-02-10 RX ADMIN — METOPROLOL TARTRATE 100 MG: 50 TABLET, FILM COATED ORAL at 20:40

## 2025-02-10 RX ADMIN — WARFARIN SODIUM 5 MG: 5 TABLET ORAL at 18:06

## 2025-02-10 RX ADMIN — Medication 100 MG: at 08:45

## 2025-02-10 RX ADMIN — CIPROFLOXACIN 500 MG: 500 TABLET ORAL at 20:40

## 2025-02-10 RX ADMIN — ATORVASTATIN CALCIUM 40 MG: 40 TABLET, FILM COATED ORAL at 20:40

## 2025-02-10 RX ADMIN — CIPROFLOXACIN 500 MG: 500 TABLET ORAL at 08:45

## 2025-02-10 RX ADMIN — METOPROLOL TARTRATE 100 MG: 50 TABLET, FILM COATED ORAL at 08:45

## 2025-02-10 RX ADMIN — TAMSULOSIN HYDROCHLORIDE 0.8 MG: 0.4 CAPSULE ORAL at 08:45

## 2025-02-10 RX ADMIN — Medication 1 TABLET: at 08:45

## 2025-02-10 ASSESSMENT — COGNITIVE AND FUNCTIONAL STATUS - GENERAL
HELP NEEDED FOR BATHING: A LITTLE
MOBILITY SCORE: 24
PERSONAL GROOMING: A LITTLE
DRESSING REGULAR UPPER BODY CLOTHING: A LITTLE
MOBILITY SCORE: 24
DRESSING REGULAR LOWER BODY CLOTHING: A LITTLE
TOILETING: A LITTLE
DAILY ACTIVITIY SCORE: 19
DAILY ACTIVITIY SCORE: 24

## 2025-02-10 ASSESSMENT — PAIN SCALES - GENERAL
PAINLEVEL_OUTOF10: 0 - NO PAIN
PAINLEVEL_OUTOF10: 0 - NO PAIN

## 2025-02-10 ASSESSMENT — PAIN - FUNCTIONAL ASSESSMENT: PAIN_FUNCTIONAL_ASSESSMENT: 0-10

## 2025-02-10 NOTE — CARE PLAN
Problem: Pain - Adult  Goal: Verbalizes/displays adequate comfort level or baseline comfort level  Outcome: Progressing     Problem: Safety - Adult  Goal: Free from fall injury  Outcome: Progressing     Problem: Discharge Planning  Goal: Discharge to home or other facility with appropriate resources  Outcome: Progressing     Problem: Chronic Conditions and Co-morbidities  Goal: Patient's chronic conditions and co-morbidity symptoms are monitored and maintained or improved  Outcome: Progressing     Problem: Nutrition  Goal: Nutrient intake appropriate for maintaining nutritional needs  Outcome: Progressing     Problem: Heart Failure  Goal: Improved gas exchange this shift  Outcome: Progressing  Goal: Improved urinary output this shift  Outcome: Progressing  Goal: Reduction in peripheral edema within 24 hours  Outcome: Progressing  Goal: Report improvement of dyspnea/breathlessness this shift  Outcome: Progressing  Goal: Weight from fluid excess reduced over 2-3 days, then stabilize  Outcome: Progressing  Goal: Increase self care and/or family involvement in 24 hours  Outcome: Progressing     Problem: Fall/Injury  Goal: Not fall by end of shift  Outcome: Progressing  Goal: Be free from injury by end of the shift  Outcome: Progressing  Goal: Verbalize understanding of personal risk factors for fall in the hospital  Outcome: Progressing  Goal: Verbalize understanding of risk factor reduction measures to prevent injury from fall in the home  Outcome: Progressing  Goal: Use assistive devices by end of the shift  Outcome: Progressing  Goal: Pace activities to prevent fatigue by end of the shift  Outcome: Progressing

## 2025-02-10 NOTE — CONSULTS
Consults    Reason For Consult  PAE    History Of Present Illness  Ming Bunch is a 84 y.o. male presenting with hematuria. He has had intermittent hematuria for several months, with a cystoscopy with Dr. Juan in June 2023 with no abnormalities. He saw Sarah Kathleen NP as an outpatient in December 2024 with hematuria, PSA is normal, discussed adding tadalafil. He subsequently saw Dr. Rock where a repeat cystoscopy showed no abnormalities, strictures or stones. Visual inspection showed an enlarged mildly obstructive prostate. He had recurrent hematuria in Feb and a catheter was placed. He was admitted to the hospital for CBI. He takes Coumadin for a-fib. INR on admission was supratherapeutic at 3.4. Inpatient workup included CT A/P which demonstrated pleural effusion, cirrhotic changes to the liver and a small volume ascites. He is s/p a right 950ml thoracentesis and a 1liter paracentesis. IR consulted for PAE for ongoing hematuria. Hgb 8.8      Past Medical History  He has a past medical history of Personal history of diseases of the blood and blood-forming organs and certain disorders involving the immune mechanism (06/09/2021) and Shortness of breath (02/19/2020).    Surgical History  He has a past surgical history that includes Colonoscopy (06/25/2013); Other surgical history (04/22/2015); Pilonidal cyst drainage (09/11/2013); and Appendectomy (09/11/2013).     Social History  He reports that he has never smoked. He has never used smokeless tobacco. He reports current alcohol use of about 10.0 standard drinks of alcohol per week. He reports that he does not currently use drugs.    Family History  Family History   Problem Relation Name Age of Onset    Heart failure Mother      Atrial fibrillation Sister          Allergies  Patient has no known allergies.    MEDS:    Current Facility-Administered Medications:     acetaminophen (Tylenol) tablet 650 mg, 650 mg, oral, q4h PRN **OR** acetaminophen (Tylenol)  oral liquid 650 mg, 650 mg, oral, q4h PRN **OR** acetaminophen (Tylenol) suppository 650 mg, 650 mg, rectal, q4h PRN, WENDY Cervantes    atorvastatin (Lipitor) tablet 40 mg, 40 mg, oral, Nightly, WENDY Cervantes, 40 mg at 02/09/25 2123    ciprofloxacin (Cipro) tablet 500 mg, 500 mg, oral, q12h SUE, Jose David Christian MD, 500 mg at 02/10/25 0845    dilTIAZem CD (Cardizem CD) 24 hr capsule 120 mg, 120 mg, oral, Daily, Peter Domingo MD, 120 mg at 02/10/25 0845    folic acid (Folvite) tablet 1 mg, 1 mg, oral, Daily, Andree Kerr PA-C, 1 mg at 02/10/25 0845    LORazepam (Ativan) tablet 0.5 mg, 0.5 mg, oral, q2h PRN **OR** LORazepam (Ativan) tablet 1 mg, 1 mg, oral, q2h PRN **OR** LORazepam (Ativan) tablet 2 mg, 2 mg, oral, q2h PRN, Andree Kerr PA-C    metoprolol tartrate (Lopressor) tablet 100 mg, 100 mg, oral, BID, WENDY Cervantes, 100 mg at 02/10/25 0845    multivitamin with minerals 1 tablet, 1 tablet, oral, Daily, Andree Kerr PA-C, 1 tablet at 02/10/25 0845    ondansetron (Zofran) tablet 4 mg, 4 mg, oral, q8h PRN **OR** ondansetron (Zofran) injection 4 mg, 4 mg, intravenous, q8h PRN, WENDY Cervantes    oxybutynin (Ditropan) tablet 5 mg, 5 mg, oral, BID PRN, WENDY [place]    polyethylene glycol (Glycolax, Miralax) packet 17 g, 17 g, oral, Daily, WENDY Cervantes, 17 g at 02/09/25 0830    tamsulosin (Flomax) 24 hr capsule 0.8 mg, 0.8 mg, oral, Daily, MENDEZ Cervantes-CNP, 0.8 mg at 02/10/25 0845    thiamine (Vitamin B-1) tablet 100 mg, 100 mg, oral, Daily, Andree Kerr PA-C, 100 mg at 02/10/25 0845    [Held by provider] warfarin (Coumadin) tablet 2.5 mg, 2.5 mg, oral, Once per day on Wednesday Thursday, Emmanuelle ThibodeauxD    [Held by provider] warfarin (Coumadin) tablet 5 mg, 5 mg, oral, Once per day on Sunday Monday Tuesday Friday Saturday, Stella Romero PharmD    Review of Systems  History obtained from chart review and the  patient  General ROS: negative  Respiratory ROS: no cough, shortness of breath, or wheezing  Cardiovascular ROS: no chest pain or dyspnea on exertion  Gastrointestinal ROS: no abdominal pain, change in bowel habits, or black or bloody stools  Genitourinary ROS: positive for - hematuria     Last Recorded Vitals  /57 (BP Location: Left arm, Patient Position: Sitting)   Pulse 72   Temp 36.9 °C (98.5 °F) (Temporal)   Resp 21   Wt 93 kg (205 lb)   SpO2 94%      Physical Exam  Orientation: oriented to person, place, time, and general circumstances  HEENT: normocephalic, atraumatic  Pulm: normal  Cardiac: Regular rate and rhythm  GI: soft, nontender, normal bowel sounds  : quintanilla with pink tinged urine. CBI running.   Pulses: peripheral pulses symmetrical    Relevant Results    LABS:  Lab Results   Component Value Date    WBC 7.8 02/10/2025    HGB 8.8 (L) 02/10/2025    HCT 27.9 (L) 02/10/2025    MCV 91 02/10/2025     02/10/2025      Results from last 72 hours   Lab Units 02/10/25  0720   SODIUM mmol/L 137   POTASSIUM mmol/L 3.8   CHLORIDE mmol/L 104   CO2 mmol/L 27   BUN mg/dL 11   CREATININE mg/dL 0.64   GLUCOSE mg/dL 105*   CALCIUM mg/dL 8.3*   ANION GAP mmol/L 10   EGFR mL/min/1.73m*2 >90     Results from last 72 hours   Lab Units 02/10/25  0720   ALK PHOS U/L 70   BILIRUBIN TOTAL mg/dL 0.9   PROTEIN TOTAL g/dL 6.3*   ALT U/L 12   AST U/L 7*   ALBUMIN g/dL 3.3*     Results from last 72 hours   Lab Units 02/10/25  0720   INR  1.3*       MICRO:  Susceptibility data from last 14 days.  Collected Specimen Info Organism   02/03/25 Urine from Indwelling (Quintanilla) Catheter Coagulase negative staphylococcus       IMAGING:   US guided abdominal paracentesis   Final Result   Uneventful paracentesis, as detailed above. Right Hemiabdomen, 1 L        Performed and dictated at [facility].        Signed by: Ivy Alva 2/7/2025 1:59 PM   Dictation workstation:   [id number]       XR chest 2 views   Final Result   Cardiomegaly with mild basilar edema again noted. The previous right   pleural effusion decreased in size without pneumothorax.        Signed by: Yonas Sullivan 2/6/2025 6:18 PM   Dictation workstation:   [id number]      US thoracentesis   Final Result   Uneventful thoracentesis, as detailed above: Right Pleural space, 950   mL        Performed and dictated at [facility].        Signed by: Ivy Alva 2/7/2025 1:58 PM   Dictation workstation:   [id number]      XR chest 1 view   Final Result   Redemonstration of small right pleural effusion, without significant   change.        Cardiomegaly, which is unchanged.                  MACRO:   None        Signed by: iMrela Dia 2/6/2025 11:47 AM   Dictation workstation:   [id number]      Transthoracic Echo (TTE) Complete   Final Result      US right upper quadrant   Final Result   Cirrhosis of the liver. Right upper quadrant ascites.        Large right pleural effusion.        Obscuration of the pancreas by bowel gas and body habitus.        MACRO:   None        Signed by: Kenney Samuel 2/4/2025 3:35 PM   Dictation workstation:   [id number]      CT abdomen pelvis wo IV contrast   Final Result   1.  Undulating hepatic contour consistent with cirrhosis along with   mild-to-moderate volume ascites and bilateral pleural effusions.             MACRO:   None        Signed by: Gunnar Goncalves 2/3/2025 10:23 PM   Dictation workstation:   [id number]      XR chest 1 view   Final Result   Cardiomegaly with likely new small bibasilar pleural effusions.   Follow-up is recommended.        MACRO:   None        Signed by: Matt Bowling 2/3/2025 12:24 PM   Dictation workstation:   [id number]      Consult to Interventional Radiology    (Results Pending)          Assessment/Plan     This is an 84 year old male presenting with hematuria. Hx afib on coumadin, INR 3.4 on admission. S/P cystoscopy with Dr. Juan  in June 2023 with no abnormalities.  PSA is normal. Repeat cystoscopy with Dr. Rock 1/24/25 showed no abnormalities, strictures or stones. Visual inspection showed an enlarged mildly obstructive prostate.     Inpatient workup included CT A/P which was personally reviewed demonstrating bilateral pleural effusions, cirrhotic changes to the liver and a small volume ascites. He is s/p a right 950ml thoracentesis and a 1liter paracentesis. Prostate only mildly enlarged on CT. No significant hypertrophy.   IR consulted for PAE for ongoing hematuria. Hgb stable at 8.8. VSS.     Discussed with IR attending Dr. Nicolas and reviewed imaging in detail. Small size of prostate would limit the ability for a successful PAE with decreased clinical outcomes. Presently not an ideal PAE candidate.     Recommend continued conservative management.   Discussed with patient, daughter, and primary/urology team.     Please reach out if clinical instability secondary to blood loss would warrant more urgent intervention. Could further assess prostate volumes with MRI pelvis w/IV contrast in the future, though would not influence current treatment recommendations.     Ivy Alva, APRN-CNP

## 2025-02-10 NOTE — PROGRESS NOTES
Ming Bunch is a 84 y.o. male who was referred to the Clinical Pharmacy Team to complete a Transitions of Care encounter for discharge medication optimization. The patient was referred for their HFpEF.    Attending: Dr. Domingo    PCP: Dr. Burris    _______________________________________________________________________  PHARMACY ASSESSMENT    Home Pharmacy: [facility]  Meds to beds? no    Affordability/Accessibility: no issues  Adherence/Organization: no issues - uses pillbox  Adverse Effects: no issues  _______________________________________________________________________  CHRONIC HEART FAILURE ASSESSMENT  -Patient reports he did have swelling in his feet/ankles when he came in and a 10 pound weight increase from his normal.   -Patient asks about [employer] meals as his wife likes to use them. States his wife doesn't have to watch her diet at all like he does.    -HTN present/diagnosed: no    LVEF: 62%  eGFR: >90  Beta blocker: metoprolol tartrate 100 mg BID  ACEi/ARB/ARNI: no  MRA: no  SGLT2i: no (has chronic quintanilla)  Diuretic: none  ___________________________________________________________________  PATIENT EDUCATION/GOALS  - Discussed basic signs and symptoms of worsening heart failure, including weight gain and swelling in the feet/ankles. Provided patient with heart failure booklet and weight/BP log. Advised on low-sodium (salt) diet.  - Introduced post-discharge pharmacy team follow up and benefits of calls  - Answered all patient questions and concerns to best of my ability  _______________________________________________________________________  RECOMMENDATIONS/PLAN  Consider prn loop diuretic for weight gain/ankle swelling  Continue all medications per medical team  Please send prescriptions to [facility] pharmacy for assistance on insurance prior authorization and copay. Prescriptions will be delivered to the patient's bedside prior to discharge with the Meds to Beds  program.   Continuity of care will be provided by PCP and clinical pharmacy team      Viridiana Mason, Saira     Verbal consent to manage patient's drug therapy was obtained from the patient. They were informed they may decline to participate or withdraw from participation in pharmacy services at any time.

## 2025-02-10 NOTE — PROGRESS NOTES
02/10/25 1805   Discharge Planning   Living Arrangements Spouse/significant other   Support Systems Spouse/significant other   Assistance Needed per IM chart review; Coumadin resumed today, will watch for increased bleeding   - INR subtherapeutic this AM at 1.3, goal INR is 2.0-3.0  - Kirby will stay for discharge, pt to follow up with retention clinic   Expected Discharge Disposition Home   Does the patient need discharge transport arranged? No

## 2025-02-10 NOTE — PROGRESS NOTES
"Ming Bunch is a 84 y.o. male on day 5 of admission presenting with Hematuria.    SUBJECTIVE  Patient was laying in bed drinking coffee this morning. Patient is feeling well. Denies any issues with his quintanilla this morning. Is hoping to continue his care outpatient. CBI has been restarted and running since 02/09/2025. Currently denies suprapubic pain or bladder spasms.         OBJECTIVE  Physical Exam  Vitals and nursing note reviewed. Exam conducted with a chaperone present.   Constitutional:       Appearance: Normal appearance. He is normal weight.   HENT:      Head: Normocephalic and atraumatic.      Nose: Nose normal.   Cardiovascular:      Rate and Rhythm: Normal rate and regular rhythm.      Pulses: Normal pulses.   Pulmonary:      Effort: Pulmonary effort is normal.      Breath sounds: Normal breath sounds.   Abdominal:      General: Abdomen is flat.      Palpations: Abdomen is soft.   Genitourinary:     Comments: Quintanilla catheter in place. CBI started since 02/09/2024. Output has been 8300cc/24hrs. Urine today was light pink in color. Manual irrigation performed with 250 mL of 0.9% saline fluid. No clots were appreciated. Urine remained link pink in color. CBI discontinued.  Skin:     General: Skin is warm and dry.   Neurological:      General: No focal deficit present.      Mental Status: He is alert and oriented to person, place, and time.   Psychiatric:         Mood and Affect: Mood normal.         Behavior: Behavior normal.         Thought Content: Thought content normal.         Judgment: Judgment normal.       Last Recorded Vitals  Blood pressure 109/57, pulse 72, temperature 36.9 °C (98.5 °F), temperature source Temporal, resp. rate 21, height 1.753 m (5' 9\"), weight 93 kg (205 lb), SpO2 94%.  Intake/Output last 3 Shifts:  I/O last 3 completed shifts:  In: 100 (1.1 mL/kg) [P.O.:100]  Out: 8925 (96 mL/kg) [Urine:8925 (2.7 mL/kg/hr)]  Weight: 93 kg     Relevant Results  Scheduled " medications  atorvastatin, 40 mg, oral, Nightly  ciprofloxacin, 500 mg, oral, q12h SUE  dilTIAZem CD, 120 mg, oral, Daily  folic acid, 1 mg, oral, Daily  metoprolol tartrate, 100 mg, oral, BID  multivitamin with minerals, 1 tablet, oral, Daily  polyethylene glycol, 17 g, oral, Daily  tamsulosin, 0.8 mg, oral, Daily  thiamine, 100 mg, oral, Daily  [Held by provider] warfarin, 2.5 mg, oral, Once per day on Wednesday Thursday  [Held by provider] warfarin, 5 mg, oral, Once per day on Sunday Monday Tuesday Friday Saturday      Continuous medications     PRN medications  PRN medications: acetaminophen **OR** acetaminophen **OR** acetaminophen, LORazepam **OR** LORazepam **OR** LORazepam, ondansetron **OR** ondansetron, oxybutynin     Results for orders placed or performed during the hospital encounter of 02/03/25 (from the past 24 hours)   Protime-INR   Result Value Ref Range    Protime 14.8 (H) 9.8 - 12.8 seconds    INR 1.3 (H) 0.9 - 1.1   CBC   Result Value Ref Range    WBC 7.8 4.4 - 11.3 x10*3/uL    nRBC 0.0 0.0 - 0.0 /100 WBCs    RBC 3.06 (L) 4.50 - 5.90 x10*6/uL    Hemoglobin 8.8 (L) 13.5 - 17.5 g/dL    Hematocrit 27.9 (L) 41.0 - 52.0 %    MCV 91 80 - 100 fL    MCH 28.8 26.0 - 34.0 pg    MCHC 31.5 (L) 32.0 - 36.0 g/dL    RDW 16.0 (H) 11.5 - 14.5 %    Platelets 231 150 - 450 x10*3/uL   Comprehensive metabolic panel   Result Value Ref Range    Glucose 105 (H) 74 - 99 mg/dL    Sodium 137 136 - 145 mmol/L    Potassium 3.8 3.5 - 5.3 mmol/L    Chloride 104 98 - 107 mmol/L    Bicarbonate 27 21 - 32 mmol/L    Anion Gap 10 10 - 20 mmol/L    Urea Nitrogen 11 6 - 23 mg/dL    Creatinine 0.64 0.50 - 1.30 mg/dL    eGFR >90 >60 mL/min/1.73m*2    Calcium 8.3 (L) 8.6 - 10.3 mg/dL    Albumin 3.3 (L) 3.4 - 5.0 g/dL    Alkaline Phosphatase 70 33 - 136 U/L    Total Protein 6.3 (L) 6.4 - 8.2 g/dL    AST 7 (L) 9 - 39 U/L    Bilirubin, Total 0.9 0.0 - 1.2 mg/dL    ALT 12 10 - 52 U/L     *Note: Due to a large number of results and/or  encounters for the requested time period, some results have not been displayed. A complete set of results can be found in Results Review.     US guided abdominal paracentesis   Final Result   Uneventful paracentesis, as detailed above. Right Hemiabdomen, 1 L        Performed and dictated at [facility].        Signed by: Ivy Alva 2/7/2025 1:59 PM   Dictation workstation:   [id number]      XR chest 2 views   Final Result   Cardiomegaly with mild basilar edema again noted. The previous right   pleural effusion decreased in size without pneumothorax.        Signed by: Yonas Sullivan 2/6/2025 6:18 PM   Dictation workstation:   [id number]      US thoracentesis   Final Result   Uneventful thoracentesis, as detailed above: Right Pleural space, 950   mL        Performed and dictated at [facility].        Signed by: Ivy Alva 2/7/2025 1:58 PM   Dictation workstation:   [id number]      XR chest 1 view   Final Result   Redemonstration of small right pleural effusion, without significant   change.        Cardiomegaly, which is unchanged.                  MACRO:   None        Signed by: Mirela Dia 2/6/2025 11:47 AM   Dictation workstation:   [id number]      Transthoracic Echo (TTE) Complete   Final Result      US right upper quadrant   Final Result   Cirrhosis of the liver. Right upper quadrant ascites.        Large right pleural effusion.        Obscuration of the pancreas by bowel gas and body habitus.        MACRO:   None        Signed by: Kenney Samuel 2/4/2025 3:35 PM   Dictation workstation:   [id number]      CT abdomen pelvis wo IV contrast   Final Result   1.  Undulating hepatic contour consistent with cirrhosis along with   mild-to-moderate volume ascites and bilateral pleural effusions.             MACRO:   None        Signed by: Gunnar Goncalves 2/3/2025 10:23 PM   Dictation workstation:   [id number]      XR chest 1 view   Final  Result   Cardiomegaly with likely new small bibasilar pleural effusions.   Follow-up is recommended.        MACRO:   None        Signed by: Matt Bowling 2/3/2025 12:24 PM   Dictation workstation:   [id number]      Consult to Interventional Radiology    (Results Pending)     ASSESSMENT/PLAN  Ming Valdez is an 85 y/o male with PMH of BPH and recurrent hematuria who presented to the ED on 02/03/2025 with decreased urine output from quintanilla catheter and hematuria. Patient underwent cystoscopy on 01/24/2025 with findings of mildly obstructive enlarged prostate with no tumors, stone, or strictures being seen.  - CBI started on 02/09/2025 (8300cc/24hrs).  - Manually irrigated patient's catheter and resulted in unchanged light pink urine.  - Discontinue CBI as of today.  -H/H: 8.4/26.1 --> 8.1/26.9 --> 8.8/27.9  - Clear to resume Warfarin.  - Referred patient to outpatient urinary retention clinic.  - Will not be removing quintanilla catheter prior to discharge.  - Plan to continue management outpatient has been discussed with patient and patient's daughter.    Patient's urine is light pink in color. Was manually irrigated, color remained unchanged and no clots were appreciated. CBI has been stopped. Patient is cleared from a urology standpoint. Urology will sign off.    I spent 35 minutes in the professional and overall care of this patient.      Rocco Alexander PA-C

## 2025-02-10 NOTE — PROGRESS NOTES
Ming Bunch is a 84 y.o. male admitted for hematuria. Pharmacy has been consulted for warfarin dosing and monitoring for Atrial Fibrillation/Atrial Flutter with goal INR of 2.0-3.0.     Home regimen   MON TUE WED THR FRI SAT SUN   Dose 5  mg 5  mg 2.5  mg 2.5  mg 5  mg 5  mg 5  mg   Total weekly dose: 30 mg  Source: [facility] note     Labs  INR: 1.3  Hgb/Hct/Plt  Lab Results   Component Value Date    HGB 8.8 (L) 02/10/2025    HGB 8.1 (L) 02/09/2025    HGB 8.4 (L) 02/08/2025    HGB 7.8 (L) 02/07/2025    HGB 7.7 (L) 02/06/2025        Lab Results   Component Value Date    HCT 27.9 (L) 02/10/2025    HCT 26.9 (L) 02/09/2025    HCT 26.1 (L) 02/08/2025    HCT 25.0 (L) 02/07/2025    HCT 25.5 (L) 02/06/2025        Platelets   Date Value Ref Range Status   02/10/2025 231 150 - 450 x10*3/uL Final   02/09/2025 212 150 - 450 x10*3/uL Final   02/08/2025 203 150 - 450 x10*3/uL Final   02/07/2025 191 150 - 450 x10*3/uL Final   02/06/2025 202 150 - 450 x10*3/uL Final      Warfarin Therapy   Current regimen: resuming home reigmen  Bridging: None needed  Interacting medications: interacting medication(s) of Cipro increase bleeding risk    Dosing History During Current Admission  Date 2/10     INR 1.3     Dose  (mg) 5 mg       Assessment and Plan  Patient's INR of 1.3 today is Subtherapeutic. Hemoglobin/hematocrit/platelet stable  Warfarin inpatient plan: Give warfarin 5 mg today per home regimen.  Patient admitted 2/3 with complaints of hematuria and supratherapeutic INR  Warfarin has been on hold since 2/3  Ok to restart Warfarin per PA on primary team and general surgery team  Monitor s/sx of bleeding including epistaxis, hematuria, unusual bruising, hemoptysis, hematochezia as well as s/sx of stroke including impaired speech, unilateral paralysis, blurry vision.  Pharmacy will continue to monitor the patient and adjust therapy as needed.      Thank you for the consult. Please do not hesitate to contact a pharmacist with any  questions.    Stella Romero, PharmD

## 2025-02-10 NOTE — PROGRESS NOTES
Ming AMY Bunch is a 84 y.o. male     Urine is beginning to clear with the CBI  Will resume patient's Coumadin monitor    Review of Systems     Constitutional: no fever, no chills, not feeling poorly, not feeling tired   Cardiovascular: no chest pain   Respiratory: no cough, wheezing or shortness of breath a  Gastrointestinal: no abdominal pain, no constipation, no melena, no nausea, no diarrhea, no vomiting and no blood in stools.   Neurological: no headache,   All other systems have been reviewed and are negative for complaint.       Vitals:    02/10/25 1524   BP: 116/60   Pulse: 84   Resp: 18   Temp: 37.1 °C (98.8 °F)   SpO2: 97%        Scheduled medications  atorvastatin, 40 mg, oral, Nightly  ciprofloxacin, 500 mg, oral, q12h SUE  dilTIAZem CD, 120 mg, oral, Daily  folic acid, 1 mg, oral, Daily  metoprolol tartrate, 100 mg, oral, BID  multivitamin with minerals, 1 tablet, oral, Daily  polyethylene glycol, 17 g, oral, Daily  tamsulosin, 0.8 mg, oral, Daily  thiamine, 100 mg, oral, Daily  warfarin, 2.5 mg, oral, Once per day on Wednesday Thursday  warfarin, 5 mg, oral, Once per day on Sunday Monday Tuesday Friday Saturday      Continuous medications     PRN medications  PRN medications: acetaminophen **OR** acetaminophen **OR** acetaminophen, LORazepam **OR** LORazepam **OR** LORazepam, ondansetron **OR** ondansetron, oxybutynin    Lab Review   Results from last 7 days   Lab Units 02/10/25  0720 02/09/25 0526 02/08/25  0624   WBC AUTO x10*3/uL 7.8 6.3 6.5   HEMOGLOBIN g/dL 8.8* 8.1* 8.4*   HEMATOCRIT % 27.9* 26.9* 26.1*   PLATELETS AUTO x10*3/uL 231 212 203     Results from last 7 days   Lab Units 02/10/25  0720 02/09/25  0526 02/08/25  0624   SODIUM mmol/L 137 137 137   POTASSIUM mmol/L 3.8 3.7 3.7   CHLORIDE mmol/L 104 104 105   CO2 mmol/L 27 29 29   BUN mg/dL 11 14 14   CREATININE mg/dL 0.64 0.65 0.64   CALCIUM mg/dL 8.3* 7.7* 8.0*   PROTEIN TOTAL g/dL 6.3* 5.4* 5.9*   BILIRUBIN TOTAL mg/dL 0.9 0.9 1.0   ALK  PHOS U/L 70 64 66   ALT U/L 12 11 12   AST U/L 7* 7* 8*   GLUCOSE mg/dL 105* 92 99            US guided abdominal paracentesis   Final Result   Uneventful paracentesis, as detailed above. Right Hemiabdomen, 1 L        Performed and dictated at [facility].        Signed by: Ivy Alva 2/7/2025 1:59 PM   Dictation workstation:   [id number]      XR chest 2 views   Final Result   Cardiomegaly with mild basilar edema again noted. The previous right   pleural effusion decreased in size without pneumothorax.        Signed by: Yonas Sullivan 2/6/2025 6:18 PM   Dictation workstation:   [id number]      US thoracentesis   Final Result   Uneventful thoracentesis, as detailed above: Right Pleural space, 950   mL        Performed and dictated at [facility].        Signed by: Ivy Alva 2/7/2025 1:58 PM   Dictation workstation:   [id number]      XR chest 1 view   Final Result   Redemonstration of small right pleural effusion, without significant   change.        Cardiomegaly, which is unchanged.                  MACRO:   None        Signed by: Mirela Dia 2/6/2025 11:47 AM   Dictation workstation:   [id number]      Transthoracic Echo (TTE) Complete   Final Result      US right upper quadrant   Final Result   Cirrhosis of the liver. Right upper quadrant ascites.        Large right pleural effusion.        Obscuration of the pancreas by bowel gas and body habitus.        MACRO:   None        Signed by: Kenney Samuel 2/4/2025 3:35 PM   Dictation workstation:   [id number]      CT abdomen pelvis wo IV contrast   Final Result   1.  Undulating hepatic contour consistent with cirrhosis along with   mild-to-moderate volume ascites and bilateral pleural effusions.             MACRO:   None        Signed by: Gunnar Goncalves 2/3/2025 10:23 PM   Dictation workstation:   [id number]      XR chest 1 view   Final Result   Cardiomegaly with likely new small  bibasilar pleural effusions.   Follow-up is recommended.        MACRO:   None        Signed by: Matt Bowling 2/3/2025 12:24 PM   Dictation workstation:   [id number]            Physical Exam    Constitutional   General appearance: Alert and in no acute distress.   Pulmonary   Respiratory assessment: No respiratory distress, normal respiratory rhythm and effort.    Auscultation of Lungs: Clear bilateral breath sounds.   Cardiovascular   Auscultation of heart: Apical pulse normal, heart rate and rhythm normal, normal S1 and S2, no murmurs and no pericardial rub.    Exam for edema: No peripheral edema.   Abdomen   Abdominal Exam: No bruits, normal bowel sounds, soft, non-tender, no abdominal mass palpated.    Liver and Spleen exam: No hepato-splenomegaly.   Musculoskeletal     Inspection/palpation of joints, bones and muscles: No joint swelling. Normal movement of all extremities.   Neurologic   Cranial nerves: Nerves 2-12 were intact, no focal neuro defects.         Assessment/Plan      #Hematuria  #Urinary tract infection  Urine is clearing up with the bladder irrigation  Will resume Coumadin and monitor  Will likely need to leave the Kirby catheter however the patient is not keen on that idea    #Cirrhosis of the liver  Hepatitis panel noted  Paracentesis done  SAAG greater than 1  Counseled on alcohol abstinence    #Atrial fibrillation  On Coumadin which is on hold at the moment    #Hypertension  Low blood pressure readings  Monitor    #Dyslipidemia  On statins at 40 mg every day

## 2025-02-10 NOTE — CARE PLAN
"The patient's goals for the shift include \"find out when I can go home\"    The clinical goals for the shift include pt will remain safe throughout shift      Problem: Pain - Adult  Goal: Verbalizes/displays adequate comfort level or baseline comfort level  Outcome: Progressing     Problem: Safety - Adult  Goal: Free from fall injury  Outcome: Progressing     Problem: Chronic Conditions and Co-morbidities  Goal: Patient's chronic conditions and co-morbidity symptoms are monitored and maintained or improved  Outcome: Progressing       "

## 2025-02-10 NOTE — PROGRESS NOTES
Medicine PA-C follow up note    Subjective:  Seen with family at bedside, feeling well with no complaints. Discussed plan to restart coumadin today which pharmacy will dose - will watch for bleeding    Additional information:      Vitals (Last 24 Hours):  Heart Rate:  []   Temp:  [36.9 °C (98.4 °F)-37.1 °C (98.8 °F)]   Resp:  [17-21]   BP: ()/(43-77)   SpO2:  [92 %-98 %]       I have reviewed all imaging reports and labs pertinent to this visit / presenting problem    PHYSICAL EXAM:  Constitutional: NAD, alert and cooperative  Eyes: no icterus  ENMT: mucous membranes moist, no lesions  Head/Neck: supple  Respiratory/Thorax: CTA bilaterally, non-labored breathing, no cough, on RA  Cardiovascular: RRR, no murmurs heard  Gastrointestinal: ND/S/NT  : Kirby catheter with light pink urine, no SP/flank discomfort  Musculoskeletal: no joint swelling, ROM intact  Extremities: no edema  Neurological: non-focal  Skin: warm and dry  Psych: calm, stable mood     MEDS:  Scheduled meds  atorvastatin, 40 mg, oral, Nightly  ciprofloxacin, 500 mg, oral, q12h SUE  dilTIAZem CD, 120 mg, oral, Daily  folic acid, 1 mg, oral, Daily  metoprolol tartrate, 100 mg, oral, BID  multivitamin with minerals, 1 tablet, oral, Daily  polyethylene glycol, 17 g, oral, Daily  tamsulosin, 0.8 mg, oral, Daily  thiamine, 100 mg, oral, Daily  warfarin, 2.5 mg, oral, Once per day on Wednesday Thursday  warfarin, 5 mg, oral, Once per day on Sunday Monday Tuesday Friday Saturday        Continuous meds       PRN meds  PRN medications: acetaminophen **OR** acetaminophen **OR** acetaminophen, LORazepam **OR** LORazepam **OR** LORazepam, ondansetron **OR** ondansetron, oxybutynin      ASSESSMENT/PLAN:  Mr. Bunch is an 84 year old male with PMHx of hypertension, A-fib on Coumadin, BPH w/LUTS, hematuria recent cystoscopy, Kirby catheter placed discharged home on 1/31/2025, presented to [facility] ED on 2/3/2025 for hematuria.     Hematuria, improving    Urinary retention  Acute on chronic anemia   - Clear for discharge from a Urology perspective  - Coumadin resumed today, will watch for increased bleeding   - INR subtherapeutic this AM at 1.3, goal INR is 2.0-3.0  - Kirby will stay for discharge, pt to follow up with retention clinic     Liver Cirrhosis  Ascites  Pleural Effusion  -CXR with cardiomegaly, small new bilateral effusions  -CT A/P with cirrhosis , mild-mod volume ascites with generalized mesenteric edema and bilateral pleural effusion  -RUQ US showing cirrhosis of the liver. Right upper quadrant ascites. Large right pleural effusion.   -  -T bili .5  -echo: EF 62%, similar findings compared to last echo 10/24/23  - Thoracentesis completed by IR on 2/6/25 - 950mL clear yellow fluid was drained   - Paracentesis completed by IR on 2/7/25 and 1L of serosanguineous fluid drained   - IR also consulted for possible prostate embolization but pt was deemed not a good candidate for procedure      Hyperlipidemia   -continue atorvastatin 40 mg nightly     Other comorbidities as above  - Continue medications as ordered and adjust based on clinical course     VTE / GI prophylaxis   - Coumadin, Miralax     Discharge planning  - HNN   - Will need to follow closely with [facility] clinic     Discussed with Dr. Domingo and the interdisciplinary team     Melvina Saavedra PA-C

## 2025-02-11 LAB
A1AT PHENOTYP SERPL-IMP: ABNORMAL
A1AT SERPL-MCNC: 212 MG/DL (ref 90–200)
ALBUMIN SERPL BCP-MCNC: 3 G/DL (ref 3.4–5)
ALP SERPL-CCNC: 63 U/L (ref 33–136)
ALT SERPL W P-5'-P-CCNC: 12 U/L (ref 10–52)
ANION GAP SERPL CALC-SCNC: 8 MMOL/L (ref 10–20)
AST SERPL W P-5'-P-CCNC: 8 U/L (ref 9–39)
BILIRUB SERPL-MCNC: 0.8 MG/DL (ref 0–1.2)
BUN SERPL-MCNC: 10 MG/DL (ref 6–23)
CALCIUM SERPL-MCNC: 7.9 MG/DL (ref 8.6–10.3)
CHLORIDE SERPL-SCNC: 106 MMOL/L (ref 98–107)
CO2 SERPL-SCNC: 28 MMOL/L (ref 21–32)
CREAT SERPL-MCNC: 0.6 MG/DL (ref 0.5–1.3)
EGFRCR SERPLBLD CKD-EPI 2021: >90 ML/MIN/1.73M*2
ERYTHROCYTE [DISTWIDTH] IN BLOOD BY AUTOMATED COUNT: 16 % (ref 11.5–14.5)
FUNGUS SPEC CULT: NORMAL
FUNGUS SPEC FUNGUS STN: NORMAL
GLUCOSE SERPL-MCNC: 94 MG/DL (ref 74–99)
HCT VFR BLD AUTO: 26 % (ref 41–52)
HGB BLD-MCNC: 8.1 G/DL (ref 13.5–17.5)
INR PPP: 1.4 (ref 0.9–1.1)
LABORATORY COMMENT REPORT: NORMAL
MCH RBC QN AUTO: 28.2 PG (ref 26–34)
MCHC RBC AUTO-ENTMCNC: 31.2 G/DL (ref 32–36)
MCV RBC AUTO: 91 FL (ref 80–100)
NRBC BLD-RTO: 0 /100 WBCS (ref 0–0)
PATH REPORT.FINAL DX SPEC: NORMAL
PATH REPORT.FINAL DX SPEC: NORMAL
PATH REPORT.GROSS SPEC: NORMAL
PATH REPORT.GROSS SPEC: NORMAL
PATH REPORT.RELEVANT HX SPEC: NORMAL
PATH REPORT.RELEVANT HX SPEC: NORMAL
PATH REPORT.TOTAL CANCER: NORMAL
PATH REPORT.TOTAL CANCER: NORMAL
PLATELET # BLD AUTO: 206 X10*3/UL (ref 150–450)
POTASSIUM SERPL-SCNC: 3.6 MMOL/L (ref 3.5–5.3)
PROT SERPL-MCNC: 5.7 G/DL (ref 6.4–8.2)
PROTHROMBIN TIME: 15.9 SECONDS (ref 9.8–12.8)
RBC # BLD AUTO: 2.87 X10*6/UL (ref 4.5–5.9)
SODIUM SERPL-SCNC: 138 MMOL/L (ref 136–145)
WBC # BLD AUTO: 6.5 X10*3/UL (ref 4.4–11.3)

## 2025-02-11 PROCEDURE — 2500000001 HC RX 250 WO HCPCS SELF ADMINISTERED DRUGS (ALT 637 FOR MEDICARE OP)

## 2025-02-11 PROCEDURE — 2500000001 HC RX 250 WO HCPCS SELF ADMINISTERED DRUGS (ALT 637 FOR MEDICARE OP): Performed by: INTERNAL MEDICINE

## 2025-02-11 PROCEDURE — 36415 COLL VENOUS BLD VENIPUNCTURE: CPT

## 2025-02-11 PROCEDURE — 99232 SBSQ HOSP IP/OBS MODERATE 35: CPT | Performed by: INTERNAL MEDICINE

## 2025-02-11 PROCEDURE — 80053 COMPREHEN METABOLIC PANEL: CPT

## 2025-02-11 PROCEDURE — 99231 SBSQ HOSP IP/OBS SF/LOW 25: CPT

## 2025-02-11 PROCEDURE — 85610 PROTHROMBIN TIME: CPT

## 2025-02-11 PROCEDURE — 85027 COMPLETE CBC AUTOMATED: CPT

## 2025-02-11 PROCEDURE — 99223 1ST HOSP IP/OBS HIGH 75: CPT | Performed by: INTERNAL MEDICINE

## 2025-02-11 PROCEDURE — 1200000002 HC GENERAL ROOM WITH TELEMETRY DAILY

## 2025-02-11 PROCEDURE — 2500000002 HC RX 250 W HCPCS SELF ADMINISTERED DRUGS (ALT 637 FOR MEDICARE OP, ALT 636 FOR OP/ED)

## 2025-02-11 RX ADMIN — FOLIC ACID 1 MG: 1 TABLET ORAL at 08:30

## 2025-02-11 RX ADMIN — Medication 100 MG: at 08:30

## 2025-02-11 RX ADMIN — METOPROLOL TARTRATE 100 MG: 50 TABLET, FILM COATED ORAL at 08:29

## 2025-02-11 RX ADMIN — CIPROFLOXACIN 500 MG: 500 TABLET ORAL at 20:45

## 2025-02-11 RX ADMIN — ATORVASTATIN CALCIUM 40 MG: 40 TABLET, FILM COATED ORAL at 20:45

## 2025-02-11 RX ADMIN — METOPROLOL TARTRATE 100 MG: 50 TABLET, FILM COATED ORAL at 20:45

## 2025-02-11 RX ADMIN — TAMSULOSIN HYDROCHLORIDE 0.8 MG: 0.4 CAPSULE ORAL at 08:29

## 2025-02-11 RX ADMIN — CIPROFLOXACIN 500 MG: 500 TABLET ORAL at 08:30

## 2025-02-11 RX ADMIN — Medication 1 TABLET: at 08:30

## 2025-02-11 RX ADMIN — DILTIAZEM HYDROCHLORIDE 120 MG: 120 CAPSULE, COATED, EXTENDED RELEASE ORAL at 08:30

## 2025-02-11 ASSESSMENT — LIFESTYLE VARIABLES
VISUAL DISTURBANCES: NOT PRESENT
PAROXYSMAL SWEATS: NO SWEAT VISIBLE
TREMOR: NO TREMOR
NAUSEA AND VOMITING: NO NAUSEA AND NO VOMITING
ORIENTATION AND CLOUDING OF SENSORIUM: ORIENTED AND CAN DO SERIAL ADDITIONS
ANXIETY: NO ANXIETY, AT EASE
AUDITORY DISTURBANCES: NOT PRESENT
TOTAL SCORE: 0
AGITATION: NORMAL ACTIVITY
HEADACHE, FULLNESS IN HEAD: NOT PRESENT

## 2025-02-11 ASSESSMENT — COGNITIVE AND FUNCTIONAL STATUS - GENERAL
CLIMB 3 TO 5 STEPS WITH RAILING: A LITTLE
STANDING UP FROM CHAIR USING ARMS: A LITTLE
DRESSING REGULAR LOWER BODY CLOTHING: A LITTLE
MOBILITY SCORE: 21
WALKING IN HOSPITAL ROOM: A LITTLE
EATING MEALS: A LOT
DRESSING REGULAR UPPER BODY CLOTHING: A LITTLE
TURNING FROM BACK TO SIDE WHILE IN FLAT BAD: A LITTLE
TOILETING: A LITTLE
PERSONAL GROOMING: A LITTLE
HELP NEEDED FOR BATHING: A LITTLE
DAILY ACTIVITIY SCORE: 24
PERSONAL GROOMING: A LITTLE
MOBILITY SCORE: 24
MOVING TO AND FROM BED TO CHAIR: A LITTLE
DAILY ACTIVITIY SCORE: 19
MOVING TO AND FROM BED TO CHAIR: A LITTLE
MOBILITY SCORE: 18
DAILY ACTIVITIY SCORE: 24
DAILY ACTIVITIY SCORE: 17
DRESSING REGULAR UPPER BODY CLOTHING: A LITTLE
CLIMB 3 TO 5 STEPS WITH RAILING: A LITTLE
DRESSING REGULAR LOWER BODY CLOTHING: A LITTLE
TOILETING: A LITTLE
HELP NEEDED FOR BATHING: A LITTLE
STANDING UP FROM CHAIR USING ARMS: A LITTLE
MOVING FROM LYING ON BACK TO SITTING ON SIDE OF FLAT BED WITH BEDRAILS: A LITTLE
MOBILITY SCORE: 24

## 2025-02-11 ASSESSMENT — PAIN - FUNCTIONAL ASSESSMENT: PAIN_FUNCTIONAL_ASSESSMENT: 0-10

## 2025-02-11 ASSESSMENT — PAIN SCALES - GENERAL
PAINLEVEL_OUTOF10: 0 - NO PAIN
PAINLEVEL_OUTOF10: 0 - NO PAIN

## 2025-02-11 NOTE — PROGRESS NOTES
HPI    84 y.o. male being seen with the following problem list:    Problem list:  Gross hematuria   Urinary retention    History of Elevated PSA, Gross Hematuria with benign workup, Afib on Warfarin, HLD, UTI's. He is taking tamsulosin 0.8 mg daily. He has no complaints or concerns today. Denies dysuria, nocturia, flank pain, and bothersome frequency or urgency.  Patient is a former smoker.     1/31/25 PVR  737 ml      Cystoscopy with Dr. Juan 06/06/2023 with no cause for hematuria identified. Prostate is short, nonobstructing bilobar hypertrophy only. Large capacity bladder, moderate trabeculation, L periureteral hutch diverticulum that is shallow     CTU 05/23/2023 negative for stones, hydroureteronephrosis, tumor, or mass bilaterally.   Non GYN cytology Negative    Presented to ED on 1/31 with  a blocked Quintanilla catheter and hematuria. Presented to ED again on 2/3 with blood in quintanilla cath.     02/11/25 - Seen today over telehealth, performed this visit using real-time telehealth tools, including an audio/video connection between Ming Bunch at home and Philip Valdez MD at [facility]. Consent for telemedicine visit was obtained.         Lab Results   Component Value Date    PSA 0.26 10/01/2024    PSA 0.37 07/14/2022    PSA 0.38 07/13/2021    PSA 0.53 12/29/2020              Current Medications:  No current facility-administered medications for this visit.     No current outpatient medications on file.     Facility-Administered Medications Ordered in Other Visits   Medication Dose Route Frequency Provider Last Rate Last Admin    acetaminophen (Tylenol) tablet 650 mg  650 mg oral q4h PRN WENDY Cervantes        Or    acetaminophen (Tylenol) oral liquid 650 mg  650 mg oral q4h PRN WENDY Cervantes        Or    acetaminophen (Tylenol) suppository 650 mg  650 mg rectal q4h PRN WENDY Cervantes        atorvastatin (Lipitor) tablet 40 mg  40 mg oral Nightly Juan Domingo  MENDEZ-CNP   40 mg at 02/10/25 2040    ciprofloxacin (Cipro) tablet 500 mg  500 mg oral q12h [facility] Jose David Christian MD   500 mg at 02/11/25 0830    dilTIAZem CD (Cardizem CD) 24 hr capsule 120 mg  120 mg oral Daily Peter Domingo MD   120 mg at 02/11/25 0830    folic acid (Folvite) tablet 1 mg  1 mg oral Daily Andree Kerr PA-C   1 mg at 02/11/25 0830    LORazepam (Ativan) tablet 0.5 mg  0.5 mg oral q2h PRN Andree Kerr PA-C        Or    LORazepam (Ativan) tablet 1 mg  1 mg oral q2h PRN Andree Kerr PA-C        Or    LORazepam (Ativan) tablet 2 mg  2 mg oral q2h PRN Andree Kerr PA-C        metoprolol tartrate (Lopressor) tablet 100 mg  100 mg oral BID WENDY Cervantes   100 mg at 02/11/25 0829    multivitamin with minerals 1 tablet  1 tablet oral Daily Andree Kerr PA-C   1 tablet at 02/11/25 0830    ondansetron (Zofran) tablet 4 mg  4 mg oral q8h PRN WENDY Cervantes        Or    ondansetron (Zofran) injection 4 mg  4 mg intravenous q8h PRN WENDY Cervantes        oxybutynin (Ditropan) tablet 5 mg  5 mg oral BID PRN WENDY [place]        polyethylene glycol (Glycolax, Miralax) packet 17 g  17 g oral Daily MENDEZ Cervantes-CNP   17 g at 02/09/25 0830    tamsulosin (Flomax) 24 hr capsule 0.8 mg  0.8 mg oral Daily WENDY Cervantes   0.8 mg at 02/11/25 0829    thiamine (Vitamin B-1) tablet 100 mg  100 mg oral Daily Andree Kerr PA-C   100 mg at 02/11/25 0830    [Held by provider] warfarin (Coumadin) tablet 2.5 mg  2.5 mg oral Once per day on Wednesday Thursday Stella Romero PharmD        [Held by provider] warfarin (Coumadin) tablet 5 mg  5 mg oral Once per day on Sunday Monday Tuesday Friday Saturday Stella Romero PharmD   5 mg at 02/10/25 7826        Active Problems:  Ming Bunch is a 84 y.o. male with the following Problems and Medications.  Patient Active Problem List   Diagnosis    Anemia    Asymptomatic microscopic hematuria     Atrial fibrillation (Multi)    Bilateral impacted cerumen    Coronary artery disease    Elevated LDL cholesterol level    Elevated PSA    Hematuria    Increased prostate specific antigen (PSA) velocity    Hyperglycemia    Lower urinary tract symptoms    Other difficulties with micturition    Sepsis secondary to UTI (Multi)    Aortic root dilatation (CMS-HCC)    Vitamin D deficiency    History of BPH    History of thrombocytopenia    Shortness of breath on exertion    On warfarin therapy    Low platelet count (CMS-[facility])    Atrial fibrillation, unspecified type (Multi)    Acute on chronic combined systolic and diastolic congestive heart failure     No current facility-administered medications for this visit.     No current outpatient medications on file.     Facility-Administered Medications Ordered in Other Visits   Medication Dose Route Frequency Provider Last Rate Last Admin    acetaminophen (Tylenol) tablet 650 mg  650 mg oral q4h PRN MENDEZ Cervantes-CNP        Or    acetaminophen (Tylenol) oral liquid 650 mg  650 mg oral q4h PRN MENDEZ Cervantes-CNP        Or    acetaminophen (Tylenol) suppository 650 mg  650 mg rectal q4h PRN Juan Domingo APRN-CNP        atorvastatin (Lipitor) tablet 40 mg  40 mg oral Nightly MENDEZ Cervantes-CNP   40 mg at 02/10/25 2040    ciprofloxacin (Cipro) tablet 500 mg  500 mg oral q12h [facility] Jose David Christian MD   500 mg at 02/11/25 0830    dilTIAZem CD (Cardizem CD) 24 hr capsule 120 mg  120 mg oral Daily Peter Domingo MD   120 mg at 02/11/25 0830    folic acid (Folvite) tablet 1 mg  1 mg oral Daily Andree Kerr PA-C   1 mg at 02/11/25 0830    LORazepam (Ativan) tablet 0.5 mg  0.5 mg oral q2h PRN Andree Kerr PA-C        Or    LORazepam (Ativan) tablet 1 mg  1 mg oral q2h PRN Andree Kerr PA-C        Or    LORazepam (Ativan) tablet 2 mg  2 mg oral q2h PRN Andree Kerr PA-C        metoprolol tartrate (Lopressor) tablet 100 mg  100 mg oral BID Juan  Domingo, APRN-CNP   100 mg at 02/11/25 0829    multivitamin with minerals 1 tablet  1 tablet oral Daily Andree Kerr PA-C   1 tablet at 02/11/25 0830    ondansetron (Zofran) tablet 4 mg  4 mg oral q8h PRN WENDY Cervantes        Or    ondansetron (Zofran) injection 4 mg  4 mg intravenous q8h PRN WENDY Cervantes        oxybutynin (Ditropan) tablet 5 mg  5 mg oral BID PRN WENDY [place]        polyethylene glycol (Glycolax, Miralax) packet 17 g  17 g oral Daily WENDY Cervantes   17 g at 02/09/25 0830    tamsulosin (Flomax) 24 hr capsule 0.8 mg  0.8 mg oral Daily WENDY Cervantes   0.8 mg at 02/11/25 0829    thiamine (Vitamin B-1) tablet 100 mg  100 mg oral Daily Andree Kerr PA-C   100 mg at 02/11/25 0830    [Held by provider] warfarin (Coumadin) tablet 2.5 mg  2.5 mg oral Once per day on Wednesday Thursday Stella Romero PharmD        [Held by provider] warfarin (Coumadin) tablet 5 mg  5 mg oral Once per day on Sunday Monday Tuesday Friday Saturday Stella Romero PharmD   5 mg at 02/10/25 1806       PMH:  Past Medical History:   Diagnosis Date    Personal history of diseases of the blood and blood-forming organs and certain disorders involving the immune mechanism 06/09/2021    History of thrombocytopenia    Shortness of breath 02/19/2020    Shortness of breath on exertion       PSH:  Past Surgical History:   Procedure Laterality Date    APPENDECTOMY  09/11/2013    Appendectomy    COLONOSCOPY  06/25/2013    Complete Colonoscopy    OTHER SURGICAL HISTORY  04/22/2015    Removal Of Lesion    PILONIDAL CYST DRAINAGE  09/11/2013    Pilonidal Cyst Resection       FMH:  Family History   Problem Relation Name Age of Onset    Heart failure Mother      Atrial fibrillation Sister         SHx:  Social History     Tobacco Use    Smoking status: Never    Smokeless tobacco: Never   Substance Use Topics    Alcohol use: Yes     Alcohol/week: 10.0 standard drinks of alcohol      Types: 10 Standard drinks or equivalent per week    Drug use: Not Currently       Allergies:  No Known Allergies    Physical Exam:      Assessment/Plan      Scribe Attestation  By signing my name below, I, Lisha Arredondo, Kathyibsaqib, attest that this documentation has been prepared under the direction and in the presence of Philip Valdez MD.

## 2025-02-11 NOTE — PROGRESS NOTES
Ming Bunch is a 84 y.o. male admitted for hematuria. Pharmacy has been consulted for warfarin dosing and monitoring for Atrial Fibrillation/Atrial Flutter with goal INR of 2.0-3.0.     Home regimen   MON TUE WED THR FRI SAT SUN   Dose 5  mg 5  mg 2.5  mg 2.5  mg 5  mg 5  mg 5  mg   Total weekly dose: 30 mg  Source: [employer] note     Labs  INR: 1.4  Hgb/Hct/Plt  Lab Results   Component Value Date    HGB 8.1 (L) 02/11/2025    HGB 8.8 (L) 02/10/2025    HGB 8.1 (L) 02/09/2025    HGB 8.4 (L) 02/08/2025    HGB 7.8 (L) 02/07/2025        Lab Results   Component Value Date    HCT 26.0 (L) 02/11/2025    HCT 27.9 (L) 02/10/2025    HCT 26.9 (L) 02/09/2025    HCT 26.1 (L) 02/08/2025    HCT 25.0 (L) 02/07/2025        Platelets   Date Value Ref Range Status   02/11/2025 206 150 - 450 x10*3/uL Final   02/10/2025 231 150 - 450 x10*3/uL Final   02/09/2025 212 150 - 450 x10*3/uL Final   02/08/2025 203 150 - 450 x10*3/uL Final   02/07/2025 191 150 - 450 x10*3/uL Final      Warfarin Therapy   Current regimen: resuming home reigmen  Bridging: None needed  Interacting medications: interacting medication(s) of Cipro increase bleeding risk    Dosing History During Current Admission  Date 2/10 2/11    INR 1.3 1.4    Dose  (mg) 5 mg Hold      Assessment and Plan  Patient's INR of 1.4 today is Subtherapeutic, but slightly increased since yesterday. Hemoglobin/hematocrit/platelet stable  Warfarin inpatient plan: On hold per Jyoti Gonzales.  Monitor s/sx of bleeding including epistaxis, hematuria, unusual bruising, hemoptysis, hematochezia as well as s/sx of stroke including impaired speech, unilateral paralysis, blurry vision.  Pharmacy will continue to monitor the patient and adjust therapy as needed.      Thank you for the consult. Please do not hesitate to contact a pharmacist with any questions.    Stella Romero, PharmD

## 2025-02-11 NOTE — TREATMENT PLAN
CBI stopped, coumadin restarted yesterday. Urine output back to dark red/maroon colored today with some clots. Patient denies obstructive s/sx such as abdominal pain/fullness, bladder spasms, leakage.     - restart CBI, titrate to light pink. Manually irrigate as needed  - hold coumadin  - NPO after midnight   - will tentatively add on for cystoscopy and clot evacuation tomorrow pending if no improvement     Discussed with Dr. Valdez and Dr. Ring  Family and patient updated at bedside, all questions answered    Gladys Duvall PA-C  Department of Surgical Services  [facility]

## 2025-02-11 NOTE — PROGRESS NOTES
"Ming Bunch is a 84 y.o. male on day 6 of admission presenting with Hematuria.    Subjective   Seen and evaluated patient at bedside, with family members present ( 2 daughters and spouse)  Dark red maroon colored urine noted in quintanilla catheter   Patient and family members frustrated with persistent hematuria and \"lack of communication from providers\"  Discussed with Urology FLORINA, that hematuria has worsened- agreed to restart CBI and to hold Coumadin     Objective     Last Recorded Vitals  /77 (BP Location: Left arm, Patient Position: Lying)   Pulse 92   Temp 36.6 °C (97.8 °F) (Temporal)   Resp 17   Wt 93 kg (205 lb)   SpO2 96%   Intake/Output last 3 Shifts:    Intake/Output Summary (Last 24 hours) at 2/11/2025 1301  Last data filed at 2/11/2025 1201  Gross per 24 hour   Intake 480 ml   Output 1350 ml   Net -870 ml       Admission Weight  Weight: 93 kg (205 lb) (02/03/25 0944)    Daily Weight  02/03/25 : 93 kg (205 lb)    Image Results  US guided abdominal paracentesis  Narrative: Interpreted By:  Ivy Alva,   STUDY:  US GUIDED ABDOMINAL PARACENTESIS; 2/7/20251:58 pm      INDICATION:  Signs/Symptoms:Ascites      COMPARISON:  None.      ACCESSION NUMBER(S):  [id number]      ORDERING CLINICIAN:  CJ CORNELL      TECHNIQUE:  INTERVENTIONALIST(S):  Ivy Alva      CONSENT:  The patient/patient's POA/next of kin was informed of the nature of  the proposed procedure. Risks were discussed including but not  limited to bleeding, infection, pain at insertion site, or puncture  of underlying organ. Purpose of treatment and alternative options  were also reviewed. All questions were answered and patient agreed to  proceed.      SEDATION:  None      MEDICATION/CONTRAST:  1% lidocaine      TIME OUT:  A time out was performed immediately prior to procedure start with  the interventional team, correctly identifying the patient name, date  of birth, MRN, procedure, anatomy (including marking of " site and  side), patient position, procedure consent form, relevant laboratory  and imaging test results, antibiotic administration, safety  precautions, and procedure-specific equipment needs.      FINDINGS:  The patient was placed into the supine position.      The patient's abdomen was scanned using the ultrasound probe. The  area of fluid most amenable to drainage was marked. Color doppler was  used to assess for vasculature in the intended field.      The patient's skin was sterilized using chlorhexidine and draped in  sterile manner. The skin was anesthestized with 1% lidocaine. Under  real time ultrasound guidance, a 5F centesis catheter was inserted  into the abdomen in the RLQ where previously marked. A total of 1 L  of serosanguineous ascitic fluid was removed.  The catheter was then  removed and a sterile op site was placed over the insertion site.  Sterile technique used throughout entirety of procedure.      Specimens were obtained, labeled and sent to the laboratory with  requisitions per primary team.      Patient tolerated procedure well and there were no immediate  complications identified.      Impression: Uneventful paracentesis, as detailed above. Right Hemiabdomen, 1 L      Performed and dictated at [facility].      Signed by: Ivy Alva 2/7/2025 1:59 PM  Dictation workstation:   [id number]  US thoracentesis  Narrative: Interpreted By:  Ivy Alva,   STUDY:  US THORACENTESIS; 2/7/20251:57 pm      INDICATION:  Signs/Symptoms:Large pleural efussion, mild to moderate ascites, SOB  x several weeks, should we drain?      COMPARISON:  None.      ACCESSION NUMBER(S):  [id number]      ORDERING CLINICIAN:  KENZIE SMITH      TECHNIQUE:  INTERVENTIONALIST:  Ivy Alva      CONSENT:  The patient/patient's POA/next of kin was informed of the nature of  the proposed procedure. Risks were discussed including but not  limited to bleeding,  infection, pain at insertion site, or  pneumo/hemothorax requiring chest tube placement. Purpose of  treatment and alternative options were also reviewed. All questions  were answered and patient agreed to proceed.      SEDATION:  None      MEDICATION/CONTRAST:  Lidocaine 1%.      TIME OUT:  A time out was performed immediately prior to procedure start with  the interventional team, correctly identifying the patient name, date  of birth, MRN, procedure, anatomy (including marking of site and  side), patient position, procedure consent form, relevant laboratory  and imaging test results, antibiotic administration, safety  precautions, and procedure-specific equipment needs.      FINDINGS:  The patient was placed in the sitting position.      The patient's right pleural space was scanned using the ultrasound  probe. The area of fluid most amenable to drainage was marked. Color  doppler was used to assess for vasculature in the intended field.      The patient's skin was sterilized using chlorhexidine and draped in  sterile manner. The skin was anesthestized with 1% lidocaine.  Under  real time ultrasound guidance, a 5F valved centesis catheter was  inserted into the right pleural space where previously marked. A  total of 950ml of clear tony pleural fluid was removed. The catheter  was then removed and a sterile op site was placed over the insertion  site. Sterile technique used throughout entirety of procedure.      Specimens were obtained, labeled and sent to lab with requisitions  ordered by primary team.      The patient tolerated the procedure well and there were no immediate  complications.      Impression: Uneventful thoracentesis, as detailed above: Right Pleural space, 950  mL      Performed and dictated at [facility].      Signed by: Ivy Alva 2/7/2025 1:58 PM  Dictation workstation:   [id number]      Physical Exam  Constitutional: NAD, alert and cooperative,  frustrated  Eyes: no icterus  ENMT: mucous membranes moist, no lesions  Head/Neck: supple  Respiratory/Thorax: CTA bilaterally, non-labored breathing, no cough, on RA  Cardiovascular: RRR, no murmurs heard  Gastrointestinal: ND/S/NT  : Kirby catheter with dark red maroon urine, no SP/flank discomfort  Musculoskeletal: no joint swelling, ROM intact  Extremities: no edema  Neurological: non-focal  Skin: warm and dry  Psych: calm, stable mood     Relevant Results  Scheduled medications  atorvastatin, 40 mg, oral, Nightly  ciprofloxacin, 500 mg, oral, q12h SUE  dilTIAZem CD, 120 mg, oral, Daily  folic acid, 1 mg, oral, Daily  metoprolol tartrate, 100 mg, oral, BID  multivitamin with minerals, 1 tablet, oral, Daily  polyethylene glycol, 17 g, oral, Daily  tamsulosin, 0.8 mg, oral, Daily  thiamine, 100 mg, oral, Daily  [Held by provider] warfarin, 2.5 mg, oral, Once per day on Wednesday Thursday  [Held by provider] warfarin, 5 mg, oral, Once per day on Sunday Monday Tuesday Friday Saturday      Continuous medications     PRN medications  PRN medications: acetaminophen **OR** acetaminophen **OR** acetaminophen, LORazepam **OR** LORazepam **OR** LORazepam, ondansetron **OR** ondansetron, oxybutynin  Results for orders placed or performed during the hospital encounter of 02/03/25 (from the past 24 hours)   CBC   Result Value Ref Range    WBC 6.5 4.4 - 11.3 x10*3/uL    nRBC 0.0 0.0 - 0.0 /100 WBCs    RBC 2.87 (L) 4.50 - 5.90 x10*6/uL    Hemoglobin 8.1 (L) 13.5 - 17.5 g/dL    Hematocrit 26.0 (L) 41.0 - 52.0 %    MCV 91 80 - 100 fL    MCH 28.2 26.0 - 34.0 pg    MCHC 31.2 (L) 32.0 - 36.0 g/dL    RDW 16.0 (H) 11.5 - 14.5 %    Platelets 206 150 - 450 x10*3/uL   Comprehensive metabolic panel   Result Value Ref Range    Glucose 94 74 - 99 mg/dL    Sodium 138 136 - 145 mmol/L    Potassium 3.6 3.5 - 5.3 mmol/L    Chloride 106 98 - 107 mmol/L    Bicarbonate 28 21 - 32 mmol/L    Anion Gap 8 (L) 10 - 20 mmol/L    Urea Nitrogen 10 6 -  23 mg/dL    Creatinine 0.60 0.50 - 1.30 mg/dL    eGFR >90 >60 mL/min/1.73m*2    Calcium 7.9 (L) 8.6 - 10.3 mg/dL    Albumin 3.0 (L) 3.4 - 5.0 g/dL    Alkaline Phosphatase 63 33 - 136 U/L    Total Protein 5.7 (L) 6.4 - 8.2 g/dL    AST 8 (L) 9 - 39 U/L    Bilirubin, Total 0.8 0.0 - 1.2 mg/dL    ALT 12 10 - 52 U/L   Protime-INR   Result Value Ref Range    Protime 15.9 (H) 9.8 - 12.8 seconds    INR 1.4 (H) 0.9 - 1.1     *Note: Due to a large number of results and/or encounters for the requested time period, some results have not been displayed. A complete set of results can be found in Results Review.     No results found.  Assessment/Plan   Assessment & Plan  Hematuria    Acute on chronic combined systolic and diastolic congestive heart failure    Mr. Bunch is an 84 year old male with PMHx of hypertension, A-fib on Coumadin, BPH w/LUTS, hematuria recent cystoscopy, Quintanilla catheter placed discharged home on 1/31/2025, presented to [facility] ED on 2/3/2025 for hematuria.      # Persistent Hematuria  - OP cystoscopy on 1/24 for hematuria. No clear source of hematuria; possibly BPH with retention and cystitis; recommended PEA procedure  - IR also consulted for possible prostate embolization but pt was deemed not a good candidate for procedure   - Urinary retention- maintain quintanilla catheter upon discharge  - Initially cleared by Urology to resume Coumadin 2/10.  - Worsening bleeding this morning  - Discussed case with Urology FLORINA, restart CBI and hold Coumadin.  - Urology following  - Daily CBC     #History of AF  - Coumadin on hold ( received 5mg Coumadin 2/10)  - Appreciate Cardiology input for AC recommendations     #Liver Cirrhosis  Ascites  Pleural Effusion  -CXR with cardiomegaly, small new bilateral effusions  -CT A/P with cirrhosis , mild-mod volume ascites with generalized mesenteric edema and bilateral pleural effusion  -RUQ US showing cirrhosis of the liver. Right upper quadrant ascites. Large right pleural  effusion.   -  -T bili .5  -echo: EF 62%, similar findings compared to last echo 10/24/23    Hyperlipidemia   -continue atorvastatin 40 mg nightly      Other comorbidities as above  - Continue medications as ordered and adjust based on clinical course      VTE / GI prophylaxis   - Coumadin ( on hold), Miralax      Discharge planning  - HNN   - at this point; maintain quintanilla upon discharge  - Will need to follow closely with AC clinic      Discussed with Dr. Domingo and the interdisciplinary team     MENDEZ Silver-CNP

## 2025-02-11 NOTE — CONSULTS
Inpatient consult to Cardiology  Consult performed by: WENDY Riley  Consult ordered by: WENDY Silver  Reason for consult: anticoagulation recs  Assessment/Recommendations: Long standing persistent atrial fibrillation with [id number] Stroke Risk Points: 4 who has been maintained on VKA for CVA prevention who presents with hematuria. He is currently planned for Cystoscopy in the AM.   -- OK to hjold anticoagulation at this time.   -- Resume when deemed safe per Urology.   -- Further recommendations pending cystoscopy results             History Of Present Illness:    Ming Bunch is a 84 y.o. male with past medical history of low grade CAD by [facility] 2007, chronic atrial fibrillation on warfarin, hyperlipidemia, cirrhosis,  BPH w/LUTS, hematuria recent cystoscopy, Quintanilla catheter placed discharged home on 1/31/2025, presented to [facility] ED on 2/3/2025 for hematuria.  Cardiology consulted for anticoagulation recs in setting of hematuria.      States he came to ED with blood in his quintanilla bag.  Denies any chest pain or angina.  State he can be short of breath with exertion, but this is at baseline.  He noticed increased swelling in his legs, feet, and abdomen the past few weeks. No recent nausea, vomiting, fever, or chills. No palpitations or syncope.     Home CV regimen:  warfarin, metoprolol tartrate 100mg BID, diltiazem  mg daily.    He follows with Dr. HUE Lee, and Kellen See CNP for cardiology    Past Cardiology Tests (Last 3 Years):    EKG:        Echo:  TTE 2/4/2025   1. Left ventricular ejection fraction is normal, calculated by Brandon's biplane at 62%.   2. Spectral Doppler shows a Grade III (restrictive pattern) of left ventricular diastolic filling with an elevated left atrial pressure.   3. There is normal right ventricular global systolic function.   4. Mildly enlarged right ventricle.   5. The left atrial size is severely dilated.   6. The right atrium is severely  dilated.   7. Mild to moderate mitral valve regurgitation.   8. Mildly elevated right ventricular systolic pressure.   9. Severe tricuspid regurgitation visualized.  10. Moderate aortic valve regurgitation.    Echo 10/2023  CONCLUSIONS:   1. Left ventricular systolic function is low normal with a 50-55% estimated ejection fraction.   2. The left atrium is severely dilated.   3. The right atrium is severely dilated.   4. Moderate to severe mitral valve regurgitation.   5. The tricuspid valve annulus appears dilated.   6. Mildly elevated RVSP.   7. Severe tricuspid regurgitation visualized.   8. Moderate aortic valve regurgitation.   9. The estimated PASP is 42 mmHg.  10. There is moderate dilatation of the aortic root.  11. There is moderate dilatation of the ascending aorta.    Cath:    Low-grade CAD by cardiac cath, 09/15/2007       Past Medical History:  He has a past medical history of Personal history of diseases of the blood and blood-forming organs and certain disorders involving the immune mechanism (06/09/2021) and Shortness of breath (02/19/2020).    Past Surgical History:  He has a past surgical history that includes Colonoscopy (06/25/2013); Other surgical history (04/22/2015); Pilonidal cyst drainage (09/11/2013); and Appendectomy (09/11/2013).      Social History:  He reports that he has never smoked. He has never used smokeless tobacco. He reports current alcohol use of about 10.0 standard drinks of alcohol per week. He reports that he does not currently use drugs.    Family History:  Family History   Problem Relation Name Age of Onset    Heart failure Mother      Atrial fibrillation Sister          Allergies:  Patient has no known allergies.    ROS:  10 point review of systems including (Constitutional, Eyes, ENMT, Respiratory, Cardiac, Gastrointestinal, Neurological, Psychiatric, and Hematologic) was performed and is otherwise negative.    Objective Data:  Last Recorded Vitals:  Vitals:    02/11/25  "0015 25 0412 25 0712 25 1201   BP: 111/59 118/70 126/69 111/77   BP Location: Left arm Left arm Left leg Left arm   Patient Position: Lying Lying Lying Lying   Pulse: 81  84 92   Resp: 17  18 17   Temp: 36.9 °C (98.4 °F) 37.2 °C (99 °F) 36.8 °C (98.3 °F) 36.6 °C (97.8 °F)   TempSrc: Temporal Temporal Oral Temporal   SpO2: 98% 98% 94% 96%   Weight:       Height:         Medical Gas Therapy: None (Room air)  Medical Gas Delivery Method: Nasal cannula  Weight  Av kg (205 lb)  Min: 93 kg (205 lb)  Max: 93 kg (205 lb)    LABS:  CMP:  Results from last 7 days   Lab Units 25  0808 02/10/25  0720 25  0526 25  0624 25  0522 25  0453 25  0445   SODIUM mmol/L 138 137 137 137 138 140 138   POTASSIUM mmol/L 3.6 3.8 3.7 3.7 3.2* 3.6 3.5   CHLORIDE mmol/L 106 104 104 105 104 102 103   CO2 mmol/L 28 27 29 29 28 32 29   ANION GAP mmol/L 8* 10 8* 7* 9* 10 10   BUN mg/dL 10 11 14 14 16 15 12   CREATININE mg/dL 0.60 0.64 0.65 0.64 0.65 0.72 0.68   EGFR mL/min/1.73m*2 >90 >90 >90 >90 >90 90 >90   MAGNESIUM mg/dL  --   --   --   --  1.66 1.66 1.73   ALBUMIN g/dL 3.0* 3.3* 3.0* 3.1* 3.0* 3.1* 3.0*   ALT U/L 12 12 11 12 9* 9* 8*   AST U/L 8* 7* 7* 8* 5* 6* 5*   BILIRUBIN TOTAL mg/dL 0.8 0.9 0.9 1.0 1.0 0.9 0.9     CBC:  Results from last 7 days   Lab Units 25  0808 02/10/25  0720 25  0526 25  0624 25  0522 25  0453 25  0445   WBC AUTO x10*3/uL 6.5 7.8 6.3 6.5 6.4 6.7 6.4   HEMOGLOBIN g/dL 8.1* 8.8* 8.1* 8.4* 7.8* 7.7* 7.7*   HEMATOCRIT % 26.0* 27.9* 26.9* 26.1* 25.0* 25.5* 24.3*   PLATELETS AUTO x10*3/uL 206 231 212 203 191 202 184   MCV fL 91 91 92 90 92 91 90     COAG:   Results from last 7 days   Lab Units 25  0808 02/10/25  0720 25  0526 25  0624 25  0522 25  0453 25  0944   INR  1.4* 1.3* 1.5* 1.5* 1.8* 2.0* 2.3*     ABO: No results found for: \"ABO\"  HEME/ENDO:     CARDIAC:       No lab exists for " "component: \"CK\", \"CKMBP\"          Last I/O:    Intake/Output Summary (Last 24 hours) at 2/11/2025 1516  Last data filed at 2/11/2025 1201  Gross per 24 hour   Intake 480 ml   Output 1350 ml   Net -870 ml     Net IO Since Admission: -15,630 mL [02/11/25 1516]      Imaging Results:      Inpatient Medications:  Scheduled medications   Medication Dose Route Frequency    atorvastatin  40 mg oral Nightly    ciprofloxacin  500 mg oral q12h SUE    dilTIAZem CD  120 mg oral Daily    folic acid  1 mg oral Daily    metoprolol tartrate  100 mg oral BID    multivitamin with minerals  1 tablet oral Daily    polyethylene glycol  17 g oral Daily    tamsulosin  0.8 mg oral Daily    thiamine  100 mg oral Daily    [Held by provider] warfarin  2.5 mg oral Once per day on Wednesday Thursday    [Held by provider] warfarin  5 mg oral Once per day on Sunday Monday Tuesday Friday Saturday     PRN medications   Medication    acetaminophen    Or    acetaminophen    Or    acetaminophen    LORazepam    Or    LORazepam    Or    LORazepam    ondansetron    Or    ondansetron    oxybutynin     Continuous Medications   Medication Dose Last Rate       Outpatient Medications:  Current Outpatient Medications   Medication Instructions    aspirin 81 mg, oral, Daily    atorvastatin (LIPITOR) 40 mg, oral, Nightly    dilTIAZem CD (CARDIZEM CD) 240 mg, oral, Daily    metoprolol tartrate (LOPRESSOR) 100 mg, oral, 2 times daily    tadalafil (CIALIS) 5 mg, oral, Daily    tamsulosin (FLOMAX) 0.8 mg, oral, Daily    warfarin (COUMADIN) 5 mg, oral, Every evening       Physical Exam:    General:  Patient is awake, alert, and oriented.  Patient is in no acute distress.  HEENT:  Normocephalic.  Moist mucosa.    Neck:  Normal Jugular Venous Pressure.  Cardiovascular:  Regular rate and rhythm.  Normal S1 and S2, no murmurs, rubs or gallops  Pulmonary:  Clear to auscultation bilaterally.  Abdomen:  Soft. Non-tender.   Non-distended.  Positive bowel sounds.  Lower " Extremities:  2+ pedal pulses. 1+ dependent edema to lower extremities.   Neurologic:  Alert and oriented x3. No focal deficit.   Skin: Skin warm and dry, normal skin turgor.   Psychiatric: Normal affect.       Assessment/Plan     Ming Bunch is a 84 y.o. male with past medical history of low grade CAD by [facility] 2007, chronic atrial fibrillation on warfarin, hyperlipidemia, cirrhosis,  BPH w/LUTS, hematuria recent cystoscopy, Kirby catheter placed discharged home on 1/31/2025, presented to [facility] ED on 2/3/2025 for hematuria.  Cardiology consulted for anticoagulation recs in setting of hematuria.      Assessment:    # Chronic atrial fibrillation on warfarin   - HR controlled with home metoprolol tartrate 100mg BID and diltiazem 240mg daily    # Severe TR on echo   - follow up as outpatient    # Hematuria  # UTI  # Chronic Urinary retention   # Acute on chronic anemia    - Coumadin was held, then resumed on 2/10 and urine went from pink back to dark red.   - Leslieamdee dee held 2/11, plans for possible cystoscopy on 2/12    # Nodular liver and ascites, likely cirrhosis    - GI following with paracentesis with 1L removed   - s/p thoracentesis     Plan:  - holding warfarin with hematuria and possible cystoscopy.  Plan to restart with ok with urology.   - continue with metoprolol tartrate 100mg BID  - recommend increasing diltiazem from 120mg daily to home dose of 240mg daily    He will need to follow up with Dr. Lee within 2-3 weeks of hospital discharge.     Code Status:  Full Code    Michlele AMY Sahu, APRN-CNP      ===========================================  Attending note   ===========================================  Both the FLORINA and I have had a face to face encounter with the patient today. I have examined the patient and edited the documented physical examination as necessary.  I personally reviewed the patient's recent labs, medications, orders, EKGs, and pertinent cardiac imaging.  I have reviewed the FLORINA's  encounter note, approve the FLORINA's documentation and have edited the note to reflect the diagnostic and therapeutic plan.    Ming Bunch is a 84 y.o. male with past medical history of low grade CAD by [facility] 2007, chronic atrial fibrillation on warfarin, hyperlipidemia, cirrhosis,  BPH w/LUTS, hematuria recent cystoscopy, Quintanilla catheter placed discharged home on 1/31/2025, presented to [facility] ED on 2/3/2025 for hematuria.  Cardiology consulted for anticoagulation recs in setting of hematuria.      States he came to ED with blood in his quintanilla bag.  Denies any chest pain or angina.  State he can be short of breath with exertion, but this is at baseline.  He noticed increased swelling in his legs, feet, and abdomen the past few weeks. No recent nausea, vomiting, fever, or chills. No palpitations or syncope.     No exacerbating or relieving factors.  Patient denies chest pain and angina.  Pt denies shortness of breath, dyspnea on exertion, orthopnea, and paroxysmal nocturnal dyspnea.  Pt denies worsening lower extremity edema.  Pt denies palpitations or syncope.  No recent falls.  No fever or chills.  No cough.  No change in bowel or bladder habits.  No sick contacts.  No recent travel.     12 point review of systems including (Constitutional, Eyes, ENMT, Respiratory, Cardiac, Gastrointestinal, Neurological, Psychiatric, and Hematologic) was performed and is otherwise negative.    Past medical history:  As above.    Medications were reviewed.    Allergies were reviewed.    Social history:  Patient denies smoking, alcohol abuse, or illicit drug use.    Family history:  No sudden cardiac death or premature coronary artery disease.     General:  Patient is awake, alert, and oriented.  Patient is in no acute distress.  HEENT:  Pupils equal and reactive.  Normocephalic.  Moist mucosa.    Neck:  No thyromegaly.  Normal Jugular Venous Pressure.  Cardiovascular:  Regular rate and rhythm.  Normal S1 and S2.  Pulmonary:  Clear  to auscultation bilaterally.  Abdomen:  Soft. Non-tender.   Non-distended.  Positive bowel sounds.  Lower Extremities:  2+ pedal pulses. No LE edema.  Neurologic:  Cranial nerves intact.  No focal deficit.   Skin: Skin warm and dry, normal skin turgor.   Psychiatric: Normal affect.    Vital signs, telemetry, medications, labs, and imaging were reviewed as well.        Long standing persistent atrial fibrillation with [id number] Stroke Risk Points: 4 who has been maintained on VKA for CVA prevention who presents with hematuria. He is currently planned for Cystoscopy in the AM.   -- OK to hjold anticoagulation at this time.   -- Resume when deemed safe per Urology.   -- Further recommendations pending cystoscopy results         Andrea Torres DO   Division of Cardiovascular Medicine  [facility] [place]

## 2025-02-11 NOTE — PROGRESS NOTES
Ming Bunch is a 84 y.o. male     Bleeding again  We will continue to hold Coumadin  Will consult cardiology  He might be a good candidate for watchman's procedure down the road  At high risk for strokes while off Coumadin  Will let urology know about recurrent bleeding    Review of Systems     Constitutional: no fever, no chills, not feeling poorly, not feeling tired   Cardiovascular: no chest pain   Respiratory: no cough, wheezing or shortness of breath a  Gastrointestinal: no abdominal pain, no constipation, no melena, no nausea, no diarrhea, no vomiting and no blood in stools.   Neurological: no headache,   All other systems have been reviewed and are negative for complaint.       Vitals:    02/11/25 1651   BP: 116/71   Pulse: 90   Resp: 20   Temp: 37.1 °C (98.8 °F)   SpO2: 95%        Scheduled medications  atorvastatin, 40 mg, oral, Nightly  ciprofloxacin, 500 mg, oral, q12h SUE  dilTIAZem CD, 120 mg, oral, Daily  folic acid, 1 mg, oral, Daily  metoprolol tartrate, 100 mg, oral, BID  multivitamin with minerals, 1 tablet, oral, Daily  polyethylene glycol, 17 g, oral, Daily  tamsulosin, 0.8 mg, oral, Daily  thiamine, 100 mg, oral, Daily  [Held by provider] warfarin, 2.5 mg, oral, Once per day on Wednesday Thursday  [Held by provider] warfarin, 5 mg, oral, Once per day on Sunday Monday Tuesday Friday Saturday      Continuous medications     PRN medications  PRN medications: acetaminophen **OR** acetaminophen **OR** acetaminophen, LORazepam **OR** LORazepam **OR** LORazepam, ondansetron **OR** ondansetron, oxybutynin    Lab Review   Results from last 7 days   Lab Units 02/11/25  0808 02/10/25  0720 02/09/25  0526   WBC AUTO x10*3/uL 6.5 7.8 6.3   HEMOGLOBIN g/dL 8.1* 8.8* 8.1*   HEMATOCRIT % 26.0* 27.9* 26.9*   PLATELETS AUTO x10*3/uL 206 231 212     Results from last 7 days   Lab Units 02/11/25  0808 02/10/25  0720 02/09/25  0526   SODIUM mmol/L 138 137 137   POTASSIUM mmol/L 3.6 3.8 3.7   CHLORIDE mmol/L 106  104 104   CO2 mmol/L 28 27 29   BUN mg/dL 10 11 14   CREATININE mg/dL 0.60 0.64 0.65   CALCIUM mg/dL 7.9* 8.3* 7.7*   PROTEIN TOTAL g/dL 5.7* 6.3* 5.4*   BILIRUBIN TOTAL mg/dL 0.8 0.9 0.9   ALK PHOS U/L 63 70 64   ALT U/L 12 12 11   AST U/L 8* 7* 7*   GLUCOSE mg/dL 94 105* 92            US guided abdominal paracentesis   Final Result   Uneventful paracentesis, as detailed above. Right Hemiabdomen, 1 L        Performed and dictated at [facility].        Signed by: Ivy Alva 2/7/2025 1:59 PM   Dictation workstation:   [id number]      XR chest 2 views   Final Result   Cardiomegaly with mild basilar edema again noted. The previous right   pleural effusion decreased in size without pneumothorax.        Signed by: Yonas Sullivan 2/6/2025 6:18 PM   Dictation workstation:   [id number]      US thoracentesis   Final Result   Uneventful thoracentesis, as detailed above: Right Pleural space, 950   mL        Performed and dictated at [facility].        Signed by: Ivy Alva 2/7/2025 1:58 PM   Dictation workstation:   [id number]      XR chest 1 view   Final Result   Redemonstration of small right pleural effusion, without significant   change.        Cardiomegaly, which is unchanged.                  MACRO:   None        Signed by: Mirela Dia 2/6/2025 11:47 AM   Dictation workstation:   [id number]      Transthoracic Echo (TTE) Complete   Final Result      US right upper quadrant   Final Result   Cirrhosis of the liver. Right upper quadrant ascites.        Large right pleural effusion.        Obscuration of the pancreas by bowel gas and body habitus.        MACRO:   None        Signed by: Kenney Samuel 2/4/2025 3:35 PM   Dictation workstation:   [id number]      CT abdomen pelvis wo IV contrast   Final Result   1.  Undulating hepatic contour consistent with cirrhosis along with   mild-to-moderate volume ascites and bilateral pleural effusions.              MACRO:   None        Signed by: Gunnar Goncalves 2/3/2025 10:23 PM   Dictation workstation:   [id number]      XR chest 1 view   Final Result   Cardiomegaly with likely new small bibasilar pleural effusions.   Follow-up is recommended.        MACRO:   None        Signed by: Matt Johnjuliette 2/3/2025 12:24 PM   Dictation workstation:   [id number]            Physical Exam    Constitutional   General appearance: Alert and in no acute distress.   Pulmonary   Respiratory assessment: No respiratory distress, normal respiratory rhythm and effort.    Auscultation of Lungs: Clear bilateral breath sounds.   Cardiovascular   Auscultation of heart: Apical pulse normal, heart rate and rhythm normal, normal S1 and S2, no murmurs and no pericardial rub.    Exam for edema: No peripheral edema.   Abdomen   Abdominal Exam: No bruits, normal bowel sounds, soft, non-tender, no abdominal mass palpated.    Liver and Spleen exam: No hepato-splenomegaly.   Musculoskeletal     Inspection/palpation of joints, bones and muscles: No joint swelling. Normal movement of all extremities.   Neurologic   Cranial nerves: Nerves 2-12 were intact, no focal neuro defects.         Assessment/Plan      #Hematuria  #Urinary tract infection  Hematuria started again  Continue to hold Coumadin  Consulted cardiology  Neurology made aware    #Cirrhosis of the liver  Hepatitis panel noted  Paracentesis done  SAAG greater than 1  Counseled on alcohol abstinence    #Atrial fibrillation  On Coumadin which is on hold at the moment    #Hypertension  Low blood pressure readings  Monitor    #Dyslipidemia  On statins at 40 mg every day

## 2025-02-11 NOTE — CARE PLAN
"The patient's goals for the shift include \"find out when I can go home\"    The clinical goals for the shift include pt free from hematuria    Problem: Pain - Adult  Goal: Verbalizes/displays adequate comfort level or baseline comfort level  Outcome: Progressing     Problem: Safety - Adult  Goal: Free from fall injury  Outcome: Progressing     Problem: Discharge Planning  Goal: Discharge to home or other facility with appropriate resources  Outcome: Progressing     Problem: Chronic Conditions and Co-morbidities  Goal: Patient's chronic conditions and co-morbidity symptoms are monitored and maintained or improved  Outcome: Progressing     Problem: Nutrition  Goal: Nutrient intake appropriate for maintaining nutritional needs  Outcome: Progressing     Problem: Heart Failure  Goal: Improved gas exchange this shift  Outcome: Progressing  Goal: Improved urinary output this shift  Outcome: Progressing  Goal: Reduction in peripheral edema within 24 hours  Outcome: Progressing  Goal: Report improvement of dyspnea/breathlessness this shift  Outcome: Progressing  Goal: Weight from fluid excess reduced over 2-3 days, then stabilize  Outcome: Progressing  Goal: Increase self care and/or family involvement in 24 hours  Outcome: Progressing     Problem: Fall/Injury  Goal: Not fall by end of shift  Outcome: Progressing  Goal: Be free from injury by end of the shift  Outcome: Progressing  Goal: Verbalize understanding of personal risk factors for fall in the hospital  Outcome: Progressing  Goal: Verbalize understanding of risk factor reduction measures to prevent injury from fall in the home  Outcome: Progressing  Goal: Use assistive devices by end of the shift  Outcome: Progressing  Goal: Pace activities to prevent fatigue by end of the shift  Outcome: Progressing     "

## 2025-02-12 ENCOUNTER — APPOINTMENT (OUTPATIENT)
Dept: UROLOGY | Facility: HOSPITAL | Age: 85
End: 2025-02-12
Payer: MEDICARE

## 2025-02-12 ENCOUNTER — ANESTHESIA (OUTPATIENT)
Dept: OPERATING ROOM | Facility: HOSPITAL | Age: 85
End: 2025-02-12
Payer: MEDICARE

## 2025-02-12 ENCOUNTER — ANESTHESIA EVENT (OUTPATIENT)
Dept: OPERATING ROOM | Facility: HOSPITAL | Age: 85
End: 2025-02-12
Payer: MEDICARE

## 2025-02-12 ENCOUNTER — APPOINTMENT (OUTPATIENT)
Dept: UROLOGY | Facility: CLINIC | Age: 85
End: 2025-02-12
Payer: MEDICARE

## 2025-02-12 LAB
ABO GROUP (TYPE) IN BLOOD: NORMAL
ACID FAST STN SPEC: NORMAL
ALBUMIN SERPL BCP-MCNC: 3 G/DL (ref 3.4–5)
ALP SERPL-CCNC: 70 U/L (ref 33–136)
ALT SERPL W P-5'-P-CCNC: 16 U/L (ref 10–52)
ANION GAP SERPL CALC-SCNC: 9 MMOL/L (ref 10–20)
ANTIBODY SCREEN: NORMAL
AST SERPL W P-5'-P-CCNC: 9 U/L (ref 9–39)
BILIRUB SERPL-MCNC: 0.8 MG/DL (ref 0–1.2)
BUN SERPL-MCNC: 10 MG/DL (ref 6–23)
CALCIUM SERPL-MCNC: 8 MG/DL (ref 8.6–10.3)
CHLORIDE SERPL-SCNC: 105 MMOL/L (ref 98–107)
CO2 SERPL-SCNC: 28 MMOL/L (ref 21–32)
CREAT SERPL-MCNC: 0.62 MG/DL (ref 0.5–1.3)
EGFRCR SERPLBLD CKD-EPI 2021: >90 ML/MIN/1.73M*2
ERYTHROCYTE [DISTWIDTH] IN BLOOD BY AUTOMATED COUNT: 16.2 % (ref 11.5–14.5)
GLUCOSE BLD MANUAL STRIP-MCNC: 88 MG/DL (ref 74–99)
GLUCOSE SERPL-MCNC: 91 MG/DL (ref 74–99)
HCT VFR BLD AUTO: 27.3 % (ref 41–52)
HGB BLD-MCNC: 8.5 G/DL (ref 13.5–17.5)
INR PPP: 1.4 (ref 0.9–1.1)
MCH RBC QN AUTO: 28.4 PG (ref 26–34)
MCHC RBC AUTO-ENTMCNC: 31.1 G/DL (ref 32–36)
MCV RBC AUTO: 91 FL (ref 80–100)
MYCOBACTERIUM SPEC CULT: NORMAL
NRBC BLD-RTO: 0 /100 WBCS (ref 0–0)
PLATELET # BLD AUTO: 222 X10*3/UL (ref 150–450)
POTASSIUM SERPL-SCNC: 3.8 MMOL/L (ref 3.5–5.3)
PROT SERPL-MCNC: 5.5 G/DL (ref 6.4–8.2)
PROTHROMBIN TIME: 15.9 SECONDS (ref 9.8–12.8)
RBC # BLD AUTO: 2.99 X10*6/UL (ref 4.5–5.9)
RH FACTOR (ANTIGEN D): NORMAL
SODIUM SERPL-SCNC: 138 MMOL/L (ref 136–145)
WBC # BLD AUTO: 6.4 X10*3/UL (ref 4.4–11.3)

## 2025-02-12 PROCEDURE — 99232 SBSQ HOSP IP/OBS MODERATE 35: CPT | Performed by: INTERNAL MEDICINE

## 2025-02-12 PROCEDURE — 2500000001 HC RX 250 WO HCPCS SELF ADMINISTERED DRUGS (ALT 637 FOR MEDICARE OP)

## 2025-02-12 PROCEDURE — 0TCB8ZZ EXTIRPATION OF MATTER FROM BLADDER, VIA NATURAL OR ARTIFICIAL OPENING ENDOSCOPIC: ICD-10-PCS

## 2025-02-12 PROCEDURE — 3700000002 HC GENERAL ANESTHESIA TIME - EACH INCREMENTAL 1 MINUTE: Performed by: UROLOGY

## 2025-02-12 PROCEDURE — 85027 COMPLETE CBC AUTOMATED: CPT

## 2025-02-12 PROCEDURE — 36415 COLL VENOUS BLD VENIPUNCTURE: CPT | Performed by: STUDENT IN AN ORGANIZED HEALTH CARE EDUCATION/TRAINING PROGRAM

## 2025-02-12 PROCEDURE — 2500000001 HC RX 250 WO HCPCS SELF ADMINISTERED DRUGS (ALT 637 FOR MEDICARE OP): Performed by: INTERNAL MEDICINE

## 2025-02-12 PROCEDURE — 7100000001 HC RECOVERY ROOM TIME - INITIAL BASE CHARGE: Performed by: UROLOGY

## 2025-02-12 PROCEDURE — 86901 BLOOD TYPING SEROLOGIC RH(D): CPT | Performed by: STUDENT IN AN ORGANIZED HEALTH CARE EDUCATION/TRAINING PROGRAM

## 2025-02-12 PROCEDURE — 85610 PROTHROMBIN TIME: CPT

## 2025-02-12 PROCEDURE — 3700000001 HC GENERAL ANESTHESIA TIME - INITIAL BASE CHARGE: Performed by: UROLOGY

## 2025-02-12 PROCEDURE — 3600000003 HC OR TIME - INITIAL BASE CHARGE - PROCEDURE LEVEL THREE: Performed by: UROLOGY

## 2025-02-12 PROCEDURE — 3600000008 HC OR TIME - EACH INCREMENTAL 1 MINUTE - PROCEDURE LEVEL THREE: Performed by: UROLOGY

## 2025-02-12 PROCEDURE — 2500000005 HC RX 250 GENERAL PHARMACY W/O HCPCS: Performed by: UROLOGY

## 2025-02-12 PROCEDURE — 36415 COLL VENOUS BLD VENIPUNCTURE: CPT

## 2025-02-12 PROCEDURE — 7100000002 HC RECOVERY ROOM TIME - EACH INCREMENTAL 1 MINUTE: Performed by: UROLOGY

## 2025-02-12 PROCEDURE — 2500000004 HC RX 250 GENERAL PHARMACY W/ HCPCS (ALT 636 FOR OP/ED): Performed by: ANESTHESIOLOGIST ASSISTANT

## 2025-02-12 PROCEDURE — 2500000005 HC RX 250 GENERAL PHARMACY W/O HCPCS: Performed by: ANESTHESIOLOGIST ASSISTANT

## 2025-02-12 PROCEDURE — 80053 COMPREHEN METABOLIC PANEL: CPT

## 2025-02-12 PROCEDURE — 52001 CYSTO W/IRRG&EVAC MLT CLOTS: CPT | Performed by: UROLOGY

## 2025-02-12 PROCEDURE — A52001 PR CYSTOURETHROSCOPY W/IRRIG AND EVAC CLOTS: Performed by: ANESTHESIOLOGIST ASSISTANT

## 2025-02-12 PROCEDURE — A52001 PR CYSTOURETHROSCOPY W/IRRIG AND EVAC CLOTS: Performed by: STUDENT IN AN ORGANIZED HEALTH CARE EDUCATION/TRAINING PROGRAM

## 2025-02-12 PROCEDURE — 86850 RBC ANTIBODY SCREEN: CPT | Performed by: STUDENT IN AN ORGANIZED HEALTH CARE EDUCATION/TRAINING PROGRAM

## 2025-02-12 PROCEDURE — 2500000002 HC RX 250 W HCPCS SELF ADMINISTERED DRUGS (ALT 637 FOR MEDICARE OP, ALT 636 FOR OP/ED)

## 2025-02-12 PROCEDURE — 82947 ASSAY GLUCOSE BLOOD QUANT: CPT

## 2025-02-12 PROCEDURE — 1100000001 HC PRIVATE ROOM DAILY

## 2025-02-12 PROCEDURE — 99100 ANES PT EXTEME AGE<1 YR&>70: CPT | Performed by: STUDENT IN AN ORGANIZED HEALTH CARE EDUCATION/TRAINING PROGRAM

## 2025-02-12 RX ORDER — PHENYLEPHRINE HCL IN 0.9% NACL 1 MG/10 ML
SYRINGE (ML) INTRAVENOUS AS NEEDED
Status: DISCONTINUED | OUTPATIENT
Start: 2025-02-12 | End: 2025-02-12

## 2025-02-12 RX ORDER — LIDOCAINE HYDROCHLORIDE 10 MG/ML
INJECTION, SOLUTION INFILTRATION; PERINEURAL AS NEEDED
Status: DISCONTINUED | OUTPATIENT
Start: 2025-02-12 | End: 2025-02-12

## 2025-02-12 RX ORDER — FENTANYL CITRATE 50 UG/ML
INJECTION, SOLUTION INTRAMUSCULAR; INTRAVENOUS AS NEEDED
Status: DISCONTINUED | OUTPATIENT
Start: 2025-02-12 | End: 2025-02-12

## 2025-02-12 RX ORDER — SODIUM CHLORIDE 0.9 G/100ML
INJECTION, SOLUTION IRRIGATION AS NEEDED
Status: DISCONTINUED | OUTPATIENT
Start: 2025-02-12 | End: 2025-02-12 | Stop reason: HOSPADM

## 2025-02-12 RX ORDER — ONDANSETRON HYDROCHLORIDE 2 MG/ML
4 INJECTION, SOLUTION INTRAVENOUS ONCE AS NEEDED
Status: DISCONTINUED | OUTPATIENT
Start: 2025-02-12 | End: 2025-02-12 | Stop reason: HOSPADM

## 2025-02-12 RX ORDER — ONDANSETRON HYDROCHLORIDE 2 MG/ML
INJECTION, SOLUTION INTRAVENOUS AS NEEDED
Status: DISCONTINUED | OUTPATIENT
Start: 2025-02-12 | End: 2025-02-12

## 2025-02-12 RX ORDER — LIDOCAINE HYDROCHLORIDE 10 MG/ML
0.1 INJECTION, SOLUTION EPIDURAL; INFILTRATION; INTRACAUDAL; PERINEURAL ONCE
Status: DISCONTINUED | OUTPATIENT
Start: 2025-02-12 | End: 2025-02-12 | Stop reason: HOSPADM

## 2025-02-12 RX ORDER — OXYCODONE HYDROCHLORIDE 5 MG/1
5 TABLET ORAL EVERY 4 HOURS PRN
Status: DISCONTINUED | OUTPATIENT
Start: 2025-02-12 | End: 2025-02-12 | Stop reason: HOSPADM

## 2025-02-12 RX ORDER — SODIUM CHLORIDE, SODIUM LACTATE, POTASSIUM CHLORIDE, CALCIUM CHLORIDE 600; 310; 30; 20 MG/100ML; MG/100ML; MG/100ML; MG/100ML
100 INJECTION, SOLUTION INTRAVENOUS CONTINUOUS
Status: DISCONTINUED | OUTPATIENT
Start: 2025-02-12 | End: 2025-02-12 | Stop reason: HOSPADM

## 2025-02-12 RX ORDER — LABETALOL HYDROCHLORIDE 5 MG/ML
5 INJECTION, SOLUTION INTRAVENOUS ONCE AS NEEDED
Status: DISCONTINUED | OUTPATIENT
Start: 2025-02-12 | End: 2025-02-12 | Stop reason: HOSPADM

## 2025-02-12 RX ORDER — PROPOFOL 10 MG/ML
INJECTION, EMULSION INTRAVENOUS AS NEEDED
Status: DISCONTINUED | OUTPATIENT
Start: 2025-02-12 | End: 2025-02-12

## 2025-02-12 RX ORDER — DIPHENHYDRAMINE HYDROCHLORIDE 50 MG/ML
12.5 INJECTION INTRAMUSCULAR; INTRAVENOUS ONCE AS NEEDED
Status: DISCONTINUED | OUTPATIENT
Start: 2025-02-12 | End: 2025-02-12 | Stop reason: HOSPADM

## 2025-02-12 RX ADMIN — DEXAMETHASONE SODIUM PHOSPHATE 8 MG: 4 INJECTION, SOLUTION INTRAMUSCULAR; INTRAVENOUS at 13:46

## 2025-02-12 RX ADMIN — CARBOXYMETHYLCELLULOSE SODIUM 2 DROP: 5 SOLUTION/ DROPS OPHTHALMIC at 13:46

## 2025-02-12 RX ADMIN — CIPROFLOXACIN 500 MG: 500 TABLET ORAL at 21:54

## 2025-02-12 RX ADMIN — Medication 100 MCG: at 13:43

## 2025-02-12 RX ADMIN — PROPOFOL 50 MG: 10 INJECTION, EMULSION INTRAVENOUS at 14:00

## 2025-02-12 RX ADMIN — CIPROFLOXACIN 500 MG: 500 TABLET ORAL at 08:24

## 2025-02-12 RX ADMIN — Medication 1 TABLET: at 08:24

## 2025-02-12 RX ADMIN — METOPROLOL TARTRATE 100 MG: 50 TABLET, FILM COATED ORAL at 21:54

## 2025-02-12 RX ADMIN — METOPROLOL TARTRATE 100 MG: 50 TABLET, FILM COATED ORAL at 08:23

## 2025-02-12 RX ADMIN — FENTANYL CITRATE 50 MCG: 50 INJECTION, SOLUTION INTRAMUSCULAR; INTRAVENOUS at 13:43

## 2025-02-12 RX ADMIN — FOLIC ACID 1 MG: 1 TABLET ORAL at 08:24

## 2025-02-12 RX ADMIN — Medication 100 MCG: at 13:40

## 2025-02-12 RX ADMIN — LIDOCAINE HYDROCHLORIDE 50 MG: 10 INJECTION, SOLUTION INFILTRATION; PERINEURAL at 13:43

## 2025-02-12 RX ADMIN — TAMSULOSIN HYDROCHLORIDE 0.8 MG: 0.4 CAPSULE ORAL at 08:24

## 2025-02-12 RX ADMIN — DILTIAZEM HYDROCHLORIDE 120 MG: 120 CAPSULE, COATED, EXTENDED RELEASE ORAL at 08:24

## 2025-02-12 RX ADMIN — PROPOFOL 100 MG: 10 INJECTION, EMULSION INTRAVENOUS at 13:43

## 2025-02-12 RX ADMIN — SODIUM CHLORIDE, SODIUM LACTATE, POTASSIUM CHLORIDE, AND CALCIUM CHLORIDE: .6; .31; .03; .02 INJECTION, SOLUTION INTRAVENOUS at 13:34

## 2025-02-12 RX ADMIN — ONDANSETRON 4 MG: 2 INJECTION, SOLUTION INTRAMUSCULAR; INTRAVENOUS at 13:46

## 2025-02-12 RX ADMIN — Medication 100 MG: at 08:24

## 2025-02-12 RX ADMIN — ATORVASTATIN CALCIUM 40 MG: 40 TABLET, FILM COATED ORAL at 21:54

## 2025-02-12 RX ADMIN — LIDOCAINE HYDROCHLORIDE 50 MG: 10 INJECTION, SOLUTION INFILTRATION; PERINEURAL at 13:40

## 2025-02-12 RX ADMIN — PROPOFOL 50 MG: 10 INJECTION, EMULSION INTRAVENOUS at 13:50

## 2025-02-12 RX ADMIN — FENTANYL CITRATE 50 MCG: 50 INJECTION, SOLUTION INTRAMUSCULAR; INTRAVENOUS at 13:55

## 2025-02-12 ASSESSMENT — COGNITIVE AND FUNCTIONAL STATUS - GENERAL
TOILETING: A LITTLE
DAILY ACTIVITIY SCORE: 20
DRESSING REGULAR LOWER BODY CLOTHING: A LITTLE
CLIMB 3 TO 5 STEPS WITH RAILING: A LITTLE
MOBILITY SCORE: 23
CLIMB 3 TO 5 STEPS WITH RAILING: A LITTLE
MOBILITY SCORE: 23
HELP NEEDED FOR BATHING: A LITTLE
DRESSING REGULAR UPPER BODY CLOTHING: A LITTLE

## 2025-02-12 ASSESSMENT — LIFESTYLE VARIABLES
HEADACHE, FULLNESS IN HEAD: NOT PRESENT
ORIENTATION AND CLOUDING OF SENSORIUM: ORIENTED AND CAN DO SERIAL ADDITIONS
TREMOR: NO TREMOR
NAUSEA AND VOMITING: NO NAUSEA AND NO VOMITING
PAROXYSMAL SWEATS: NO SWEAT VISIBLE
AGITATION: NORMAL ACTIVITY
AUDITORY DISTURBANCES: NOT PRESENT
VISUAL DISTURBANCES: NOT PRESENT
TOTAL SCORE: 0
ANXIETY: NO ANXIETY, AT EASE

## 2025-02-12 ASSESSMENT — PAIN SCALES - GENERAL
PAINLEVEL_OUTOF10: 0 - NO PAIN

## 2025-02-12 ASSESSMENT — PAIN - FUNCTIONAL ASSESSMENT
PAIN_FUNCTIONAL_ASSESSMENT: 0-10

## 2025-02-12 NOTE — PROGRESS NOTES
02/12/25 1716   Discharge Planning   Support Systems Spouse/significant other;Children   Assistance Needed TCC met with patient, daughter and wife at bedside to follow up re; discharge planning needs; 2/11/2025; Urology following ; hematuria ; plan; cystoscopy and clot evacuation today   Expected Discharge Disposition Home H   Does the patient need discharge transport arranged? No

## 2025-02-12 NOTE — ANESTHESIA PROCEDURE NOTES
Airway  Date/Time: 2/12/2025 1:45 PM  Urgency: elective    Airway not difficult    Staffing  Performed: MELIDA   Authorized by: Rafael Myles MD    Performed by: MELIDA Gates  Patient location during procedure: OR    Indications and Patient Condition  Indications for airway management: anesthesia and airway protection  Spontaneous ventilation: present  Sedation level: deep  Preoxygenated: yes  Patient position: sniffing  MILS maintained throughout  Mask difficulty assessment: 1 - vent by mask    Final Airway Details  Final airway type: supraglottic airway      Successful airway: classic  Size 5     Number of attempts at approach: 1    Additional Comments  Easy LMA placement. Teeth and lips in pre-op condition. Soft bite block used.

## 2025-02-12 NOTE — OP NOTE
Cystoscopy; Clot Evacuation Operative Note     Date: 2025  OR Location: [facility] OR    Name: Ming Bunch, : 1940, Age: 84 y.o., MRN: [id number], Sex: male    Diagnosis  Pre-op Diagnosis      * Gross hematuria [R31.0] Post-op Diagnosis     * Gross hematuria [R31.0]     Procedures  Cystoscopy; Clot Evacuation  86619 - MS CYSTO W/IRRIG & EVAC MULTPLE OBSTRUCTING CLOTS      Surgeons      * Dilip Rodriguez - Primary    Resident/Fellow/Other Assistant:  Niranjan Manriquez MD    Staff:   Circulator: Yvette De Leon Person: Nathalie    Anesthesia Staff: Anesthesiologist: Rafael Myles MD  C-AA: MELIDA Gates    Procedure Summary  Anesthesia: Anesthesia type not filed in the log.  ASA: ASA status not filed in the log.  Estimated Blood Loss: 0mL  Intra-op Medications:   Administrations occurring from 1230 to 1345 on 25:   Medication Name Total Dose   fentaNYL (Sublimaze) injection 50 mcg/mL 50 mcg   LR bolus Cannot be calculated   lidocaine (Xylocaine) 1 % 100 mg   phenylephrine 100 mcg/mL syringe 10 mL (prefilled) 200 mcg   propofol (Diprivan) injection 10 mg/mL 100 mg              Anesthesia Record               Intraprocedure I/O Totals          Intake    LR bolus 300.00 mL    Total Intake 300 mL       Output    Urine 0 mL    Est. Blood Loss 5 mL    Total Output 5 mL       Net    Net Volume 295 mL          Specimen: No specimens collected              Drains and/or Catheters:   Urethral Catheter Other (Comment) 22 Fr. (Active)       Tourniquet Times:         Implants:     Findings: Prostate without signs of hemorrhage; bladder with multiple diverticula, multiple areas of irritation, no discrete areas of hemorrhage noted; mild burden of clot completely evacuated    Indications: Ming Bunch is an 84 y.o. male who is having surgery for Gross hematuria [R31.0].     The patient was seen in the preoperative area. The risks, benefits, complications, treatment options, non-operative alternatives,  expected recovery and outcomes were discussed with the patient. The possibilities of reaction to medication, pulmonary aspiration, injury to surrounding structures, bleeding, recurrent infection, the need for additional procedures, failure to diagnose a condition, and creating a complication requiring transfusion or operation were discussed with the patient. The patient concurred with the proposed plan, giving informed consent.  The site of surgery was properly noted/marked if necessary per policy. The patient has been actively warmed in preoperative area. Preoperative antibiotics have been ordered and given within 1 hours of incision. Venous thrombosis prophylaxis have been ordered including bilateral sequential compression devices    Procedure Details:     Patient underwent anesthesia without complication then was repositioned in dorsal lithotomy. Patient was prepped and draped in the usual sterile fashion.     A 21 Fr rigid cystoscope was used to perform cystourethroscopy. Bladder was noted to have no masses or stones. UOs were in normal orthotopic position. Multiple diverticula were noted throughout the bladder wall. Multiple areas of bladder irritation noted, likely from the quintanilla catheter. No discrete areas of hemorrhage was noted from the bladder or the prostate. A small clot burden was completely evacuated from the bladder with clear output on return after.     Bladder was drained. A 22fr three way quintanilla was placed and put on CBI to run in PACU. This concluded the procedure. Patient was awoken form anesthesia without complication and was transferred to PACU in stable condition.      Complications:  None; patient tolerated the procedure well.    Disposition: PACU - hemodynamically stable.  Condition: stable       Plan:  - OK to discontinue CBI  - Quintanilla to removed tomorrow morning with trial of void  - OK if urine remains red tinged until then    Attending Attestation:     Dilip Rodriguez  Phone Number:  [phone]

## 2025-02-12 NOTE — ANESTHESIA PROCEDURE NOTES
Peripheral IV  Date/Time: 2/12/2025 1:42 PM  Inserted by: MELIDA Gates    Placement  Needle size: 20 G  Laterality: right  Location: wrist  Local anesthetic: none  Site prep: alcohol  Technique: anatomical landmarks  Attempts: 1

## 2025-02-12 NOTE — ANESTHESIA PREPROCEDURE EVALUATION
Patient: Ming Bunch    Procedure Information       Date/Time: 02/12/25 1230    Procedure: Cystoscopy; Thrombus Removal (Bladder)    Location: [facility] A OR 02 / [facility] OR    Surgeons: Dilip Rodriguez MD            Relevant Problems   Cardiac   (+) Acute on chronic combined systolic and diastolic congestive heart failure   (+) Atrial fibrillation (Multi)   (+) Atrial fibrillation, unspecified type (Multi)   (+) Coronary artery disease      Pulmonary   (+) Shortness of breath on exertion      Hematology   (+) Anemia       Clinical information reviewed:    Allergies  Meds                Past Medical History:   Diagnosis Date    Personal history of diseases of the blood and blood-forming organs and certain disorders involving the immune mechanism 06/09/2021    History of thrombocytopenia    Shortness of breath 02/19/2020    Shortness of breath on exertion      Past Surgical History:   Procedure Laterality Date    APPENDECTOMY  09/11/2013    Appendectomy    COLONOSCOPY  06/25/2013    Complete Colonoscopy    OTHER SURGICAL HISTORY  04/22/2015    Removal Of Lesion    PILONIDAL CYST DRAINAGE  09/11/2013    Pilonidal Cyst Resection     Social History     Tobacco Use    Smoking status: Never    Smokeless tobacco: Never   Substance Use Topics    Alcohol use: Yes     Alcohol/week: 10.0 standard drinks of alcohol     Types: 10 Standard drinks or equivalent per week    Drug use: Not Currently      Current Outpatient Medications   Medication Instructions    aspirin 81 mg, oral, Daily    atorvastatin (LIPITOR) 40 mg, oral, Nightly    dilTIAZem CD (CARDIZEM CD) 240 mg, oral, Daily    metoprolol tartrate (LOPRESSOR) 100 mg, oral, 2 times daily    tadalafil (CIALIS) 5 mg, oral, Daily    tamsulosin (FLOMAX) 0.8 mg, oral, Daily    warfarin (COUMADIN) 5 mg, oral, Every evening      No Known Allergies     Chemistry    Lab Results   Component Value Date/Time     02/12/2025 0648    K 3.8 02/12/2025 0648     02/12/2025 0648  "   CO2 28 02/12/2025 0648    BUN 10 02/12/2025 0648    CREATININE 0.62 02/12/2025 0648    Lab Results   Component Value Date/Time    CALCIUM 8.0 (L) 02/12/2025 0648    ALKPHOS 70 02/12/2025 0648    AST 9 02/12/2025 0648    ALT 16 02/12/2025 0648    BILITOT 0.8 02/12/2025 0648          Lab Results   Component Value Date    HGBA1C 5.2 01/05/2024     Lab Results   Component Value Date/Time    WBC 6.4 02/12/2025 0648    HGB 8.5 (L) 02/12/2025 0648    HCT 27.3 (L) 02/12/2025 0648     02/12/2025 0648     Lab Results   Component Value Date/Time    PROTIME 15.9 (H) 02/12/2025 0648    INR 1.4 (H) 02/12/2025 0648    INR 2.60 01/29/2025 0000     No results found for: \"ABORH\"  Encounter Date: 02/03/25   ECG 12 lead   Result Value    Ventricular Rate 81    QRS Duration 96    QT Interval 366    QTC Calculation(Bazett) 425    R Axis -21    T Axis -34    QRS Count 13    Q Onset 225    T Offset 408    QTC Fredericia 404    Narrative    Atrial fibrillation  Low voltage QRS  Inferior infarct , age undetermined  Cannot rule out Anterior infarct , age undetermined  Abnormal ECG  When compared with ECG of 03-FEB-2020 12:30,  Questionable change in QRS axis     No results found for this or any previous visit from the past 1095 days.       Visit Vitals  /70 (BP Location: Right arm, Patient Position: Lying)   Pulse 94   Temp 36.5 °C (97.7 °F) (Temporal)   Resp 18   Ht 1.753 m (5' 9\")   Wt 93 kg (205 lb)   SpO2 95%   BMI 30.27 kg/m²   Smoking Status Never   BSA 2.13 m²     No data recorded    PHYSICAL EXAM    Anesthesia Plan    History of general anesthesia?: yes  History of complications of general anesthesia?: no    ASA 3     general     intravenous induction   Anesthetic plan and risks discussed with patient.    Plan discussed with CRNA.        "

## 2025-02-12 NOTE — NURSING NOTE
1455- assumed care of patient from Zarawilla avalos RN. Zara currently giving report to Sandi ORELLANA on 6th floor. Patient having no pain at this time. Patient placed on bed pan due to feelings like he has to have a bowel movement.

## 2025-02-12 NOTE — CARE PLAN
"The patient's goals for the shift include \"find out when I can go home\"    The clinical goals for the shift include DECREASE HEMATURIA        Problem: Safety - Adult  Goal: Free from fall injury  Outcome: Progressing     Problem: Pain - Adult  Goal: Verbalizes/displays adequate comfort level or baseline comfort level  Outcome: Progressing     Problem: Nutrition  Goal: Nutrient intake appropriate for maintaining nutritional needs  Outcome: Progressing     Problem: Nutrition  Goal: Nutrient intake appropriate for maintaining nutritional needs  Outcome: Progressing     Problem: Heart Failure  Goal: Improved urinary output this shift  Outcome: Progressing     "

## 2025-02-12 NOTE — PROGRESS NOTES
Ming Bunch is a 84 y.o. male     Patient s/p cystoscopy  Multiple diverticula noted in the bladder  No discrete areas of hemorrhage is noted in the bladder or prostate  He had a small clot burden which was completely evacuated from the bladder    Will continue CBI  Hopefully can remove Kirby catheter tomorrow  Review of Systems     Constitutional: no fever, no chills, not feeling poorly, not feeling tired   Cardiovascular: no chest pain   Respiratory: no cough, wheezing or shortness of breath a  Gastrointestinal: no abdominal pain, no constipation, no melena, no nausea, no diarrhea, no vomiting and no blood in stools.   Neurological: no headache,   All other systems have been reviewed and are negative for complaint.       Vitals:    02/12/25 1445   BP: 118/71   Pulse: 90   Resp: 20   Temp:    SpO2: 95%        Scheduled medications  [Transfer Hold] atorvastatin, 40 mg, oral, Nightly  [Transfer Hold] ciprofloxacin, 500 mg, oral, q12h SUE  [Transfer Hold] dilTIAZem CD, 120 mg, oral, Daily  [Transfer Hold] folic acid, 1 mg, oral, Daily  lidocaine, 0.1 mL, subcutaneous, Once  [Transfer Hold] metoprolol tartrate, 100 mg, oral, BID  multivitamin with minerals, 1 tablet, oral, Daily  [Transfer Hold] polyethylene glycol, 17 g, oral, Daily  [Transfer Hold] tamsulosin, 0.8 mg, oral, Daily  [Transfer Hold] thiamine, 100 mg, oral, Daily  [Held by provider] warfarin, 2.5 mg, oral, Once per day on Wednesday Thursday  [Held by provider] warfarin, 5 mg, oral, Once per day on Sunday Monday Tuesday Friday Saturday      Continuous medications  lactated Ringer's, 100 mL/hr      PRN medications  PRN medications: [Transfer Hold] acetaminophen **OR** [Transfer Hold] acetaminophen **OR** [Transfer Hold] acetaminophen, diphenhydrAMINE, HYDROmorphone, HYDROmorphone, labetaloL, [Transfer Hold] LORazepam **OR** [Transfer Hold] LORazepam **OR** [Transfer Hold] LORazepam, [Transfer Hold] ondansetron **OR** [Transfer Hold] ondansetron,  ondansetron, [Transfer Hold] oxybutynin, oxyCODONE, oxygen, promethazine    Lab Review   Results from last 7 days   Lab Units 02/12/25  0648 02/11/25  0808 02/10/25  0720   WBC AUTO x10*3/uL 6.4 6.5 7.8   HEMOGLOBIN g/dL 8.5* 8.1* 8.8*   HEMATOCRIT % 27.3* 26.0* 27.9*   PLATELETS AUTO x10*3/uL 222 206 231     Results from last 7 days   Lab Units 02/12/25  0648 02/11/25  0808 02/10/25  0720   SODIUM mmol/L 138 138 137   POTASSIUM mmol/L 3.8 3.6 3.8   CHLORIDE mmol/L 105 106 104   CO2 mmol/L 28 28 27   BUN mg/dL 10 10 11   CREATININE mg/dL 0.62 0.60 0.64   CALCIUM mg/dL 8.0* 7.9* 8.3*   PROTEIN TOTAL g/dL 5.5* 5.7* 6.3*   BILIRUBIN TOTAL mg/dL 0.8 0.8 0.9   ALK PHOS U/L 70 63 70   ALT U/L 16 12 12   AST U/L 9 8* 7*   GLUCOSE mg/dL 91 94 105*            US guided abdominal paracentesis   Final Result   Uneventful paracentesis, as detailed above. Right Hemiabdomen, 1 L        Performed and dictated at [facility].        Signed by: Ivy Alva 2/7/2025 1:59 PM   Dictation workstation:   [id number]      XR chest 2 views   Final Result   Cardiomegaly with mild basilar edema again noted. The previous right   pleural effusion decreased in size without pneumothorax.        Signed by: Yonas Sullivan 2/6/2025 6:18 PM   Dictation workstation:   [id number]      US thoracentesis   Final Result   Uneventful thoracentesis, as detailed above: Right Pleural space, 950   mL        Performed and dictated at [facility].        Signed by: Ivy Alva 2/7/2025 1:58 PM   Dictation workstation:   [id number]      XR chest 1 view   Final Result   Redemonstration of small right pleural effusion, without significant   change.        Cardiomegaly, which is unchanged.                  MACRO:   None        Signed by: Mirela Dia 2/6/2025 11:47 AM   Dictation workstation:   [id number]      Transthoracic Echo (TTE) Complete   Final Result      US right upper quadrant    Final Result   Cirrhosis of the liver. Right upper quadrant ascites.        Large right pleural effusion.        Obscuration of the pancreas by bowel gas and body habitus.        MACRO:   None        Signed by: Kenney Samuel 2/4/2025 3:35 PM   Dictation workstation:   [id number]      CT abdomen pelvis wo IV contrast   Final Result   1.  Undulating hepatic contour consistent with cirrhosis along with   mild-to-moderate volume ascites and bilateral pleural effusions.             MACRO:   None        Signed by: Gunnar Goncalves 2/3/2025 10:23 PM   Dictation workstation:   [id number]      XR chest 1 view   Final Result   Cardiomegaly with likely new small bibasilar pleural effusions.   Follow-up is recommended.        MACRO:   None        Signed by: Matt Bowling 2/3/2025 12:24 PM   Dictation workstation:   [id number]            Physical Exam    Constitutional   General appearance: Alert and in no acute distress.   Pulmonary   Respiratory assessment: No respiratory distress, normal respiratory rhythm and effort.    Auscultation of Lungs: Clear bilateral breath sounds.   Cardiovascular   Auscultation of heart: Apical pulse normal, heart rate and rhythm normal, normal S1 and S2, no murmurs and no pericardial rub.    Exam for edema: No peripheral edema.   Abdomen   Abdominal Exam: No bruits, normal bowel sounds, soft, non-tender, no abdominal mass palpated.    Liver and Spleen exam: No hepato-splenomegaly.   Musculoskeletal     Inspection/palpation of joints, bones and muscles: No joint swelling. Normal movement of all extremities.   Neurologic   Cranial nerves: Nerves 2-12 were intact, no focal neuro defects.         Assessment/Plan      #Hematuria  #Urinary tract infection  S/p cystoscopy with irrigation and removal of clot burden  Will monitor until tomorrow  Holding Coumadin    #Cirrhosis of the liver  Hepatitis panel noted  Paracentesis done  SAAG greater than 1  Counseled on alcohol abstinence    #Atrial  fibrillation  On Coumadin which is on hold at the moment    #Hypertension  Low blood pressure readings  Monitor    #Dyslipidemia  On statins at 40 mg every day

## 2025-02-12 NOTE — PROGRESS NOTES
Subjective Data:  Patient seen and examined, chart reviewed   -- Plans for cystopscopy remain to be determined.   -- Continues to have cherry red urine within the collection system   -- Cardiac Telemetry shows Rate controlled Atrial Fibrillation          Objective Data:  Last Recorded Vitals:  Vitals:    02/11/25 1651 02/11/25 1900 02/12/25 0300 02/12/25 0819   BP: 116/71 121/68 118/70 120/70   BP Location: Left arm Left arm Right arm Right arm   Patient Position: Lying Lying Lying Lying   Pulse: 90   94   Resp: 20   18   Temp: 37.1 °C (98.8 °F) 37 °C (98.6 °F) 36.6 °C (97.9 °F) 36.5 °C (97.7 °F)   TempSrc: Temporal Temporal Temporal Temporal   SpO2: 95%   95%   Weight:       Height:           Last Labs:  Results from last 7 days   Lab Units 02/12/25  0648 02/11/25  0808 02/10/25  0720   WBC AUTO x10*3/uL 6.4 6.5 7.8   HEMOGLOBIN g/dL 8.5* 8.1* 8.8*   HEMATOCRIT % 27.3* 26.0* 27.9*   PLATELETS AUTO x10*3/uL 222 206 231     Results from last 7 days   Lab Units 02/12/25  0648 02/11/25  0808 02/10/25  0720   SODIUM mmol/L 138 138 137   POTASSIUM mmol/L 3.8 3.6 3.8   CHLORIDE mmol/L 105 106 104   CO2 mmol/L 28 28 27   BUN mg/dL 10 10 11   CREATININE mg/dL 0.62 0.60 0.64   CALCIUM mg/dL 8.0* 7.9* 8.3*   PROTEIN TOTAL g/dL 5.5* 5.7* 6.3*   BILIRUBIN TOTAL mg/dL 0.8 0.8 0.9   ALK PHOS U/L 70 63 70   ALT U/L 16 12 12   AST U/L 9 8* 7*   GLUCOSE mg/dL 91 94 105*         BNP   Date/Time Value Ref Range Status   02/03/2025 12:15  0 - 99 pg/mL Final     HGBA1C   Date/Time Value Ref Range Status   01/05/2024 08:43 AM 5.2 see below % Final   06/05/2023 08:24 AM 5.4 % Final     Comment:          Diagnosis of Diabetes-Adults   Non-Diabetic: < or = 5.6%   Increased risk for developing diabetes: 5.7-6.4%   Diagnostic of diabetes: > or = 6.5%  .       Monitoring of Diabetes                Age (y)     Therapeutic Goal (%)   Adults:          >18           <7.0   Pediatrics:    13-18           <7.5                   7-12            <8.0                   0- 6            7.5-8.5   American Diabetes Association. Diabetes Care 33(S1), Jan 2010.   11/17/2022 08:13 AM 5.1 % Final     Comment:          Diagnosis of Diabetes-Adults   Non-Diabetic: < or = 5.6%   Increased risk for developing diabetes: 5.7-6.4%   Diagnostic of diabetes: > or = 6.5%  .       Monitoring of Diabetes                Age (y)     Therapeutic Goal (%)   Adults:          >18           <7.0   Pediatrics:    13-18           <7.5                   7-12           <8.0                   0- 6            7.5-8.5   American Diabetes Association. Diabetes Care 33(S1), Jan 2010.       LDLCALC   Date/Time Value Ref Range Status   10/01/2024 08:50 AM 39 <=99 mg/dL Final     Comment:                                 Near   Borderline      AGE      Desirable  Optimal    High     High     Very High     0-19 Y     0 - 109     ---    110-129   >/= 130     ----    20-24 Y     0 - 119     ---    120-159   >/= 160     ----      >24 Y     0 -  99   100-129  130-159   160-189     >/=190     02/08/2024 08:29 AM 39 <=99 mg/dL Final     Comment:                                 Near   Borderline      AGE      Desirable  Optimal    High     High     Very High     0-19 Y     0 - 109     ---    110-129   >/= 130     ----    20-24 Y     0 - 119     ---    120-159   >/= 160     ----      >24 Y     0 -  99   100-129  130-159   160-189     >/=190       VLDL   Date/Time Value Ref Range Status   10/01/2024 08:50 AM 11 0 - 40 mg/dL Final   02/08/2024 08:29 AM 10 0 - 40 mg/dL Final   06/05/2023 08:24 AM 13 0 - 40 mg/dL Final   11/17/2022 08:13 AM 10 0 - 40 mg/dL Final   11/10/2021 07:52 AM 16 0 - 40 mg/dL Final      Last I/O:  I/O last 3 completed shifts:  In: 480 (5.2 mL/kg) [P.O.:480]  Out: 8250 (88.7 mL/kg) [Urine:8250 (2.5 mL/kg/hr)]  Weight: 93 kg     Past Cardiology Tests (Last 3 Years):  EKG:  ECG 12 lead 02/03/2025 (Preliminary)    Echo:  Transthoracic Echo (TTE) Complete 02/04/2025      Transthoracic Echo  "(TTE) Complete 10/24/2023    Ejection Fractions:  EF   Date/Time Value Ref Range Status   02/04/2025 04:07 PM 62 %      Cath:  No results found for this or any previous visit from the past 1095 days.    Stress Test:  No results found for this or any previous visit from the past 1095 days.    Cardiac Imaging:  No results found for this or any previous visit from the past 1095 days.      Inpatient Medications:  Scheduled medications   Medication Dose Route Frequency    atorvastatin  40 mg oral Nightly    ciprofloxacin  500 mg oral q12h SUE    dilTIAZem CD  120 mg oral Daily    folic acid  1 mg oral Daily    metoprolol tartrate  100 mg oral BID    multivitamin with minerals  1 tablet oral Daily    polyethylene glycol  17 g oral Daily    tamsulosin  0.8 mg oral Daily    thiamine  100 mg oral Daily    [Held by provider] warfarin  2.5 mg oral Once per day on Wednesday Thursday    [Held by provider] warfarin  5 mg oral Once per day on Sunday Monday Tuesday Friday Saturday     PRN medications   Medication    acetaminophen    Or    acetaminophen    Or    acetaminophen    LORazepam    Or    LORazepam    Or    LORazepam    ondansetron    Or    ondansetron    oxybutynin     Continuous Medications   Medication Dose Last Rate       Physical Exam:  Documented Vital Signs   Heart Rate:  [90-94]   Temp:  [36.5 °C (97.7 °F)-37.1 °C (98.8 °F)]   Resp:  [17-20]   BP: (111-121)/(68-77)   SpO2:  [95 %-96 %]   Temp:  [36.5 °C (97.7 °F)-37.1 °C (98.8 °F)] 36.5 °C (97.7 °F)  Heart Rate:  [90-94] 94  Resp:  [17-20] 18  BP: (111-121)/(68-77) 120/70     Documented Fluid Status     Intake/Output Summary (Last 24 hours) at 2/12/2025 1003  Last data filed at 2/12/2025 0224  Gross per 24 hour   Intake --   Output 7250 ml   Net -7250 ml     Net IO Since Admission: -22,730 mL [02/12/25 1003]    /70 (BP Location: Right arm, Patient Position: Lying)   Pulse 94   Temp 36.5 °C (97.7 °F) (Temporal)   Resp 18   Ht 1.753 m (5' 9\")   Wt 93 kg " (205 lb)   SpO2 95%   BMI 30.27 kg/m²   General:  Patient is awake, alert, and oriented.  Patient is in no acute distress.  HEENT:  Pupils equal and reactive.  Normocephalic.  Moist mucosa.    Neck:  No thyromegaly.  Normal Jugular Venous Pressure.  Cardiovascular:  Irregular rate and Irregular Rhythm with variable S1, S2  Pulmonary:  Clear to auscultation bilaterally.  Abdomen:  Soft. Non-tender.   Non-distended.  Positive bowel sounds.  Lower Extremities:  2+ pedal pulses. No LE edema.  Neurologic:  Cranial nerves intact.  No focal deficit.   Skin: Skin warm and dry, normal skin turgor.   Psychiatric: Normal affect.         Assessment/Plan   Ming Bunch is a 84 y.o. male with past medical history of low grade CAD by [facility] 2007, chronic atrial fibrillation on warfarin, hyperlipidemia, cirrhosis,  BPH w/LUTS, hematuria recent cystoscopy, Kirby catheter placed discharged home on 1/31/2025, presented to [facility] ED on 2/3/2025 for hematuria.  Cardiology consulted for anticoagulation recs in setting of hematuria.      Long standing persistent atrial fibrillation with [id number] Stroke Risk Points: 4 who has been maintained on VKA for CVA prevention who presents with hematuria. He is currently planned for Cystoscopy in the AM.   -- OK to hold anticoagulation at this time.   -- Resume when deemed safe per Urology.   -- Further recommendations pending cystoscopy results        Peripheral IV 02/11/25 20 G Distal;Right;Posterior Wrist (Active)   Site Assessment Clean;Dry;Intact 02/11/25 2048   Dressing Status Clean;Dry 02/11/25 2048   Number of days: 1       Urethral Catheter Coude;Double-lumen 24 Fr. (Active)   Irrigation Output (mL) 1100 mL 02/12/25 0822   Number of days: 9       Code Status:  Full Code      Andrea Torres DO   Division of Cardiovascular Medicine  [facility] [place]

## 2025-02-12 NOTE — ANESTHESIA POSTPROCEDURE EVALUATION
Patient: Ming Bunch    Procedure Summary       Date: 02/12/25 Room / Location: [facility] A OR 02 / Virtual [facility] A OR    Anesthesia Start: 1334 Anesthesia Stop: 1418    Procedure: Cystoscopy; Clot Evacuation (Bladder) Diagnosis:       Gross hematuria      (Gross hematuria [R31.0])    Surgeons: Dilip Rodriguez MD Responsible Provider: Rafael Myles MD    Anesthesia Type: general ASA Status: 3            Anesthesia Type: general    Vitals Value Taken Time   /71 02/12/25 1446   Temp 36.8 °C (98.2 °F) 02/12/25 1413   Pulse 100 02/12/25 1500   Resp 20 02/12/25 1445   SpO2 93 % 02/12/25 1500   Vitals shown include unfiled device data.    Anesthesia Post Evaluation    Patient location during evaluation: bedside  Patient participation: complete - patient participated  Level of consciousness: awake  Pain management: adequate  Multimodal analgesia pain management approach  Airway patency: patent  Cardiovascular status: stable  Respiratory status: spontaneous ventilation and unassisted  Hydration status: acceptable  Postoperative Nausea and Vomiting: none  Comments: No significant PONV.        No notable events documented.

## 2025-02-12 NOTE — CARE PLAN
"The patient's goals for the shift include \"find out when I can go home\"    The clinical goals for the shift include decrease hematuria    Over the shift, the patient did not make progress toward the following goals.     "

## 2025-02-12 NOTE — PROGRESS NOTES
Ming Bunch is a 84 y.o. male admitted for hematuria. Pharmacy has been consulted for warfarin dosing and monitoring for Atrial Fibrillation/Atrial Flutter with goal INR of 2.0-3.0.     Home regimen   MON TUE WED THR FRI SAT SUN   Dose 5  mg 5  mg 2.5  mg 2.5  mg 5  mg 5  mg 5  mg   Total weekly dose: 30 mg  Source: [facility] note     Labs  INR: 1.4  Hgb/Hct/Plt  Lab Results   Component Value Date    HGB 8.5 (L) 02/12/2025    HGB 8.1 (L) 02/11/2025    HGB 8.8 (L) 02/10/2025    HGB 8.1 (L) 02/09/2025    HGB 8.4 (L) 02/08/2025        Lab Results   Component Value Date    HCT 27.3 (L) 02/12/2025    HCT 26.0 (L) 02/11/2025    HCT 27.9 (L) 02/10/2025    HCT 26.9 (L) 02/09/2025    HCT 26.1 (L) 02/08/2025        Platelets   Date Value Ref Range Status   02/12/2025 222 150 - 450 x10*3/uL Final   02/11/2025 206 150 - 450 x10*3/uL Final   02/10/2025 231 150 - 450 x10*3/uL Final   02/09/2025 212 150 - 450 x10*3/uL Final   02/08/2025 203 150 - 450 x10*3/uL Final      Warfarin Therapy   Current regimen: resuming home reigmen  Bridging: None needed  Interacting medications: interacting medication(s) of Cipro increase bleeding risk    Dosing History During Current Admission  Date 2/10 2/11 2/12   INR 1.3 1.4 1.4   Dose  (mg) 5 mg Hold Hold     Assessment and Plan  Patient's INR of 1.4 today is Subtherapeutic. Hemoglobin/hematocrit/platelet stable  Warfarin inpatient plan: On hold due to cystology today. Will re-evaluate tomorrow.   Monitor s/sx of bleeding including epistaxis, hematuria, unusual bruising, hemoptysis, hematochezia as well as s/sx of stroke including impaired speech, unilateral paralysis, blurry vision.  Pharmacy will continue to monitor the patient and adjust therapy as needed.      Thank you for the consult. Please do not hesitate to contact a pharmacist with any questions.    Stella Romero, EmmanuelleD

## 2025-02-13 VITALS
RESPIRATION RATE: 23 BRPM | BODY MASS INDEX: 27.63 KG/M2 | WEIGHT: 186.54 LBS | OXYGEN SATURATION: 97 % | DIASTOLIC BLOOD PRESSURE: 76 MMHG | SYSTOLIC BLOOD PRESSURE: 126 MMHG | HEART RATE: 92 BPM | HEIGHT: 69 IN | TEMPERATURE: 97.8 F

## 2025-02-13 LAB
ALBUMIN SERPL BCP-MCNC: 2.9 G/DL (ref 3.4–5)
ALP SERPL-CCNC: 67 U/L (ref 33–136)
ALT SERPL W P-5'-P-CCNC: 16 U/L (ref 10–52)
ANION GAP SERPL CALC-SCNC: 9 MMOL/L (ref 10–20)
AST SERPL W P-5'-P-CCNC: 9 U/L (ref 9–39)
BILIRUB SERPL-MCNC: 0.6 MG/DL (ref 0–1.2)
BUN SERPL-MCNC: 11 MG/DL (ref 6–23)
CALCIUM SERPL-MCNC: 8 MG/DL (ref 8.6–10.3)
CHLORIDE SERPL-SCNC: 106 MMOL/L (ref 98–107)
CO2 SERPL-SCNC: 26 MMOL/L (ref 21–32)
CREAT SERPL-MCNC: 0.55 MG/DL (ref 0.5–1.3)
EGFRCR SERPLBLD CKD-EPI 2021: >90 ML/MIN/1.73M*2
ERYTHROCYTE [DISTWIDTH] IN BLOOD BY AUTOMATED COUNT: 16 % (ref 11.5–14.5)
GLUCOSE SERPL-MCNC: 141 MG/DL (ref 74–99)
HCT VFR BLD AUTO: 26.4 % (ref 41–52)
HGB BLD-MCNC: 8.3 G/DL (ref 13.5–17.5)
INR PPP: 1.4 (ref 0.9–1.1)
MCH RBC QN AUTO: 28.6 PG (ref 26–34)
MCHC RBC AUTO-ENTMCNC: 31.4 G/DL (ref 32–36)
MCV RBC AUTO: 91 FL (ref 80–100)
NRBC BLD-RTO: 0 /100 WBCS (ref 0–0)
PLATELET # BLD AUTO: 231 X10*3/UL (ref 150–450)
POTASSIUM SERPL-SCNC: 4 MMOL/L (ref 3.5–5.3)
PROT SERPL-MCNC: 5.7 G/DL (ref 6.4–8.2)
PROTHROMBIN TIME: 15.3 SECONDS (ref 9.8–12.8)
RBC # BLD AUTO: 2.9 X10*6/UL (ref 4.5–5.9)
SODIUM SERPL-SCNC: 137 MMOL/L (ref 136–145)
WBC # BLD AUTO: 5.1 X10*3/UL (ref 4.4–11.3)

## 2025-02-13 PROCEDURE — 2500000002 HC RX 250 W HCPCS SELF ADMINISTERED DRUGS (ALT 637 FOR MEDICARE OP, ALT 636 FOR OP/ED)

## 2025-02-13 PROCEDURE — 2500000001 HC RX 250 WO HCPCS SELF ADMINISTERED DRUGS (ALT 637 FOR MEDICARE OP)

## 2025-02-13 PROCEDURE — 85610 PROTHROMBIN TIME: CPT

## 2025-02-13 PROCEDURE — 99232 SBSQ HOSP IP/OBS MODERATE 35: CPT | Performed by: INTERNAL MEDICINE

## 2025-02-13 PROCEDURE — 99238 HOSP IP/OBS DSCHRG MGMT 30/<: CPT | Performed by: INTERNAL MEDICINE

## 2025-02-13 PROCEDURE — 99232 SBSQ HOSP IP/OBS MODERATE 35: CPT | Performed by: STUDENT IN AN ORGANIZED HEALTH CARE EDUCATION/TRAINING PROGRAM

## 2025-02-13 PROCEDURE — 85027 COMPLETE CBC AUTOMATED: CPT

## 2025-02-13 PROCEDURE — 36415 COLL VENOUS BLD VENIPUNCTURE: CPT

## 2025-02-13 PROCEDURE — 84075 ASSAY ALKALINE PHOSPHATASE: CPT

## 2025-02-13 PROCEDURE — 2500000004 HC RX 250 GENERAL PHARMACY W/ HCPCS (ALT 636 FOR OP/ED)

## 2025-02-13 PROCEDURE — 2500000004 HC RX 250 GENERAL PHARMACY W/ HCPCS (ALT 636 FOR OP/ED): Performed by: STUDENT IN AN ORGANIZED HEALTH CARE EDUCATION/TRAINING PROGRAM

## 2025-02-13 RX ORDER — OXYBUTYNIN CHLORIDE 5 MG/1
5 TABLET ORAL 2 TIMES DAILY PRN
Qty: 28 TABLET | Refills: 0 | Status: SHIPPED | OUTPATIENT
Start: 2025-02-13 | End: 2025-02-27

## 2025-02-13 RX ADMIN — CIPROFLOXACIN 500 MG: 500 TABLET ORAL at 10:12

## 2025-02-13 RX ADMIN — TAMSULOSIN HYDROCHLORIDE 0.8 MG: 0.4 CAPSULE ORAL at 10:10

## 2025-02-13 RX ADMIN — Medication 100 MG: at 10:07

## 2025-02-13 RX ADMIN — METOPROLOL TARTRATE 100 MG: 50 TABLET, FILM COATED ORAL at 10:07

## 2025-02-13 RX ADMIN — DILTIAZEM HYDROCHLORIDE 120 MG: 120 CAPSULE, COATED, EXTENDED RELEASE ORAL at 10:09

## 2025-02-13 RX ADMIN — POLYETHYLENE GLYCOL 3350 17 G: 17 POWDER, FOR SOLUTION ORAL at 10:05

## 2025-02-13 RX ADMIN — SODIUM CHLORIDE, POTASSIUM CHLORIDE, SODIUM LACTATE AND CALCIUM CHLORIDE 500 ML: 600; 310; 30; 20 INJECTION, SOLUTION INTRAVENOUS at 10:16

## 2025-02-13 RX ADMIN — Medication 1 TABLET: at 10:08

## 2025-02-13 RX ADMIN — FOLIC ACID 1 MG: 1 TABLET ORAL at 10:10

## 2025-02-13 ASSESSMENT — COGNITIVE AND FUNCTIONAL STATUS - GENERAL
DAILY ACTIVITIY SCORE: 23
MOBILITY SCORE: 24
MOBILITY SCORE: 21
TOILETING: A LITTLE
MOVING TO AND FROM BED TO CHAIR: A LITTLE
WALKING IN HOSPITAL ROOM: A LITTLE
CLIMB 3 TO 5 STEPS WITH RAILING: A LITTLE

## 2025-02-13 ASSESSMENT — LIFESTYLE VARIABLES
AGITATION: NORMAL ACTIVITY
PAROXYSMAL SWEATS: NO SWEAT VISIBLE
TREMOR: NO TREMOR
TOTAL SCORE: 0
ANXIETY: NO ANXIETY, AT EASE
AUDITORY DISTURBANCES: NOT PRESENT
HEADACHE, FULLNESS IN HEAD: NOT PRESENT
NAUSEA AND VOMITING: NO NAUSEA AND NO VOMITING
VISUAL DISTURBANCES: NOT PRESENT
ORIENTATION AND CLOUDING OF SENSORIUM: ORIENTED AND CAN DO SERIAL ADDITIONS

## 2025-02-13 ASSESSMENT — PAIN SCALES - GENERAL: PAINLEVEL_OUTOF10: 0 - NO PAIN

## 2025-02-13 ASSESSMENT — PAIN - FUNCTIONAL ASSESSMENT: PAIN_FUNCTIONAL_ASSESSMENT: 0-10

## 2025-02-13 NOTE — PROGRESS NOTES
02/13/25 1546   Discharge Planning   Living Arrangements Spouse/significant other;Children   Support Systems Children   Assistance Needed TCC follow up with patient and daughter at bedside re; discharge planning;patient declined need for nursing C, if anything changes he will call his PCP;Urology following;Would anticipate hematuria to resolve in the next week or two  - okay to stop CBI and do a TOV  - if fails TOV please reinsert quintanilla. Will have patient follow up OP for repeat TOV   Expected Discharge Disposition Home  (patient denies any further needs)   Does the patient need discharge transport arranged? No

## 2025-02-13 NOTE — CARE PLAN
"The patient's goals for the shift include \"find out when I can go home\"    The clinical goals for the shift include Decrease in hematuria    Problem: Safety - Adult  Goal: Free from fall injury  Outcome: Progressing     Problem: Pain - Adult  Goal: Verbalizes/displays adequate comfort level or baseline comfort level  Outcome: Progressing     Problem: Chronic Conditions and Co-morbidities  Goal: Patient's chronic conditions and co-morbidity symptoms are monitored and maintained or improved  Outcome: Progressing     Problem: Fall/Injury  Goal: Not fall by end of shift  Outcome: Progressing     "

## 2025-02-13 NOTE — DISCHARGE SUMMARY
Discharge Diagnosis  Hematuria    Issues Requiring Follow-Up  ***    Test Results Pending At Discharge  Pending Labs       Order Current Status    Extra Urine Gray Tube Collected (02/03/25 1204)    Urinalysis with Reflex Culture and Microscopic In process    AFB Culture/Smear Preliminary result    Fungal Culture/Smear Preliminary result            Hospital Course    Has had hematuria before and seen urology       PMHX: a fib (on coumadin), BPH, anemia    Hematuria  -coumadin on hold due to hematuria and supratherapeutic INR  -CBI  -UA with bacteria, leuks, WBC, bacteria -> urine culture from 1/31/25-> colonized, no further work up needed-> repeat   -hgb 8.4-> trend  -urology consulted, appreciate recs  -follows with Dr. Rock-> follow on DC as follow up     A fib  -continue metoprolol, cardizem,  -hold coumadin 2/2 hematuria and supratherapeutic INR  -INR 3.4-> recheck in AM  -plan for pharmacy to dose when restarted   -CXR with cardiomegaly, small new bilateral effusions  -  -last echo 10/24/23 EF 50-55%    CAD  -continue statin and aspirin      DVT prophylaxis:  Coumadin held 2/2 to elevated INR/ hematuria, SCD    DC plan:  DC home when medically stable    Labs/Testing reviewed    Interdisciplinary team rounding completed with hospitalist, nurse, TCC    NP discussed plan and lab/testing results with Dr. Christian     ***    Pertinent Physical Exam At Time of Discharge  Physical Exam    Home Medications     Medication List      CONTINUE taking these medications     atorvastatin 40 mg tablet; Commonly known as: Lipitor; Take 1 tablet (40   mg) by mouth once daily at bedtime.   dilTIAZem  mg 24 hr capsule; Commonly known as: Cardizem CD   metoprolol tartrate 100 mg tablet; Commonly known as: Lopressor; Take 1   tablet (100 mg) by mouth 2 times a day.   tadalafil 5 mg tablet; Commonly known as: Cialis; Take 1 tablet (5 mg)   by mouth once daily.   tamsulosin 0.4 mg 24 hr capsule; Commonly known as: Flomax;  Take 2   capsules (0.8 mg) by mouth once daily.     ASK your doctor about these medications     aspirin 81 mg EC tablet   warfarin 5 mg tablet; Commonly known as: Coumadin; Take as directed. If   you are unsure how to take this medication, talk to your nurse or doctor.;   Original instructions: Take 1 tablet (5 mg) by mouth once daily in the   evening.       Outpatient Follow-Up  Future Appointments   Date Time Provider Department [place]   2/26/2025 11:00 AM [facility] [id number] [facility] [id number] [facility]   3/26/2025 10:00 AM Phoenix Gilman MD [id number] [facility]   4/23/2025 10:30 AM [id number]   4/23/2025 11:30 AM MENDEZ Ribera-CNP [id number] [facility]   8/11/2025 10:00 AM MENDEZ Bhardwaj-CNP [id number] [facility]   9/25/2025  1:00 PM Kellee Burris DO [id number] [facility]       Peter Domingo MD

## 2025-02-13 NOTE — CONSULTS
"Nutrition Assessment Note  Nutrition Assessment      Reason for Assessment: Length of stay    Ming Bunch is a 84 y.o. year old male patient with Hematuria [R31.9]  Gross hematuria [R31.0]  Acute on chronic combined systolic and diastolic congestive heart failure [I50.43]  Problem with Kirby catheter, initial encounter (CMS-[facility]) [T83.9XXA]  Iron deficiency anemia, unspecified iron deficiency anemia type [D50.9]    Seen for LOS .     Chart reviewed and pt visited.  Past Medical History:   Diagnosis Date    Personal history of diseases of the blood and blood-forming organs and certain disorders involving the immune mechanism 06/09/2021    History of thrombocytopenia    Shortness of breath 02/19/2020    Shortness of breath on exertion     Per chart review:  -Acute on chronic HF (moderate edema on admission mostly from chronic illness)    Per pt:  -No known wt changes; came in around 215# (bedscale shows 186.4#)  -Great appetite; 3 meals daily    No nutrition intervention is indicated at this time. Please reconsult if there are changes in patient status & nutrition therapy is warranted.    Dietary Orders (From admission, onward)       Start     Ordered    02/12/25 1457  Adult diet Regular  Diet effective now        Question:  Diet type  Answer:  Regular    02/12/25 1456    02/03/25 2154  May Participate in Room Service  ( ROOM SERVICE MAY PARTICIPATE)  Once        Question:  .  Answer:  Yes    02/03/25 2153                  History:  Energy Intake: Good > 75 %  Food and Nutrient History: 3 meals daily    Anthropometrics:  Height: 175.3 cm (5' 9.02\")  Weight: 84.6 kg (186 lb 8.6 oz)  BMI (Calculated): 27.53    Weight Change: -9.02    Wt Readings from Last 10 Encounters:  02/13/25 84.6 kg (186 lb 8.6 oz)  01/31/25 93 kg (205 lb)  01/24/25 93.4 kg (206 lb)  11/18/24 88.5 kg (195 lb)  09/25/24 85.3 kg (188 lb)  08/09/24 81.6 kg (180 lb)  07/24/24 83.6 kg (184 lb 3.2 oz)  03/01/24 86.2 kg (190 lb)  11/17/23 88.9 kg (196 " lb)  10/24/23 81.9 kg (180 lb 9.6 oz)    Moderate edema noted on this admission    Significant Weight Loss: Yes (moderate edema)       IBW/kg (Dietitian Calculated): 72.7 kg  Percent of IBW: 116 %     Nutrition Focused Physical Findings:  Orbital Fat Pads: Well nourished (slightly bulging fat pads)  Buccal Fat Pads: Well nourished (full, rounded cheeks)    Temporalis: Well nourished (well-defined muscle)    Edema: +3 moderate  Edema Location: (+3 edema on bilateral LE on admission)- no ed edema seen on visit       Skin: Negative       Time Spent (min): 30 minutes  Last Date of Nutrition Visit: 02/13/25  Nutrition Follow-Up Needed?: Dietitian to reassess per policy  Follow up Comment: HOWIE Bray

## 2025-02-13 NOTE — PROGRESS NOTES
Subjective Data:  Patient seen and examined, chart reviewed   -- s/p cystopscopy 2/12 with clot evac  -- Continues to have cherry red urine within the collection system   -- Cardiac Telemetry shows Rate controlled Atrial Fibrillation          Objective Data:  Last Recorded Vitals:  Vitals:    02/12/25 1505 02/12/25 1555 02/12/25 2107 02/13/25 0710   BP: 141/76 113/68 114/71 126/67   BP Location:  Left arm Left arm Left arm   Patient Position:  Lying Lying Lying   Pulse: 96 106  91   Resp: 16 19  18   Temp:  36.5 °C (97.7 °F) 36.2 °C (97.2 °F) 36.6 °C (97.9 °F)   TempSrc:  Temporal Temporal Temporal   SpO2: 94% 95% 96% 93%   Weight:       Height:           Last Labs:  Results from last 7 days   Lab Units 02/13/25  0717 02/12/25  0648 02/11/25  0808   WBC AUTO x10*3/uL 5.1 6.4 6.5   HEMOGLOBIN g/dL 8.3* 8.5* 8.1*   HEMATOCRIT % 26.4* 27.3* 26.0*   PLATELETS AUTO x10*3/uL 231 222 206     Results from last 7 days   Lab Units 02/13/25  0717 02/12/25  0648 02/11/25  0808   SODIUM mmol/L 137 138 138   POTASSIUM mmol/L 4.0 3.8 3.6   CHLORIDE mmol/L 106 105 106   CO2 mmol/L 26 28 28   BUN mg/dL 11 10 10   CREATININE mg/dL 0.55 0.62 0.60   CALCIUM mg/dL 8.0* 8.0* 7.9*   PROTEIN TOTAL g/dL 5.7* 5.5* 5.7*   BILIRUBIN TOTAL mg/dL 0.6 0.8 0.8   ALK PHOS U/L 67 70 63   ALT U/L 16 16 12   AST U/L 9 9 8*   GLUCOSE mg/dL 141* 91 94         BNP   Date/Time Value Ref Range Status   02/03/2025 12:15  0 - 99 pg/mL Final     HGBA1C   Date/Time Value Ref Range Status   01/05/2024 08:43 AM 5.2 see below % Final   06/05/2023 08:24 AM 5.4 % Final     Comment:          Diagnosis of Diabetes-Adults   Non-Diabetic: < or = 5.6%   Increased risk for developing diabetes: 5.7-6.4%   Diagnostic of diabetes: > or = 6.5%  .       Monitoring of Diabetes                Age (y)     Therapeutic Goal (%)   Adults:          >18           <7.0   Pediatrics:    13-18           <7.5                   7-12           <8.0                   0- 6             7.5-8.5   American Diabetes Association. Diabetes Care 33(S1), Jan 2010.   11/17/2022 08:13 AM 5.1 % Final     Comment:          Diagnosis of Diabetes-Adults   Non-Diabetic: < or = 5.6%   Increased risk for developing diabetes: 5.7-6.4%   Diagnostic of diabetes: > or = 6.5%  .       Monitoring of Diabetes                Age (y)     Therapeutic Goal (%)   Adults:          >18           <7.0   Pediatrics:    13-18           <7.5                   7-12           <8.0                   0- 6            7.5-8.5   American Diabetes Association. Diabetes Care 33(S1), Jan 2010.       LDLCALC   Date/Time Value Ref Range Status   10/01/2024 08:50 AM 39 <=99 mg/dL Final     Comment:                                 Near   Borderline      AGE      Desirable  Optimal    High     High     Very High     0-19 Y     0 - 109     ---    110-129   >/= 130     ----    20-24 Y     0 - 119     ---    120-159   >/= 160     ----      >24 Y     0 -  99   100-129  130-159   160-189     >/=190     02/08/2024 08:29 AM 39 <=99 mg/dL Final     Comment:                                 Near   Borderline      AGE      Desirable  Optimal    High     High     Very High     0-19 Y     0 - 109     ---    110-129   >/= 130     ----    20-24 Y     0 - 119     ---    120-159   >/= 160     ----      >24 Y     0 -  99   100-129  130-159   160-189     >/=190       VLDL   Date/Time Value Ref Range Status   10/01/2024 08:50 AM 11 0 - 40 mg/dL Final   02/08/2024 08:29 AM 10 0 - 40 mg/dL Final   06/05/2023 08:24 AM 13 0 - 40 mg/dL Final   11/17/2022 08:13 AM 10 0 - 40 mg/dL Final   11/10/2021 07:52 AM 16 0 - 40 mg/dL Final      Last I/O:  I/O last 3 completed shifts:  In: 400 (4.3 mL/kg) [IV Piggyback:400]  Out: 36876 (143.6 mL/kg) [Urine:91860 (4 mL/kg/hr); Blood:5]  Weight: 93 kg     Past Cardiology Tests (Last 3 Years):  EKG:  ECG 12 lead 02/03/2025 (Preliminary)    Echo:  Transthoracic Echo (TTE) Complete 02/04/2025      Transthoracic Echo (TTE) Complete  "10/24/2023    Ejection Fractions:  EF   Date/Time Value Ref Range Status   02/04/2025 04:07 PM 62 %      Cath:  No results found for this or any previous visit from the past 1095 days.    Stress Test:  No results found for this or any previous visit from the past 1095 days.    Cardiac Imaging:  No results found for this or any previous visit from the past 1095 days.      Inpatient Medications:  Scheduled medications   Medication Dose Route Frequency    atorvastatin  40 mg oral Nightly    dilTIAZem CD  120 mg oral Daily    folic acid  1 mg oral Daily    lactated Ringer's  500 mL intravenous Once    metoprolol tartrate  100 mg oral BID    multivitamin with minerals  1 tablet oral Daily    polyethylene glycol  17 g oral Daily    tamsulosin  0.8 mg oral Daily    thiamine  100 mg oral Daily    [Held by provider] warfarin  2.5 mg oral Once per day on Wednesday Thursday    [Held by provider] warfarin  5 mg oral Once per day on Sunday Monday Tuesday Friday Saturday     PRN medications   Medication    acetaminophen    Or    acetaminophen    Or    acetaminophen    LORazepam    Or    LORazepam    Or    LORazepam    ondansetron    Or    ondansetron    oxybutynin     Continuous Medications   Medication Dose Last Rate       Physical Exam:  Documented Vital Signs   Heart Rate:  []   Temp:  [36.2 °C (97.2 °F)-36.8 °C (98.2 °F)]   Resp:  [15-20]   BP: (107-141)/(51-76)   Height:  [175.3 cm (5' 9\")]   Weight:  [93 kg (205 lb 0.4 oz)]   SpO2:  [93 %-96 %]   Temp:  [36.2 °C (97.2 °F)-36.8 °C (98.2 °F)] 36.6 °C (97.9 °F)  Heart Rate:  [] 91  Resp:  [15-20] 18  BP: (107-141)/(51-76) 126/67     Documented Fluid Status     Intake/Output Summary (Last 24 hours) at 2/13/2025 1059  Last data filed at 2/13/2025 0710  Gross per 24 hour   Intake 400 ml   Output 7155 ml   Net -6755 ml     Net IO Since Admission: -29,485 mL [02/13/25 1059]    /67 (BP Location: Left arm, Patient Position: Lying)   Pulse 91   Temp 36.6 °C (97.9 " "°F) (Temporal)   Resp 18   Ht 1.753 m (5' 9\")   Wt 93 kg (205 lb 0.4 oz)   SpO2 93%   BMI 30.28 kg/m²   General:  Patient is awake, alert, and oriented.  Patient is in no acute distress.  HEENT:  Pupils equal and reactive.  Normocephalic.  Moist mucosa.    Neck:  No thyromegaly.  Normal Jugular Venous Pressure.  Cardiovascular:  Irregular rate and Irregular Rhythm with variable S1, S2  Pulmonary:  Clear to auscultation bilaterally.  Abdomen:  Soft. Non-tender.   Non-distended.  Positive bowel sounds.  Lower Extremities:  2+ pedal pulses. No LE edema.  Neurologic:  Cranial nerves intact.  No focal deficit.   Skin: Skin warm and dry, normal skin turgor.   Psychiatric: Normal affect.         Assessment/Plan   Ming Bunch is a 84 y.o. male with past medical history of low grade CAD by [facility] 2007, chronic atrial fibrillation on warfarin, hyperlipidemia, cirrhosis,  BPH w/LUTS, hematuria recent cystoscopy, Kirby catheter placed discharged home on 1/31/2025, presented to [facility] ED on 2/3/2025 for hematuria.  Cardiology consulted for anticoagulation recs in setting of hematuria.      Long standing persistent atrial fibrillation with [id number] Stroke Risk Points: 4 who has been maintained on VKA for CVA prevention who presents with hematuria. He is s/p cystoscopy 2/13 with voiding trial today  -- OK to hold anticoagulation at this time.   -- Resume when deemed safe per Urology, We would anticipated ~ 2/17/2025 provided his urine clears.   -- Would resume at his prior dosing with coumadin clinic within 1 week of resume therapy. \  -- Call if any additional questions or concerns  -- Cardiology to sign off at this time.       Andrea Torres DO   Division of Cardiovascular Medicine  [facility] [place]             "

## 2025-02-13 NOTE — PROGRESS NOTES
Ming Bunch is a 84 y.o. male admitted for hematuria. Pharmacy has been consulted for warfarin dosing and monitoring for Atrial Fibrillation/Atrial Flutter with goal INR of 2.0-3.0.     Home regimen   MON TUE WED THR FRI SAT SUN   Dose 5  mg 5  mg 2.5  mg 2.5  mg 5  mg 5  mg 5  mg   Total weekly dose: 30 mg  Source: [facility] note     Labs  INR: 1.4  Hgb/Hct/Plt  Lab Results   Component Value Date    HGB 8.3 (L) 02/13/2025    HGB 8.5 (L) 02/12/2025    HGB 8.1 (L) 02/11/2025    HGB 8.8 (L) 02/10/2025    HGB 8.1 (L) 02/09/2025        Lab Results   Component Value Date    HCT 26.4 (L) 02/13/2025    HCT 27.3 (L) 02/12/2025    HCT 26.0 (L) 02/11/2025    HCT 27.9 (L) 02/10/2025    HCT 26.9 (L) 02/09/2025        Platelets   Date Value Ref Range Status   02/13/2025 231 150 - 450 x10*3/uL Final   02/12/2025 222 150 - 450 x10*3/uL Final   02/11/2025 206 150 - 450 x10*3/uL Final   02/10/2025 231 150 - 450 x10*3/uL Final   02/09/2025 212 150 - 450 x10*3/uL Final      Warfarin Therapy   Current regimen: resuming home reigmen  Bridging: None needed  Interacting medications: interacting medication(s) of Cipro increase bleeding risk    Dosing History During Current Admission  Date 2/10 2/11 2/12 2/13   INR 1.3 1.4 1.4 1.4   Dose  (mg) 5 mg Hold Hold Hold     Assessment and Plan  Patient's INR of 1.4 today is Subtherapeutic. Hemoglobin/hematocrit/platelet stable  Warfarin inpatient plan: On hold per Urology, Cardio okay with holding and deferring plan to Urology.  Monitor s/sx of bleeding including epistaxis, hematuria, unusual bruising, hemoptysis, hematochezia as well as s/sx of stroke including impaired speech, unilateral paralysis, blurry vision.  Pharmacy will continue to monitor the patient and adjust therapy as needed.      Thank you for the consult. Please do not hesitate to contact a pharmacist with any questions.    Stella Romero, EmmanuelleD

## 2025-02-13 NOTE — PROGRESS NOTES
Mnig Bunch is a 84 y.o. male on day 8 of admission presenting with Hematuria.    Subjective   Seen and examined patient this morning  Resting comfortably in bed  CBI to be discontinued to AM; quintanilla to be removed; trial void pending  Will determine resuming AC after trial void; discussed with Urology FLORINA    Objective     Last Recorded Vitals  /67 (BP Location: Left arm, Patient Position: Lying)   Pulse 91   Temp 36.6 °C (97.9 °F) (Temporal)   Resp 18   Wt 93 kg (205 lb 0.4 oz)   SpO2 93%   Intake/Output last 3 Shifts:    Intake/Output Summary (Last 24 hours) at 2/13/2025 0938  Last data filed at 2/13/2025 0710  Gross per 24 hour   Intake 400 ml   Output 7155 ml   Net -6755 ml       Admission Weight  Weight: 93 kg (205 lb) (02/03/25 0944)    Daily Weight  02/12/25 : 93 kg (205 lb 0.4 oz)    Image Results  US guided abdominal paracentesis  Narrative: Interpreted By:  Ivy Alva,   STUDY:  US GUIDED ABDOMINAL PARACENTESIS; 2/7/20251:58 pm      INDICATION:  Signs/Symptoms:Ascites      COMPARISON:  None.      ACCESSION NUMBER(S):  [id number]      ORDERING CLINICIAN:  CJ CORNELL      TECHNIQUE:  INTERVENTIONALIST(S):  Ivy Alva      CONSENT:  The patient/patient's POA/next of kin was informed of the nature of  the proposed procedure. Risks were discussed including but not  limited to bleeding, infection, pain at insertion site, or puncture  of underlying organ. Purpose of treatment and alternative options  were also reviewed. All questions were answered and patient agreed to  proceed.      SEDATION:  None      MEDICATION/CONTRAST:  1% lidocaine      TIME OUT:  A time out was performed immediately prior to procedure start with  the interventional team, correctly identifying the patient name, date  of birth, MRN, procedure, anatomy (including marking of site and  side), patient position, procedure consent form, relevant laboratory  and imaging test results, antibiotic administration,  safety  precautions, and procedure-specific equipment needs.      FINDINGS:  The patient was placed into the supine position.      The patient's abdomen was scanned using the ultrasound probe. The  area of fluid most amenable to drainage was marked. Color doppler was  used to assess for vasculature in the intended field.      The patient's skin was sterilized using chlorhexidine and draped in  sterile manner. The skin was anesthestized with 1% lidocaine. Under  real time ultrasound guidance, a 5F centesis catheter was inserted  into the abdomen in the RLQ where previously marked. A total of 1 L  of serosanguineous ascitic fluid was removed.  The catheter was then  removed and a sterile op site was placed over the insertion site.  Sterile technique used throughout entirety of procedure.      Specimens were obtained, labeled and sent to the laboratory with  requisitions per primary team.      Patient tolerated procedure well and there were no immediate  complications identified.      Impression: Uneventful paracentesis, as detailed above. Right Hemiabdomen, 1 L      Performed and dictated at [facility].      Signed by: Ivy Alva 2/7/2025 1:59 PM  Dictation workstation:   [id number]  US thoracentesis  Narrative: Interpreted By:  Ivy Alva,   STUDY:  US THORACENTESIS; 2/7/20251:57 pm      INDICATION:  Signs/Symptoms:Large pleural efussion, mild to moderate ascites, SOB  x several weeks, should we drain?      COMPARISON:  None.      ACCESSION NUMBER(S):  [id number]      ORDERING CLINICIAN:  KENZIE SMITH      TECHNIQUE:  INTERVENTIONALIST:  Ivy Alva      CONSENT:  The patient/patient's POA/next of kin was informed of the nature of  the proposed procedure. Risks were discussed including but not  limited to bleeding, infection, pain at insertion site, or  pneumo/hemothorax requiring chest tube placement. Purpose of  treatment and alternative options were  also reviewed. All questions  were answered and patient agreed to proceed.      SEDATION:  None      MEDICATION/CONTRAST:  Lidocaine 1%.      TIME OUT:  A time out was performed immediately prior to procedure start with  the interventional team, correctly identifying the patient name, date  of birth, MRN, procedure, anatomy (including marking of site and  side), patient position, procedure consent form, relevant laboratory  and imaging test results, antibiotic administration, safety  precautions, and procedure-specific equipment needs.      FINDINGS:  The patient was placed in the sitting position.      The patient's right pleural space was scanned using the ultrasound  probe. The area of fluid most amenable to drainage was marked. Color  doppler was used to assess for vasculature in the intended field.      The patient's skin was sterilized using chlorhexidine and draped in  sterile manner. The skin was anesthestized with 1% lidocaine.  Under  real time ultrasound guidance, a 5F valved centesis catheter was  inserted into the right pleural space where previously marked. A  total of 950ml of clear tony pleural fluid was removed. The catheter  was then removed and a sterile op site was placed over the insertion  site. Sterile technique used throughout entirety of procedure.      Specimens were obtained, labeled and sent to lab with requisitions  ordered by primary team.      The patient tolerated the procedure well and there were no immediate  complications.      Impression: Uneventful thoracentesis, as detailed above: Right Pleural space, 950  mL      Performed and dictated at [facility].      Signed by: Ivy Alva 2/7/2025 1:58 PM  Dictation workstation:   [id number]      Physical Exam  Constitutional: NAD, alert and cooperative, frustrated  Eyes: no icterus  ENMT: mucous membranes moist, no lesions  Head/Neck: supple  Respiratory/Thorax: CTA bilaterally, non-labored  breathing, no cough, on RA  Cardiovascular: RRR, no murmurs heard  Gastrointestinal: ND/S/NT  : Kirby catheter with dark red maroon urine, no SP/flank discomfort  Musculoskeletal: no joint swelling, ROM intact  Extremities: no edema  Neurological: non-focal  Skin: warm and dry  Psych: calm, stable mood     Relevant Results  Scheduled medications  atorvastatin, 40 mg, oral, Nightly  ciprofloxacin, 500 mg, oral, q12h SUE  dilTIAZem CD, 120 mg, oral, Daily  folic acid, 1 mg, oral, Daily  lactated Ringer's, 500 mL, intravenous, Once  metoprolol tartrate, 100 mg, oral, BID  multivitamin with minerals, 1 tablet, oral, Daily  polyethylene glycol, 17 g, oral, Daily  tamsulosin, 0.8 mg, oral, Daily  thiamine, 100 mg, oral, Daily  [Held by provider] warfarin, 2.5 mg, oral, Once per day on Wednesday Thursday  [Held by provider] warfarin, 5 mg, oral, Once per day on Sunday Monday Tuesday Friday Saturday      Continuous medications     PRN medications  PRN medications: acetaminophen **OR** acetaminophen **OR** acetaminophen, LORazepam **OR** LORazepam **OR** LORazepam, ondansetron **OR** ondansetron, oxybutynin  Results for orders placed or performed during the hospital encounter of 02/03/25 (from the past 24 hours)   Protime-INR   Result Value Ref Range    Protime 15.3 (H) 9.8 - 12.8 seconds    INR 1.4 (H) 0.9 - 1.1   CBC   Result Value Ref Range    WBC 5.1 4.4 - 11.3 x10*3/uL    nRBC 0.0 0.0 - 0.0 /100 WBCs    RBC 2.90 (L) 4.50 - 5.90 x10*6/uL    Hemoglobin 8.3 (L) 13.5 - 17.5 g/dL    Hematocrit 26.4 (L) 41.0 - 52.0 %    MCV 91 80 - 100 fL    MCH 28.6 26.0 - 34.0 pg    MCHC 31.4 (L) 32.0 - 36.0 g/dL    RDW 16.0 (H) 11.5 - 14.5 %    Platelets 231 150 - 450 x10*3/uL   Comprehensive metabolic panel   Result Value Ref Range    Glucose 141 (H) 74 - 99 mg/dL    Sodium 137 136 - 145 mmol/L    Potassium 4.0 3.5 - 5.3 mmol/L    Chloride 106 98 - 107 mmol/L    Bicarbonate 26 21 - 32 mmol/L    Anion Gap 9 (L) 10 - 20 mmol/L    Urea  Nitrogen 11 6 - 23 mg/dL    Creatinine 0.55 0.50 - 1.30 mg/dL    eGFR >90 >60 mL/min/1.73m*2    Calcium 8.0 (L) 8.6 - 10.3 mg/dL    Albumin 2.9 (L) 3.4 - 5.0 g/dL    Alkaline Phosphatase 67 33 - 136 U/L    Total Protein 5.7 (L) 6.4 - 8.2 g/dL    AST 9 9 - 39 U/L    Bilirubin, Total 0.6 0.0 - 1.2 mg/dL    ALT 16 10 - 52 U/L     *Note: Due to a large number of results and/or encounters for the requested time period, some results have not been displayed. A complete set of results can be found in Results Review.     No results found.       Assessment/Plan   Assessment & Plan  Hematuria    Acute on chronic combined systolic and diastolic congestive heart failure      Mr. Bunch is an 84 year old male with PMHx of hypertension, A-fib on Coumadin, BPH w/LUTS, hematuria recent cystoscopy, Quintanilla catheter placed discharged home on 1/31/2025, presented to [facility] ED on 2/3/2025 for hematuria.      # Persistent Hematuria  -Underwent cystoscopy with clot removal yesterday; maintained CBI overnight  - Plan to discontinue CBI and remove quintanilla today  - Urology following; to determine AC plans after trial void  -  #History of AF  - Coumadin on hold   - Appreciate Cardiology input for AC recommendations      #Liver Cirrhosis  Ascites  Pleural Effusion  -CXR with cardiomegaly, small new bilateral effusions  -CT A/P with cirrhosis , mild-mod volume ascites with generalized mesenteric edema and bilateral pleural effusion  -RUQ US showing cirrhosis of the liver. Right upper quadrant ascites. Large right pleural effusion.   -  -T bili .5  -echo: EF 62%, similar findings compared to last echo 10/24/23     Hyperlipidemia   -continue atorvastatin 40 mg nightly      Other comorbidities as above  - Continue medications as ordered and adjust based on clinical course      VTE / GI prophylaxis   - Coumadin ( on hold), Miralax      Discharge planning  - HNN      Discussed with Dr. Domingo and the interdisciplinary team      Jyoti Gonzales,  APRN-CNP

## 2025-02-13 NOTE — CARE PLAN
"The patient's goals for the shift include \"find out when I can go home\"    The clinical goals for the shift include safety    Over the shift, the patient did not make progress toward the following goals.   Problem: Pain - Adult  Goal: Verbalizes/displays adequate comfort level or baseline comfort level  Outcome: Progressing     Problem: Safety - Adult  Goal: Free from fall injury  Outcome: Progressing     Problem: Discharge Planning  Goal: Discharge to home or other facility with appropriate resources  Outcome: Progressing     Problem: Chronic Conditions and Co-morbidities  Goal: Patient's chronic conditions and co-morbidity symptoms are monitored and maintained or improved  Outcome: Progressing     Problem: Nutrition  Goal: Nutrient intake appropriate for maintaining nutritional needs  Outcome: Progressing     Problem: Heart Failure  Goal: Improved gas exchange this shift  Outcome: Progressing  Goal: Improved urinary output this shift  Outcome: Progressing  Goal: Reduction in peripheral edema within 24 hours  Outcome: Progressing  Goal: Report improvement of dyspnea/breathlessness this shift  Outcome: Progressing  Goal: Weight from fluid excess reduced over 2-3 days, then stabilize  Outcome: Progressing  Goal: Increase self care and/or family involvement in 24 hours  Outcome: Progressing     Problem: Fall/Injury  Goal: Not fall by end of shift  Outcome: Progressing  Goal: Be free from injury by end of the shift  Outcome: Progressing  Goal: Verbalize understanding of personal risk factors for fall in the hospital  Outcome: Progressing  Goal: Verbalize understanding of risk factor reduction measures to prevent injury from fall in the home  Outcome: Progressing  Goal: Use assistive devices by end of the shift  Outcome: Progressing  Goal: Pace activities to prevent fatigue by end of the shift  Outcome: Progressing     "

## 2025-02-13 NOTE — PROGRESS NOTES
Ming Bunch is a 84 y.o. male on day 8 of admission presenting with Hematuria.      Subjective   Feels good post-procedure. Denies pain. Tolerating diet. Has not appreciated any clots        Objective     PE:  Constitutional: A&Ox3, calm and cooperative, NAD  Eyes: EOMI, clear sclera   Cardiovascular: Normal rate and regular rhythm  Respiratory/Thorax: NLWOB  Genitourinary: Voiding via quintanilla- red lemonade appearance, no clots, CBI on slow trickle   Musculoskeletal: ROM intact  Neurological: A&Ox3, No focal deficits   Psychological: Appropriate mood and behavior      Last Recorded Vitals  Vitals:    02/12/25 1505 02/12/25 1555 02/12/25 2107 02/13/25 0710   BP: 141/76 113/68 114/71 126/67   BP Location:  Left arm Left arm Left arm   Patient Position:  Lying Lying Lying   Pulse: 96 106  91   Resp: 16 19  18   Temp:  36.5 °C (97.7 °F) 36.2 °C (97.2 °F) 36.6 °C (97.9 °F)   TempSrc:  Temporal Temporal Temporal   SpO2: 94% 95% 96% 93%   Weight:       Height:             Relevant Results    Imaging:     .=== 02/03/25 ===    XR CHEST 1 VIEW    - Impression -  Redemonstration of small right pleural effusion, without significant  change.    Cardiomegaly, which is unchanged.        MACRO:  None    Signed by: Mirela Dia 2/6/2025 11:47 AM  Dictation workstation:   [id number]   .=== 02/03/25 ===    CT ABDOMEN PELVIS WO IV CONTRAST    - Impression -  1.  Undulating hepatic contour consistent with cirrhosis along with  mild-to-moderate volume ascites and bilateral pleural effusions.      MACRO:  None    Signed by: Gunnar Goncalves 2/3/2025 10:23 PM  Dictation workstation:   [id number]         Lab Results:   Lab Results   Component Value Date    WBC 5.1 02/13/2025    HGB 8.3 (L) 02/13/2025    HCT 26.4 (L) 02/13/2025    MCV 91 02/13/2025     02/13/2025     Lab Results   Component Value Date    GLUCOSE 141 (H) 02/13/2025    CALCIUM 8.0 (L) 02/13/2025     02/13/2025    K 4.0 02/13/2025    CO2 26 02/13/2025      "02/13/2025    BUN 11 02/13/2025    CREATININE 0.55 02/13/2025     Results from last 72 hours   Lab Units 02/13/25  0717   ALK PHOS U/L 67   BILIRUBIN TOTAL mg/dL 0.6   PROTEIN TOTAL g/dL 5.7*   ALT U/L 16   AST U/L 9   ALBUMIN g/dL 2.9*     Estimated Creatinine Clearance: 112.6 mL/min (by C-G formula based on SCr of 0.55 mg/dL).  No results found for: \"CRP\"      Assessment/Plan   Ming Bunch is a 84 y.o. male with PMH of BPH and  recurrent hematuria that underwent a cystoscopy 3 weeks ago (notable for mildly enlarged, obstructive prostate; no tumors, stones, or strictures) that presented to ED on 2/3 with decreased urine output from quintanilla and hematuria, was upsized to 24F hematuria quintanilla. Had undergone CBI initiation and subsequently discontinuation with reoccurence of hematuria in the setting of restarting anticoagulation.     Now POD1 cystoscopy, clot evac   Intra-op findings: Prostate without signs of hemorrhage; bladder with multiple diverticula, multiple areas of irritation, no discrete areas of hemorrhage noted; mild burden of clot completely evacuated     -Discussed that hematuria may often occur after cystoscopy, particularly in setting of anticoagulation.  Would anticipate hematuria to resolve in the next week or two  - okay to stop CBI and do a TOV  - if fails TOV please reinsert quintanilla. Will have patient follow up OP for repeat TOV  - hold coumadin  - continue PO flomax for retention  - oxybutynin for bladder spasms PRN   - continue cipro     Will follow up on TOV. ADOD in 1-2 days      I spent 35 minutes in the professional and overall care of this patient.      Gladys Duvall PA-C           "

## 2025-02-13 NOTE — DISCHARGE INSTRUCTIONS
DEPARTMENT OF Urology  DISCHARGE INSTRUCTIONS -- Cystoscopy, Hematoma Evacuation   Inpatient Surgery    C O N F I D E N T I A L   I N F O R M A T I O N      Call [phone] during regular daytime business hours (8:00 am - 5:00 pm) and after 5:00 pm ask for the Urology resident with any questions or concerns.    If it is a life-threatening situation, proceed to the nearest emergency department.          Thank you for the opportunity to care for you today.  Your health and healing are very important to us.  We hope we made you feel as comfortable as possible and are committed to your recovery and continued well-being.      Physicians:   Dr. Rodriguez    Procedure performed: Cystoscopy with Hematoma Evacuation     What to Expect During your Recovery and Home Care  Anesthesia Side Effects   You received general anesthesia.  You may feel sleepy, tired, or have a sore throat.   You may also feel drowsiness, dizziness, or inability to think clearly.  For your safety, do not drive, drink alcoholic beverages, take any unprescribed medication or make any important decisions for 24 hours.  A responsible adult should be with you for 24 hours.        Activity and Recovery    No heavy lifting day of surgery. Rest for the next 24 hours.    Pain Control  Unfortunately, you may experience pain after your procedure.  Frequency and urgency to urinate and mild discomfort are expected. Adequate pain management can include alternative measures to help ease your pain and that can include over the counter Tylenol/ibuprofen and a heating pad. Do not take more than 4,000mg of Tylenol in a 24-hour period.      You may have also been prescribed Pyridium (for burning sensation) and Ditropan(for bladder spasms) for pain control. Pyridium will turn your urine bright orange.    Nausea/Vomiting   Clear liquids are best tolerated at first. Start slow, advance your diet as tolerated to normal foods. Avoid spicy, greasy, heavy foods at first. Also, you  may feel nauseous or like you need to vomit if you take any type of medication on an empty stomach.  Call your physician if you are unable to eat or drink and have persistent vomiting.    Signs of Bleeding   You may some blood in your urine off and on over the next several weeks. Your urine will be light red to yellow.    Treatment/wound care:   It is okay to shower after surgery.    Signs of Infection  Signs of infection can include fever, chills, burning sensation with passing of urine, or severe abdominal pain.  If you see any of these occur, please contact your doctor's office at [phone].  Any fever higher than 100.4, especially if associated with an ill feeling, abdominal pain, chills, or nausea should be reported to your surgeon.      Assist in bowel movements  Increase fiber in diet  Increase water (6 to 8 glasses)  Increase walking       Resume Coumadin at prior dosing on 2/17/2025  Resume Coumadin Clinic schedule

## 2025-02-13 NOTE — POST-PROCEDURE NOTE
"84 year old male presenting with gross hematuria, now POD#0 s/p cystoscopy with clot evacuation. Findings significant for Prostate without signs of hemorrhage; bladder with multiple diverticula, multiple areas of irritation, no discrete areas of hemorrhage noted; mild burden of clot completely evacuated.    Patient sleeping during time of quintanilla catheter evaluation this evening, does not appear to be in any acute distress.     /71 (BP Location: Left arm, Patient Position: Lying)   Pulse 106   Temp 36.2 °C (97.2 °F) (Temporal)   Resp 19   Ht 1.753 m (5' 9\")   Wt 93 kg (205 lb 0.4 oz)   SpO2 96%   BMI 30.28 kg/m²       PE:  Head/Neck: Neck supple, no JVD  Respiratory/Thorax: Good symmetric chest expansion.   Genitourinary: Quintanilla catheter in place with CBI running at slow drip, dark pink in the tubing, flowing freely at time of assessment, no clots noted       Plan:   - Diet: as per medicine team   - Continue CBI over night, titrate to light pink, plan for quintanilla removal with TOV in the AM  - Will discuss when to resume Coumadin in the AM with urologist   - Continue Cipro to complete the course   - continue PO flomax  - Continue Oxybutynin for bladder spasms PRN  - Continue current pain regimen   - PRN antiemetic   - DVT Proph: SCDs/ ambulate/ Heparin subcutaneous if unable to resume AC  - Monitor VS every 4 hours   - Labs ordered for AM   - IS every hour while awake   - Encourage ambulation / OOB as tolerated       Dispo: see above.   "

## 2025-02-14 ENCOUNTER — TELEPHONE (OUTPATIENT)
Dept: CARDIOLOGY | Facility: CLINIC | Age: 85
End: 2025-02-14
Payer: MEDICARE

## 2025-02-17 ENCOUNTER — APPOINTMENT (OUTPATIENT)
Dept: UROLOGY | Facility: CLINIC | Age: 85
End: 2025-02-17
Payer: MEDICARE

## 2025-02-17 ENCOUNTER — ANTICOAGULATION - WARFARIN VISIT (OUTPATIENT)
Dept: CARDIOLOGY | Facility: CLINIC | Age: 85
End: 2025-02-17

## 2025-02-17 ENCOUNTER — TELEPHONE (OUTPATIENT)
Dept: CARDIOLOGY | Facility: CLINIC | Age: 85
End: 2025-02-17

## 2025-02-17 VITALS — BODY MASS INDEX: 27.03 KG/M2 | WEIGHT: 188.8 LBS | TEMPERATURE: 96.3 F | HEIGHT: 70 IN

## 2025-02-17 DIAGNOSIS — N40.1 BENIGN PROSTATIC HYPERPLASIA WITH URINARY HESITANCY: ICD-10-CM

## 2025-02-17 DIAGNOSIS — R31.0 GROSS HEMATURIA: Primary | ICD-10-CM

## 2025-02-17 DIAGNOSIS — I48.91 ATRIAL FIBRILLATION, UNSPECIFIED TYPE (MULTI): Primary | ICD-10-CM

## 2025-02-17 DIAGNOSIS — R39.11 BENIGN PROSTATIC HYPERPLASIA WITH URINARY HESITANCY: ICD-10-CM

## 2025-02-17 LAB
FUNGUS SPEC CULT: NORMAL
FUNGUS SPEC FUNGUS STN: NORMAL

## 2025-02-17 PROCEDURE — 99213 OFFICE O/P EST LOW 20 MIN: CPT | Performed by: SURGERY

## 2025-02-17 PROCEDURE — 1111F DSCHRG MED/CURRENT MED MERGE: CPT | Performed by: SURGERY

## 2025-02-17 PROCEDURE — 1159F MED LIST DOCD IN RCRD: CPT | Performed by: SURGERY

## 2025-02-17 PROCEDURE — 1123F ACP DISCUSS/DSCN MKR DOCD: CPT | Performed by: SURGERY

## 2025-02-17 PROCEDURE — 1126F AMNT PAIN NOTED NONE PRSNT: CPT | Performed by: SURGERY

## 2025-02-17 PROCEDURE — 1036F TOBACCO NON-USER: CPT | Performed by: SURGERY

## 2025-02-17 PROCEDURE — 1160F RVW MEDS BY RX/DR IN RCRD: CPT | Performed by: SURGERY

## 2025-02-17 ASSESSMENT — PAIN SCALES - GENERAL: PAINLEVEL_OUTOF10: 0-NO PAIN

## 2025-02-17 NOTE — PROGRESS NOTES
Subjective   Patient ID: Ming Bunch is a 84 y.o. male who presents for hematuria.      HPI  He is here for follow-up.  He was hospitalized several weeks ago with hematuria and clot retention.  This necessitated a cystoscopy and clot evacuation.  The only notable finding on cystoscopy was BPH.  He is on anticoagulation due to atrial fibrillation.  This was discontinued during his hospital stay.  Since he was discharged he is voiding better.  He reports that his urine is orange-colored.  He has not seen any clots.  He is following up with his cardiologist.            Objective   Physical Exam  Well nourished  Abdomen soft, ND, NT                Assessment/Plan   Problem List Items Addressed This Visit             ICD-10-CM       Genitourinary and Reproductive    Hematuria - Primary R31.9     Other Visit Diagnoses         Codes    Benign prostatic hyperplasia with urinary hesitancy     N40.1, R39.11             We had a lengthy discussion today regarding his recurrent hematuria.  He was able to see an interventional radiologist during his hospital stay.  Unfortunately he is not a candidate for prostate artery embolization.  He may benefit from finasteride.  He will follow-up with his cardiologist and consider changing his anticoagulation regimen.  I would like to see him back in 6 months.          Valencia Rock MD 02/17/25 9:12 AM

## 2025-02-17 NOTE — TELEPHONE ENCOUNTER
PT called to cancel appt for 2/19.  Hospitalized on 2/3 with urinary bleeding, and taken off of warfarin.  He has an appt with Urology on 2/19, he will let us know after that appt if he is to restart warfarin, or possibly transition to another anticoagulant.

## 2025-02-19 LAB
ACID FAST STN SPEC: NORMAL
MYCOBACTERIUM SPEC CULT: NORMAL

## 2025-02-20 NOTE — DOCUMENTATION CLARIFICATION NOTE
"    PATIENT:               EVERETTE IQBAL  ACCT #:                  [id number]  MRN:                       [id number]  :                       1940  ADMIT DATE:       2/3/2025 11:06 AM  DISCH DATE:        2025 4:15 PM  RESPONDING PROVIDER #:        [id number]          PROVIDER RESPONSE TEXT:    Coagulopathy  Coumadin    CDI QUERY TEXT:    Clarification        Instruction:    Based on your assessment of the patient and the clinical information, please provide the requested documentation by clicking on the appropriate radio button and enter any additional information if prompted.    Question: Please further clarify the most likely etiology of hematuria on admission after final work up    When answering this query, please exercise your independent professional judgment. The fact that a question is being asked, does not imply that any particular answer is desired or expected.    The patient's clinical indicators include:  Clinical Information: Pt is an 85yo male presenting with recurrent hematuria.    Clinical Indicators: INR: 2/3- 3.4,  2.3,  2.0,  1.5, 2/10 1.3, - 1.4    2/3 HP \"Hematuria  Supratherapeutic INR / On Warfarin  UTI  Urinary retention  Acute on chronic anemia  -coumadin on hold due to hematuria and supratherapeutic INR  -continuous bladder irrigation\"    2/10 Consult \"IR consulted for PAE for ongoing hematuria\" \"Small size of prostate would limit the ability for a successful PAE with decreased clinical outcomes. Presently not an ideal PAE candidate.\"     Consult-Cardiology \"Chronic atrial fibrillation on warfarin\" \"Coumadin was held, then resumed on 2/10 and urine went from pink back to dark red.\"     PN-Primary care \"Patient s/p cystoscopy Multiple diverticula noted in the bladder No discrete areas of hemorrhage is noted in the bladder or prostate He had a small clot burden which was completely evacuated from the bladder\" \"Hematuria  Urinary tract infection  S/p " "cystoscopy with irrigation and removal of clot burden  Will monitor until tomorrow  Holding Coumadin\"    2/13 PN-Urology \"hematuria may often occur after cystoscopy, particularly in setting of anticoagulation\"    Treatment: held coumadin to restart 2/17, CBI, cystoscopy, monitor labs    Risk Factors: use of Coumadin, elevated INR, BPH, urinary retention, UTI  Options provided:  -- Coagulopathy 2/2 Coumadin  -- Bladder diverticula  -- UTI  -- Other - I will add my own diagnosis  -- Refer to Clinical Documentation Reviewer    Query created by: Isis Yeh on 2/20/2025 1:03 PM      Electronically signed by:  SHYLA MONTES MD 2/20/2025 3:31 PM          "

## 2025-02-22 NOTE — TELEPHONE ENCOUNTER
PT HAD UROLOGY F/U ON 2/19 AND STILL HAD BLEEDING DESPITE BEING OFF WARFARIN. PT HAS AN APPOINTMENT WITH DR. VILLATORO 2/26 TO SEE ABOUT RESUMING WARFARIN. HE WILL UPDATE US.

## 2025-02-24 DIAGNOSIS — I50.43 ACUTE ON CHRONIC COMBINED SYSTOLIC AND DIASTOLIC CONGESTIVE HEART FAILURE: Primary | ICD-10-CM

## 2025-02-24 PROBLEM — H61.20 IMPACTED CERUMEN: Status: ACTIVE | Noted: 2025-02-24

## 2025-02-24 LAB
FUNGUS SPEC CULT: NORMAL
FUNGUS SPEC FUNGUS STN: NORMAL

## 2025-02-26 ENCOUNTER — APPOINTMENT (OUTPATIENT)
Dept: CARDIOLOGY | Facility: CLINIC | Age: 85
End: 2025-02-26
Payer: MEDICARE

## 2025-02-26 ENCOUNTER — OFFICE VISIT (OUTPATIENT)
Dept: CARDIOLOGY | Facility: CLINIC | Age: 85
End: 2025-02-26
Payer: MEDICARE

## 2025-02-26 ENCOUNTER — ANTICOAGULATION - WARFARIN VISIT (OUTPATIENT)
Dept: CARDIOLOGY | Facility: CLINIC | Age: 85
End: 2025-02-26

## 2025-02-26 VITALS
SYSTOLIC BLOOD PRESSURE: 106 MMHG | DIASTOLIC BLOOD PRESSURE: 63 MMHG | BODY MASS INDEX: 27.03 KG/M2 | WEIGHT: 188.8 LBS | OXYGEN SATURATION: 96 % | HEART RATE: 129 BPM | HEIGHT: 70 IN

## 2025-02-26 DIAGNOSIS — I48.91 ATRIAL FIBRILLATION, UNSPECIFIED TYPE (MULTI): ICD-10-CM

## 2025-02-26 DIAGNOSIS — I48.91 ATRIAL FIBRILLATION, UNSPECIFIED TYPE (MULTI): Primary | ICD-10-CM

## 2025-02-26 DIAGNOSIS — R94.31 ABNORMAL EKG: Primary | ICD-10-CM

## 2025-02-26 LAB
ACID FAST STN SPEC: NORMAL
MYCOBACTERIUM SPEC CULT: NORMAL
Q ONSET: 225 MS
QRS COUNT: 13 BEATS
QRS DURATION: 96 MS
QT INTERVAL: 366 MS
QTC CALCULATION(BAZETT): 425 MS
QTC FREDERICIA: 404 MS
R AXIS: -21 DEGREES
T AXIS: -34 DEGREES
T OFFSET: 408 MS
VENTRICULAR RATE: 81 BPM

## 2025-02-26 PROCEDURE — 1036F TOBACCO NON-USER: CPT | Performed by: NURSE PRACTITIONER

## 2025-02-26 PROCEDURE — 1123F ACP DISCUSS/DSCN MKR DOCD: CPT | Performed by: NURSE PRACTITIONER

## 2025-02-26 PROCEDURE — 1159F MED LIST DOCD IN RCRD: CPT | Performed by: NURSE PRACTITIONER

## 2025-02-26 PROCEDURE — 93005 ELECTROCARDIOGRAM TRACING: CPT | Performed by: NURSE PRACTITIONER

## 2025-02-26 PROCEDURE — 99214 OFFICE O/P EST MOD 30 MIN: CPT | Performed by: NURSE PRACTITIONER

## 2025-02-26 PROCEDURE — 1126F AMNT PAIN NOTED NONE PRSNT: CPT | Performed by: NURSE PRACTITIONER

## 2025-02-26 PROCEDURE — 1111F DSCHRG MED/CURRENT MED MERGE: CPT | Performed by: NURSE PRACTITIONER

## 2025-02-26 PROCEDURE — 1160F RVW MEDS BY RX/DR IN RCRD: CPT | Performed by: NURSE PRACTITIONER

## 2025-02-26 PROCEDURE — G2211 COMPLEX E/M VISIT ADD ON: HCPCS | Performed by: NURSE PRACTITIONER

## 2025-02-26 RX ORDER — DILTIAZEM HYDROCHLORIDE 300 MG/1
300 CAPSULE, COATED, EXTENDED RELEASE ORAL DAILY
Qty: 90 CAPSULE | Refills: 3 | Status: SHIPPED | OUTPATIENT
Start: 2025-02-26 | End: 2026-02-26

## 2025-02-26 ASSESSMENT — ENCOUNTER SYMPTOMS
MUSCULOSKELETAL NEGATIVE: 1
NEUROLOGICAL NEGATIVE: 1
GASTROINTESTINAL NEGATIVE: 1
RESPIRATORY NEGATIVE: 1
CARDIOVASCULAR NEGATIVE: 1

## 2025-02-26 ASSESSMENT — PAIN SCALES - GENERAL: PAINLEVEL_OUTOF10: 0-NO PAIN

## 2025-02-26 ASSESSMENT — PATIENT HEALTH QUESTIONNAIRE - PHQ9
2. FEELING DOWN, DEPRESSED OR HOPELESS: NOT AT ALL
1. LITTLE INTEREST OR PLEASURE IN DOING THINGS: NOT AT ALL
SUM OF ALL RESPONSES TO PHQ9 QUESTIONS 1 AND 2: 0

## 2025-02-26 NOTE — PROGRESS NOTES
"Chief Complaint:   Follow-up    History Of Present Illness:    .Mr Bunch and his daughter return in follow up.  Denies chest pain, sob, palpitations or pedal edema.  Is fatigued.  Has been in the hospital with hematuria again.  Coumadin has been on hold.  Was to resume 02/17/2025, but still held due to urology wanted to use a different protocol.  Will start Eliquis.  Has had no further bleeding.  Cautioned on s/s with Eliquis use.         Last Recorded Vitals:  Blood pressure 106/63, pulse (!) 129, height 1.765 m (5' 9.5\"), weight 85.6 kg (188 lb 12.8 oz), SpO2 96%.     Past Medical History:  Past Medical History:   Diagnosis Date    Personal history of diseases of the blood and blood-forming organs and certain disorders involving the immune mechanism 06/09/2021    History of thrombocytopenia    Shortness of breath 02/19/2020    Shortness of breath on exertion        Past Surgical History:  Past Surgical History:   Procedure Laterality Date    APPENDECTOMY  09/11/2013    Appendectomy    COLONOSCOPY  06/25/2013    Complete Colonoscopy    OTHER SURGICAL HISTORY  04/22/2015    Removal Of Lesion    PILONIDAL CYST DRAINAGE  09/11/2013    Pilonidal Cyst Resection       Social History:  Social History     Socioeconomic History    Marital status:    Tobacco Use    Smoking status: Never    Smokeless tobacco: Never   Substance and Sexual Activity    Alcohol use: Yes     Alcohol/week: 10.0 standard drinks of alcohol     Types: 10 Standard drinks or equivalent per week    Drug use: Not Currently     Social Drivers of Health     Financial Resource Strain: Low Risk  (2/4/2025)    Overall Financial Resource Strain (CARDIA)     Difficulty of Paying Living Expenses: Not hard at all   Food Insecurity: No Food Insecurity (2/3/2025)    Hunger Vital Sign     Worried About Running Out of Food in the Last Year: Never true     Ran Out of Food in the Last Year: Never true   Transportation Needs: No Transportation Needs (2/4/2025)    " PRAPARE - Transportation     Lack of Transportation (Medical): No     Lack of Transportation (Non-Medical): No   Intimate Partner Violence: Not At Risk (2/3/2025)    Humiliation, Afraid, Rape, and Kick questionnaire     Fear of Current or Ex-Partner: No     Emotionally Abused: No     Physically Abused: No     Sexually Abused: No   Housing Stability: Low Risk  (2/4/2025)    Housing Stability Vital Sign     Unable to Pay for Housing in the Last Year: No     Number of Times Moved in the Last Year: 0     Homeless in the Last Year: No       Family History:  Family History   Problem Relation Name Age of Onset    Heart failure Mother      Atrial fibrillation Sister           Allergies:  Patient has no known allergies.    Outpatient Medications:  Current Outpatient Medications   Medication Sig Dispense Refill    aspirin 81 mg EC tablet Take 1 tablet (81 mg) by mouth once daily.      dilTIAZem CD (Cardizem CD) 240 mg 24 hr capsule Take 1 capsule (240 mg) by mouth once daily.      metoprolol tartrate (Lopressor) 100 mg tablet Take 1 tablet (100 mg) by mouth 2 times a day. 180 tablet 3    oxybutynin (Ditropan) 5 mg tablet Take 1 tablet (5 mg) by mouth 2 times a day as needed (bladder spasm) for up to 14 days. 28 tablet 0    tadalafil (Cialis) 5 mg tablet Take 1 tablet (5 mg) by mouth once daily. 30 tablet 3    tamsulosin (Flomax) 0.4 mg 24 hr capsule Take 2 capsules (0.8 mg) by mouth once daily. 180 capsule 3    atorvastatin (Lipitor) 40 mg tablet Take 1 tablet (40 mg) by mouth once daily at bedtime. 90 tablet 3     No current facility-administered medications for this visit.        Physical Exam:  Cardiovascular:      PMI at left midclavicular line. Normal rate. Regular rhythm. Normal S1. Normal S2.       Murmurs: There is no murmur.      No gallop.  No click. No rub.   Pulses:     Intact distal pulses.   Edema:     Peripheral edema absent.         ROS:  Review of Systems   Constitutional: Positive for malaise/fatigue.    Cardiovascular: Negative.    Respiratory: Negative.     Skin: Negative.    Musculoskeletal: Negative.    Gastrointestinal: Negative.    Genitourinary: Negative.    Neurological: Negative.           Last Labs:  CBC -  Lab Results   Component Value Date    WBC 5.1 02/13/2025    HGB 8.3 (L) 02/13/2025    HCT 26.4 (L) 02/13/2025    MCV 91 02/13/2025     02/13/2025       CMP -  Lab Results   Component Value Date    CALCIUM 8.0 (L) 02/13/2025    PROT 5.7 (L) 02/13/2025    ALBUMIN 2.9 (L) 02/13/2025    AST 9 02/13/2025    ALT 16 02/13/2025    ALKPHOS 67 02/13/2025    BILITOT 0.6 02/13/2025       LIPID PANEL -   Lab Results   Component Value Date    CHOL 103 10/01/2024    TRIG 56 10/01/2024    HDL 53.0 10/01/2024    CHHDL 1.9 10/01/2024    LDLF 37 06/05/2023    VLDL 11 10/01/2024    NHDL 50 10/01/2024       RENAL FUNCTION PANEL -   Lab Results   Component Value Date    GLUCOSE 141 (H) 02/13/2025     02/13/2025    K 4.0 02/13/2025     02/13/2025    CO2 26 02/13/2025    ANIONGAP 9 (L) 02/13/2025    BUN 11 02/13/2025    CREATININE 0.55 02/13/2025    GFRMALE 87 06/05/2023    CALCIUM 8.0 (L) 02/13/2025    ALBUMIN 2.9 (L) 02/13/2025        Lab Results   Component Value Date     (H) 02/03/2025    HGBA1C 5.2 01/05/2024         Assessment/Plan   Problem List Items Addressed This Visit    None  Visit Diagnoses       Abnormal EKG    -  Primary    Relevant Orders    ECG 12 lead (Clinic Performed)          Assessment:     1. Low-grade CAD by cardiac cath, 09/15/2007. The patient is doing well with no unusual chest discomfort. At the time of his catheterization, he was found to have normal coronary arteries other than for minor 20% narrowing.of the diagonal branch. This patient was seen at urgent care on 9/7/2015 with atypical chest pain that occurred when every he would take a deep breath. EKG evidently did not demonstrate any ST segment abnormality. His chest x-ray showed mild interstitial prominence. The  amylase and lipase values were normal and his liver function panel was also normal. Cardiac enzymes were negative. The SMA panel included a creatinine of 0.77 a CBC included hematocrit of 43.3. At this point will postpone repeat nuclear stress testing. Patient denies chest pain at today's office visit.   2. Chronic atrial fibrillation. At the time of a previous visit the patient was noted to have a somewhat rapid ventricular response to his atrial fibrillation despite being on metoprolol 100 mg twice a day. At that time the metoprolol dose was increased to 150 mg twice a day. Since that time the patient has remained generally fatigued with some vague muscle aching. Due to the perceived side effects the dose of metoprolol will be reduced back to 100 mg twice a day and he was started on Cardizem  mg daily to assist in ventricular rate control. HHe subsequently had the dose of Cardizem CD reduced to 120 mg daily. e did have an echocardiogram performed 11/4/2014 which demonstrated normal LV chamber size with low normal LV ejection fraction of 55%. There was moderate to severe dilatation of the left atrium. There is moderate dilatation of the right-sided chambers with mild mitral valve regurgitation mild to moderate tricuspid valve regurgitation and mild pulmonary hypertension with an estimated PA systolic pressure of 40 mmHg. There is also mild dilatation of the aortic root and ascending thoracic aorta. The presence of underlying COPD would explain the mild to moderate right-sided chamber dilatation. The patient did have a repeat echocardiogram performed today on 01/23/2018 which demonstrates an LV ejection fraction of approximately 55% with mild aortic valve insufficiency, mild to moderate mitral valve regurgitation, moderate tricuspid regurgitation, mild pulmonic insufficiency along with moderate left atrial enlargement and mild to moderate right atrial enlargement. The patient had been essentially asymptomatic  until he was admitted to [facility] in 2/2020 with a urinary tract infection with bacteremia. He was noted to have Proteus mirabilis bacteremia with acute emphysematous cystitis. During that admission he had atrial fibrillation rapid ventricular rate of all 110â€“1 140/m. Ultimately he was placed on a combination of both diltiazem and metoprolol. The dose of diltiazem was increased from 120-240 mg daily and he was kept on metoprolol tartrate 100 mg twice a day. He did have an echocardiogram that showed an LV ejection fraction of 55â€“60 percent with moderate left atrial enlargement 1+ aortic valve insufficiency and moderately dilated aortic root at 4.1 cm. The ventricular rate to the atrial fibrillation is now well controlled on the present combination of the Cardizem  mg daily along with metoprolol tartrate 100 mg twice daily.  He has declined trying metoprolol succinate.  HR today 119.  Will increase diltiazem to 300 mg daily and have him return in six weeks as scheduled.  Echo 02/2025  CONCLUSIONS:   1. Left ventricular ejection fraction is normal, calculated by Brandon's biplane at 62%.   2. Spectral Doppler shows a Grade III (restrictive pattern) of left ventricular diastolic filling with an elevated left atrial pressure.   3. There is normal right ventricular global systolic function.   4. Mildly enlarged right ventricle.   5. The left atrial size is severely dilated.   6. The right atrium is severely dilated.   7. Mild to moderate mitral valve regurgitation.   8. Mildly elevated right ventricular systolic pressure.   9. Severe tricuspid regurgitation visualized.  10. Moderate aortic valve regurgitation.   3. Coumadin anticoagulation with one episode of significant epistaxis. The patient has had no recurrent episodes of epistaxis since his last visit. The patient prefers to remain on Coumadin rather than switching to a direct oral anticoagulant, however per urology and several incidents with  hematuria, will switch to Eliquis.  4. Hyperlipidemia. The patient did have lab work performed on 11//2022 which included a lipid panel with cholesterol 119 LDL 51 HDL 57 triglyceride 52. The patient's panel is improved now on atorvastatin 40 mg daily rather than the previous simvastatin 40 mg daily.   5. Remote former smoker. Patient discontinued smoking 35 years ago.  6.? COPD.  7. History of urosepsis 2/2020. This patient was admitted to [facility] from 02/02/2020â€“02/05/2020 with sepsis due to a urinary tract infection. The patient presented with hematuria and burning upon urination. The patient initially had a Kirby catheter placed in the office which had subsequently been removed. At the time of his admission to [facility] he was noted to be in his atrial fibrillation but with a more rapid ventricular response. Serial troponins were negative. The patient had already been on Coumadin anticoagulation. The patient was placed on IV Zosyn and IV vancomycin for the urosepsis. He was initially placed on both diltiazem and metoprolol for ventricular rate control and the dose of diltiazem was increased from 120 mg daily to 240 mg daily. He also had an echocardiogram on 02/04/2020 that showed an LV ejection fraction 55â€“60 percent with moderate left atrial enlargement with 1+ aortic valve insufficiency and a moderately dilated aortic root at 4.1 cm. The patient was found to have Proteus mirabilis bacteremia with acute emphysematous cystitis. He ultimately was discharged home on ampicillin was also started on tamsulosin. The patient had a recent PSA of 2.36 on 5/20/2020. He had a visit with urology and had a postvoid residual of urine of only 29 cc on 7/9/2020.  8. Hx of covid-19 vaccine #1 and #2.          Kellen See, APRN-CNP

## 2025-02-27 ENCOUNTER — TELEPHONE (OUTPATIENT)
Dept: CARDIOLOGY | Facility: CLINIC | Age: 85
End: 2025-02-27
Payer: MEDICARE

## 2025-02-28 DIAGNOSIS — E78.00 ELEVATED LDL CHOLESTEROL LEVEL: ICD-10-CM

## 2025-02-28 RX ORDER — ATORVASTATIN CALCIUM 40 MG/1
40 TABLET, FILM COATED ORAL NIGHTLY
Qty: 90 TABLET | Refills: 3 | Status: SHIPPED | OUTPATIENT
Start: 2025-02-28 | End: 2026-02-28

## 2025-03-03 DIAGNOSIS — N40.0 BPH (BENIGN PROSTATIC HYPERPLASIA): ICD-10-CM

## 2025-03-03 LAB
ATRIAL RATE: 125 BPM
Q ONSET: 228 MS
QRS COUNT: 20 BEATS
QRS DURATION: 90 MS
QT INTERVAL: 334 MS
QTC CALCULATION(BAZETT): 469 MS
QTC FREDERICIA: 419 MS
R AXIS: 73 DEGREES
T AXIS: -59 DEGREES
T OFFSET: 395 MS
VENTRICULAR RATE: 119 BPM

## 2025-03-03 RX ORDER — TAMSULOSIN HYDROCHLORIDE 0.4 MG/1
0.8 CAPSULE ORAL DAILY
Qty: 180 CAPSULE | Refills: 3 | Status: SHIPPED | OUTPATIENT
Start: 2025-03-03 | End: 2026-03-03

## 2025-03-05 LAB
ACID FAST STN SPEC: NORMAL
MYCOBACTERIUM SPEC CULT: NORMAL

## 2025-03-11 ENCOUNTER — OFFICE VISIT (OUTPATIENT)
Dept: CARDIOLOGY | Facility: CLINIC | Age: 85
End: 2025-03-11
Payer: MEDICARE

## 2025-03-11 VITALS
OXYGEN SATURATION: 97 % | HEIGHT: 70 IN | HEART RATE: 92 BPM | WEIGHT: 200.4 LBS | BODY MASS INDEX: 28.69 KG/M2 | DIASTOLIC BLOOD PRESSURE: 60 MMHG | SYSTOLIC BLOOD PRESSURE: 113 MMHG

## 2025-03-11 DIAGNOSIS — I48.91 ATRIAL FIBRILLATION, UNSPECIFIED TYPE (MULTI): ICD-10-CM

## 2025-03-11 DIAGNOSIS — I50.9 CONGESTIVE HEART FAILURE, UNSPECIFIED HF CHRONICITY, UNSPECIFIED HEART FAILURE TYPE: Primary | ICD-10-CM

## 2025-03-11 DIAGNOSIS — I25.10 CORONARY ARTERY DISEASE INVOLVING NATIVE CORONARY ARTERY OF NATIVE HEART WITHOUT ANGINA PECTORIS: ICD-10-CM

## 2025-03-11 PROCEDURE — 1111F DSCHRG MED/CURRENT MED MERGE: CPT | Performed by: NURSE PRACTITIONER

## 2025-03-11 PROCEDURE — 99214 OFFICE O/P EST MOD 30 MIN: CPT | Performed by: NURSE PRACTITIONER

## 2025-03-11 PROCEDURE — 1126F AMNT PAIN NOTED NONE PRSNT: CPT | Performed by: NURSE PRACTITIONER

## 2025-03-11 PROCEDURE — G2211 COMPLEX E/M VISIT ADD ON: HCPCS | Performed by: NURSE PRACTITIONER

## 2025-03-11 PROCEDURE — 1036F TOBACCO NON-USER: CPT | Performed by: NURSE PRACTITIONER

## 2025-03-11 PROCEDURE — 1159F MED LIST DOCD IN RCRD: CPT | Performed by: NURSE PRACTITIONER

## 2025-03-11 PROCEDURE — 1123F ACP DISCUSS/DSCN MKR DOCD: CPT | Performed by: NURSE PRACTITIONER

## 2025-03-11 PROCEDURE — 1160F RVW MEDS BY RX/DR IN RCRD: CPT | Performed by: NURSE PRACTITIONER

## 2025-03-11 RX ORDER — FUROSEMIDE 40 MG/1
40 TABLET ORAL DAILY
Qty: 90 TABLET | Refills: 3 | Status: SHIPPED | OUTPATIENT
Start: 2025-03-11 | End: 2026-03-11

## 2025-03-11 ASSESSMENT — ENCOUNTER SYMPTOMS
CONSTITUTIONAL NEGATIVE: 1
RESPIRATORY NEGATIVE: 1
LOSS OF SENSATION IN FEET: 0
MUSCULOSKELETAL NEGATIVE: 1
NEUROLOGICAL NEGATIVE: 1
OCCASIONAL FEELINGS OF UNSTEADINESS: 0
GASTROINTESTINAL NEGATIVE: 1
DEPRESSION: 0

## 2025-03-11 ASSESSMENT — PAIN SCALES - GENERAL: PAINLEVEL_OUTOF10: 0-NO PAIN

## 2025-03-11 NOTE — PROGRESS NOTES
"Chief Complaint:   Follow-up    History Of Present Illness:    .Mr Bunch returns in follow up.  Denies chest pain, sob, palpitations, but has pitting bilateral pedal edema.  Couch.         Last Recorded Vitals:  Blood pressure 113/60, pulse 92, height 1.765 m (5' 9.5\"), weight 90.9 kg (200 lb 6.4 oz), SpO2 97%.     Past Medical History:  Past Medical History:   Diagnosis Date    Personal history of diseases of the blood and blood-forming organs and certain disorders involving the immune mechanism 06/09/2021    History of thrombocytopenia    Shortness of breath 02/19/2020    Shortness of breath on exertion        Past Surgical History:  Past Surgical History:   Procedure Laterality Date    APPENDECTOMY  09/11/2013    Appendectomy    COLONOSCOPY  06/25/2013    Complete Colonoscopy    OTHER SURGICAL HISTORY  04/22/2015    Removal Of Lesion    PILONIDAL CYST DRAINAGE  09/11/2013    Pilonidal Cyst Resection       Social History:  Social History     Socioeconomic History    Marital status:    Tobacco Use    Smoking status: Never    Smokeless tobacco: Never   Substance and Sexual Activity    Alcohol use: Yes     Alcohol/week: 10.0 standard drinks of alcohol     Types: 10 Standard drinks or equivalent per week    Drug use: Not Currently     Social Drivers of Health     Financial Resource Strain: Low Risk  (2/4/2025)    Overall Financial Resource Strain (CARDIA)     Difficulty of Paying Living Expenses: Not hard at all   Food Insecurity: No Food Insecurity (2/3/2025)    Hunger Vital Sign     Worried About Running Out of Food in the Last Year: Never true     Ran Out of Food in the Last Year: Never true   Transportation Needs: No Transportation Needs (2/4/2025)    PRAPARE - Transportation     Lack of Transportation (Medical): No     Lack of Transportation (Non-Medical): No   Intimate Partner Violence: Not At Risk (2/3/2025)    Humiliation, Afraid, Rape, and Kick questionnaire     Fear of Current or Ex-Partner: No     " Emotionally Abused: No     Physically Abused: No     Sexually Abused: No   Housing Stability: Low Risk  (2/4/2025)    Housing Stability Vital Sign     Unable to Pay for Housing in the Last Year: No     Number of Times Moved in the Last Year: 0     Homeless in the Last Year: No       Family History:  Family History   Problem Relation Name Age of Onset    Heart failure Mother      Atrial fibrillation Sister           Allergies:  Patient has no known allergies.    Outpatient Medications:  Current Outpatient Medications   Medication Sig Dispense Refill    apixaban (Eliquis) 5 mg tablet Take 1 tablet (5 mg) by mouth 2 times a day. 60 tablet 0    aspirin 81 mg EC tablet Take 1 tablet (81 mg) by mouth once daily.      atorvastatin (Lipitor) 40 mg tablet Take 1 tablet (40 mg) by mouth once daily at bedtime. 90 tablet 3    dilTIAZem CD (Cardizem CD) 300 mg 24 hr capsule Take 1 capsule (300 mg) by mouth once daily. 90 capsule 3    metoprolol tartrate (Lopressor) 100 mg tablet Take 1 tablet (100 mg) by mouth 2 times a day. 180 tablet 3    tadalafil (Cialis) 5 mg tablet Take 1 tablet (5 mg) by mouth once daily. 30 tablet 3    tamsulosin (Flomax) 0.4 mg 24 hr capsule Take 2 capsules (0.8 mg) by mouth once daily. 180 capsule 3     No current facility-administered medications for this visit.        Physical Exam:  Cardiovascular:      PMI at left midclavicular line. Normal rate. Regular rhythm. Normal S1. Normal S2.       Murmurs: There is no murmur.      No gallop.  No click. No rub.   Pulses:     Intact distal pulses.   Edema:     Ankle: bilateral 2+ pitting edema of the ankle.     Feet: 2+ pitting edema of the left foot.        ROS:  Review of Systems   Constitutional: Negative.   Cardiovascular:  Positive for leg swelling.   Respiratory: Negative.     Skin: Negative.    Musculoskeletal: Negative.    Gastrointestinal: Negative.    Genitourinary: Negative.    Neurological: Negative.           Last Labs:  CBC -  Lab Results    Component Value Date    WBC 5.1 02/13/2025    HGB 8.3 (L) 02/13/2025    HCT 26.4 (L) 02/13/2025    MCV 91 02/13/2025     02/13/2025       CMP -  Lab Results   Component Value Date    CALCIUM 8.0 (L) 02/13/2025    PROT 5.7 (L) 02/13/2025    ALBUMIN 2.9 (L) 02/13/2025    AST 9 02/13/2025    ALT 16 02/13/2025    ALKPHOS 67 02/13/2025    BILITOT 0.6 02/13/2025       LIPID PANEL -   Lab Results   Component Value Date    CHOL 103 10/01/2024    TRIG 56 10/01/2024    HDL 53.0 10/01/2024    CHHDL 1.9 10/01/2024    LDLF 37 06/05/2023    VLDL 11 10/01/2024    NHDL 50 10/01/2024       RENAL FUNCTION PANEL -   Lab Results   Component Value Date    GLUCOSE 141 (H) 02/13/2025     02/13/2025    K 4.0 02/13/2025     02/13/2025    CO2 26 02/13/2025    ANIONGAP 9 (L) 02/13/2025    BUN 11 02/13/2025    CREATININE 0.55 02/13/2025    GFRMALE 87 06/05/2023    CALCIUM 8.0 (L) 02/13/2025    ALBUMIN 2.9 (L) 02/13/2025        Lab Results   Component Value Date     (H) 02/03/2025    HGBA1C 5.2 01/05/2024         Assessment/Plan   Problem List Items Addressed This Visit    None    Assessment:     1. Low-grade CAD by cardiac cath, 09/15/2007. The patient is doing well with no unusual chest discomfort. At the time of his catheterization, he was found to have normal coronary arteries other than for minor 20% narrowing.of the diagonal branch. This patient was seen at urgent care on 9/7/2015 with atypical chest pain that occurred when every he would take a deep breath. EKG evidently did not demonstrate any ST segment abnormality. His chest x-ray showed mild interstitial prominence. The amylase and lipase values were normal and his liver function panel was also normal. Cardiac enzymes were negative. The SMA panel included a creatinine of 0.77 a CBC included hematocrit of 43.3. At this point will postpone repeat nuclear stress testing. Patient denies chest pain at today's office visit.   2. Chronic atrial fibrillation. At  the time of a previous visit the patient was noted to have a somewhat rapid ventricular response to his atrial fibrillation despite being on metoprolol 100 mg twice a day. At that time the metoprolol dose was increased to 150 mg twice a day. Since that time the patient has remained generally fatigued with some vague muscle aching. Due to the perceived side effects the dose of metoprolol will be reduced back to 100 mg twice a day and he was started on Cardizem  mg daily to assist in ventricular rate control. HHe subsequently had the dose of Cardizem CD reduced to 120 mg daily. e did have an echocardiogram performed 11/4/2014 which demonstrated normal LV chamber size with low normal LV ejection fraction of 55%. There was moderate to severe dilatation of the left atrium. There is moderate dilatation of the right-sided chambers with mild mitral valve regurgitation mild to moderate tricuspid valve regurgitation and mild pulmonary hypertension with an estimated PA systolic pressure of 40 mmHg. There is also mild dilatation of the aortic root and ascending thoracic aorta. The presence of underlying COPD would explain the mild to moderate right-sided chamber dilatation. The patient did have a repeat echocardiogram performed today on 01/23/2018 which demonstrates an LV ejection fraction of approximately 55% with mild aortic valve insufficiency, mild to moderate mitral valve regurgitation, moderate tricuspid regurgitation, mild pulmonic insufficiency along with moderate left atrial enlargement and mild to moderate right atrial enlargement. The patient had been essentially asymptomatic until he was admitted to [facility] in 2/2020 with a urinary tract infection with bacteremia. He was noted to have Proteus mirabilis bacteremia with acute emphysematous cystitis. During that admission he had atrial fibrillation rapid ventricular rate of all 110â€“1 140/m. Ultimately he was placed on a combination of both  diltiazem and metoprolol. The dose of diltiazem was increased from 120-240 mg daily and he was kept on metoprolol tartrate 100 mg twice a day. He did have an echocardiogram that showed an LV ejection fraction of 55â€“60 percent with moderate left atrial enlargement 1+ aortic valve insufficiency and moderately dilated aortic root at 4.1 cm. The ventricular rate to the atrial fibrillation is now well controlled on the present combination of the Cardizem  mg daily along with metoprolol tartrate 100 mg twice daily.  He has declined trying metoprolol succinate.  HR today 119.  Will increase diltiazem to 300 mg daily and have him return in six weeks as scheduled.  Echo 02/2025  CONCLUSIONS:   1. Left ventricular ejection fraction is normal, calculated by Brandon's biplane at 62%.   2. Spectral Doppler shows a Grade III (restrictive pattern) of left ventricular diastolic filling with an elevated left atrial pressure.   3. There is normal right ventricular global systolic function.   4. Mildly enlarged right ventricle.   5. The left atrial size is severely dilated.   6. The right atrium is severely dilated.   7. Mild to moderate mitral valve regurgitation.   8. Mildly elevated right ventricular systolic pressure.   9. Severe tricuspid regurgitation visualized.  10. Moderate aortic valve regurgitation.   3. Coumadin anticoagulation with one episode of significant epistaxis. The patient has had no recurrent episodes of epistaxis since his last visit. The patient prefers to remain on Coumadin rather than switching to a direct oral anticoagulant, however per urology and several incidents with hematuria, will switch to Eliquis.  4. Hyperlipidemia. The patient did have lab work performed on 11//2022 which included a lipid panel with cholesterol 119 LDL 51 HDL 57 triglyceride 52. The patient's panel is improved now on atorvastatin 40 mg daily rather than the previous simvastatin 40 mg daily.   5. Remote former smoker.  Patient discontinued smoking 35 years ago.  6.? COPD.  7. History of urosepsis 2/2020. This patient was admitted to [facility] from 02/02/2020â€“02/05/2020 with sepsis due to a urinary tract infection. The patient presented with hematuria and burning upon urination. The patient initially had a Kirby catheter placed in the office which had subsequently been removed. At the time of his admission to [facility] he was noted to be in his atrial fibrillation but with a more rapid ventricular response. Serial troponins were negative. The patient had already been on Coumadin anticoagulation. The patient was placed on IV Zosyn and IV vancomycin for the urosepsis. He was initially placed on both diltiazem and metoprolol for ventricular rate control and the dose of diltiazem was increased from 120 mg daily to 240 mg daily. He also had an echocardiogram on 02/04/2020 that showed an LV ejection fraction 55â€“60 percent with moderate left atrial enlargement with 1+ aortic valve insufficiency and a moderately dilated aortic root at 4.1 cm. The patient was found to have Proteus mirabilis bacteremia with acute emphysematous cystitis. He ultimately was discharged home on ampicillin was also started on tamsulosin. The patient had a recent PSA of 2.36 on 5/20/2020. He had a visit with urology and had a postvoid residual of urine of only 29 cc on 7/9/2020.  8. Hx of covid-19 vaccine #1 and #2.    9.  Diastolic dysfunction.  Bilateral pedal edema.  Start furosemide 40 mg daily.  Call me if no improvement as may take twice daily.  Return as scheduled in four weeks with bmp.      Kellen See, APRN-CNP

## 2025-03-12 ENCOUNTER — TELEMEDICINE (OUTPATIENT)
Dept: PHARMACY | Facility: HOSPITAL | Age: 85
End: 2025-03-12
Payer: MEDICARE

## 2025-03-12 DIAGNOSIS — R94.31 ABNORMAL EKG: ICD-10-CM

## 2025-03-12 DIAGNOSIS — R94.31 ABNORMAL EKG: Primary | ICD-10-CM

## 2025-03-12 LAB
ACID FAST STN SPEC: NORMAL
MYCOBACTERIUM SPEC CULT: NORMAL

## 2025-03-12 NOTE — PROGRESS NOTES
Patient ID: Ming Bunch is a 84 y.o. male who presents for urgent recommendations for Abnormal ECG.    Subjective     Urgent Chief Complaint:   Patient's daughter called in concerned patient will run out of Eliquis prior to appointment with cardiology pharmacy team on 3/21.    Objective     There were no vitals taken for this visit.   BP Readings from Last 4 Encounters:   03/11/25 113/60   02/26/25 106/63   02/13/25 126/76   01/31/25 (!) 137/91      There were no vitals filed for this visit.     Current Outpatient Medications   Medication Instructions    apixaban (ELIQUIS) 5 mg, oral, 2 times daily    aspirin 81 mg, Daily    atorvastatin (LIPITOR) 40 mg, oral, Nightly    dilTIAZem CD (CARDIZEM CD) 300 mg, oral, Daily    furosemide (LASIX) 40 mg, oral, Daily    metoprolol tartrate (LOPRESSOR) 100 mg, oral, 2 times daily    tadalafil (CIALIS) 5 mg, oral, Daily    tamsulosin (FLOMAX) 0.8 mg, oral, Daily       _______________________________________________________________________  PHARMACY ASSESSMENT    Allergies Reviewed? Yes  Home Pharmacy Reviewed? Yes, describe: [facility] in [place]    Assessment/Plan     PATIENT EDUCATION/GOALS  - Counseled patient on MOA, expectations, duration of therapy, contraindications, administration, and monitoring parameters  - Counseled patient of side effects  - Answered all patient questions and concerns    Patient was previously on warfarin for atrial fibrillation and went to the emergency room for blood in the urine.  Cardiology changed his warfarin to Eliquis and the blood in urine resolved.  He has been stable on it since February 26th with no bleeding episodes per daughter Isamar.  Discussed [facility] patient assistance program, however daughter does not think he will qualify.  She is sending over finances via email just to verify eligibility.  Copay through Medicare D is $47/month which is manageable per Isamar.  Will send 1 month supply with 0 refills to patient pharmacy  [facility] just to ensure he does not run out prior to appointment.  Daughter aware to keep appointment with Cardiology pharmacy team for further guidance/refills.    Follow-up: 3/21 @2147 with [facility] Leti High, PharmD, Clinical Support Pharmacist    Continue all meds under the continuation of care with the referring provider and clinical pharmacy team.

## 2025-03-19 LAB
ACID FAST STN SPEC: NORMAL
MYCOBACTERIUM SPEC CULT: NORMAL

## 2025-03-21 ENCOUNTER — APPOINTMENT (OUTPATIENT)
Dept: PHARMACY | Facility: HOSPITAL | Age: 85
End: 2025-03-21
Payer: MEDICARE

## 2025-03-26 ENCOUNTER — APPOINTMENT (OUTPATIENT)
Dept: PRIMARY CARE | Facility: CLINIC | Age: 85
End: 2025-03-26
Payer: MEDICARE

## 2025-03-26 VITALS
WEIGHT: 189 LBS | RESPIRATION RATE: 12 BRPM | BODY MASS INDEX: 27.06 KG/M2 | HEART RATE: 56 BPM | OXYGEN SATURATION: 100 % | DIASTOLIC BLOOD PRESSURE: 64 MMHG | HEIGHT: 70 IN | SYSTOLIC BLOOD PRESSURE: 118 MMHG

## 2025-03-26 DIAGNOSIS — R31.0 GROSS HEMATURIA: Primary | ICD-10-CM

## 2025-03-26 DIAGNOSIS — D69.6 LOW PLATELET COUNT (CMS-HCC): ICD-10-CM

## 2025-03-26 DIAGNOSIS — I77.810 AORTIC ROOT DILATATION: ICD-10-CM

## 2025-03-26 DIAGNOSIS — E78.00 ELEVATED LDL CHOLESTEROL LEVEL: ICD-10-CM

## 2025-03-26 DIAGNOSIS — I25.10 CORONARY ARTERY DISEASE INVOLVING NATIVE CORONARY ARTERY OF NATIVE HEART WITHOUT ANGINA PECTORIS: ICD-10-CM

## 2025-03-26 DIAGNOSIS — I50.43 ACUTE ON CHRONIC COMBINED SYSTOLIC AND DIASTOLIC CONGESTIVE HEART FAILURE: ICD-10-CM

## 2025-03-26 DIAGNOSIS — I48.91 ATRIAL FIBRILLATION, UNSPECIFIED TYPE (MULTI): ICD-10-CM

## 2025-03-26 DIAGNOSIS — D64.9 ANEMIA, UNSPECIFIED TYPE: ICD-10-CM

## 2025-03-26 LAB
ACID FAST STN SPEC: NORMAL
MYCOBACTERIUM SPEC CULT: NORMAL

## 2025-03-26 PROCEDURE — 1036F TOBACCO NON-USER: CPT | Performed by: FAMILY MEDICINE

## 2025-03-26 PROCEDURE — 1159F MED LIST DOCD IN RCRD: CPT | Performed by: FAMILY MEDICINE

## 2025-03-26 PROCEDURE — 99214 OFFICE O/P EST MOD 30 MIN: CPT | Performed by: FAMILY MEDICINE

## 2025-03-26 PROCEDURE — G2211 COMPLEX E/M VISIT ADD ON: HCPCS | Performed by: FAMILY MEDICINE

## 2025-03-26 PROCEDURE — 1123F ACP DISCUSS/DSCN MKR DOCD: CPT | Performed by: FAMILY MEDICINE

## 2025-03-26 ASSESSMENT — ENCOUNTER SYMPTOMS
SHORTNESS OF BREATH: 0
EYE PAIN: 0
DIFFICULTY URINATING: 0
DIZZINESS: 0
BACK PAIN: 0
DYSPHORIC MOOD: 0
FEVER: 0
BRUISES/BLEEDS EASILY: 0
ABDOMINAL PAIN: 0
DYSURIA: 0
FATIGUE: 1
CHILLS: 0
HEMATURIA: 1
CHEST TIGHTNESS: 0
ABDOMINAL DISTENTION: 0
ADENOPATHY: 0
ARTHRALGIAS: 0
SORE THROAT: 0
EYE REDNESS: 0
BLOOD IN STOOL: 0
APPETITE CHANGE: 0
CONSTIPATION: 0
NERVOUS/ANXIOUS: 0
WEAKNESS: 0
COUGH: 0
DIARRHEA: 0
HEADACHES: 0

## 2025-03-26 NOTE — PROGRESS NOTES
"Subjective   Patient ID: Ming Bunch is a 84 y.o. male who presents for Follow-up.    HPI     Review of Systems    Objective   /64   Pulse 56   Resp 12   Ht 1.778 m (5' 10\")   Wt 85.7 kg (189 lb)   SpO2 100%   BMI 27.12 kg/m²     Physical Exam    Assessment/Plan          "

## 2025-03-26 NOTE — PROGRESS NOTES
"Subjective   Patient ID: Ming Bunch is a 84 y.o. male who presents for Follow-up.  Dr Pereira pt here for 6 month follow up. He was inpt with hematuria from 2/3-13. He held Coumadin form 2/14-26, then restarted with Eliquis replacing the Coumadin. Was doing well on Eliquis without blood in urine until this past Sunday, now having red visible blood in urine since. He is also seeing cardilogy and has Afib, CAD, and hypervolemia/ CHF. Lasix was increased on to BID on 3/11 and leg swelling improved. Cardiology is also monitoring aortic root dilation.    Pt has chronic HTN, DLD.  Pt is taking Lipitor, Diltiazem( increased dose in Feb) and Metoprolol. Tolerating well.  Exercising 0 days per week   Low sodium diet is being followed.   Is not monitoring home blood pressures.   Denies HA, vision changes or CP.         Review of Systems   Constitutional:  Positive for fatigue. Negative for appetite change, chills and fever.   HENT:  Negative for congestion, hearing loss and sore throat.    Eyes:  Negative for pain, redness and visual disturbance.   Respiratory:  Negative for cough, chest tightness and shortness of breath.    Cardiovascular:  Positive for leg swelling. Negative for chest pain.   Gastrointestinal:  Negative for abdominal distention, abdominal pain, blood in stool, constipation and diarrhea.   Genitourinary:  Positive for hematuria. Negative for difficulty urinating and dysuria.   Musculoskeletal:  Negative for arthralgias and back pain.   Skin:  Negative for rash.   Neurological:  Negative for dizziness, weakness and headaches.   Hematological:  Negative for adenopathy. Does not bruise/bleed easily.   Psychiatric/Behavioral:  Negative for dysphoric mood. The patient is not nervous/anxious.        Objective   /64   Pulse 56   Resp 12   Ht 1.778 m (5' 10\")   Wt 85.7 kg (189 lb)   SpO2 100%   BMI 27.12 kg/m²    Physical Exam  Constitutional:       General: He is not in acute distress.     " Appearance: Normal appearance.   Cardiovascular:      Rate and Rhythm: Normal rate. Rhythm irregularly irregular.      Heart sounds: Normal heart sounds. No murmur heard.  Pulmonary:      Effort: Pulmonary effort is normal.      Breath sounds: Normal breath sounds.   Abdominal:      Palpations: Abdomen is soft.      Tenderness: There is no abdominal tenderness.   Musculoskeletal:      Right lower le+ Edema present.      Left lower le+ Edema present.   Neurological:      Mental Status: He is alert.   Psychiatric:         Mood and Affect: Mood normal.         Judgment: Judgment normal.           Assessment/Plan   Diagnoses and all orders for this visit:  Gross hematuria/Anemia/Low platelet count  -with recurrent blood in urine will need to call Dr Tomas's office today for immediate further instructions.  Atrial fibrillation - stable, rated controlled, current on Eliquis  Coronary artery disease/ Aortic root dilatation- asymptomatic, monitoring with cardioloyg  Acute on chronic combined systolic and diastolic congestive heart failure - improved fluid status since increasing Lasix, monitoring with weights  Elevated LDL cholesterol level - stable on statin.     Follow up with Dr Pereira in 6 months for preventative

## 2025-03-31 DIAGNOSIS — Z79.01 ANTICOAGULATION MANAGEMENT ENCOUNTER: Primary | ICD-10-CM

## 2025-03-31 DIAGNOSIS — Z51.81 ANTICOAGULATION MANAGEMENT ENCOUNTER: Primary | ICD-10-CM

## 2025-04-02 LAB
ACID FAST STN SPEC: NORMAL
MYCOBACTERIUM SPEC CULT: NORMAL

## 2025-04-07 ENCOUNTER — TELEPHONE (OUTPATIENT)
Dept: CARDIOLOGY | Facility: CLINIC | Age: 85
End: 2025-04-07
Payer: MEDICARE

## 2025-04-07 ENCOUNTER — OFFICE VISIT (OUTPATIENT)
Dept: CARDIOLOGY | Facility: CLINIC | Age: 85
End: 2025-04-07
Payer: MEDICARE

## 2025-04-07 VITALS
SYSTOLIC BLOOD PRESSURE: 104 MMHG | DIASTOLIC BLOOD PRESSURE: 60 MMHG | WEIGHT: 199 LBS | BODY MASS INDEX: 28.55 KG/M2 | HEART RATE: 90 BPM | OXYGEN SATURATION: 99 %

## 2025-04-07 DIAGNOSIS — I48.91 ATRIAL FIBRILLATION, UNSPECIFIED TYPE (MULTI): ICD-10-CM

## 2025-04-07 DIAGNOSIS — R06.02 SHORTNESS OF BREATH: Primary | ICD-10-CM

## 2025-04-07 PROCEDURE — 1160F RVW MEDS BY RX/DR IN RCRD: CPT | Performed by: NURSE PRACTITIONER

## 2025-04-07 PROCEDURE — 99214 OFFICE O/P EST MOD 30 MIN: CPT | Performed by: NURSE PRACTITIONER

## 2025-04-07 PROCEDURE — G2211 COMPLEX E/M VISIT ADD ON: HCPCS | Performed by: NURSE PRACTITIONER

## 2025-04-07 PROCEDURE — 1126F AMNT PAIN NOTED NONE PRSNT: CPT | Performed by: NURSE PRACTITIONER

## 2025-04-07 PROCEDURE — 1036F TOBACCO NON-USER: CPT | Performed by: NURSE PRACTITIONER

## 2025-04-07 PROCEDURE — 1159F MED LIST DOCD IN RCRD: CPT | Performed by: NURSE PRACTITIONER

## 2025-04-07 PROCEDURE — 1123F ACP DISCUSS/DSCN MKR DOCD: CPT | Performed by: NURSE PRACTITIONER

## 2025-04-07 RX ORDER — FUROSEMIDE 80 MG/1
80 TABLET ORAL
Qty: 180 TABLET | Refills: 3 | Status: SHIPPED | OUTPATIENT
Start: 2025-04-07 | End: 2026-04-07

## 2025-04-07 ASSESSMENT — ENCOUNTER SYMPTOMS
LOSS OF SENSATION IN FEET: 0
OCCASIONAL FEELINGS OF UNSTEADINESS: 0
CONSTITUTIONAL NEGATIVE: 1
MUSCULOSKELETAL NEGATIVE: 1
RESPIRATORY NEGATIVE: 1
GASTROINTESTINAL NEGATIVE: 1
NEUROLOGICAL NEGATIVE: 1
DEPRESSION: 0
DYSPNEA ON EXERTION: 1

## 2025-04-07 ASSESSMENT — PAIN SCALES - GENERAL: PAINLEVEL_OUTOF10: 0-NO PAIN

## 2025-04-07 NOTE — PROGRESS NOTES
Chief Complaint:   Shortness of Breath (Pt states he's been SOB and retaining water. Says his feet and legs are swollen. Pt started having blood in his urine so his eloquis was stopped April 1, 2025. )    History Of Present Illness:    .Mr Bunch returns in follow up with his daughter.  Denies chest pain, sob, palpitations, but has pitting bilateral pedal edema.  Couch.  Did not make much progress with furosemide 40 mg twice daily and will increase to 80 mg twice daily.  Return as scheduled in two weeks with bmp.              Last Recorded Vitals:  Blood pressure 104/60, pulse 90, weight 90.3 kg (199 lb), SpO2 99%.     Past Medical History:  Past Medical History:   Diagnosis Date    Personal history of diseases of the blood and blood-forming organs and certain disorders involving the immune mechanism 06/09/2021    History of thrombocytopenia    Shortness of breath 02/19/2020    Shortness of breath on exertion        Past Surgical History:  Past Surgical History:   Procedure Laterality Date    APPENDECTOMY  09/11/2013    Appendectomy    COLONOSCOPY  06/25/2013    Complete Colonoscopy    OTHER SURGICAL HISTORY  04/22/2015    Removal Of Lesion    PILONIDAL CYST DRAINAGE  09/11/2013    Pilonidal Cyst Resection       Social History:  Social History     Socioeconomic History    Marital status:    Tobacco Use    Smoking status: Never    Smokeless tobacco: Never   Substance and Sexual Activity    Alcohol use: Yes     Alcohol/week: 10.0 standard drinks of alcohol     Types: 10 Standard drinks or equivalent per week    Drug use: Not Currently     Social Drivers of Health     Financial Resource Strain: Low Risk  (2/4/2025)    Overall Financial Resource Strain (CARDIA)     Difficulty of Paying Living Expenses: Not hard at all   Food Insecurity: No Food Insecurity (2/3/2025)    Hunger Vital Sign     Worried About Running Out of Food in the Last Year: Never true     Ran Out of Food in the Last Year: Never true    Transportation Needs: No Transportation Needs (2/4/2025)    PRAPARE - Transportation     Lack of Transportation (Medical): No     Lack of Transportation (Non-Medical): No   Intimate Partner Violence: Not At Risk (2/3/2025)    Humiliation, Afraid, Rape, and Kick questionnaire     Fear of Current or Ex-Partner: No     Emotionally Abused: No     Physically Abused: No     Sexually Abused: No   Housing Stability: Low Risk  (2/4/2025)    Housing Stability Vital Sign     Unable to Pay for Housing in the Last Year: No     Number of Times Moved in the Last Year: 0     Homeless in the Last Year: No       Family History:  Family History   Problem Relation Name Age of Onset    Heart failure Mother      Atrial fibrillation Sister           Allergies:  Patient has no known allergies.    Outpatient Medications:  Current Outpatient Medications   Medication Sig Dispense Refill    aspirin 81 mg EC tablet Take 1 tablet (81 mg) by mouth once daily.      atorvastatin (Lipitor) 40 mg tablet Take 1 tablet (40 mg) by mouth once daily at bedtime. 90 tablet 3    dilTIAZem CD (Cardizem CD) 300 mg 24 hr capsule Take 1 capsule (300 mg) by mouth once daily. 90 capsule 3    furosemide (Lasix) 40 mg tablet Take 1 tablet (40 mg) by mouth once daily. (Patient taking differently: Take 1 tablet (40 mg) by mouth 2 times daily (morning and late afternoon).) 90 tablet 3    metoprolol tartrate (Lopressor) 100 mg tablet Take 1 tablet (100 mg) by mouth 2 times a day. 180 tablet 3    tadalafil (Cialis) 5 mg tablet Take 1 tablet (5 mg) by mouth once daily. 30 tablet 3    tamsulosin (Flomax) 0.4 mg 24 hr capsule Take 2 capsules (0.8 mg) by mouth once daily. 180 capsule 3    apixaban (Eliquis) 5 mg tablet Take 1 tablet (5 mg) by mouth 2 times a day. (Patient not taking: Reported on 4/7/2025) 60 tablet 0     No current facility-administered medications for this visit.        Physical Exam:  Cardiovascular:      PMI at left midclavicular line. Normal rate.  Regular rhythm. Normal S1. Normal S2.       Murmurs: There is no murmur.      No gallop.  No click. No rub.   Pulses:     Intact distal pulses.   Edema:     Ankle: bilateral 2+ pitting edema of the ankle.        ROS:  Review of Systems   Constitutional: Negative.   Cardiovascular:  Positive for dyspnea on exertion and leg swelling.   Respiratory: Negative.     Skin: Negative.    Musculoskeletal: Negative.    Gastrointestinal: Negative.    Genitourinary: Negative.    Neurological: Negative.           Last Labs: reviewed  CBC -  Lab Results   Component Value Date    WBC 5.1 02/13/2025    HGB 8.3 (L) 02/13/2025    HCT 26.4 (L) 02/13/2025    MCV 91 02/13/2025     02/13/2025       CMP -  Lab Results   Component Value Date    CALCIUM 8.0 (L) 02/13/2025    PROT 5.7 (L) 02/13/2025    ALBUMIN 2.9 (L) 02/13/2025    AST 9 02/13/2025    ALT 16 02/13/2025    ALKPHOS 67 02/13/2025    BILITOT 0.6 02/13/2025       LIPID PANEL -   Lab Results   Component Value Date    CHOL 103 10/01/2024    TRIG 56 10/01/2024    HDL 53.0 10/01/2024    CHHDL 1.9 10/01/2024    LDLF 37 06/05/2023    VLDL 11 10/01/2024    NHDL 50 10/01/2024       RENAL FUNCTION PANEL -   Lab Results   Component Value Date    GLUCOSE 141 (H) 02/13/2025     02/13/2025    K 4.0 02/13/2025     02/13/2025    CO2 26 02/13/2025    ANIONGAP 9 (L) 02/13/2025    BUN 11 02/13/2025    CREATININE 0.55 02/13/2025    GFRMALE 87 06/05/2023    CALCIUM 8.0 (L) 02/13/2025    ALBUMIN 2.9 (L) 02/13/2025        Lab Results   Component Value Date     (H) 02/03/2025    HGBA1C 5.2 01/05/2024         Assessment/Plan   Problem List Items Addressed This Visit    None        Assessment:     1. Low-grade CAD by cardiac cath, 09/15/2007. The patient is doing well with no unusual chest discomfort. At the time of his catheterization, he was found to have normal coronary arteries other than for minor 20% narrowing.of the diagonal branch. This patient was seen at urgent care on  9/7/2015 with atypical chest pain that occurred when every he would take a deep breath. EKG evidently did not demonstrate any ST segment abnormality. His chest x-ray showed mild interstitial prominence. The amylase and lipase values were normal and his liver function panel was also normal. Cardiac enzymes were negative. The SMA panel included a creatinine of 0.77 a CBC included hematocrit of 43.3. At this point will postpone repeat nuclear stress testing. Patient denies chest pain at today's office visit.   2. Chronic atrial fibrillation. At the time of a previous visit the patient was noted to have a somewhat rapid ventricular response to his atrial fibrillation despite being on metoprolol 100 mg twice a day. At that time the metoprolol dose was increased to 150 mg twice a day. Since that time the patient has remained generally fatigued with some vague muscle aching. Due to the perceived side effects the dose of metoprolol will be reduced back to 100 mg twice a day and he was started on Cardizem  mg daily to assist in ventricular rate control. HHe subsequently had the dose of Cardizem CD reduced to 120 mg daily. e did have an echocardiogram performed 11/4/2014 which demonstrated normal LV chamber size with low normal LV ejection fraction of 55%. There was moderate to severe dilatation of the left atrium. There is moderate dilatation of the right-sided chambers with mild mitral valve regurgitation mild to moderate tricuspid valve regurgitation and mild pulmonary hypertension with an estimated PA systolic pressure of 40 mmHg. There is also mild dilatation of the aortic root and ascending thoracic aorta. The presence of underlying COPD would explain the mild to moderate right-sided chamber dilatation. The patient did have a repeat echocardiogram performed today on 01/23/2018 which demonstrates an LV ejection fraction of approximately 55% with mild aortic valve insufficiency, mild to moderate mitral valve  regurgitation, moderate tricuspid regurgitation, mild pulmonic insufficiency along with moderate left atrial enlargement and mild to moderate right atrial enlargement. The patient had been essentially asymptomatic until he was admitted to [facility] in 2/2020 with a urinary tract infection with bacteremia. He was noted to have Proteus mirabilis bacteremia with acute emphysematous cystitis. During that admission he had atrial fibrillation rapid ventricular rate of all 110â€“1 140/m. Ultimately he was placed on a combination of both diltiazem and metoprolol. The dose of diltiazem was increased from 120-240 mg daily and he was kept on metoprolol tartrate 100 mg twice a day. He did have an echocardiogram that showed an LV ejection fraction of 55â€“60 percent with moderate left atrial enlargement 1+ aortic valve insufficiency and moderately dilated aortic root at 4.1 cm. The ventricular rate to the atrial fibrillation is now well controlled on the present combination of the Cardizem  mg daily along with metoprolol tartrate 100 mg twice daily.  He has declined trying metoprolol succinate.  HR today 90.  Will continue diltiazem to 300 mg daily and have him return in two weeks as scheduled.  Echo 02/2025  CONCLUSIONS:   1. Left ventricular ejection fraction is normal, calculated by Brandon's biplane at 62%.   2. Spectral Doppler shows a Grade III (restrictive pattern) of left ventricular diastolic filling with an elevated left atrial pressure.   3. There is normal right ventricular global systolic function.   4. Mildly enlarged right ventricle.   5. The left atrial size is severely dilated.   6. The right atrium is severely dilated.   7. Mild to moderate mitral valve regurgitation.   8. Mildly elevated right ventricular systolic pressure.   9. Severe tricuspid regurgitation visualized.  10. Moderate aortic valve regurgitation.   3. Coumadin anticoagulation with one episode of significant epistaxis. The  patient has had no recurrent episodes of epistaxis since his last visit. The patient prefers to remain on Coumadin rather than switching to a direct oral anticoagulant, however per urology and several incidents with hematuria, will switch to Eliquis.  On hold.  4. Hyperlipidemia. The patient did have lab work performed on 11//2022 which included a lipid panel with cholesterol 119 LDL 51 HDL 57 triglyceride 52. The patient's panel is improved now on atorvastatin 40 mg daily rather than the previous simvastatin 40 mg daily.   5. Remote former smoker. Patient discontinued smoking 35 years ago.  6.? COPD.  7. History of urosepsis 2/2020. This patient was admitted to [facility] from 02/02/2020â€“02/05/2020 with sepsis due to a urinary tract infection. The patient presented with hematuria and burning upon urination. The patient initially had a Kirby catheter placed in the office which had subsequently been removed. At the time of his admission to [facility] he was noted to be in his atrial fibrillation but with a more rapid ventricular response. Serial troponins were negative. The patient had already been on Coumadin anticoagulation. The patient was placed on IV Zosyn and IV vancomycin for the urosepsis. He was initially placed on both diltiazem and metoprolol for ventricular rate control and the dose of diltiazem was increased from 120 mg daily to 240 mg daily. He also had an echocardiogram on 02/04/2020 that showed an LV ejection fraction 55â€“60 percent with moderate left atrial enlargement with 1+ aortic valve insufficiency and a moderately dilated aortic root at 4.1 cm. The patient was found to have Proteus mirabilis bacteremia with acute emphysematous cystitis. He ultimately was discharged home on ampicillin was also started on tamsulosin. The patient had a recent PSA of 2.36 on 5/20/2020. He had a visit with urology and had a postvoid residual of urine of only 29 cc on 7/9/2020.  8. Hx of  covid-19 vaccine #1 and #2.    9.  Diastolic dysfunction.  Bilateral pedal edema.  Start furosemide 80 mg daily.  Call me if no improvement.  Return as scheduled in two weeks with bmp.     10.  Hematuria.  Will refer for Watchman. (See TC 03/31/2025)  Kellen See, MENDEZ-CNP

## 2025-04-16 ENCOUNTER — OFFICE VISIT (OUTPATIENT)
Dept: CARDIOLOGY | Facility: CLINIC | Age: 85
End: 2025-04-16
Payer: MEDICARE

## 2025-04-16 VITALS
HEIGHT: 70 IN | BODY MASS INDEX: 28.4 KG/M2 | DIASTOLIC BLOOD PRESSURE: 60 MMHG | WEIGHT: 198.4 LBS | SYSTOLIC BLOOD PRESSURE: 106 MMHG | OXYGEN SATURATION: 94 % | HEART RATE: 79 BPM

## 2025-04-16 DIAGNOSIS — Z79.01 ANTICOAGULATION MANAGEMENT ENCOUNTER: ICD-10-CM

## 2025-04-16 DIAGNOSIS — I48.91 ATRIAL FIBRILLATION (MULTI): ICD-10-CM

## 2025-04-16 DIAGNOSIS — I48.19 PERSISTENT ATRIAL FIBRILLATION (MULTI): Primary | ICD-10-CM

## 2025-04-16 DIAGNOSIS — Z51.81 ANTICOAGULATION MANAGEMENT ENCOUNTER: ICD-10-CM

## 2025-04-16 PROCEDURE — 1159F MED LIST DOCD IN RCRD: CPT | Performed by: INTERNAL MEDICINE

## 2025-04-16 PROCEDURE — 1036F TOBACCO NON-USER: CPT | Performed by: INTERNAL MEDICINE

## 2025-04-16 PROCEDURE — 1126F AMNT PAIN NOTED NONE PRSNT: CPT | Performed by: INTERNAL MEDICINE

## 2025-04-16 PROCEDURE — 99214 OFFICE O/P EST MOD 30 MIN: CPT | Performed by: INTERNAL MEDICINE

## 2025-04-16 PROCEDURE — 1123F ACP DISCUSS/DSCN MKR DOCD: CPT | Performed by: INTERNAL MEDICINE

## 2025-04-16 ASSESSMENT — PATIENT HEALTH QUESTIONNAIRE - PHQ9
SUM OF ALL RESPONSES TO PHQ9 QUESTIONS 1 AND 2: 1
10. IF YOU CHECKED OFF ANY PROBLEMS, HOW DIFFICULT HAVE THESE PROBLEMS MADE IT FOR YOU TO DO YOUR WORK, TAKE CARE OF THINGS AT HOME, OR GET ALONG WITH OTHER PEOPLE: SOMEWHAT DIFFICULT
2. FEELING DOWN, DEPRESSED OR HOPELESS: NOT AT ALL
1. LITTLE INTEREST OR PLEASURE IN DOING THINGS: SEVERAL DAYS

## 2025-04-16 ASSESSMENT — COLUMBIA-SUICIDE SEVERITY RATING SCALE - C-SSRS
6. HAVE YOU EVER DONE ANYTHING, STARTED TO DO ANYTHING, OR PREPARED TO DO ANYTHING TO END YOUR LIFE?: NO
1. IN THE PAST MONTH, HAVE YOU WISHED YOU WERE DEAD OR WISHED YOU COULD GO TO SLEEP AND NOT WAKE UP?: NO
2. HAVE YOU ACTUALLY HAD ANY THOUGHTS OF KILLING YOURSELF?: NO

## 2025-04-16 ASSESSMENT — PAIN SCALES - GENERAL: PAINLEVEL_OUTOF10: 0-NO PAIN

## 2025-04-16 NOTE — PROGRESS NOTES
Cardio: Dr. Lee/Luis Alberto CHO was asked to evaluate this patient in consultation for evaluation of left atrial appendage closure.    The patient is a 84 y.o.  male with PMH of nonobstructive coronary artery disease, COPD, mild aortic insufficiency, moderate MR, persistent atrial fibrillation, heart failure with preserved ejection fraction, and hyperlipidemia. The patient has normal LVEF.    Patient has no history of TIA or CVA.  He does have history of thrombocytopenia anemia and hematuria.  He has been on warfarin previously with significant epistaxis.  He was admitted to the hospital for management of this epistaxis.  Given several incidents of hematuria, including severe hematuria requiring hospitalization, he was switched to Eliquis.  Initially his hematuria resolved after making the switch but subsequently recurred.  He stopped taking Eliquis on April 1 and is still having some mild hematuria since then although feels that it is improving.    Given the patient's significant intolerance to oral anticoagulant therapy including significant epistaxis with warfarin and hematuria requiring hospitalizations,  the patient is referred for consideration of left atrial appendage closure for stroke risk reduction.     Patient relates prominent lower extremity edema and his diuretic was recently increased from 40 mg of Lasix to 80 mg twice daily.  Patient states that he is lost 5 pounds in 6 weeks.  He continues to remain of dyspnea on exertion walking a distance of 100 yards.  He understands the principles of sodium restriction but has not been adhering to a low-sodium diet.      ROS:  Constitutional: no fatigue, no fevers, no body aches  Eyes: no acute eye problems, no blurred vision, no diplopia, no eye pain  ENT:  no acute hearing loss, no earache, no sore throat  Cardiovascular: dyspnea on exertion, no chest pain  Respiratory: no chronic cough, not coughing up sputum, no wheezing that is consistent with  asthma  Gastrointestinal: no acute bowel complaints  Musculoskeletal: no acute arthralgias, no acute myalgias, lower extremity edema as noted skin: no skin rashes, no change in skin color and pigmentation, no skin lesions and no skin lumps.   Neurological: no headaches, no dizziness, no tingling, no fainting and no limb weakness.   Psychiatric:  no suicidal ideation, no confusion, no personality change and no emotional problems.   Hematologic/Lymphatic: no bleeding issues, other then mentioned in HPI  All other systems have been reviewed and are negative for complaint.     Physical Exam:     Visit Vitals  Smoking Status Never        Constitutional: alert and in no acute distress.   Eyes: no erythema, swelling or discharge from the eye .   Ears, Nose, Mouth, and Throat: external inspection of ears and nose is normal , lips, teeth, and gums are normal with good dentition  and oropharynx normal with no erythema, edema, exudate or lesions .   Neck: neck is supple, symmetric, trachea midline, no masses  and no thyromegaly .   Pulmonary: no increased work of breathing or signs of respiratory distress , lungs clear to auscultation. , normal percussion of chest  and chest palpation normal .   Cardiovascular: Irregular, irregular with soft systolic murmur, S1-S2 within normal limits, 2+ bilateral lower extremity edema, non-displaced PMI, no S3 or S4  Abdomen: abdomen non-tender, no masses  and no hepatomegaly .           Skin:  no skin lesions          Neurologic: non-focal neurologic examination.      Psychiatric judgment and insight is normal , oriented to person, place and time , normal mood and affect .       Labs:    Results for orders placed or performed in visit on 02/26/25   ECG 12 lead (Clinic Performed)    Collection Time: 02/26/25  1:15 PM   Result Value Ref Range    Ventricular Rate 119 BPM    Atrial Rate 125 BPM    QRS Duration 90 ms    QT Interval 334 ms    QTC Calculation(Bazett) 469 ms    R Axis 73 degrees     T Axis -59 degrees    QRS Count 20 beats    Q Onset 228 ms    T Offset 395 ms    QTC Fredericia 419 ms     *Note: Due to a large number of results and/or encounters for the requested time period, some results have not been displayed. A complete set of results can be found in Results Review.          Medications:    Current Outpatient Medications   Medication Instructions    apixaban (ELIQUIS) 5 mg, oral, 2 times daily    aspirin 81 mg, Daily    atorvastatin (LIPITOR) 40 mg, oral, Nightly    dilTIAZem CD (CARDIZEM CD) 300 mg, oral, Daily    furosemide (LASIX) 80 mg, oral, 2 times daily (morning and late afternoon)    metoprolol tartrate (LOPRESSOR) 100 mg, oral, 2 times daily    tadalafil (CIALIS) 5 mg, oral, Daily    tamsulosin (FLOMAX) 0.8 mg, oral, Daily          Assessmen/Plan:      This is a 84 y.o. male with PMH of nonobstructive coronary artery disease, COPD, mild aortic insufficiency, moderate MR, persistent atrial fibrillation, heart failure with preserved ejection fraction, and hyperlipidemia who has had significant bleeding issues on oral anticoagulant therapy including epistaxis and hematuria which have resulted in hospitalizations.       The CHADS-VASC score is 5 (age, HTN, CHF, vascular/mild CAD) and HAS-BLED score is 4. The patient is at increased risk of both bleeding and stroke.  As such the patient is a reasonable candidate for consideration of left atrial appendage occluder placement.    Today we discussed the left atrial appendage closure procedure. The patient was given written educational handout materials and watched an educational video. All risks, benefits and alternative were discussed.     The risks discussed included but were not limited to vascular complications, sedation related complications, risk of MI, CVA, device embolization, pericardial tamponade and death. The patient verbalized understanding and decided to proceed.         The patient requires CT for planning and to exclude JEAN CLAUDE  thrombus. In addition, the patient will also need a CT scan 4 months after the procedure, to evaluate the device for position, thrombus and berlin-device leak. Following device implant, strategy will be for dual antiplatelet therapy with aspirin and clopidogrel for 6 months then aspirin for life.    Thank you for this consultation and for allowing me to participate in the care of this patient.      Valentin Banks MD  , Interventional Cardiology Fellowship Program   [facility]   [facility]  Office Phone Number: [phone]

## 2025-04-17 ENCOUNTER — OFFICE VISIT (OUTPATIENT)
Dept: CARDIOLOGY | Facility: CLINIC | Age: 85
End: 2025-04-17
Payer: MEDICARE

## 2025-04-17 VITALS
DIASTOLIC BLOOD PRESSURE: 60 MMHG | HEART RATE: 78 BPM | WEIGHT: 198 LBS | BODY MASS INDEX: 29.33 KG/M2 | SYSTOLIC BLOOD PRESSURE: 98 MMHG | HEIGHT: 69 IN | OXYGEN SATURATION: 96 %

## 2025-04-17 DIAGNOSIS — R18.8 OTHER ASCITES: ICD-10-CM

## 2025-04-17 DIAGNOSIS — I48.91 ATRIAL FIBRILLATION, UNSPECIFIED TYPE (MULTI): ICD-10-CM

## 2025-04-17 DIAGNOSIS — I25.10 CORONARY ARTERY DISEASE INVOLVING NATIVE CORONARY ARTERY OF NATIVE HEART WITHOUT ANGINA PECTORIS: ICD-10-CM

## 2025-04-17 PROCEDURE — 1123F ACP DISCUSS/DSCN MKR DOCD: CPT | Performed by: INTERNAL MEDICINE

## 2025-04-17 PROCEDURE — 99214 OFFICE O/P EST MOD 30 MIN: CPT | Performed by: INTERNAL MEDICINE

## 2025-04-17 PROCEDURE — G2211 COMPLEX E/M VISIT ADD ON: HCPCS | Performed by: INTERNAL MEDICINE

## 2025-04-17 PROCEDURE — 1159F MED LIST DOCD IN RCRD: CPT | Performed by: INTERNAL MEDICINE

## 2025-04-17 ASSESSMENT — ENCOUNTER SYMPTOMS: DEPRESSION: 0

## 2025-04-17 ASSESSMENT — PATIENT HEALTH QUESTIONNAIRE - PHQ9
SUM OF ALL RESPONSES TO PHQ9 QUESTIONS 1 AND 2: 1
1. LITTLE INTEREST OR PLEASURE IN DOING THINGS: NOT AT ALL
2. FEELING DOWN, DEPRESSED OR HOPELESS: SEVERAL DAYS

## 2025-04-17 NOTE — PROGRESS NOTES
Cardio: Dr. Lee/Luis Alberto CHO was asked to evaluate this patient in consultation for evaluation of left atrial appendage closure.    The patient is a 84 y.o.  male with PMH of nonobstructive coronary artery disease, COPD, mild aortic insufficiency, moderate MR, persistent atrial fibrillation, heart failure with preserved ejection fraction, and hyperlipidemia. The patient has normal LVEF.    Patient has no history of TIA or CVA.  He does have history of thrombocytopenia anemia and hematuria.  He has been on warfarin previously with significant epistaxis.  He was admitted to the hospital for management of this epistaxis.  Given several incidents of hematuria, including severe hematuria requiring hospitalization, he was switched to Eliquis.  Initially his hematuria resolved after making the switch but subsequently recurred.  He stopped taking Eliquis on April 1 and is still having some mild hematuria since then although feels that it is improving.    Given the patient's significant intolerance to oral anticoagulant therapy including significant epistaxis with warfarin and hematuria requiring hospitalizations,  the patient is referred for consideration of left atrial appendage closure for stroke risk reduction.     Patient relates prominent lower extremity edema and his diuretic was recently increased from 40 mg of Lasix to 80 mg twice daily.  Patient states that he is lost 5 pounds in 6 weeks.  He continues to remain of dyspnea on exertion walking a distance of 100 yards.  He understands the principles of sodium restriction but has not been adhering to a low-sodium diet.      ROS:  Constitutional: no fatigue, no fevers, no body aches  Eyes: no acute eye problems, no blurred vision, no diplopia, no eye pain  ENT:  no acute hearing loss, no earache, no sore throat  Cardiovascular: dyspnea on exertion, no chest pain  Respiratory: no chronic cough, not coughing up sputum, no wheezing that is consistent with  "asthma  Gastrointestinal: no acute bowel complaints  Musculoskeletal: no acute arthralgias, no acute myalgias, lower extremity edema as noted skin: no skin rashes, no change in skin color and pigmentation, no skin lesions and no skin lumps.   Neurological: no headaches, no dizziness, no tingling, no fainting and no limb weakness.   Psychiatric:  no suicidal ideation, no confusion, no personality change and no emotional problems.   Hematologic/Lymphatic: no bleeding issues, other then mentioned in HPI  All other systems have been reviewed and are negative for complaint.     Physical Exam:     Visit Vitals  /74   Pulse 85   Ht 1.753 m (5' 9\")   Wt 89.8 kg (198 lb)   SpO2 96%   BMI 29.24 kg/m²   Smoking Status Never   BSA 2.09 m²        Constitutional: alert and in no acute distress.   Eyes: no erythema, swelling or discharge from the eye .   Ears, Nose, Mouth, and Throat: external inspection of ears and nose is normal , lips, teeth, and gums are normal with good dentition  and oropharynx normal with no erythema, edema, exudate or lesions .   Neck: neck is supple, symmetric, trachea midline, no masses  and no thyromegaly .   Pulmonary: no increased work of breathing or signs of respiratory distress , lungs clear to auscultation. , normal percussion of chest  and chest palpation normal .   Cardiovascular: Irregular, irregular with soft systolic murmur, S1-S2 within normal limits, 2+ bilateral lower extremity edema, non-displaced PMI, no S3 or S4  Abdomen: abdomen non-tender, no masses  and no hepatomegaly .           Skin:  no skin lesions          Neurologic: non-focal neurologic examination.      Psychiatric judgment and insight is normal , oriented to person, place and time , normal mood and affect .       Labs:    Results for orders placed or performed in visit on 02/26/25   ECG 12 lead (Clinic Performed)    Collection Time: 02/26/25  1:15 PM   Result Value Ref Range    Ventricular Rate 119 BPM    Atrial Rate " 125 BPM    QRS Duration 90 ms    QT Interval 334 ms    QTC Calculation(Bazett) 469 ms    R Axis 73 degrees    T Axis -59 degrees    QRS Count 20 beats    Q Onset 228 ms    T Offset 395 ms    QTC Fredericia 419 ms     *Note: Due to a large number of results and/or encounters for the requested time period, some results have not been displayed. A complete set of results can be found in Results Review.          Medications:    Current Outpatient Medications   Medication Instructions    apixaban (ELIQUIS) 5 mg, oral, 2 times daily    aspirin 81 mg, Daily    atorvastatin (LIPITOR) 40 mg, oral, Nightly    dilTIAZem CD (CARDIZEM CD) 300 mg, oral, Daily    furosemide (LASIX) 80 mg, oral, 2 times daily (morning and late afternoon)    metoprolol tartrate (LOPRESSOR) 100 mg, oral, 2 times daily    tadalafil (CIALIS) 5 mg, oral, Daily    tamsulosin (FLOMAX) 0.8 mg, oral, Daily          Assessmen/Plan:      This is a 84 y.o. male with PMH of nonobstructive coronary artery disease, COPD, mild aortic insufficiency, moderate MR, persistent atrial fibrillation, heart failure with preserved ejection fraction, and hyperlipidemia who has had significant bleeding issues on oral anticoagulant therapy including epistaxis and hematuria which have resulted in hospitalizations.       The CHADS-VASC score is 5 (age, HTN, CHF, vascular/mild CAD) and HAS-BLED score is 4. The patient is at increased risk of both bleeding and stroke.  As such the patient is a reasonable candidate for consideration of left atrial appendage occluder placement.    Today we discussed the left atrial appendage closure procedure. The patient was given written educational handout materials and watched an educational video. All risks, benefits and alternative were discussed.     The risks discussed included but were not limited to vascular complications, sedation related complications, risk of MI, CVA, device embolization, pericardial tamponade and death. The patient  verbalized understanding and decided to proceed.         The patient requires CT for planning and to exclude JEAN CLAUDE thrombus. In addition, the patient will also need a CT scan 4 months after the procedure, to evaluate the device for position, thrombus and berlin-device leak. Following device implant, strategy will be for dual antiplatelet therapy with aspirin and clopidogrel for 6 months then aspirin for life.    Thank you for this consultation and for allowing me to participate in the care of this patient.      Valentin Banks MD  , Interventional Cardiology Fellowship Program   [facility] [place]   [facility] [employer]  Office Phone Number: [phone]

## 2025-04-18 ENCOUNTER — HOSPITAL ENCOUNTER (OUTPATIENT)
Dept: RADIOLOGY | Facility: CLINIC | Age: 85
Discharge: HOME | End: 2025-04-18
Payer: MEDICARE

## 2025-04-18 DIAGNOSIS — R18.8 OTHER ASCITES: ICD-10-CM

## 2025-04-18 LAB
ALBUMIN SERPL-MCNC: 3.6 G/DL (ref 3.6–5.1)
ALP SERPL-CCNC: 60 U/L (ref 35–144)
ALT SERPL-CCNC: 6 U/L (ref 9–46)
ANION GAP SERPL CALCULATED.4IONS-SCNC: 8 MMOL/L (CALC) (ref 7–17)
AST SERPL-CCNC: 6 U/L (ref 10–35)
BILIRUB SERPL-MCNC: 0.7 MG/DL (ref 0.2–1.2)
BUN SERPL-MCNC: 17 MG/DL (ref 7–25)
CALCIUM SERPL-MCNC: 8.2 MG/DL (ref 8.6–10.3)
CHLORIDE SERPL-SCNC: 99 MMOL/L (ref 98–110)
CO2 SERPL-SCNC: 30 MMOL/L (ref 20–32)
CREAT SERPL-MCNC: 0.89 MG/DL (ref 0.7–1.22)
EGFRCR SERPLBLD CKD-EPI 2021: 85 ML/MIN/1.73M2
GLUCOSE SERPL-MCNC: 130 MG/DL (ref 65–99)
INR PPP: 1.2
POTASSIUM SERPL-SCNC: 4.1 MMOL/L (ref 3.5–5.3)
PROT SERPL-MCNC: 6 G/DL (ref 6.1–8.1)
PROTHROMBIN TIME: 12.3 SEC (ref 9–11.5)
SODIUM SERPL-SCNC: 137 MMOL/L (ref 135–146)

## 2025-04-18 PROCEDURE — 76705 ECHO EXAM OF ABDOMEN: CPT

## 2025-04-18 PROCEDURE — 76705 ECHO EXAM OF ABDOMEN: CPT | Performed by: RADIOLOGY

## 2025-04-21 ASSESSMENT — ENCOUNTER SYMPTOMS
MUSCULOSKELETAL NEGATIVE: 1
DYSPNEA ON EXERTION: 1
GASTROINTESTINAL NEGATIVE: 1
NEUROLOGICAL NEGATIVE: 1
CONSTITUTIONAL NEGATIVE: 1
RESPIRATORY NEGATIVE: 1

## 2025-04-21 NOTE — PROGRESS NOTES
"Chief Complaint:   Follow-up    History Of Present Illness:    .Mr Bunch returns in follow up with his daughter.  Denies chest pain, sob, palpitations, but has pitting bilateral pedal edema.  Couch.  Did not make much progress with furosemide 40 mg twice daily and will increase to 80 mg twice daily.  Return as scheduled in two weeks with bmp.              Last Recorded Vitals:  Blood pressure 98/60, pulse 78, height 1.753 m (5' 9\"), weight 89.8 kg (198 lb), SpO2 96%.     Past Medical History:  Past Medical History:   Diagnosis Date    Personal history of diseases of the blood and blood-forming organs and certain disorders involving the immune mechanism 06/09/2021    History of thrombocytopenia    Shortness of breath 02/19/2020    Shortness of breath on exertion        Past Surgical History:  Past Surgical History:   Procedure Laterality Date    APPENDECTOMY  09/11/2013    Appendectomy    COLONOSCOPY  06/25/2013    Complete Colonoscopy    OTHER SURGICAL HISTORY  04/22/2015    Removal Of Lesion    PILONIDAL CYST DRAINAGE  09/11/2013    Pilonidal Cyst Resection       Social History:  Social History     Socioeconomic History    Marital status:    Tobacco Use    Smoking status: Never    Smokeless tobacco: Never   Substance and Sexual Activity    Alcohol use: Yes     Alcohol/week: 10.0 standard drinks of alcohol     Types: 10 Standard drinks or equivalent per week    Drug use: Not Currently     Social Drivers of Health     Financial Resource Strain: Low Risk  (2/4/2025)    Overall Financial Resource Strain (CARDIA)     Difficulty of Paying Living Expenses: Not hard at all   Food Insecurity: No Food Insecurity (2/3/2025)    Hunger Vital Sign     Worried About Running Out of Food in the Last Year: Never true     Ran Out of Food in the Last Year: Never true   Transportation Needs: No Transportation Needs (2/4/2025)    PRAPARE - Transportation     Lack of Transportation (Medical): No     Lack of Transportation " (Non-Medical): No   Intimate Partner Violence: Not At Risk (2/3/2025)    Humiliation, Afraid, Rape, and Kick questionnaire     Fear of Current or Ex-Partner: No     Emotionally Abused: No     Physically Abused: No     Sexually Abused: No   Housing Stability: Low Risk  (2/4/2025)    Housing Stability Vital Sign     Unable to Pay for Housing in the Last Year: No     Number of Times Moved in the Last Year: 0     Homeless in the Last Year: No       Family History:  Family History   Problem Relation Name Age of Onset    Heart failure Mother      Atrial fibrillation Sister           Allergies:  Patient has no known allergies.    Outpatient Medications:  Current Outpatient Medications   Medication Sig Dispense Refill    aspirin 81 mg EC tablet Take 1 tablet (81 mg) by mouth once daily.      atorvastatin (Lipitor) 40 mg tablet Take 1 tablet (40 mg) by mouth once daily at bedtime. 90 tablet 3    dilTIAZem CD (Cardizem CD) 300 mg 24 hr capsule Take 1 capsule (300 mg) by mouth once daily. 90 capsule 3    furosemide (Lasix) 80 mg tablet Take 1 tablet (80 mg) by mouth 2 times daily (morning and late afternoon). 180 tablet 3    metoprolol tartrate (Lopressor) 100 mg tablet Take 1 tablet (100 mg) by mouth 2 times a day. 180 tablet 3    tadalafil (Cialis) 5 mg tablet Take 1 tablet (5 mg) by mouth once daily. 30 tablet 3    tamsulosin (Flomax) 0.4 mg 24 hr capsule Take 2 capsules (0.8 mg) by mouth once daily. 180 capsule 3    apixaban (Eliquis) 5 mg tablet Take 1 tablet (5 mg) by mouth 2 times a day. (Patient not taking: Reported on 4/7/2025) 60 tablet 0     No current facility-administered medications for this visit.        Physical Exam:  Cardiovascular:      PMI at left midclavicular line. Normal rate. Regular rhythm. Normal S1. Normal S2.       Murmurs: There is no murmur.      No gallop.  No click. No rub.   Pulses:     Intact distal pulses.   Edema:     Ankle: bilateral 2+ pitting edema of the ankle.        ROS:  Review of  Systems   Constitutional: Negative.   Cardiovascular:  Positive for dyspnea on exertion and leg swelling.   Respiratory: Negative.     Skin: Negative.    Musculoskeletal: Negative.    Gastrointestinal: Negative.    Genitourinary: Negative.    Neurological: Negative.           Last Labs: reviewed  CBC -  Lab Results   Component Value Date    WBC 5.1 02/13/2025    HGB 8.3 (L) 02/13/2025    HCT 26.4 (L) 02/13/2025    MCV 91 02/13/2025     02/13/2025       CMP -  Lab Results   Component Value Date    CALCIUM 8.2 (L) 04/17/2025    PROT 6.0 (L) 04/17/2025    ALBUMIN 3.6 04/17/2025    AST 6 (L) 04/17/2025    ALT 6 (L) 04/17/2025    ALKPHOS 60 04/17/2025    BILITOT 0.7 04/17/2025       LIPID PANEL -   Lab Results   Component Value Date    CHOL 103 10/01/2024    TRIG 56 10/01/2024    HDL 53.0 10/01/2024    CHHDL 1.9 10/01/2024    LDLF 37 06/05/2023    VLDL 11 10/01/2024    NHDL 50 10/01/2024       RENAL FUNCTION PANEL -   Lab Results   Component Value Date    GLUCOSE 130 (H) 04/17/2025     04/17/2025    K 4.1 04/17/2025    CL 99 04/17/2025    CO2 30 04/17/2025    ANIONGAP 8 04/17/2025    BUN 17 04/17/2025    CREATININE 0.89 04/17/2025    GFRMALE 87 06/05/2023    CALCIUM 8.2 (L) 04/17/2025    ALBUMIN 3.6 04/17/2025        Lab Results   Component Value Date     (H) 02/03/2025    HGBA1C 5.2 01/05/2024         Assessment/Plan   Problem List Items Addressed This Visit          Cardiac and Vasculature    Atrial fibrillation (Multi)    Relevant Orders    Comprehensive metabolic panel (Completed)    Protime-INR (Completed)    Coronary artery disease    Relevant Orders    Comprehensive metabolic panel (Completed)    Protime-INR (Completed)     Other Visit Diagnoses         Other ascites                  Assessment:     1. Low-grade CAD by cardiac cath, 09/15/2007. The patient is doing well with no unusual chest discomfort. At the time of his catheterization, he was found to have normal coronary arteries other than  for minor 20% narrowing.of the diagonal branch. This patient was seen at urgent care on 9/7/2015 with atypical chest pain that occurred when every he would take a deep breath. EKG evidently did not demonstrate any ST segment abnormality. His chest x-ray showed mild interstitial prominence. The amylase and lipase values were normal and his liver function panel was also normal. Cardiac enzymes were negative. The SMA panel included a creatinine of 0.77 a CBC included hematocrit of 43.3. At this point will postpone repeat nuclear stress testing. Patient denies chest pain at today's office visit.   2. Chronic atrial fibrillation. At the time of a previous visit the patient was noted to have a somewhat rapid ventricular response to his atrial fibrillation despite being on metoprolol 100 mg twice a day. At that time the metoprolol dose was increased to 150 mg twice a day. Since that time the patient has remained generally fatigued with some vague muscle aching. Due to the perceived side effects the dose of metoprolol will be reduced back to 100 mg twice a day and he was started on Cardizem  mg daily to assist in ventricular rate control. e subsequently had the dose of Cardizem CD reduced to 120 mg daily. e did have an echocardiogram performed 11/4/2014 which demonstrated normal LV chamber size with low normal LV ejection fraction of 55%. There was moderate to severe dilatation of the left atrium. There is moderate dilatation of the right-sided chambers with mild mitral valve regurgitation mild to moderate tricuspid valve regurgitation and mild pulmonary hypertension with an estimated PA systolic pressure of 40 mmHg. There is also mild dilatation of the aortic root and ascending thoracic aorta. The presence of underlying COPD would explain the mild to moderate right-sided chamber dilatation. The patient did have a repeat echocardiogram performed today on 01/23/2018 which demonstrates an LV ejection fraction of  approximately 55% with mild aortic valve insufficiency, mild to moderate mitral valve regurgitation, moderate tricuspid regurgitation, mild pulmonic insufficiency along with moderate left atrial enlargement and mild to moderate right atrial enlargement. The patient had been essentially asymptomatic until he was admitted to [facility] in 2/2020 with a urinary tract infection with bacteremia. He was noted to have Proteus mirabilis bacteremia with acute emphysematous cystitis. During that admission he had atrial fibrillation rapid ventricular rate of all 110â€“1 140/m. Ultimately he was placed on a combination of both diltiazem and metoprolol. The dose of diltiazem was increased from 120-240 mg daily and he was kept on metoprolol tartrate 100 mg twice a day. He did have an echocardiogram that showed an LV ejection fraction of 55â€“60 percent with moderate left atrial enlargement 1+ aortic valve insufficiency and moderately dilated aortic root at 4.1 cm. The ventricular rate to the atrial fibrillation is now well controlled on the present combination of the Cardizem  mg daily along with metoprolol tartrate 100 mg twice daily.  He has declined trying metoprolol succinate.  HR today 90.  Will continue diltiazem to 300 mg daily and have him return in two weeks as scheduled.  Echo 02/2025  CONCLUSIONS:   1. Left ventricular ejection fraction is normal, calculated by Brandon's biplane at 62%.   2. Spectral Doppler shows a Grade III (restrictive pattern) of left ventricular diastolic filling with an elevated left atrial pressure.   3. There is normal right ventricular global systolic function.   4. Mildly enlarged right ventricle.   5. The left atrial size is severely dilated.   6. The right atrium is severely dilated.   7. Mild to moderate mitral valve regurgitation.   8. Mildly elevated right ventricular systolic pressure.   9. Severe tricuspid regurgitation visualized.  10. Moderate aortic valve  regurgitation.   3. Coumadin anticoagulation with one episode of significant epistaxis. The patient has had no recurrent episodes of epistaxis since his last visit. The patient prefers to remain on Coumadin rather than switching to a direct oral anticoagulant, however per urology and several incidents with hematuria, will switch to Eliquis.  Patient has remained off of Eliquis over the past several weeks due to hematuria.  He patient is described below and does have evidence of hepatic cirrhosis possibly related to right heart failure.  He has been seen by interventional cardiology for possible watchman.  For now he will remain off Eliquis anticoagulation.  If patient requires a surgical tricuspid valve repair then a left atrial appendage ligation could conceivably bleed be performed.  4. Hyperlipidemia. The patient did have lab work performed on 11//2022 which included a lipid panel with cholesterol 119 LDL 51 HDL 57 triglyceride 52. The patient's panel is improved now on atorvastatin 40 mg daily rather than the previous simvastatin 40 mg daily.   5. Remote former smoker. Patient discontinued smoking 35 years ago.  6.? COPD.  7. History of urosepsis 2/2020. This patient was admitted to [facility] from 02/02/2020â€“02/05/2020 with sepsis due to a urinary tract infection. The patient presented with hematuria and burning upon urination. The patient initially had a Kirby catheter placed in the office which had subsequently been removed. At the time of his admission to [facility] he was noted to be in his atrial fibrillation but with a more rapid ventricular response. Serial troponins were negative. The patient had already been on Coumadin anticoagulation. The patient was placed on IV Zosyn and IV vancomycin for the urosepsis. He was initially placed on both diltiazem and metoprolol for ventricular rate control and the dose of diltiazem was increased from 120 mg daily to 240 mg daily. He also had an  echocardiogram on 02/04/2020 that showed an LV ejection fraction 55â€“60 percent with moderate left atrial enlargement with 1+ aortic valve insufficiency and a moderately dilated aortic root at 4.1 cm. The patient was found to have Proteus mirabilis bacteremia with acute emphysematous cystitis. He ultimately was discharged home on ampicillin was also started on tamsulosin. The patient had a recent PSA of 2.36 on 5/20/2020. He had a visit with urology and had a postvoid residual of urine of only 29 cc on 7/9/2020.  8. Hx of covid-19 vaccine #1 and #2.       9.  Congestive heart failure/right-sided CHF.  This patient had been seen earlier this year because of increasing shortness of breath along with lower extremity edema and increasing abdominal distention.  Patient was actually admitted to [facility] 2/3/2025 - 2/13/2025 because of hematuria.  Patient had a cystoscopy with clot evacuation.  Abdominal CT scan performed 2/3/2025 demonstrated an undulating hepatic contour consistent with cirrhosis along with mild to moderate volume ascites and bilateral pleural effusions.  Abdominal ultrasound performed on 2/4/2025 demonstrated a large right pleural effusion hepatic cirrhosis and right upper quadrant ascites.  The patient ultimately had a right-sided thoracentesis performed with 950 cc removed.  The patient also had a paracentesis performed of 1 L of serosanguineous ascitic fluid.  Echocardiogram on 2/4/2025 showed a preserved LV ejection fraction as 62% with 1-2+ mitral valve regurgitation 2+ aortic valve insufficiency severe left atrial enlargement severe tricuspid valve regurgitation severe right atrial enlargement and mild pulmonary hypertension.  At time of office follow-up patient had been started on Lasix 40 mg twice daily 3/11/2025.  He continues to have persistent  lower extremity edema and abdominal ascites.  It appears at this point that the patient has significant right-sided heart failure with  the significant tricuspid valve regurgitation being a primary culprit with dilated neck veins.  Patient will be scheduled for right and left heart catheterization within the next several weeks.  Continue current Lasix 80 mg twice daily for now.  Will also repeat abdominal ultrasound and check CMP PT/INR today.     10.  Hematuria.  Will refer for Watchman. (See TC 03/31/2025)

## 2025-04-21 NOTE — H&P (VIEW-ONLY)
"Chief Complaint:   Follow-up    History Of Present Illness:    .Mr Bunch returns in follow up with his daughter.  Denies chest pain, sob, palpitations, but has pitting bilateral pedal edema.  Couch.  Did not make much progress with furosemide 40 mg twice daily and will increase to 80 mg twice daily.  Return as scheduled in two weeks with bmp.              Last Recorded Vitals:  Blood pressure 98/60, pulse 78, height 1.753 m (5' 9\"), weight 89.8 kg (198 lb), SpO2 96%.     Past Medical History:  Past Medical History:   Diagnosis Date    Personal history of diseases of the blood and blood-forming organs and certain disorders involving the immune mechanism 06/09/2021    History of thrombocytopenia    Shortness of breath 02/19/2020    Shortness of breath on exertion        Past Surgical History:  Past Surgical History:   Procedure Laterality Date    APPENDECTOMY  09/11/2013    Appendectomy    COLONOSCOPY  06/25/2013    Complete Colonoscopy    OTHER SURGICAL HISTORY  04/22/2015    Removal Of Lesion    PILONIDAL CYST DRAINAGE  09/11/2013    Pilonidal Cyst Resection       Social History:  Social History     Socioeconomic History    Marital status:    Tobacco Use    Smoking status: Never    Smokeless tobacco: Never   Substance and Sexual Activity    Alcohol use: Yes     Alcohol/week: 10.0 standard drinks of alcohol     Types: 10 Standard drinks or equivalent per week    Drug use: Not Currently     Social Drivers of Health     Financial Resource Strain: Low Risk  (2/4/2025)    Overall Financial Resource Strain (CARDIA)     Difficulty of Paying Living Expenses: Not hard at all   Food Insecurity: No Food Insecurity (2/3/2025)    Hunger Vital Sign     Worried About Running Out of Food in the Last Year: Never true     Ran Out of Food in the Last Year: Never true   Transportation Needs: No Transportation Needs (2/4/2025)    PRAPARE - Transportation     Lack of Transportation (Medical): No     Lack of Transportation " (Non-Medical): No   Intimate Partner Violence: Not At Risk (2/3/2025)    Humiliation, Afraid, Rape, and Kick questionnaire     Fear of Current or Ex-Partner: No     Emotionally Abused: No     Physically Abused: No     Sexually Abused: No   Housing Stability: Low Risk  (2/4/2025)    Housing Stability Vital Sign     Unable to Pay for Housing in the Last Year: No     Number of Times Moved in the Last Year: 0     Homeless in the Last Year: No       Family History:  Family History   Problem Relation Name Age of Onset    Heart failure Mother      Atrial fibrillation Sister           Allergies:  Patient has no known allergies.    Outpatient Medications:  Current Outpatient Medications   Medication Sig Dispense Refill    aspirin 81 mg EC tablet Take 1 tablet (81 mg) by mouth once daily.      atorvastatin (Lipitor) 40 mg tablet Take 1 tablet (40 mg) by mouth once daily at bedtime. 90 tablet 3    dilTIAZem CD (Cardizem CD) 300 mg 24 hr capsule Take 1 capsule (300 mg) by mouth once daily. 90 capsule 3    furosemide (Lasix) 80 mg tablet Take 1 tablet (80 mg) by mouth 2 times daily (morning and late afternoon). 180 tablet 3    metoprolol tartrate (Lopressor) 100 mg tablet Take 1 tablet (100 mg) by mouth 2 times a day. 180 tablet 3    tadalafil (Cialis) 5 mg tablet Take 1 tablet (5 mg) by mouth once daily. 30 tablet 3    tamsulosin (Flomax) 0.4 mg 24 hr capsule Take 2 capsules (0.8 mg) by mouth once daily. 180 capsule 3    apixaban (Eliquis) 5 mg tablet Take 1 tablet (5 mg) by mouth 2 times a day. (Patient not taking: Reported on 4/7/2025) 60 tablet 0     No current facility-administered medications for this visit.        Physical Exam:  Cardiovascular:      PMI at left midclavicular line. Normal rate. Regular rhythm. Normal S1. Normal S2.       Murmurs: There is no murmur.      No gallop.  No click. No rub.   Pulses:     Intact distal pulses.   Edema:     Ankle: bilateral 2+ pitting edema of the ankle.        ROS:  Review of  Systems   Constitutional: Negative.   Cardiovascular:  Positive for dyspnea on exertion and leg swelling.   Respiratory: Negative.     Skin: Negative.    Musculoskeletal: Negative.    Gastrointestinal: Negative.    Genitourinary: Negative.    Neurological: Negative.           Last Labs: reviewed  CBC -  Lab Results   Component Value Date    WBC 5.1 02/13/2025    HGB 8.3 (L) 02/13/2025    HCT 26.4 (L) 02/13/2025    MCV 91 02/13/2025     02/13/2025       CMP -  Lab Results   Component Value Date    CALCIUM 8.2 (L) 04/17/2025    PROT 6.0 (L) 04/17/2025    ALBUMIN 3.6 04/17/2025    AST 6 (L) 04/17/2025    ALT 6 (L) 04/17/2025    ALKPHOS 60 04/17/2025    BILITOT 0.7 04/17/2025       LIPID PANEL -   Lab Results   Component Value Date    CHOL 103 10/01/2024    TRIG 56 10/01/2024    HDL 53.0 10/01/2024    CHHDL 1.9 10/01/2024    LDLF 37 06/05/2023    VLDL 11 10/01/2024    NHDL 50 10/01/2024       RENAL FUNCTION PANEL -   Lab Results   Component Value Date    GLUCOSE 130 (H) 04/17/2025     04/17/2025    K 4.1 04/17/2025    CL 99 04/17/2025    CO2 30 04/17/2025    ANIONGAP 8 04/17/2025    BUN 17 04/17/2025    CREATININE 0.89 04/17/2025    GFRMALE 87 06/05/2023    CALCIUM 8.2 (L) 04/17/2025    ALBUMIN 3.6 04/17/2025        Lab Results   Component Value Date     (H) 02/03/2025    HGBA1C 5.2 01/05/2024         Assessment/Plan   Problem List Items Addressed This Visit          Cardiac and Vasculature    Atrial fibrillation (Multi)    Relevant Orders    Comprehensive metabolic panel (Completed)    Protime-INR (Completed)    Coronary artery disease    Relevant Orders    Comprehensive metabolic panel (Completed)    Protime-INR (Completed)     Other Visit Diagnoses         Other ascites                  Assessment:     1. Low-grade CAD by cardiac cath, 09/15/2007. The patient is doing well with no unusual chest discomfort. At the time of his catheterization, he was found to have normal coronary arteries other than  for minor 20% narrowing.of the diagonal branch. This patient was seen at urgent care on 9/7/2015 with atypical chest pain that occurred when every he would take a deep breath. EKG evidently did not demonstrate any ST segment abnormality. His chest x-ray showed mild interstitial prominence. The amylase and lipase values were normal and his liver function panel was also normal. Cardiac enzymes were negative. The SMA panel included a creatinine of 0.77 a CBC included hematocrit of 43.3. At this point will postpone repeat nuclear stress testing. Patient denies chest pain at today's office visit.   2. Chronic atrial fibrillation. At the time of a previous visit the patient was noted to have a somewhat rapid ventricular response to his atrial fibrillation despite being on metoprolol 100 mg twice a day. At that time the metoprolol dose was increased to 150 mg twice a day. Since that time the patient has remained generally fatigued with some vague muscle aching. Due to the perceived side effects the dose of metoprolol will be reduced back to 100 mg twice a day and he was started on Cardizem  mg daily to assist in ventricular rate control. e subsequently had the dose of Cardizem CD reduced to 120 mg daily. e did have an echocardiogram performed 11/4/2014 which demonstrated normal LV chamber size with low normal LV ejection fraction of 55%. There was moderate to severe dilatation of the left atrium. There is moderate dilatation of the right-sided chambers with mild mitral valve regurgitation mild to moderate tricuspid valve regurgitation and mild pulmonary hypertension with an estimated PA systolic pressure of 40 mmHg. There is also mild dilatation of the aortic root and ascending thoracic aorta. The presence of underlying COPD would explain the mild to moderate right-sided chamber dilatation. The patient did have a repeat echocardiogram performed today on 01/23/2018 which demonstrates an LV ejection fraction of  approximately 55% with mild aortic valve insufficiency, mild to moderate mitral valve regurgitation, moderate tricuspid regurgitation, mild pulmonic insufficiency along with moderate left atrial enlargement and mild to moderate right atrial enlargement. The patient had been essentially asymptomatic until he was admitted to [facility] in 2/2020 with a urinary tract infection with bacteremia. He was noted to have Proteus mirabilis bacteremia with acute emphysematous cystitis. During that admission he had atrial fibrillation rapid ventricular rate of all 110â€“1 140/m. Ultimately he was placed on a combination of both diltiazem and metoprolol. The dose of diltiazem was increased from 120-240 mg daily and he was kept on metoprolol tartrate 100 mg twice a day. He did have an echocardiogram that showed an LV ejection fraction of 55â€“60 percent with moderate left atrial enlargement 1+ aortic valve insufficiency and moderately dilated aortic root at 4.1 cm. The ventricular rate to the atrial fibrillation is now well controlled on the present combination of the Cardizem  mg daily along with metoprolol tartrate 100 mg twice daily.  He has declined trying metoprolol succinate.  HR today 90.  Will continue diltiazem to 300 mg daily and have him return in two weeks as scheduled.  Echo 02/2025  CONCLUSIONS:   1. Left ventricular ejection fraction is normal, calculated by Brandon's biplane at 62%.   2. Spectral Doppler shows a Grade III (restrictive pattern) of left ventricular diastolic filling with an elevated left atrial pressure.   3. There is normal right ventricular global systolic function.   4. Mildly enlarged right ventricle.   5. The left atrial size is severely dilated.   6. The right atrium is severely dilated.   7. Mild to moderate mitral valve regurgitation.   8. Mildly elevated right ventricular systolic pressure.   9. Severe tricuspid regurgitation visualized.  10. Moderate aortic valve  regurgitation.   3. Coumadin anticoagulation with one episode of significant epistaxis. The patient has had no recurrent episodes of epistaxis since his last visit. The patient prefers to remain on Coumadin rather than switching to a direct oral anticoagulant, however per urology and several incidents with hematuria, will switch to Eliquis.  Patient has remained off of Eliquis over the past several weeks due to hematuria.  He patient is described below and does have evidence of hepatic cirrhosis possibly related to right heart failure.  He has been seen by interventional cardiology for possible watchman.  For now he will remain off Eliquis anticoagulation.  If patient requires a surgical tricuspid valve repair then a left atrial appendage ligation could conceivably bleed be performed.  4. Hyperlipidemia. The patient did have lab work performed on 11//2022 which included a lipid panel with cholesterol 119 LDL 51 HDL 57 triglyceride 52. The patient's panel is improved now on atorvastatin 40 mg daily rather than the previous simvastatin 40 mg daily.   5. Remote former smoker. Patient discontinued smoking 35 years ago.  6.? COPD.  7. History of urosepsis 2/2020. This patient was admitted to [facility] from 02/02/2020â€“02/05/2020 with sepsis due to a urinary tract infection. The patient presented with hematuria and burning upon urination. The patient initially had a Kirby catheter placed in the office which had subsequently been removed. At the time of his admission to [facility] he was noted to be in his atrial fibrillation but with a more rapid ventricular response. Serial troponins were negative. The patient had already been on Coumadin anticoagulation. The patient was placed on IV Zosyn and IV vancomycin for the urosepsis. He was initially placed on both diltiazem and metoprolol for ventricular rate control and the dose of diltiazem was increased from 120 mg daily to 240 mg daily. He also had an  echocardiogram on 02/04/2020 that showed an LV ejection fraction 55â€“60 percent with moderate left atrial enlargement with 1+ aortic valve insufficiency and a moderately dilated aortic root at 4.1 cm. The patient was found to have Proteus mirabilis bacteremia with acute emphysematous cystitis. He ultimately was discharged home on ampicillin was also started on tamsulosin. The patient had a recent PSA of 2.36 on 5/20/2020. He had a visit with urology and had a postvoid residual of urine of only 29 cc on 7/9/2020.  8. Hx of covid-19 vaccine #1 and #2.       9.  Congestive heart failure/right-sided CHF.  This patient had been seen earlier this year because of increasing shortness of breath along with lower extremity edema and increasing abdominal distention.  Patient was actually admitted to [facility] 2/3/2025 - 2/13/2025 because of hematuria.  Patient had a cystoscopy with clot evacuation.  Abdominal CT scan performed 2/3/2025 demonstrated an undulating hepatic contour consistent with cirrhosis along with mild to moderate volume ascites and bilateral pleural effusions.  Abdominal ultrasound performed on 2/4/2025 demonstrated a large right pleural effusion hepatic cirrhosis and right upper quadrant ascites.  The patient ultimately had a right-sided thoracentesis performed with 950 cc removed.  The patient also had a paracentesis performed of 1 L of serosanguineous ascitic fluid.  Echocardiogram on 2/4/2025 showed a preserved LV ejection fraction as 62% with 1-2+ mitral valve regurgitation 2+ aortic valve insufficiency severe left atrial enlargement severe tricuspid valve regurgitation severe right atrial enlargement and mild pulmonary hypertension.  At time of office follow-up patient had been started on Lasix 40 mg twice daily 3/11/2025.  He continues to have persistent  lower extremity edema and abdominal ascites.  It appears at this point that the patient has significant right-sided heart failure with  the significant tricuspid valve regurgitation being a primary culprit with dilated neck veins.  Patient will be scheduled for right and left heart catheterization within the next several weeks.  Continue current Lasix 80 mg twice daily for now.  Will also repeat abdominal ultrasound and check CMP PT/INR today.     10.  Hematuria.  Will refer for Watchman. (See TC 03/31/2025)

## 2025-04-23 ENCOUNTER — APPOINTMENT (OUTPATIENT)
Dept: CARDIOLOGY | Facility: CLINIC | Age: 85
End: 2025-04-23
Payer: MEDICARE

## 2025-04-23 DIAGNOSIS — N40.1 BENIGN PROSTATIC HYPERPLASIA WITH LOWER URINARY TRACT SYMPTOMS, SYMPTOM DETAILS UNSPECIFIED: ICD-10-CM

## 2025-04-23 RX ORDER — TADALAFIL 5 MG/1
5 TABLET ORAL DAILY
Qty: 30 TABLET | Refills: 3 | Status: SHIPPED | OUTPATIENT
Start: 2025-04-23 | End: 2025-05-04 | Stop reason: HOSPADM

## 2025-05-01 ENCOUNTER — APPOINTMENT (OUTPATIENT)
Dept: RADIOLOGY | Facility: HOSPITAL | Age: 85
DRG: 811 | End: 2025-05-01
Payer: MEDICARE

## 2025-05-01 ENCOUNTER — HOSPITAL ENCOUNTER (INPATIENT)
Facility: HOSPITAL | Age: 85
LOS: 3 days | Discharge: HOME | End: 2025-05-04
Attending: INTERNAL MEDICINE | Admitting: INTERNAL MEDICINE
Payer: MEDICARE

## 2025-05-01 ENCOUNTER — APPOINTMENT (OUTPATIENT)
Dept: CARDIOLOGY | Facility: HOSPITAL | Age: 85
DRG: 811 | End: 2025-05-01
Payer: MEDICARE

## 2025-05-01 DIAGNOSIS — D64.9 ANEMIA, UNSPECIFIED TYPE: ICD-10-CM

## 2025-05-01 DIAGNOSIS — I50.43 ACUTE ON CHRONIC COMBINED SYSTOLIC (CONGESTIVE) AND DIASTOLIC (CONGESTIVE) HEART FAILURE: ICD-10-CM

## 2025-05-01 DIAGNOSIS — R18.8 OTHER ASCITES: ICD-10-CM

## 2025-05-01 DIAGNOSIS — I25.10 CAD (CORONARY ARTERY DISEASE): Primary | ICD-10-CM

## 2025-05-01 LAB
ABO GROUP (TYPE) IN BLOOD: NORMAL
ABO GROUP (TYPE) IN BLOOD: NORMAL
ANION GAP SERPL CALCULATED.3IONS-SCNC: 11 MMOL/L (ref 10–20)
ANTIBODY SCREEN: NORMAL
BNP SERPL-MCNC: 257 PG/ML (ref 0–99)
BUN SERPL-MCNC: 11 MG/DL (ref 6–23)
CALCIUM SERPL-MCNC: 8.1 MG/DL (ref 8.6–10.3)
CHLORIDE SERPL-SCNC: 98 MMOL/L (ref 98–107)
CO2 SERPL-SCNC: 33 MMOL/L (ref 21–32)
CREAT SERPL-MCNC: 0.82 MG/DL (ref 0.5–1.3)
EGFRCR SERPLBLD CKD-EPI 2021: 87 ML/MIN/1.73M*2
ERYTHROCYTE [DISTWIDTH] IN BLOOD BY AUTOMATED COUNT: 17.7 % (ref 11.5–14.5)
GLUCOSE BLD MANUAL STRIP-MCNC: 116 MG/DL (ref 74–99)
GLUCOSE SERPL-MCNC: 108 MG/DL (ref 74–99)
HCT VFR BLD AUTO: 21.9 % (ref 41–52)
HCT VFR BLD AUTO: 23.8 % (ref 41–52)
HGB BLD-MCNC: 6.4 G/DL (ref 13.5–17.5)
HGB BLD-MCNC: 7.5 G/DL (ref 13.5–17.5)
INR PPP: 1.2 (ref 0.9–1.2)
IRON SATN MFR SERPL: 13 % (ref 25–45)
IRON SERPL-MCNC: 44 UG/DL (ref 35–150)
MCH RBC QN AUTO: 22.9 PG (ref 26–34)
MCHC RBC AUTO-ENTMCNC: 29.2 G/DL (ref 32–36)
MCV RBC AUTO: 79 FL (ref 80–100)
NRBC BLD-RTO: 0 /100 WBCS (ref 0–0)
PLATELET # BLD AUTO: 298 X10*3/UL (ref 150–450)
POTASSIUM SERPL-SCNC: 2.8 MMOL/L (ref 3.5–5.3)
PROTHROMBIN TIME: 13.1 SECONDS (ref 9.3–12.7)
RBC # BLD AUTO: 2.79 X10*6/UL (ref 4.5–5.9)
RBC #/AREA URNS AUTO: >20 /HPF
RH FACTOR (ANTIGEN D): NORMAL
RH FACTOR (ANTIGEN D): NORMAL
SODIUM SERPL-SCNC: 139 MMOL/L (ref 136–145)
TIBC SERPL-MCNC: 331 UG/DL (ref 240–445)
UIBC SERPL-MCNC: 287 UG/DL (ref 110–370)
WBC # BLD AUTO: 5.4 X10*3/UL (ref 4.4–11.3)
WBC #/AREA URNS AUTO: >50 /HPF

## 2025-05-01 PROCEDURE — P9016 RBC LEUKOCYTES REDUCED: HCPCS

## 2025-05-01 PROCEDURE — 85027 COMPLETE CBC AUTOMATED: CPT | Performed by: NURSE PRACTITIONER

## 2025-05-01 PROCEDURE — 2500000001 HC RX 250 WO HCPCS SELF ADMINISTERED DRUGS (ALT 637 FOR MEDICARE OP): Performed by: NURSE PRACTITIONER

## 2025-05-01 PROCEDURE — 71045 X-RAY EXAM CHEST 1 VIEW: CPT | Performed by: RADIOLOGY

## 2025-05-01 PROCEDURE — 2500000004 HC RX 250 GENERAL PHARMACY W/ HCPCS (ALT 636 FOR OP/ED): Mod: JZ

## 2025-05-01 PROCEDURE — 81001 URINALYSIS AUTO W/SCOPE: CPT | Performed by: SURGERY

## 2025-05-01 PROCEDURE — 36415 COLL VENOUS BLD VENIPUNCTURE: CPT | Performed by: NURSE PRACTITIONER

## 2025-05-01 PROCEDURE — 82947 ASSAY GLUCOSE BLOOD QUANT: CPT

## 2025-05-01 PROCEDURE — 99223 1ST HOSP IP/OBS HIGH 75: CPT | Performed by: INTERNAL MEDICINE

## 2025-05-01 PROCEDURE — 86923 COMPATIBILITY TEST ELECTRIC: CPT

## 2025-05-01 PROCEDURE — 83540 ASSAY OF IRON: CPT | Performed by: NURSE PRACTITIONER

## 2025-05-01 PROCEDURE — 36430 TRANSFUSION BLD/BLD COMPNT: CPT

## 2025-05-01 PROCEDURE — 76705 ECHO EXAM OF ABDOMEN: CPT | Mod: FOREIGN READ | Performed by: RADIOLOGY

## 2025-05-01 PROCEDURE — 76705 ECHO EXAM OF ABDOMEN: CPT

## 2025-05-01 PROCEDURE — 86900 BLOOD TYPING SEROLOGIC ABO: CPT | Performed by: NURSE PRACTITIONER

## 2025-05-01 PROCEDURE — 85018 HEMOGLOBIN: CPT | Performed by: NURSE PRACTITIONER

## 2025-05-01 PROCEDURE — 85610 PROTHROMBIN TIME: CPT | Performed by: NURSE PRACTITIONER

## 2025-05-01 PROCEDURE — 2500000002 HC RX 250 W HCPCS SELF ADMINISTERED DRUGS (ALT 637 FOR MEDICARE OP, ALT 636 FOR OP/ED): Performed by: NURSE PRACTITIONER

## 2025-05-01 PROCEDURE — G0425 INPT/ED TELECONSULT30: HCPCS | Performed by: SURGERY

## 2025-05-01 PROCEDURE — 2060000001 HC INTERMEDIATE ICU ROOM DAILY

## 2025-05-01 PROCEDURE — P9047 ALBUMIN (HUMAN), 25%, 50ML: HCPCS | Mod: JZ

## 2025-05-01 PROCEDURE — 83880 ASSAY OF NATRIURETIC PEPTIDE: CPT | Performed by: NURSE PRACTITIONER

## 2025-05-01 PROCEDURE — 71045 X-RAY EXAM CHEST 1 VIEW: CPT

## 2025-05-01 PROCEDURE — 80048 BASIC METABOLIC PNL TOTAL CA: CPT | Performed by: NURSE PRACTITIONER

## 2025-05-01 PROCEDURE — 93005 ELECTROCARDIOGRAM TRACING: CPT

## 2025-05-01 PROCEDURE — 2500000004 HC RX 250 GENERAL PHARMACY W/ HCPCS (ALT 636 FOR OP/ED): Mod: JZ | Performed by: NURSE PRACTITIONER

## 2025-05-01 PROCEDURE — 86850 RBC ANTIBODY SCREEN: CPT | Performed by: NURSE PRACTITIONER

## 2025-05-01 PROCEDURE — 99223 1ST HOSP IP/OBS HIGH 75: CPT | Performed by: NURSE PRACTITIONER

## 2025-05-01 PROCEDURE — 93010 ELECTROCARDIOGRAM REPORT: CPT | Performed by: INTERNAL MEDICINE

## 2025-05-01 PROCEDURE — 87086 URINE CULTURE/COLONY COUNT: CPT | Mod: WESLAB | Performed by: SURGERY

## 2025-05-01 PROCEDURE — 85014 HEMATOCRIT: CPT | Performed by: NURSE PRACTITIONER

## 2025-05-01 RX ORDER — ATORVASTATIN CALCIUM 40 MG/1
40 TABLET, FILM COATED ORAL NIGHTLY
Status: DISCONTINUED | OUTPATIENT
Start: 2025-05-01 | End: 2025-05-04 | Stop reason: HOSPADM

## 2025-05-01 RX ORDER — POTASSIUM CHLORIDE 20 MEQ/1
20 TABLET, EXTENDED RELEASE ORAL ONCE
Status: COMPLETED | OUTPATIENT
Start: 2025-05-01 | End: 2025-05-01

## 2025-05-01 RX ORDER — ACETAMINOPHEN 650 MG/1
650 SUPPOSITORY RECTAL EVERY 4 HOURS PRN
Status: CANCELLED | OUTPATIENT
Start: 2025-05-01

## 2025-05-01 RX ORDER — ACETAMINOPHEN 325 MG/1
650 TABLET ORAL EVERY 6 HOURS PRN
Status: CANCELLED | OUTPATIENT
Start: 2025-05-01

## 2025-05-01 RX ORDER — ACETAMINOPHEN 160 MG/5ML
650 SOLUTION ORAL EVERY 4 HOURS PRN
Status: CANCELLED | OUTPATIENT
Start: 2025-05-01

## 2025-05-01 RX ORDER — ALBUMIN HUMAN 250 G/1000ML
25 SOLUTION INTRAVENOUS ONCE
Status: COMPLETED | OUTPATIENT
Start: 2025-05-01 | End: 2025-05-01

## 2025-05-01 RX ORDER — TADALAFIL 5 MG/1
5 TABLET ORAL DAILY
Status: DISCONTINUED | OUTPATIENT
Start: 2025-05-01 | End: 2025-05-01 | Stop reason: RX

## 2025-05-01 RX ORDER — FUROSEMIDE 10 MG/ML
20 INJECTION INTRAMUSCULAR; INTRAVENOUS ONCE
Status: DISCONTINUED | OUTPATIENT
Start: 2025-05-01 | End: 2025-05-04 | Stop reason: HOSPADM

## 2025-05-01 RX ORDER — DILTIAZEM HYDROCHLORIDE 300 MG/1
300 CAPSULE, COATED, EXTENDED RELEASE ORAL NIGHTLY
Status: DISCONTINUED | OUTPATIENT
Start: 2025-05-01 | End: 2025-05-01

## 2025-05-01 RX ORDER — FUROSEMIDE 20 MG/1
80 TABLET ORAL
Status: DISCONTINUED | OUTPATIENT
Start: 2025-05-01 | End: 2025-05-04 | Stop reason: HOSPADM

## 2025-05-01 RX ORDER — PANTOPRAZOLE SODIUM 40 MG/1
40 TABLET, DELAYED RELEASE ORAL
Status: DISCONTINUED | OUTPATIENT
Start: 2025-05-02 | End: 2025-05-04 | Stop reason: HOSPADM

## 2025-05-01 RX ORDER — METOPROLOL TARTRATE 25 MG/1
100 TABLET, FILM COATED ORAL 2 TIMES DAILY
Status: DISCONTINUED | OUTPATIENT
Start: 2025-05-01 | End: 2025-05-04 | Stop reason: HOSPADM

## 2025-05-01 RX ORDER — TAMSULOSIN HYDROCHLORIDE 0.4 MG/1
0.8 CAPSULE ORAL NIGHTLY
Status: DISCONTINUED | OUTPATIENT
Start: 2025-05-01 | End: 2025-05-04 | Stop reason: HOSPADM

## 2025-05-01 RX ORDER — ACETAMINOPHEN 325 MG/1
650 TABLET ORAL EVERY 4 HOURS PRN
Status: CANCELLED | OUTPATIENT
Start: 2025-05-01

## 2025-05-01 RX ORDER — ASPIRIN 81 MG/1
81 TABLET ORAL DAILY
Status: DISCONTINUED | OUTPATIENT
Start: 2025-05-02 | End: 2025-05-04 | Stop reason: HOSPADM

## 2025-05-01 RX ORDER — TALC
3 POWDER (GRAM) TOPICAL
Status: CANCELLED | OUTPATIENT
Start: 2025-05-01

## 2025-05-01 RX ORDER — POTASSIUM CHLORIDE 14.9 MG/ML
20 INJECTION INTRAVENOUS
Status: DISPENSED | OUTPATIENT
Start: 2025-05-01 | End: 2025-05-02

## 2025-05-01 RX ORDER — ACETAMINOPHEN 650 MG/1
650 SUPPOSITORY RECTAL EVERY 6 HOURS PRN
Status: CANCELLED | OUTPATIENT
Start: 2025-05-01

## 2025-05-01 RX ORDER — ACETAMINOPHEN 160 MG/5ML
650 SOLUTION ORAL EVERY 6 HOURS PRN
Status: CANCELLED | OUTPATIENT
Start: 2025-05-01

## 2025-05-01 RX ADMIN — TAMSULOSIN HYDROCHLORIDE 0.8 MG: 0.4 CAPSULE ORAL at 20:21

## 2025-05-01 RX ADMIN — FUROSEMIDE 80 MG: 20 TABLET ORAL at 18:11

## 2025-05-01 RX ADMIN — DILTIAZEM HYDROCHLORIDE 300 MG: 120 CAPSULE, EXTENDED RELEASE ORAL at 21:06

## 2025-05-01 RX ADMIN — METOPROLOL TARTRATE 100 MG: 25 TABLET, FILM COATED ORAL at 20:20

## 2025-05-01 RX ADMIN — ALBUMIN HUMAN 25 G: 0.25 SOLUTION INTRAVENOUS at 18:16

## 2025-05-01 RX ADMIN — POTASSIUM CHLORIDE 20 MEQ: 1500 TABLET, EXTENDED RELEASE ORAL at 20:20

## 2025-05-01 RX ADMIN — ATORVASTATIN CALCIUM 40 MG: 40 TABLET, FILM COATED ORAL at 20:21

## 2025-05-01 RX ADMIN — POTASSIUM CHLORIDE 20 MEQ: 14.9 INJECTION, SOLUTION INTRAVENOUS at 20:21

## 2025-05-01 SDOH — SOCIAL STABILITY: SOCIAL INSECURITY: ARE YOU OR HAVE YOU BEEN THREATENED OR ABUSED PHYSICALLY, EMOTIONALLY, OR SEXUALLY BY ANYONE?: NO

## 2025-05-01 SDOH — SOCIAL STABILITY: SOCIAL INSECURITY: HAS ANYONE EVER THREATENED TO HURT YOUR FAMILY OR YOUR PETS?: NO

## 2025-05-01 SDOH — SOCIAL STABILITY: SOCIAL INSECURITY: DO YOU FEEL UNSAFE GOING BACK TO THE PLACE WHERE YOU ARE LIVING?: NO

## 2025-05-01 SDOH — SOCIAL STABILITY: SOCIAL INSECURITY: HAVE YOU HAD THOUGHTS OF HARMING ANYONE ELSE?: YES

## 2025-05-01 SDOH — SOCIAL STABILITY: SOCIAL INSECURITY: HAVE YOU HAD ANY THOUGHTS OF HARMING ANYONE ELSE?: NO

## 2025-05-01 SDOH — SOCIAL STABILITY: SOCIAL INSECURITY: ARE THERE ANY APPARENT SIGNS OF INJURIES/BEHAVIORS THAT COULD BE RELATED TO ABUSE/NEGLECT?: NO

## 2025-05-01 SDOH — SOCIAL STABILITY: SOCIAL INSECURITY: DO YOU FEEL ANYONE HAS EXPLOITED OR TAKEN ADVANTAGE OF YOU FINANCIALLY OR OF YOUR PERSONAL PROPERTY?: NO

## 2025-05-01 SDOH — SOCIAL STABILITY: SOCIAL INSECURITY: DOES ANYONE TRY TO KEEP YOU FROM HAVING/CONTACTING OTHER FRIENDS OR DOING THINGS OUTSIDE YOUR HOME?: NO

## 2025-05-01 SDOH — SOCIAL STABILITY: SOCIAL INSECURITY: WERE YOU ABLE TO COMPLETE ALL THE BEHAVIORAL HEALTH SCREENINGS?: YES

## 2025-05-01 SDOH — HEALTH STABILITY: MENTAL HEALTH: EXPERIENCED ANY OF THE FOLLOWING LIFE EVENTS: LIFE-THREATENING ILLNESS OR INJURY

## 2025-05-01 SDOH — SOCIAL STABILITY: SOCIAL INSECURITY: ABUSE: ADULT

## 2025-05-01 SDOH — ECONOMIC STABILITY: HOUSING INSECURITY: DO YOU FEEL UNSAFE GOING BACK TO THE PLACE WHERE YOU LIVE?: NO

## 2025-05-01 SDOH — SOCIAL STABILITY: SOCIAL INSECURITY
ASK PARENT OR GUARDIAN: ARE THERE TIMES WHEN YOU, YOUR CHILD(REN), OR ANY MEMBER OF YOUR HOUSEHOLD FEEL UNSAFE, HARMED, OR THREATENED AROUND PERSONS WITH WHOM YOU KNOW OR LIVE?: NO

## 2025-05-01 ASSESSMENT — COGNITIVE AND FUNCTIONAL STATUS - GENERAL
CLIMB 3 TO 5 STEPS WITH RAILING: A LITTLE
STANDING UP FROM CHAIR USING ARMS: A LITTLE
WALKING IN HOSPITAL ROOM: A LITTLE
DRESSING REGULAR LOWER BODY CLOTHING: A LITTLE
HELP NEEDED FOR BATHING: A LITTLE
PATIENT BASELINE BEDBOUND: NO
MOBILITY SCORE: 20
MOVING TO AND FROM BED TO CHAIR: A LITTLE
DAILY ACTIVITIY SCORE: 22

## 2025-05-01 ASSESSMENT — ACTIVITIES OF DAILY LIVING (ADL)
ADEQUATE_TO_COMPLETE_ADL: YES
HEARING - LEFT EAR: FUNCTIONAL
HEARING - RIGHT EAR: FUNCTIONAL
BATHING: INDEPENDENT
FEEDING YOURSELF: INDEPENDENT
TOILETING: INDEPENDENT
PATIENT'S MEMORY ADEQUATE TO SAFELY COMPLETE DAILY ACTIVITIES?: YES
WALKS IN HOME: INDEPENDENT
DRESSING YOURSELF: INDEPENDENT
ASSISTIVE_DEVICE: DENTURES UPPER;EYEGLASSES
JUDGMENT_ADEQUATE_SAFELY_COMPLETE_DAILY_ACTIVITIES: YES
GROOMING: INDEPENDENT

## 2025-05-01 ASSESSMENT — LIFESTYLE VARIABLES
HAS A RELATIVE, FRIEND, DOCTOR, OR ANOTHER HEALTH PROFESSIONAL EXPRESSED CONCERN ABOUT YOUR DRINKING OR SUGGESTED YOU CUT DOWN: NO
AUDIT-C TOTAL SCORE: 3
HOW OFTEN DURING THE LAST YEAR HAVE YOU FOUND THAT YOU WERE NOT ABLE TO STOP DRINKING ONCE YOU HAD STARTED: NEVER
HOW MANY STANDARD DRINKS CONTAINING ALCOHOL DO YOU HAVE ON A TYPICAL DAY: 3 OR 4
PRESCIPTION_ABUSE_PAST_12_MONTHS: NO
HOW OFTEN DURING THE LAST YEAR HAVE YOU NEEDED AN ALCOHOLIC DRINK FIRST THING IN THE MORNING TO GET YOURSELF GOING AFTER A NIGHT OF HEAVY DRINKING: NEVER
AUDIT TOTAL SCORE: 0
HOW OFTEN DO YOU HAVE 6 OR MORE DRINKS ON ONE OCCASION: NEVER
AUDIT-C TOTAL SCORE: 3
HOW OFTEN DO YOU HAVE A DRINK CONTAINING ALCOHOL: 2-4 TIMES A MONTH
HOW OFTEN DURING THE LAST YEAR HAVE YOU BEEN UNABLE TO REMEMBER WHAT HAPPENED THE NIGHT BEFORE BECAUSE YOU HAD BEEN DRINKING: NEVER
HOW OFTEN DURING THE LAST YEAR HAVE YOU FAILED TO DO WHAT WAS NORMALLY EXPECTED FROM YOU BECAUSE OF DRINKING: NEVER
HAVE YOU OR SOMEONE ELSE BEEN INJURED AS A RESULT OF YOUR DRINKING: NO
HOW OFTEN DURING THE LAST YEAR HAVE YOU HAD A FEELING OF GUILT OR REMORSE AFTER DRINKING: NEVER
AUDIT TOTAL SCORE: 3
SUBSTANCE_ABUSE_PAST_12_MONTHS: NO
SKIP TO QUESTIONS 9-10: 0

## 2025-05-01 ASSESSMENT — ENCOUNTER SYMPTOMS
DIZZINESS: 0
CONSTIPATION: 0
ABDOMINAL PAIN: 0
NAUSEA: 0
RHINORRHEA: 0
HEMATURIA: 1
PALPITATIONS: 0
DIARRHEA: 0
APPETITE CHANGE: 0
CHEST TIGHTNESS: 0
ABDOMINAL DISTENTION: 1
LIGHT-HEADEDNESS: 0
SHORTNESS OF BREATH: 1
FEVER: 0
DYSURIA: 0
VOMITING: 0
FATIGUE: 0
COUGH: 0
NUMBNESS: 0

## 2025-05-01 ASSESSMENT — COLUMBIA-SUICIDE SEVERITY RATING SCALE - C-SSRS
1. IN THE PAST MONTH, HAVE YOU WISHED YOU WERE DEAD OR WISHED YOU COULD GO TO SLEEP AND NOT WAKE UP?: NO
6. HAVE YOU EVER DONE ANYTHING, STARTED TO DO ANYTHING, OR PREPARED TO DO ANYTHING TO END YOUR LIFE?: NO
2. HAVE YOU ACTUALLY HAD ANY THOUGHTS OF KILLING YOURSELF?: NO

## 2025-05-01 ASSESSMENT — PATIENT HEALTH QUESTIONNAIRE - PHQ9
2. FEELING DOWN, DEPRESSED OR HOPELESS: NOT AT ALL
1. LITTLE INTEREST OR PLEASURE IN DOING THINGS: NOT AT ALL
SUM OF ALL RESPONSES TO PHQ9 QUESTIONS 1 & 2: 0

## 2025-05-01 ASSESSMENT — PAIN SCALES - GENERAL
PAINLEVEL_OUTOF10: 0 - NO PAIN
PAINLEVEL_OUTOF10: 0 - NO PAIN

## 2025-05-01 ASSESSMENT — PAIN - FUNCTIONAL ASSESSMENT: PAIN_FUNCTIONAL_ASSESSMENT: 0-10

## 2025-05-01 NOTE — ASSESSMENT & PLAN NOTE
Acute on chronic heart failure with preserved ejection fraction  Right sided heart failure   Tricuspid regurgitation  -2/4/2025 TTE showed EF of 62%, mildly enlarged right ventricle, left atrial severely dilated, right atrium severely dilated, mild to moderate mitral valve regurgitation, mild right ventricular pressure, severe tricuspid regurgitation, moderate aortic valve regurgitation  Was scheduled for cath 5/1/2025 however preop labs showed severe anemia  Notable fluid volume overload on exam  Will check stat chest x-ray and ultrasound of the abdomen to evaluate need for thoracentesis and paracentesis  Resume home cardiac medications  Daily weights  Strict I/Os  BNP  Diuresis  N.p.o. after midnight for possible heart cath tomorrow  Consult cardiology, appreciate recommendations     Anemia  Likely secondary to acute blood loss from hematuria versus iron deficiency anemia  Will check iron panel  Give 1 unit PRBCs  Repeat H&H  Transfuse for hemoglobin less than 7  PPI  Hold aspirin  Consult GI, appreciate recommendations  Check occult stool    Hematuria  BPH  Has had multiple cystoscopies in the past  No longer on blood thinners  Consult urology, appreciate recommendations  Reason for medications  Monitor I's and O's  Transfuse for hemoglobin less than 7    Cirrhosis  With fluid volume overload  Ultrasound of the abdomen, last study showed large volume ascites  Will likely require paracentesis  GI consulted, appreciate recommendations    CAD  Plan for heart cath tomorrow  Holding aspirin for now due to anemia, hematuria  On statin  Cardiology on consult    Hypertension  Resume home antihypertensives  Monitor blood pressure close    Atrial fibrillation  Resume home antiarrhythmics  Monitor on telemetry  No longer on blood thinners due to persistent hematuria, anemia    Disposition: Patient admitted to stepdown unit.  Preop CBC, BMP reviewed, most recent ultrasound of the abdomen and TTE reviewed, vitals reviewed.  Plan as above. Monitor fluid and electrolytes, replace as needed. DVT prophylaxis.  Iron panel, BNP, BMP ordered.  CBC and BMP in AM. Anticipate discharge home in 2-3 days.  N.p.o. after midnight for possible heart cath.  Plan discussed with the patient and family.    CODE STATUS discussed with the patient and family, patient is a full code.

## 2025-05-01 NOTE — PROGRESS NOTES
Pt transferred to 3E room 10B from cath lab.  Report received.  Pt oriented to room and call light.  Family at bedside.  Plan of care and pt condition reviewed with patient and family.  Both verbalized understanding.    Urology consulted for bloody urine.  Urologist wants to perform zoom call.  Tablet obtained for 4E.

## 2025-05-01 NOTE — H&P
Chief complaint: Anemia    History Of Present Illness  Ming Bunch is a 84 y.o. male with past medical history of hypertension, atrial fibrillation, no longer on anticoagulation, BPH with LUTS, hematuria status post multiple cystoscopies recently, coronary artery disease, hyperlipidemia, AAA, and heart failure who presented to [facility] for anticipated heart cath. Patient's daughter states that patient had been being evaluated for Watchman procedure however patient was found to have tricuspid valve disorder.  Patient was scheduled for heart cath this morning in preparation for tricuspid valve surgery.  Prior to heart cath, labs were obtained and showed severe anemia with a hemoglobin of 6.4.  Heart cath was postponed and decision was made for admission to the hospital under medicine team.  Patient does report having issues with hematuria for the last few months.  He has had multiple cystoscopies.  He does report hematuria however states it is more a and tony color.  He had been on Coumadin in the past and was transitioned to Eliquis.  Continue to have hematuria so Eliquis was discontinued as well.  He has not been on Eliquis since 4/1/2025.  Denies any hematemesis, hematochezia, or melena.  No lightheadedness or dizziness.  Does report shortness of breath with exertion.  Of note patient does have a history of thoracentesis in February as well as paracentesis.  Daughter states that last ultrasound showed severe ascites however patient has not had a paracentesis since this last study.  He does take Lasix 80 mg twice a day.  Patient does report abdominal distention and bilateral lower extremity edema.  Denies chest pain, nausea, or vomiting.  No fevers or chills.  Denies cough.    2/4/2025 TTE: Left ventricular ejection fraction 62%, grade 3 restrictive pattern of left ventricular diastolic filling, mild to moderate mitral valve regurgitation, right atrium severely dilated, left atrial size severely dilated,  severe tricuspid regurgitation, moderate aortic valve regurgitation.    4/17/2025 ultrasound of the abdomen showed large volume ascites      H&H 6.4/21.9.  No leukocytosis.  Platelets 298.  PT/INR 13.1/1.2.  Will check stat BMP, chest x-ray, ultrasound of the abdomen.  1 unit PRBCs ordered.  Cardiology, urology, GI consulted.  Patient admitted to [facility] for further evaluation and treatment.    Past Medical History  He has a past medical history of Atrial fibrillation (Multi), BPH (benign prostatic hyperplasia), CAD (coronary artery disease), CHF (congestive heart failure), Hematuria, Hyperlipidemia, Hypertension, Personal history of diseases of the blood and blood-forming organs and certain disorders involving the immune mechanism (06/09/2021), Shortness of breath (02/19/2020), and Tricuspid regurgitation.    Surgical History  He has a past surgical history that includes Colonoscopy (06/25/2013); Other surgical history (04/22/2015); Pilonidal cyst drainage (09/11/2013); Appendectomy (09/11/2013); Cystoscopy; Thoracentesis; and Paracentesis.     Social History  He reports that he has never smoked. He has never used smokeless tobacco. He reports current alcohol use of about 10.0 standard drinks of alcohol per week. He reports that he does not currently use drugs.    Family History  Family History[1]     Allergies  Patient has no known allergies.    Review of Systems   Constitutional:  Negative for appetite change, fatigue and fever.   HENT:  Negative for congestion and rhinorrhea.    Respiratory:  Positive for shortness of breath. Negative for cough and chest tightness.    Cardiovascular:  Positive for leg swelling. Negative for chest pain and palpitations.   Gastrointestinal:  Positive for abdominal distention. Negative for abdominal pain, constipation, diarrhea, nausea and vomiting.   Genitourinary:  Positive for hematuria. Negative for dysuria and urgency.   Neurological:  Negative for dizziness,  "light-headedness and numbness.   All other systems reviewed and are negative.       Physical Exam  Vitals reviewed.   Constitutional:       Appearance: Normal appearance.   HENT:      Head: Normocephalic and atraumatic.      Nose: Nose normal.      Mouth/Throat:      Mouth: Mucous membranes are moist.   Eyes:      Extraocular Movements: Extraocular movements intact.      Conjunctiva/sclera: Conjunctivae normal.   Cardiovascular:      Rate and Rhythm: Normal rate. Rhythm irregular.   Pulmonary:      Effort: Pulmonary effort is normal.      Breath sounds: No wheezing, rhonchi or rales.      Comments: Breath sounds clear, diminished throughout  Abdominal:      General: Bowel sounds are normal. There is distension.      Palpations: Abdomen is soft.      Tenderness: There is no abdominal tenderness.   Musculoskeletal:         General: Normal range of motion.      Cervical back: Normal range of motion and neck supple.      Right lower leg: Edema present.      Left lower leg: Edema present.   Skin:     General: Skin is warm and dry.      Capillary Refill: Capillary refill takes less than 2 seconds.   Neurological:      General: No focal deficit present.      Mental Status: He is alert and oriented to person, place, and time.   Psychiatric:         Mood and Affect: Mood normal.         Behavior: Behavior normal.          Last Recorded Vitals  Blood pressure 117/67, pulse (!) 116, temperature 37.2 °C (99 °F), temperature source Temporal, resp. rate 16, height 1.753 m (5' 9.02\"), weight 89.8 kg (197 lb 15.6 oz), SpO2 95%.    Relevant Results  Lab Results   Component Value Date    GLUCOSE 130 (H) 04/17/2025    CALCIUM 8.2 (L) 04/17/2025     04/17/2025    K 4.1 04/17/2025    CO2 30 04/17/2025    CL 99 04/17/2025    BUN 17 04/17/2025    CREATININE 0.89 04/17/2025      Lab Results   Component Value Date    WBC 5.4 05/01/2025    HGB 6.4 (LL) 05/01/2025    HCT 21.9 (L) 05/01/2025    MCV 79 (L) 05/01/2025     05/01/2025 "    ECG 12 Lead  Result Date: 5/1/2025  Atrial fibrillation Low voltage QRS ST & T wave abnormality, consider lateral ischemia Abnormal ECG No previous ECGs available    US abdomen limited  Large volume ascites.   MACRO: None   Signed by: Jef Fountain 4/19/2025 8:22 AM Dictation workstation:   [id number]        Assessment/Plan   Assessment & Plan  CAD (coronary artery disease)    Acute on chronic heart failure with preserved ejection fraction  Right sided heart failure   Tricuspid regurgitation  -2/4/2025 TTE showed EF of 62%, mildly enlarged right ventricle, left atrial severely dilated, right atrium severely dilated, mild to moderate mitral valve regurgitation, mild right ventricular pressure, severe tricuspid regurgitation, moderate aortic valve regurgitation  Was scheduled for cath 5/1/2025 however preop labs showed severe anemia  Notable fluid volume overload on exam  Will check stat chest x-ray and ultrasound of the abdomen to evaluate need for thoracentesis and paracentesis  Resume home cardiac medications  Daily weights  Strict I/Os  BNP  Diuresis  N.p.o. after midnight for possible heart cath tomorrow  Consult cardiology, appreciate recommendations     Anemia  Likely secondary to acute blood loss from hematuria versus iron deficiency anemia  Will check iron panel  Give 1 unit PRBCs  Repeat H&H  Transfuse for hemoglobin less than 7  PPI  Hold aspirin  Consult GI, appreciate recommendations  Check occult stool    Hematuria  BPH  Has had multiple cystoscopies in the past  No longer on blood thinners  Consult urology, appreciate recommendations  Reason for medications  Monitor I's and O's  Transfuse for hemoglobin less than 7    Cirrhosis  With fluid volume overload  Ultrasound of the abdomen, last study showed large volume ascites  Will likely require paracentesis  GI consulted, appreciate recommendations    CAD  Plan for heart cath tomorrow  Holding aspirin for now due to anemia, hematuria  On  statin  Cardiology on consult    Hypertension  Resume home antihypertensives  Monitor blood pressure close    Atrial fibrillation  Resume home antiarrhythmics  Monitor on telemetry  No longer on blood thinners due to persistent hematuria, anemia    Disposition: Patient admitted to stepdown unit.  Preop CBC, BMP reviewed, most recent ultrasound of the abdomen and TTE reviewed, vitals reviewed. Plan as above. Monitor fluid and electrolytes, replace as needed. DVT prophylaxis.  Iron panel, BNP, BMP ordered.  CBC and BMP in AM. Anticipate discharge home in 2-3 days.  N.p.o. after midnight for possible heart cath.  Plan discussed with the patient and family.    CODE STATUS discussed with the patient and family, patient is a full code.                     Ya Collier, APRN-CNP         [1]   Family History  Problem Relation Name Age of Onset    Heart failure Mother      Atrial fibrillation Sister

## 2025-05-01 NOTE — CONSULTS
Inpatient consult to Cardiology  Consult performed by: WENDY Gaston  Consult ordered by: WENDY Arias      Please see cardiology consultation completed by Dr. Lee this morning 5/1/2025.

## 2025-05-01 NOTE — INTERVAL H&P NOTE
H&P reviewed. The patient was examined and there are no changes to the H&P.  Pt reports hematuria continues but has improved since he stopped Eliquis 4/1/2025. Dr. Lee made aware.    H/H 6.4/21.9. Dr. Lee notified. T&S and VERAB obtained.

## 2025-05-01 NOTE — CARE PLAN
The patient's goals for the shift include      The clinical goals for the shift include      Over the shift, the patient did not make progress toward the following goals. Barriers to progression include. Pt not able to have right and left heart cath.. Recommendations to address these barriers include Pt needs to have stable H/H

## 2025-05-01 NOTE — CONSULTS
Consults  History Of Present Illness:    Ming Bunch is a 84 y.o. male presenting with right sided congestive heart failure with ascites.    The patient's past medical history includes the followin. Low-grade CAD by cardiac cath, 09/15/2007. The patient is doing well with no unusual chest discomfort. At the time of his catheterization, he was found to have normal coronary arteries other than for minor 20% narrowing.of the diagonal branch. This patient was seen at urgent care on 2015 with atypical chest pain that occurred when every he would take a deep breath. EKG evidently did not demonstrate any ST segment abnormality. His chest x-ray showed mild interstitial prominence. The amylase and lipase values were normal and his liver function panel was also normal. Cardiac enzymes were negative. The SMA panel included a creatinine of 0.77 a CBC included hematocrit of 43.3. At this point will postpone repeat nuclear stress testing. Patient denies chest pain at today's office visit.   2. Chronic atrial fibrillation. At the time of a previous visit the patient was noted to have a somewhat rapid ventricular response to his atrial fibrillation despite being on metoprolol 100 mg twice a day. At that time the metoprolol dose was increased to 150 mg twice a day. Since that time the patient has remained generally fatigued with some vague muscle aching. Due to the perceived side effects the dose of metoprolol will be reduced back to 100 mg twice a day and he was started on Cardizem  mg daily to assist in ventricular rate control. HHe subsequently had the dose of Cardizem CD reduced to 120 mg daily. e did have an echocardiogram performed 2014 which demonstrated normal LV chamber size with low normal LV ejection fraction of 55%. There was moderate to severe dilatation of the left atrium. There is moderate dilatation of the right-sided chambers with mild mitral valve regurgitation mild to moderate tricuspid  valve regurgitation and mild pulmonary hypertension with an estimated PA systolic pressure of 40 mmHg. There is also mild dilatation of the aortic root and ascending thoracic aorta. The presence of underlying COPD would explain the mild to moderate right-sided chamber dilatation. The patient did have a repeat echocardiogram performed today on 01/23/2018 which demonstrates an LV ejection fraction of approximately 55% with mild aortic valve insufficiency, mild to moderate mitral valve regurgitation, moderate tricuspid regurgitation, mild pulmonic insufficiency along with moderate left atrial enlargement and mild to moderate right atrial enlargement. The patient had been essentially asymptomatic until he was admitted to [facility] in 2/2020 with a urinary tract infection with bacteremia. He was noted to have Proteus mirabilis bacteremia with acute emphysematous cystitis. During that admission he had atrial fibrillation rapid ventricular rate of all 110â€“1 140/m. Ultimately he was placed on a combination of both diltiazem and metoprolol. The dose of diltiazem was increased from 120-240 mg daily and he was kept on metoprolol tartrate 100 mg twice a day. He did have an echocardiogram that showed an LV ejection fraction of 55â€“60 percent with moderate left atrial enlargement 1+ aortic valve insufficiency and moderately dilated aortic root at 4.1 cm. The ventricular rate to the atrial fibrillation is now well controlled on the present combination of the Cardizem  mg daily along with metoprolol tartrate 100 mg twice daily.  He has declined trying metoprolol succinate.  HR today 90.  Will continue diltiazem to 300 mg daily and have him return in two weeks as scheduled.  Echo 02/2025  CONCLUSIONS:   1. Left ventricular ejection fraction is normal, calculated by Brandon's biplane at 62%.   2. Spectral Doppler shows a Grade III (restrictive pattern) of left ventricular diastolic filling with an elevated left  atrial pressure.   3. There is normal right ventricular global systolic function.   4. Mildly enlarged right ventricle.   5. The left atrial size is severely dilated.   6. The right atrium is severely dilated.   7. Mild to moderate mitral valve regurgitation.   8. Mildly elevated right ventricular systolic pressure.   9. Severe tricuspid regurgitation visualized.  10. Moderate aortic valve regurgitation.   3. Coumadin anticoagulation with one episode of significant epistaxis. The patient has had no recurrent episodes of epistaxis since his last visit. The patient prefers to remain on Coumadin rather than switching to a direct oral anticoagulant, however per urology and several incidents with hematuria, will switch to Eliquis.  Patient has remained off of Eliquis over the past several weeks due to hematuria.  He patient is described below and does have evidence of hepatic cirrhosis possibly related to right heart failure.  He has been seen by interventional cardiology for possible watchman.  For now he will remain off Eliquis anticoagulation.  If patient requires a surgical tricuspid valve repair then a left atrial appendage ligation could conceivably bleed be performed.  4. Hyperlipidemia. The patient did have lab work performed on 11//2022 which included a lipid panel with cholesterol 119 LDL 51 HDL 57 triglyceride 52. The patient's panel is improved now on atorvastatin 40 mg daily rather than the previous simvastatin 40 mg daily.   5. Remote former smoker. Patient discontinued smoking 35 years ago.  6.? COPD.  7. History of urosepsis 2/2020. This patient was admitted to [facility] from 02/02/2020â€“02/05/2020 with sepsis due to a urinary tract infection. The patient presented with hematuria and burning upon urination. The patient initially had a Kirby catheter placed in the office which had subsequently been removed. At the time of his admission to [facility] he was noted to be in his atrial  fibrillation but with a more rapid ventricular response. Serial troponins were negative. The patient had already been on Coumadin anticoagulation. The patient was placed on IV Zosyn and IV vancomycin for the urosepsis. He was initially placed on both diltiazem and metoprolol for ventricular rate control and the dose of diltiazem was increased from 120 mg daily to 240 mg daily. He also had an echocardiogram on 02/04/2020 that showed an LV ejection fraction 55â€“60 percent with moderate left atrial enlargement with 1+ aortic valve insufficiency and a moderately dilated aortic root at 4.1 cm. The patient was found to have Proteus mirabilis bacteremia with acute emphysematous cystitis. He ultimately was discharged home on ampicillin was also started on tamsulosin. The patient had a recent PSA of 2.36 on 5/20/2020. He had a visit with urology and had a postvoid residual of urine of only 29 cc on 7/9/2020.  8. Hx of covid-19 vaccine #1 and #2.   9.0.  Hematuria.  The patient was hospitalized in 2/2025 with hematuria and clot retention. This necessitated a cystoscopy and clot evacuation. The only notable finding on cystoscopy was BPH. He is on anticoagulation due to atrial fibrillation. This was discontinued during his hospital stay. Since he was discharged he is voiding better. He reports that his urine is orange-colored. He has not seen any clots.  He was able to see an interventional radiologist during his hospital stay. Unfortunately he is not a candidate for prostate artery embolization. He may benefit from finasteride.due to the ongoing issues with hematuria it was decided to refer the patient for for consideration of a Watchman.  In this regard he was seen by Dr. Banks on 4/16/2025.  (See TC 03/31/2025)        10.  Congestive heart failure/right-sided CHF.  This patient had been seen earlier this year because of increasing shortness of breath along with lower extremity edema and increasing abdominal distention.   Patient was actually admitted to [facility] 2/3/2025 - 2/13/2025 because of hematuria.  Patient had a cystoscopy with clot evacuation.  Abdominal CT scan performed 2/3/2025 demonstrated an undulating hepatic contour consistent with cirrhosis along with mild to moderate volume ascites and bilateral pleural effusions.  Abdominal ultrasound performed on 2/4/2025 demonstrated a large right pleural effusion hepatic cirrhosis and right upper quadrant ascites.  The patient ultimately had a right-sided thoracentesis performed with 950 cc removed.  The patient also had a paracentesis performed of 1 L of serosanguineous ascitic fluid.  Echocardiogram on 2/4/2025 showed a preserved LV ejection fraction as 62% with 1-2+ mitral valve regurgitation 2+ aortic valve insufficiency severe left atrial enlargement severe tricuspid valve regurgitation severe right atrial enlargement and mild pulmonary hypertension.  At time of office follow-up patient had been started on Lasix 40 mg twice daily 3/11/2025.  He continues to have persistent  lower extremity edema and abdominal ascites.  It appears at this point that the patient has significant right-sided heart failure with the significant tricuspid valve regurgitation being a primary culprit with dilated neck veins.  Patient will be scheduled for right and left heart catheterization within the next several weeks.  Continue current Lasix 80 mg twice daily for now.  Will also repeat abdominal ultrasound and check CMP PT/INR today.  The comprehensive metabolic panel from 4/17/2025 was notable for a creatinine of 0.89 glucose 130 and relatively low transaminases.  His INR was slightly elevated at 1.4.  The patient also had a repeat abdominal ultrasound on 4/18/2025 confirming the presence of a large volume diffuse ascites throughout the abdomen.  Patient presented for elective cardiac catheterization 5/1/2025.  Preprocedural lab studies however indicated a significant decrease in  "hemoglobin from 8.3-6.4 with an hematocrit of 21.9.  The patient's procedure has been postponed pending PRBC transfusion further evaluation for ongoing hematuria and to exclude occult GI blood loss as well.  Comprehensive metabolic panel and repeat INR also pending.            Last Recorded Vitals:  Vitals:    05/01/25 0721   BP: 117/67   Pulse: (!) 116   Resp: 16   Temp: 37.2 °C (99 °F)   TempSrc: Temporal   SpO2: 95%   Weight: 89.8 kg (197 lb 15.6 oz)   Height: 1.753 m (5' 9.02\")       Last Labs:  CBC - 5/1/2025:  7:31 AM  5.4 6.4 298    21.9      CMP - 4/17/2025: 11:46 AM  8.2 6.0 6 --- 0.7   _ 3.6 6 60      PTT - No results in last year.  1.2   12.3 _     BNP   Date/Time Value Ref Range Status   02/03/2025 12:15  (H) 0 - 99 pg/mL Final     Hemoglobin A1C   Date/Time Value Ref Range Status   01/05/2024 08:43 AM 5.2 see below % Final   06/05/2023 08:24 AM 5.4 % Final     Comment:          Diagnosis of Diabetes-Adults   Non-Diabetic: < or = 5.6%   Increased risk for developing diabetes: 5.7-6.4%   Diagnostic of diabetes: > or = 6.5%  .       Monitoring of Diabetes                Age (y)     Therapeutic Goal (%)   Adults:          >18           <7.0   Pediatrics:    13-18           <7.5                   7-12           <8.0                   0- 6            7.5-8.5   American Diabetes Association. Diabetes Care 33(S1), Jan 2010.   11/17/2022 08:13 AM 5.1 % Final     Comment:          Diagnosis of Diabetes-Adults   Non-Diabetic: < or = 5.6%   Increased risk for developing diabetes: 5.7-6.4%   Diagnostic of diabetes: > or = 6.5%  .       Monitoring of Diabetes                Age (y)     Therapeutic Goal (%)   Adults:          >18           <7.0   Pediatrics:    13-18           <7.5                   7-12           <8.0                   0- 6            7.5-8.5   American Diabetes Association. Diabetes Care 33(S1), Jan 2010.       LDL Calculated   Date/Time Value Ref Range Status   10/01/2024 08:50 AM 39 <=99 " mg/dL Final     Comment:                                 Near   Borderline      AGE      Desirable  Optimal    High     High     Very High     0-19 Y     0 - 109     ---    110-129   >/= 130     ----    20-24 Y     0 - 119     ---    120-159   >/= 160     ----      >24 Y     0 -  99   100-129  130-159   160-189     >/=190     02/08/2024 08:29 AM 39 <=99 mg/dL Final     Comment:                                 Near   Borderline      AGE      Desirable  Optimal    High     High     Very High     0-19 Y     0 - 109     ---    110-129   >/= 130     ----    20-24 Y     0 - 119     ---    120-159   >/= 160     ----      >24 Y     0 -  99   100-129  130-159   160-189     >/=190       VLDL   Date/Time Value Ref Range Status   10/01/2024 08:50 AM 11 0 - 40 mg/dL Final   02/08/2024 08:29 AM 10 0 - 40 mg/dL Final   06/05/2023 08:24 AM 13 0 - 40 mg/dL Final   11/17/2022 08:13 AM 10 0 - 40 mg/dL Final   11/10/2021 07:52 AM 16 0 - 40 mg/dL Final      Last I/O:  No intake/output data recorded.    Past Cardiology Tests (Last 3 Years):  EKG:  ECG 12 lead (Clinic Performed) 02/26/2025      ECG 12 lead 02/03/2025    Echo:  Transthoracic Echo (TTE) Complete 02/04/2025      Transthoracic Echo (TTE) Complete 10/24/2023    Ejection Fractions:  EF   Date/Time Value Ref Range Status   02/04/2025 04:07 PM 62 %      Cath:  No results found for this or any previous visit from the past 1095 days.    Stress Test:  No results found for this or any previous visit from the past 1095 days.    Cardiac Imaging:  No results found for this or any previous visit from the past 1095 days.      Past Medical History:  He has a past medical history of Personal history of diseases of the blood and blood-forming organs and certain disorders involving the immune mechanism (06/09/2021) and Shortness of breath (02/19/2020).    Past Surgical History:  He has a past surgical history that includes Colonoscopy (06/25/2013); Other surgical history (04/22/2015);  Pilonidal cyst drainage (09/11/2013); and Appendectomy (09/11/2013).      Social History:  He reports that he has never smoked. He has never used smokeless tobacco. He reports current alcohol use of about 10.0 standard drinks of alcohol per week. He reports that he does not currently use drugs.    Family History:  Family History[1]     Allergies:  Patient has no known allergies.    Inpatient Medications:  Scheduled Medications[2]  PRN Medications[3]  Continuous Medications[4]  Outpatient Medications:  Current Outpatient Medications   Medication Instructions    apixaban (ELIQUIS) 5 mg, oral, 2 times daily    aspirin 81 mg, Daily    atorvastatin (LIPITOR) 40 mg, oral, Nightly    dilTIAZem CD (CARDIZEM CD) 300 mg, oral, Daily    furosemide (LASIX) 80 mg, oral, 2 times daily (morning and late afternoon)    metoprolol tartrate (LOPRESSOR) 100 mg, oral, 2 times daily    tadalafil (CIALIS) 5 mg, oral, Daily    tamsulosin (FLOMAX) 0.8 mg, oral, Daily       Physical Exam:  The patient is a nonobese chronically ill elderly white male lying in bed.  He is awake alert conversant no respiratory distress on room air.  JVP is elevated to the jaw angle with regurgitant V waves.  Carotid impulses are 2+  Chest fair air movement breath sounds are clear though diminished  The cardiac rhythm is irregularly irregular with mild to moderate variability normal S1-S2 no gallop murmur rub  Abdomen is distended with shifting dullness and ascites.  There is modest peripheral edema pedal pulses are present     Assessment/Plan   5/1: The patient is a 84-year-old white male with a past history that includes low-grade coronary artery disease by cardiac cath in 9/2007, permanent atrial fibrillation, hyperlipidemia, remote smoking, history of UTI and urosepsis, history of hematuria suspected as prostate in origin, and recent Sai identified right-sided congestive heart failure severe tricuspid valve regurgitation with ascites for which she did  undergo a paracentesis in 2/2025.  Patient currently admitted for elective right and left heart catheterization to reassess coronary artery status LV function pulmonary artery pressure and tricuspid valve regurgitation in anticipation of potential need for tricuspid valve repair or clip.  Patient however not noted to have a significant worsening of his chronic anemia with a hemoglobin of 6.4.  He is still having some ongoing hematuria.  Patient procedure will be postponed for need for transfusion.  Will recheck INR to ensure that he does not have coagulopathy related to the hepatic congestion.  Patient will also be assessed by gastroenterology to rule out any occult GI blood loss.  Patient currently only on low-dose aspirin 81 mg daily which can be held.  Patient does require both metoprolol and diltiazem for ventricular rate control of the atrial fibrillation.       Code Status:  Full Code    I spent  minutes in the professional and overall care of this patient.        Marlon Lee MD       [1]   Family History  Problem Relation Name Age of Onset    Heart failure Mother      Atrial fibrillation Sister     [2] [3] [4]

## 2025-05-02 ENCOUNTER — APPOINTMENT (OUTPATIENT)
Dept: RADIOLOGY | Facility: HOSPITAL | Age: 85
DRG: 811 | End: 2025-05-02
Payer: MEDICARE

## 2025-05-02 LAB
ALBUMIN FLD-MCNC: 2 G/DL
ANION GAP SERPL CALCULATED.3IONS-SCNC: 10 MMOL/L (ref 10–20)
APPEARANCE UR: ABNORMAL
BASOPHILS NFR FLD MANUAL: 0 %
BILIRUB UR STRIP.AUTO-MCNC: NEGATIVE MG/DL
BLASTS NFR FLD MANUAL: 0 % (ref ?–0)
BLOOD EXPIRATION DATE: NORMAL
BLOOD EXPIRATION DATE: NORMAL
BUN SERPL-MCNC: 10 MG/DL (ref 6–23)
CALCIUM SERPL-MCNC: 7.7 MG/DL (ref 8.6–10.3)
CHLORIDE SERPL-SCNC: 100 MMOL/L (ref 98–107)
CLARITY FLD: ABNORMAL
CO2 SERPL-SCNC: 32 MMOL/L (ref 21–32)
COLOR FLD: ABNORMAL
COLOR UR: ABNORMAL
CREAT SERPL-MCNC: 0.83 MG/DL (ref 0.5–1.3)
DISPENSE STATUS: NORMAL
DISPENSE STATUS: NORMAL
EGFRCR SERPLBLD CKD-EPI 2021: 86 ML/MIN/1.73M*2
EOSINOPHIL NFR FLD MANUAL: 0 %
ERYTHROCYTE [DISTWIDTH] IN BLOOD BY AUTOMATED COUNT: 17.2 % (ref 11.5–14.5)
GLUCOSE SERPL-MCNC: 99 MG/DL (ref 74–99)
GLUCOSE UR STRIP.AUTO-MCNC: NORMAL MG/DL
HCT VFR BLD AUTO: 22.9 % (ref 41–52)
HCT VFR BLD AUTO: 26 % (ref 41–52)
HGB BLD-MCNC: 6.9 G/DL (ref 13.5–17.5)
HGB BLD-MCNC: 8.2 G/DL (ref 13.5–17.5)
HOLD SPECIMEN: NORMAL
IMMATURE GRANULOCYTES IN FLUID: 0 %
KETONES UR STRIP.AUTO-MCNC: NEGATIVE MG/DL
LEUKOCYTE ESTERASE UR QL STRIP.AUTO: NEGATIVE
LYMPHOCYTES NFR FLD MANUAL: 12 %
MCH RBC QN AUTO: 23.6 PG (ref 26–34)
MCHC RBC AUTO-ENTMCNC: 30.1 G/DL (ref 32–36)
MCV RBC AUTO: 78 FL (ref 80–100)
MONOS+MACROS NFR FLD MANUAL: 25 %
NEUTROPHILS NFR FLD MANUAL: 63 %
NITRITE UR QL STRIP.AUTO: NEGATIVE
NRBC BLD-RTO: 0 /100 WBCS (ref 0–0)
OTHER CELLS NFR FLD MANUAL: 0 % (ref ?–0)
PH UR STRIP.AUTO: 7 [PH]
PLASMA CELLS NFR FLD MANUAL: 0 % (ref ?–0)
PLATELET # BLD AUTO: 288 X10*3/UL (ref 150–450)
POTASSIUM SERPL-SCNC: 3.2 MMOL/L (ref 3.5–5.3)
PRODUCT BLOOD TYPE: 6200
PRODUCT BLOOD TYPE: 6200
PRODUCT CODE: NORMAL
PRODUCT CODE: NORMAL
PROT UR STRIP.AUTO-MCNC: ABNORMAL MG/DL
RBC # BLD AUTO: 2.92 X10*6/UL (ref 4.5–5.9)
RBC # FLD AUTO: ABNORMAL /UL
RBC # UR STRIP.AUTO: ABNORMAL MG/DL
SODIUM SERPL-SCNC: 139 MMOL/L (ref 136–145)
SP GR UR STRIP.AUTO: 1.01
TOTAL CELLS COUNTED FLD: 100
UNIT ABO: NORMAL
UNIT ABO: NORMAL
UNIT NUMBER: NORMAL
UNIT NUMBER: NORMAL
UNIT RH: NORMAL
UNIT RH: NORMAL
UNIT VOLUME: 350
UNIT VOLUME: 350
UROBILINOGEN UR STRIP.AUTO-MCNC: NORMAL MG/DL
WBC # BLD AUTO: 6.1 X10*3/UL (ref 4.4–11.3)
WBC # FLD AUTO: 2125 /UL
XM INTEP: NORMAL
XM INTEP: NORMAL

## 2025-05-02 PROCEDURE — 2500000001 HC RX 250 WO HCPCS SELF ADMINISTERED DRUGS (ALT 637 FOR MEDICARE OP): Performed by: STUDENT IN AN ORGANIZED HEALTH CARE EDUCATION/TRAINING PROGRAM

## 2025-05-02 PROCEDURE — 2500000002 HC RX 250 W HCPCS SELF ADMINISTERED DRUGS (ALT 637 FOR MEDICARE OP, ALT 636 FOR OP/ED): Performed by: NURSE PRACTITIONER

## 2025-05-02 PROCEDURE — 2500000004 HC RX 250 GENERAL PHARMACY W/ HCPCS (ALT 636 FOR OP/ED): Performed by: RADIOLOGY

## 2025-05-02 PROCEDURE — 99233 SBSQ HOSP IP/OBS HIGH 50: CPT | Performed by: NURSE PRACTITIONER

## 2025-05-02 PROCEDURE — 36415 COLL VENOUS BLD VENIPUNCTURE: CPT | Performed by: NURSE PRACTITIONER

## 2025-05-02 PROCEDURE — 2500000001 HC RX 250 WO HCPCS SELF ADMINISTERED DRUGS (ALT 637 FOR MEDICARE OP): Performed by: NURSE PRACTITIONER

## 2025-05-02 PROCEDURE — 36430 TRANSFUSION BLD/BLD COMPNT: CPT

## 2025-05-02 PROCEDURE — P9016 RBC LEUKOCYTES REDUCED: HCPCS

## 2025-05-02 PROCEDURE — 2500000004 HC RX 250 GENERAL PHARMACY W/ HCPCS (ALT 636 FOR OP/ED): Mod: JZ | Performed by: REGISTERED NURSE

## 2025-05-02 PROCEDURE — 82042 OTHER SOURCE ALBUMIN QUAN EA: CPT | Mod: WESLAB

## 2025-05-02 PROCEDURE — 80048 BASIC METABOLIC PNL TOTAL CA: CPT | Performed by: NURSE PRACTITIONER

## 2025-05-02 PROCEDURE — 85018 HEMOGLOBIN: CPT | Performed by: NURSE PRACTITIONER

## 2025-05-02 PROCEDURE — 85027 COMPLETE CBC AUTOMATED: CPT | Performed by: NURSE PRACTITIONER

## 2025-05-02 PROCEDURE — 2060000001 HC INTERMEDIATE ICU ROOM DAILY

## 2025-05-02 PROCEDURE — 76770 US EXAM ABDO BACK WALL COMP: CPT

## 2025-05-02 PROCEDURE — 49083 ABD PARACENTESIS W/IMAGING: CPT

## 2025-05-02 PROCEDURE — 76770 US EXAM ABDO BACK WALL COMP: CPT | Performed by: RADIOLOGY

## 2025-05-02 PROCEDURE — 85014 HEMATOCRIT: CPT | Performed by: NURSE PRACTITIONER

## 2025-05-02 PROCEDURE — 49083 ABD PARACENTESIS W/IMAGING: CPT | Performed by: RADIOLOGY

## 2025-05-02 PROCEDURE — 0W9G3ZZ DRAINAGE OF PERITONEAL CAVITY, PERCUTANEOUS APPROACH: ICD-10-PCS | Performed by: RADIOLOGY

## 2025-05-02 PROCEDURE — 99232 SBSQ HOSP IP/OBS MODERATE 35: CPT | Performed by: INTERNAL MEDICINE

## 2025-05-02 PROCEDURE — 87070 CULTURE OTHR SPECIMN AEROBIC: CPT | Mod: WESLAB | Performed by: NURSE PRACTITIONER

## 2025-05-02 PROCEDURE — 89051 BODY FLUID CELL COUNT: CPT

## 2025-05-02 RX ORDER — POTASSIUM CHLORIDE 14.9 MG/ML
20 INJECTION INTRAVENOUS
Status: COMPLETED | OUTPATIENT
Start: 2025-05-02 | End: 2025-05-02

## 2025-05-02 RX ORDER — POTASSIUM CHLORIDE 20 MEQ/1
40 TABLET, EXTENDED RELEASE ORAL ONCE
Status: COMPLETED | OUTPATIENT
Start: 2025-05-02 | End: 2025-05-02

## 2025-05-02 RX ORDER — ACETAMINOPHEN 500 MG
5 TABLET ORAL NIGHTLY PRN
Status: DISCONTINUED | OUTPATIENT
Start: 2025-05-02 | End: 2025-05-04 | Stop reason: HOSPADM

## 2025-05-02 RX ORDER — LIDOCAINE HYDROCHLORIDE 10 MG/ML
INJECTION, SOLUTION EPIDURAL; INFILTRATION; INTRACAUDAL; PERINEURAL
Status: COMPLETED | OUTPATIENT
Start: 2025-05-02 | End: 2025-05-02

## 2025-05-02 RX ADMIN — TAMSULOSIN HYDROCHLORIDE 0.8 MG: 0.4 CAPSULE ORAL at 21:29

## 2025-05-02 RX ADMIN — METOPROLOL TARTRATE 100 MG: 25 TABLET, FILM COATED ORAL at 09:28

## 2025-05-02 RX ADMIN — FUROSEMIDE 80 MG: 20 TABLET ORAL at 16:58

## 2025-05-02 RX ADMIN — POTASSIUM CHLORIDE 40 MEQ: 1500 TABLET, EXTENDED RELEASE ORAL at 09:28

## 2025-05-02 RX ADMIN — LIDOCAINE HYDROCHLORIDE 7 ML: 10 INJECTION, SOLUTION EPIDURAL; INFILTRATION; INTRACAUDAL; PERINEURAL at 14:29

## 2025-05-02 RX ADMIN — ATORVASTATIN CALCIUM 40 MG: 40 TABLET, FILM COATED ORAL at 21:29

## 2025-05-02 RX ADMIN — Medication 5 MG: at 21:33

## 2025-05-02 RX ADMIN — FUROSEMIDE 80 MG: 20 TABLET ORAL at 09:28

## 2025-05-02 RX ADMIN — POTASSIUM CHLORIDE 20 MEQ: 14.9 INJECTION, SOLUTION INTRAVENOUS at 01:17

## 2025-05-02 RX ADMIN — METOPROLOL TARTRATE 100 MG: 25 TABLET, FILM COATED ORAL at 21:29

## 2025-05-02 SDOH — SOCIAL STABILITY: SOCIAL INSECURITY: HAVE YOU HAD THOUGHTS OF HARMING ANYONE ELSE?: NO

## 2025-05-02 SDOH — SOCIAL STABILITY: SOCIAL INSECURITY: DOES ANYONE TRY TO KEEP YOU FROM HAVING/CONTACTING OTHER FRIENDS OR DOING THINGS OUTSIDE YOUR HOME?: NO

## 2025-05-02 SDOH — ECONOMIC STABILITY: FOOD INSECURITY: WITHIN THE PAST 12 MONTHS, THE FOOD YOU BOUGHT JUST DIDN'T LAST AND YOU DIDN'T HAVE MONEY TO GET MORE.: NEVER TRUE

## 2025-05-02 SDOH — SOCIAL STABILITY: SOCIAL INSECURITY: HAS ANYONE EVER THREATENED TO HURT YOUR FAMILY OR YOUR PETS?: NO

## 2025-05-02 SDOH — ECONOMIC STABILITY: FOOD INSECURITY: WITHIN THE PAST 12 MONTHS, YOU WORRIED THAT YOUR FOOD WOULD RUN OUT BEFORE YOU GOT THE MONEY TO BUY MORE.: NEVER TRUE

## 2025-05-02 SDOH — SOCIAL STABILITY: SOCIAL INSECURITY: WITHIN THE LAST YEAR, HAVE YOU BEEN AFRAID OF YOUR PARTNER OR EX-PARTNER?: NO

## 2025-05-02 SDOH — SOCIAL STABILITY: SOCIAL INSECURITY: HAVE YOU HAD ANY THOUGHTS OF HARMING ANYONE ELSE?: NO

## 2025-05-02 SDOH — SOCIAL STABILITY: SOCIAL INSECURITY: DO YOU FEEL ANYONE HAS EXPLOITED OR TAKEN ADVANTAGE OF YOU FINANCIALLY OR OF YOUR PERSONAL PROPERTY?: NO

## 2025-05-02 SDOH — ECONOMIC STABILITY: INCOME INSECURITY: IN THE PAST 12 MONTHS HAS THE ELECTRIC, GAS, OIL, OR WATER COMPANY THREATENED TO SHUT OFF SERVICES IN YOUR HOME?: NO

## 2025-05-02 SDOH — ECONOMIC STABILITY: HOUSING INSECURITY: DO YOU FEEL UNSAFE GOING BACK TO THE PLACE WHERE YOU LIVE?: NO

## 2025-05-02 SDOH — SOCIAL STABILITY: SOCIAL INSECURITY: WITHIN THE LAST YEAR, HAVE YOU BEEN HUMILIATED OR EMOTIONALLY ABUSED IN OTHER WAYS BY YOUR PARTNER OR EX-PARTNER?: NO

## 2025-05-02 SDOH — SOCIAL STABILITY: SOCIAL INSECURITY: WERE YOU ABLE TO COMPLETE ALL THE BEHAVIORAL HEALTH SCREENINGS?: YES

## 2025-05-02 SDOH — SOCIAL STABILITY: SOCIAL INSECURITY: DO YOU FEEL UNSAFE GOING BACK TO THE PLACE WHERE YOU ARE LIVING?: NO

## 2025-05-02 SDOH — SOCIAL STABILITY: SOCIAL INSECURITY: ABUSE: ADULT

## 2025-05-02 SDOH — SOCIAL STABILITY: SOCIAL INSECURITY: ARE YOU OR HAVE YOU BEEN THREATENED OR ABUSED PHYSICALLY, EMOTIONALLY, OR SEXUALLY BY ANYONE?: NO

## 2025-05-02 SDOH — SOCIAL STABILITY: SOCIAL INSECURITY: ARE THERE ANY APPARENT SIGNS OF INJURIES/BEHAVIORS THAT COULD BE RELATED TO ABUSE/NEGLECT?: NO

## 2025-05-02 ASSESSMENT — COGNITIVE AND FUNCTIONAL STATUS - GENERAL
WALKING IN HOSPITAL ROOM: A LITTLE
MOVING TO AND FROM BED TO CHAIR: A LITTLE
DAILY ACTIVITIY SCORE: 22
MOBILITY SCORE: 22
DRESSING REGULAR LOWER BODY CLOTHING: A LITTLE
CLIMB 3 TO 5 STEPS WITH RAILING: A LITTLE
STANDING UP FROM CHAIR USING ARMS: A LITTLE
WALKING IN HOSPITAL ROOM: A LITTLE
TURNING FROM BACK TO SIDE WHILE IN FLAT BAD: A LITTLE
DAILY ACTIVITIY SCORE: 24
MOBILITY SCORE: 18
CLIMB 3 TO 5 STEPS WITH RAILING: A LOT
DRESSING REGULAR UPPER BODY CLOTHING: A LITTLE

## 2025-05-02 ASSESSMENT — PAIN SCALES - GENERAL
PAINLEVEL_OUTOF10: 0 - NO PAIN

## 2025-05-02 ASSESSMENT — LIFESTYLE VARIABLES
HOW OFTEN DO YOU HAVE 6 OR MORE DRINKS ON ONE OCCASION: NEVER
AUDIT-C TOTAL SCORE: 3
AUDIT-C TOTAL SCORE: 3
HOW OFTEN DO YOU HAVE A DRINK CONTAINING ALCOHOL: 2-4 TIMES A MONTH
SKIP TO QUESTIONS 9-10: 0
HOW MANY STANDARD DRINKS CONTAINING ALCOHOL DO YOU HAVE ON A TYPICAL DAY: 3 OR 4

## 2025-05-02 ASSESSMENT — ACTIVITIES OF DAILY LIVING (ADL): LACK_OF_TRANSPORTATION: NO

## 2025-05-02 ASSESSMENT — PATIENT HEALTH QUESTIONNAIRE - PHQ9: 2. FEELING DOWN, DEPRESSED OR HOPELESS: NOT AT ALL

## 2025-05-02 ASSESSMENT — PAIN - FUNCTIONAL ASSESSMENT: PAIN_FUNCTIONAL_ASSESSMENT: 0-10

## 2025-05-02 NOTE — PROGRESS NOTES
"Ming Bunch is a 84 y.o. male on day 1 of admission presenting with CAD (coronary artery disease).    Subjective   Patient states that he is feeling good today.  Denies chest pain or shortness of breath.       Objective     Physical Exam  General: Awake no acute distress  Head: Normocephalic  Neck: Normal jugular venous pressures  Heart: Irregular rate and rhythm  Lungs: Clear anteriorly  Abdomen: Soft bowel sounds positive  Extremities: 1+ edema  Neuro: Awake answering all questions appropriately    Last Recorded Vitals  Blood pressure 117/59, pulse 86, temperature 36 °C (96.8 °F), temperature source Temporal, resp. rate 18, height 1.753 m (5' 9.02\"), weight 84.5 kg (186 lb 4.6 oz), SpO2 95%.  Intake/Output last 3 Shifts:  I/O last 3 completed shifts:  In: 850 (10.1 mL/kg) [P.O.:400; Blood:350; IV Piggyback:100]  Out: 550 (6.5 mL/kg) [Urine:550 (0.2 mL/kg/hr)]  Weight: 84.5 kg     Relevant Results                   Results for orders placed or performed during the hospital encounter of 05/01/25 (from the past 24 hours)   POCT GLUCOSE   Result Value Ref Range    POCT Glucose 116 (H) 74 - 99 mg/dL   Iron and TIBC   Result Value Ref Range    Iron 44 35 - 150 ug/dL    UIBC 287 110 - 370 ug/dL    TIBC 331 240 - 445 ug/dL    % Saturation 13 (L) 25 - 45 %   Basic metabolic panel   Result Value Ref Range    Glucose 108 (H) 74 - 99 mg/dL    Sodium 139 136 - 145 mmol/L    Potassium 2.8 (LL) 3.5 - 5.3 mmol/L    Chloride 98 98 - 107 mmol/L    Bicarbonate 33 (H) 21 - 32 mmol/L    Anion Gap 11 10 - 20 mmol/L    Urea Nitrogen 11 6 - 23 mg/dL    Creatinine 0.82 0.50 - 1.30 mg/dL    eGFR 87 >60 mL/min/1.73m*2    Calcium 8.1 (L) 8.6 - 10.3 mg/dL   Hemoglobin and Hematocrit, Blood   Result Value Ref Range    Hemoglobin 7.5 (L) 13.5 - 17.5 g/dL    Hematocrit 23.8 (L) 41.0 - 52.0 %   Urinalysis with Reflex Culture and Microscopic   Result Value Ref Range    Color, Urine Brown (N) Light-Yellow, Yellow, Dark-Yellow    Appearance, " Urine Ex.Turbid (N) Clear    Specific Gravity, Urine 1.009 1.005 - 1.035    pH, Urine 7.0 5.0, 5.5, 6.0, 6.5, 7.0, 7.5, 8.0    Protein, Urine 30 (1+) (A) NEGATIVE, 10 (TRACE), 20 (TRACE) mg/dL    Glucose, Urine Normal Normal mg/dL    Blood, Urine OVER (3+) (A) NEGATIVE mg/dL    Ketones, Urine NEGATIVE NEGATIVE mg/dL    Bilirubin, Urine NEGATIVE NEGATIVE mg/dL    Urobilinogen, Urine Normal Normal mg/dL    Nitrite, Urine NEGATIVE NEGATIVE    Leukocyte Esterase, Urine NEGATIVE NEGATIVE   Urinalysis Microscopic   Result Value Ref Range    WBC, Urine >50 (A) 1-5, NONE /HPF    RBC, Urine >20 (A) NONE, 1-2, 3-5 /HPF   CBC   Result Value Ref Range    WBC 6.1 4.4 - 11.3 x10*3/uL    nRBC 0.0 0.0 - 0.0 /100 WBCs    RBC 2.92 (L) 4.50 - 5.90 x10*6/uL    Hemoglobin 6.9 (L) 13.5 - 17.5 g/dL    Hematocrit 22.9 (L) 41.0 - 52.0 %    MCV 78 (L) 80 - 100 fL    MCH 23.6 (L) 26.0 - 34.0 pg    MCHC 30.1 (L) 32.0 - 36.0 g/dL    RDW 17.2 (H) 11.5 - 14.5 %    Platelets 288 150 - 450 x10*3/uL   Basic Metabolic Panel   Result Value Ref Range    Glucose 99 74 - 99 mg/dL    Sodium 139 136 - 145 mmol/L    Potassium 3.2 (L) 3.5 - 5.3 mmol/L    Chloride 100 98 - 107 mmol/L    Bicarbonate 32 21 - 32 mmol/L    Anion Gap 10 10 - 20 mmol/L    Urea Nitrogen 10 6 - 23 mg/dL    Creatinine 0.83 0.50 - 1.30 mg/dL    eGFR 86 >60 mL/min/1.73m*2    Calcium 7.7 (L) 8.6 - 10.3 mg/dL   Prepare RBC: 1 Units   Result Value Ref Range    PRODUCT CODE Y9972S38     Unit Number [id number]     Unit ABO A     Unit RH POS     XM INTEP COMP     Dispense Status IS     Blood Expiration Date 5/23/2025 11:59:00 PM EDT     PRODUCT BLOOD TYPE 6200     UNIT VOLUME 350    Hemoglobin and Hematocrit, Blood   Result Value Ref Range    Hemoglobin 8.2 (L) 13.5 - 17.5 g/dL    Hematocrit 26.0 (L) 41.0 - 52.0 %     *Note: Due to a large number of results and/or encounters for the requested time period, some results have not been displayed. A complete set of results can be found in  Results Review.               Assessment & Plan  CAD (coronary artery disease)    Anemia/hematuria  Chronic atrial fibrillation  Right-sided heart failure  History of nonobstructive coronary artery disease  Cirrhosis  Hyperlipidemia  Tricuspid valve regurgitation    5/2: Patient found to have significant anemia and admitted for further treatment and investigation.  Reviewed Dr. Lee's notes.  Patient with significant tricuspid valve regurgitation and right-sided heart failure.  Underwent paracentesis procedure.  He is going to delay left heart catheterization until anemia stabilized, likely as an outpatient in coming weeks.  Heart rate adequately controlled on the combination of metoprolol and diltiazem.  No anticoagulation secondary to the anemia and hematuria.  But appears to be stabilized from a cardiac standpoint.  Will follow.      I spent 15 minutes in the professional and overall care of this patient.      Pratik Blankenship, DO

## 2025-05-02 NOTE — PROGRESS NOTES
"Nutrition Initial Assessment:   Nutrition Assessment         Patient is a 84 y.o. male presenting with severe anemia.  He was scheduled to have a heart cath completed yesterday 5/1/2025 however in setting of altered labs this was postponed and he was admitted for transfusions, he is also scheduled for paracentesis today.  History of hypertension, atrial fibrillation, no longer on anticoagulation, BPH with LUTS, hematuria status post multiple cystoscopies recently, coronary artery disease, hyperlipidemia, AAA, and heart failure.    Nutrition History:  Energy Intake: Good > 75 %  Patient assessed per RD virtual assessment.  Patient is doing well overall, he has received  a blood transfusion early today with planned paracentesis.  He lives at home with his wife stating he has good support and assistance when needed including with meal preparation.  He eats 3 meals/day and denies any significant changes in his weight.  His strength has not changed and does not have any nutrition related questions/concerns at this time.  Reviewed current diet order, Cardiac (2-3grams sodium) and 1200mL fluid restriction and necessity for adherence.  No nausea/vomiting, pain.    Anthropometrics:  Height: 175.3 cm (5' 9.02\")   Weight: 84.5 kg (186 lb 4.6 oz)   BMI (Calculated): 27.5  IBW/kg (Dietitian Calculated): 160 kg  Weight History:   Wt Readings from Last 10 Encounters:   05/02/25 84.5 kg (186 lb 4.6 oz)   04/17/25 89.8 kg (198 lb)   04/16/25 90 kg (198 lb 6.4 oz)   04/07/25 90.3 kg (199 lb)   03/26/25 85.7 kg (189 lb)   03/11/25 90.9 kg (200 lb 6.4 oz)   02/26/25 85.6 kg (188 lb 12.8 oz)   02/17/25 85.6 kg (188 lb 12.8 oz)   02/13/25 84.6 kg (186 lb 8.6 oz)   01/31/25 93 kg (205 lb)         Weight Change %:  Weight History / % Weight Change: Patients weight has fluctuated between 85-93gs over the last 5 months likley related to chronic disease (heart failure)  Significant Weight Loss: No    Nutrition Focused Physical Exam " Findings:  Unable to complete due to RD virtual assessment.    Nutrition Significant Labs:  BMP Trend:   Results from last 7 days   Lab Units 05/02/25  0540 05/01/25  1750   GLUCOSE mg/dL 99 108*   CALCIUM mg/dL 7.7* 8.1*   SODIUM mmol/L 139 139   POTASSIUM mmol/L 3.2* 2.8*   CO2 mmol/L 32 33*   CHLORIDE mmol/L 100 98   BUN mg/dL 10 11   CREATININE mg/dL 0.83 0.82        Nutrition Specific Medications:  Lasix 80mg 2 times/day      I/O:    ;      Dietary Orders (From admission, onward)       Start     Ordered    05/02/25 0942  Adult diet 2-3 grams sodium; 1200 mL fluid  Diet effective now        Question Answer Comment   Diet type 2-3 grams sodium    Dietary fluid restriction / 24h: 1200 mL fluid        05/02/25 0941    05/01/25 1829  May Participate in Room Service  ( ROOM SERVICE MAY PARTICIPATE)  Once        Question:  .  Answer:  Yes    05/01/25 1828                     Estimated Needs:   Total Energy Estimated Needs in 24 hours (kCal): 1800 kCal  Method for Estimating Needs: IBW  Total Protein Estimated Needs in 24 Hours (g): 87 g  Method for Estimating 24 Hour Protein Needs: IBW  Total Fluid Estimated Needs in 24 Hours (mL): 1200 mL        Nutrition Diagnosis   Malnutrition Diagnosis  Patient has Malnutrition Diagnosis: No    Nutrition Diagnosis  Patient has Nutrition Diagnosis: No       Nutrition Interventions/Recommendations   Nutrition prescription for oral nutrition    Nutrition Recommendations:  Individualized Nutrition Prescription Provided for : Recommend Adult Diet 2-3grams sodium/1200mL fluid restriction as ordered    Nutrition Interventions/Goals:   Meals and Snacks: General healthful diet  Goal: Patient will adhered to 1200mL/fluid restriction and consume greater than 75% of meals TID    Nutrition Monitoring and Evaluation   Food/Nutrient Related History Monitoring  Monitoring and Evaluation Plan: Intake / amount of food  Intake / Amount of food: Consumes at least 75% or more of  meals/snacks/supplements       Goal Status: New goal(s) identified    Time Spent (min): 60 minutes      05/02/25 at 4:12 PM - IGNACIA VAZQUEZ RDN, LD

## 2025-05-02 NOTE — NURSING NOTE
Four eyes skin check completed with Santos DRAKE RN, and Idalia ALEJO RN.    No acute findings noted.

## 2025-05-02 NOTE — PROGRESS NOTES
Physical Therapy                 Therapy Communication Note    Patient Name: Ming Bunch  MRN: [id number]  Department: [facility]  Room: 10/10-A  Today's Date: 5/2/2025     Discipline: Physical Therapy    PT Missed Visit: Yes     Missed Visit Reason: Missed Visit Reason: Cancel (pt receiving blood transfusion, scheduled for paracentesis today.)    Missed Time: Attempt

## 2025-05-02 NOTE — PROGRESS NOTES
Ming Bunch is a 84 y.o. male on day 1 of admission presenting with CAD (coronary artery disease).      Subjective   Patient seen and examined.  Awake/alert/oriented.  Resting in bed.  Denies chest pain, shortness of breath, fevers, chills, nausea, or vomiting. No abdominal discomfort. Denies lightheadedness or dizziness.  Still having hematuria.  Getting a unit of blood this morning.  Still having significant edema to lower extremities.  Plan for paracentesis today for ascites.  RN spoke with IR nurse and okay for patient to eat this morning.  Discussed cardiac cath with cardiology, no plan for cardiac cath today, will likely be done outpatient in a few weeks.     Objective     Last Recorded Vitals  /65 (BP Location: Right arm, Patient Position: Lying)   Pulse 101   Temp 36.6 °C (97.9 °F) (Temporal)   Resp 18   Wt 84.5 kg (186 lb 4.6 oz)   SpO2 93%   Intake/Output last 3 Shifts:    Intake/Output Summary (Last 24 hours) at 5/2/2025 0941  Last data filed at 5/2/2025 0847  Gross per 24 hour   Intake 850 ml   Output 850 ml   Net 0 ml       Admission Weight  Weight: 89.8 kg (197 lb 15.6 oz) (05/01/25 0721)    Daily Weight  05/02/25 : 84.5 kg (186 lb 4.6 oz)    Image Results  US abdomen limited  Narrative: STUDY:  Abdominal Limited Ultrasound; Completed Time: 11:06 AM   INDICATION:  Ascites.  Abdominal distention.  COMPARISON:  US Abdomen 4/18/2025.  ACCESSION NUMBER(S):  [id number]  ORDERING CLINICIAN:  MAHSA CAMPA  TECHNIQUE:    Limited Ultrasound of the Abdomen.  9 DICOM images  received.  COMPARISON:  US Abdomen 4/18/2025.   FINDINGS:        Moderate to large volume ascites within the abdomen.  The largest  pocket is visualized within the left lower quadrant.    Impression: Ascites present in all 4 quadrants.  This is grossly similar to prior  ultrasound of 4/18/2025.  Signed by Chris Morales DO  XR chest 1 view  Narrative: Interpreted By:  Arely Wilkins,   STUDY:  XR CHEST 1 VIEW 5/1/2025 9:53  am      INDICATION:  Signs/Symptoms:pleural effusions      COMPARISON:  02/06/2025      ACCESSION NUMBER(S):  [id number]      ORDERING CLINICIAN:  MAHSA CAMPA      TECHNIQUE:  AP erect view of the chest at bedside      FINDINGS:  There is stable enlargement of the cardiac silhouette with calcified  plaque seen in the aortic arch. Mild pulmonary vascular congestion is  observed with small right pleural effusion present. No focal  infiltrate or airspace consolidation is identified.      Impression: Stable cardiac enlargement with mild pulmonary vascular congestion  and small right pleural effusion.      Signed by: Arely Wilkins 5/1/2025 10:15 AM  Dictation workstation:   [id number]  ECG 12 Lead  Atrial fibrillation  Low voltage QRS  ST & T wave abnormality, consider lateral ischemia  Abnormal ECG  No previous ECGs available      Physical Exam  Vitals reviewed.   Constitutional:       Comments: Elderly   HENT:      Head: Normocephalic and atraumatic.   Eyes:      Extraocular Movements: Extraocular movements intact.      Conjunctiva/sclera: Conjunctivae normal.   Cardiovascular:      Rate and Rhythm: Normal rate and regular rhythm.   Pulmonary:      Effort: Pulmonary effort is normal.      Breath sounds: No wheezing, rhonchi or rales.      Comments: Breath sounds clear, diminished bilaterally  Abdominal:      General: Bowel sounds are normal. There is distension.      Palpations: Abdomen is soft.      Tenderness: There is no abdominal tenderness.   Musculoskeletal:      Right lower leg: Edema present.      Left lower leg: Edema present.   Skin:     General: Skin is warm and dry.   Neurological:      General: No focal deficit present.      Mental Status: He is alert and oriented to person, place, and time.         Relevant Results  Lab Results   Component Value Date    GLUCOSE 99 05/02/2025    CALCIUM 7.7 (L) 05/02/2025     05/02/2025    K 3.2 (L) 05/02/2025    CO2 32 05/02/2025     05/02/2025    BUN 10  05/02/2025    CREATININE 0.83 05/02/2025     Lab Results   Component Value Date    WBC 6.1 05/02/2025    HGB 6.9 (L) 05/02/2025    HCT 22.9 (L) 05/02/2025    MCV 78 (L) 05/02/2025     05/02/2025        US abdomen limited  Result Date: 5/1/2025  Ascites present in all 4 quadrants.  This is grossly similar to prior ultrasound of 4/18/2025. Signed by Chris Morales,     XR chest 1 view  Result Date: 5/1/2025  Stable cardiac enlargement with mild pulmonary vascular congestion and small right pleural effusion.   Signed by: Arely Wilkins 5/1/2025 10:15 AM Dictation workstation:   [id number]    ECG 12 Lead  Result Date: 5/1/2025  Atrial fibrillation Low voltage QRS ST & T wave abnormality, consider lateral ischemia Abnormal ECG No previous ECGs available    US abdomen limited  Result Date: 4/19/2025  Large volume ascites.   MACRO: None   Signed by: Jef Fountain 4/19/2025 8:22 AM Dictation workstation:   [id number]                         Assessment & Plan  CAD (coronary artery disease)    Acute on chronic heart failure with preserved ejection fraction  Right sided heart failure   Tricuspid regurgitation  -2/4/2025 TTE showed EF of 62%, mildly enlarged right ventricle, left atrial severely dilated, right atrium severely dilated, mild to moderate mitral valve regurgitation, mild right ventricular pressure, severe tricuspid regurgitation, moderate aortic valve regurgitation  Was scheduled for cath 5/1/2025 however preop labs showed severe anemia  Notable fluid volume overload on exam  -Chest x-ray showed stable cardiac enlargement with mild pulmonary vascular congestion, small right pleural effusion  - Ultrasound the abdomen showed moderate to large volume ascites  Resume home cardiac medications  Daily weights  Strict I/Os  BNP  Diuresis  N.p.o. after midnight for possible heart cath tomorrow  Consult cardiology, appreciate recommendations     Anemia  Likely secondary to acute blood loss from hematuria versus  iron deficiency anemia  Will check iron panel-reviewed  Status post 1 unit of PRBCs yesterday  Trend H&H 6.4/21.9, 7.5/23.8, 6.9/22.9  Will give another unit of PRBCs today  Transfuse for hemoglobin less than 7  PPI  Hold aspirin  Consult GI, appreciate recommendations  Check occult stool    Hematuria  BPH  Has had multiple cystoscopies in the past  No longer on blood thinners  Consult urology, appreciate recommendations  Resume home medications  Monitor I's and O's  Transfuse for hemoglobin less than 7  Renal ultrasound pending  UA showed large WBC and RBC, no leukocytes or nitrites, culture pending    Cirrhosis  With fluid volume overload  Ultrasound of the abdomen showed moderate to large volume ascites  Given IV albumin per GI last night  Ultrasound-guided paracentesis today  GI consulted, appreciate recommendations    CAD  Discussed with cardiology NP who spoke with Dr. Lee.  No heart cath today, likely outpatient in a few weeks  Holding aspirin for now due to anemia, hematuria  On statin  Cardiology on consult    Hypertension  Resume home antihypertensives  Monitor blood pressure close    Atrial fibrillation  Resume home antiarrhythmics  Monitor on telemetry  No longer on blood thinners due to persistent hematuria, anemia    Hypokalemia  Potassium 2.8, replaced, 3.2 this morning  Potassium replacement ordered, repeat in a.m.  Monitor on telemetry      Disposition: Patient admitted to stepdown unit.   CBC, BMP reviewed, ultrasound of the abdomen, vitals reviewed. Plan as above. Monitor fluid and electrolytes, replace as needed. DVT prophylaxi: SCDs.  CBC and BMP in AM.  Paracentesis today.  Outpatient heart cath.  Anticipate discharge home in 2-3 days.      CODE STATUS discussed with the patient and family, patient is a full code.                 Ya Collier, APRN-CNP

## 2025-05-02 NOTE — ASSESSMENT & PLAN NOTE
Acute on chronic heart failure with preserved ejection fraction  Right sided heart failure   Tricuspid regurgitation  -2/4/2025 TTE showed EF of 62%, mildly enlarged right ventricle, left atrial severely dilated, right atrium severely dilated, mild to moderate mitral valve regurgitation, mild right ventricular pressure, severe tricuspid regurgitation, moderate aortic valve regurgitation  Was scheduled for cath 5/1/2025 however preop labs showed severe anemia  Notable fluid volume overload on exam  -Chest x-ray showed stable cardiac enlargement with mild pulmonary vascular congestion, small right pleural effusion  - Ultrasound the abdomen showed moderate to large volume ascites  Resume home cardiac medications  Daily weights  Strict I/Os  BNP  Diuresis  N.p.o. after midnight for possible heart cath tomorrow  Consult cardiology, appreciate recommendations     Anemia  Likely secondary to acute blood loss from hematuria versus iron deficiency anemia  Will check iron panel-reviewed  Status post 1 unit of PRBCs yesterday  Trend H&H 6.4/21.9, 7.5/23.8, 6.9/22.9  Will give another unit of PRBCs today  Transfuse for hemoglobin less than 7  PPI  Hold aspirin  Consult GI, appreciate recommendations  Check occult stool    Hematuria  BPH  Has had multiple cystoscopies in the past  No longer on blood thinners  Consult urology, appreciate recommendations  Resume home medications  Monitor I's and O's  Transfuse for hemoglobin less than 7  Renal ultrasound pending  UA showed large WBC and RBC, no leukocytes or nitrites, culture pending    Cirrhosis  With fluid volume overload  Ultrasound of the abdomen showed moderate to large volume ascites  Given IV albumin per GI last night  Ultrasound-guided paracentesis today  GI consulted, appreciate recommendations    CAD  Discussed with cardiology NP who spoke with Dr. Lee.  No heart cath today, likely outpatient in a few weeks  Holding aspirin for now due to anemia, hematuria  On  statin  Cardiology on consult    Hypertension  Resume home antihypertensives  Monitor blood pressure close    Atrial fibrillation  Resume home antiarrhythmics  Monitor on telemetry  No longer on blood thinners due to persistent hematuria, anemia    Hypokalemia  Potassium 2.8, replaced, 3.2 this morning  Potassium replacement ordered, repeat in a.m.  Monitor on telemetry      Disposition: Patient admitted to stepdown unit.   CBC, BMP reviewed, ultrasound of the abdomen, vitals reviewed. Plan as above. Monitor fluid and electrolytes, replace as needed. DVT prophylaxi: SCDs.  CBC and BMP in AM.  Paracentesis today.  Outpatient heart cath.  Anticipate discharge home in 2-3 days.      CODE STATUS discussed with the patient and family, patient is a full code.

## 2025-05-02 NOTE — CARE PLAN
Problem: Pain - Adult  Goal: Verbalizes/displays adequate comfort level or baseline comfort level  Outcome: Progressing     Problem: Safety - Adult  Goal: Free from fall injury  Outcome: Progressing     Problem: Discharge Planning  Goal: Discharge to home or other facility with appropriate resources  Outcome: Progressing     Problem: Chronic Conditions and Co-morbidities  Goal: Patient's chronic conditions and co-morbidity symptoms are monitored and maintained or improved  Outcome: Progressing     Problem: Nutrition  Goal: Nutrient intake appropriate for maintaining nutritional needs  Outcome: Progressing     Problem: Pain  Goal: Takes deep breaths with improved pain control throughout the shift  Outcome: Progressing  Goal: Turns in bed with improved pain control throughout the shift  Outcome: Progressing  Goal: Walks with improved pain control throughout the shift  Outcome: Progressing  Goal: Performs ADL's with improved pain control throughout shift  Outcome: Progressing  Goal: Participates in PT with improved pain control throughout the shift  Outcome: Progressing  Goal: Free from opioid side effects throughout the shift  Outcome: Progressing  Goal: Free from acute confusion related to pain meds throughout the shift  Outcome: Progressing     Problem: Fall/Injury  Goal: Not fall by end of shift  Outcome: Progressing  Goal: Be free from injury by end of the shift  Outcome: Progressing  Goal: Verbalize understanding of personal risk factors for fall in the hospital  Outcome: Progressing  Goal: Verbalize understanding of risk factor reduction measures to prevent injury from fall in the home  Outcome: Progressing  Goal: Use assistive devices by end of the shift  Outcome: Progressing  Goal: Pace activities to prevent fatigue by end of the shift  Outcome: Progressing   The patient's goals for the shift include      The clinical goals for the shift include improved blood in urine    Over the shift, the patient did not  make progress toward the following goals. Barriers to progression include has been having blood in urine for multiple nights. Recommendations to address these barriers include monitor blood and labs.

## 2025-05-02 NOTE — PROGRESS NOTES
05/02/25 1432   Discharge Planning   Expected Discharge Disposition Othe     Patient requiring 1 unit PRBC today and also will need a paracentesis.  PT unable to evaluate. Urology consulted.     At this time, plan is home at time of discharge.   Will continue to follow for PT recommendations.

## 2025-05-02 NOTE — CONSULTS
I, Dr. Valencia Rock, saw this patient via Telehealth today.  The details of the visit are listed below.  This visit was completed via video and voice.      History of Present Illness (HPI):  TODAY (05/01/25)  Ming Bunch is a 84 y.o. male presenting virtually for hematuria.  He was admitted for severe anemia.  He was scheduled for an elective cardiac procedure, but this was postponed.  He has a history of BPH w/ hematuria.  He had a cystoscopy w/ clot evacuation done 2 months ago at [facility] by Dr. Rodriguez.  Bph was the only notable pathology discovered.  Over the past month he has had sporadic hematuria.  He stopped taking his anti-coagulation due to this.  He denies any abdominal or flank pain.  He is voiding spontaneously.      Medical History[1]  Surgical History[2]  Family History[3]  Tobacco Use History[4]  Current Medications[5]  Allergies[6]  Past medical, surgical, family and social history in the chart was reviewed and is accurate including any additions to what is in this HPI.    Review of systems (ROS):   Pertinent information as listed in the HPI.     Objective   Visit Vitals  /66 (BP Location: Right arm, Patient Position: Lying)   Pulse 103   Temp 37.2 °C (99 °F) (Temporal)   Resp 18     PHYSICAL EXAM:  Exam limited 2/2 telehealth visit.     Lab Review  CBC, BMP      ASSESSMENT: Gross hematuria.  Problem List Items Addressed This Visit          Cardiac and Vasculature    * (Principal) CAD (coronary artery disease) - Primary    Relevant Medications    dilTIAZem CD (Cardizem CD) 24 hr capsule 300 mg (Start on 5/1/2025  9:00 PM)    metoprolol tartrate (Lopressor) tablet 100 mg    Other Relevant Orders    Procedure aborted - Cath     Other Visit Diagnoses         Acute on chronic combined systolic (congestive) and diastolic (congestive) heart failure        Relevant Medications    dilTIAZem CD (Cardizem CD) 24 hr capsule 300 mg (Start on 5/1/2025  9:00 PM)    metoprolol tartrate (Lopressor) tablet 100  mg    Other Relevant Orders    Procedure aborted - Cath      Other ascites        Relevant Orders    US guided abdominal paracentesis    Protime-INR    Body Fluid Cell Count With Differential    Albumin    Protein, Total           PLAN:  Will order a urinalysis and culture.    Renal/bladder ultrasound ordered.    No need for catheter at this point, as he is voiding without difficulty.     Will continue to follow his progress during hospital stay.         All questions were answered to the patient’s satisfaction.  Patient agrees with the plan and wishes to proceed.  Follow-up for ongoing care of chronic medical conditions.  Follow-up will be scheduled appropriately.     Valencia Rock MD       [1]   Past Medical History:  Diagnosis Date    Atrial fibrillation (Multi)     BPH (benign prostatic hyperplasia)     CAD (coronary artery disease)     CHF (congestive heart failure)     Cirrhosis (Multi)     Hematuria     Hyperlipidemia     Hypertension     Personal history of diseases of the blood and blood-forming organs and certain disorders involving the immune mechanism 06/09/2021    History of thrombocytopenia    Shortness of breath 02/19/2020    Shortness of breath on exertion    Tricuspid regurgitation    [2]   Past Surgical History:  Procedure Laterality Date    APPENDECTOMY  09/11/2013    Appendectomy    COLONOSCOPY  06/25/2013    Complete Colonoscopy    CYSTOSCOPY      OTHER SURGICAL HISTORY  04/22/2015    Removal Of Lesion    PARACENTESIS      PILONIDAL CYST DRAINAGE  09/11/2013    Pilonidal Cyst Resection    THORACENTESIS     [3]   Family History  Problem Relation Name Age of Onset    Heart failure Mother      Atrial fibrillation Sister     [4]   Social History  Tobacco Use   Smoking Status Never   Smokeless Tobacco Never   [5]   Current Facility-Administered Medications   Medication Dose Route Frequency Provider Last Rate Last Admin    [Held by provider] aspirin EC tablet 81 mg  81 mg oral Daily Ya Collier,  MENDEZ-CNP        atorvastatin (Lipitor) tablet 40 mg  40 mg oral Nightly MENDEZ Arias-CNP   40 mg at 05/01/25 2021    dilTIAZem CD (Cardizem CD) 24 hr capsule 300 mg  300 mg oral Nightly MENDEZ Arias-CNP        furosemide (Lasix) injection 20 mg  20 mg intravenous Once MENDEZ Arias-CNP        furosemide (Lasix) tablet 80 mg  80 mg oral BID JOSEPH AriasCNP   80 mg at 05/01/25 1811    metoprolol tartrate (Lopressor) tablet 100 mg  100 mg oral BID MENDEZ Arias-CNP   100 mg at 05/01/25 2020    [START ON 5/2/2025] pantoprazole (ProtoNix) EC tablet 40 mg  40 mg oral Daily before breakfast WENDY Arias        potassium chloride 20 mEq in sterile water for injection 100 mL  20 mEq intravenous q2h JOSEPH AriasCNP 50 mL/hr at 05/01/25 2021 20 mEq at 05/01/25 2021    tamsulosin (Flomax) 24 hr capsule 0.8 mg  0.8 mg oral Nightly MENDEZ Arias-CNP   0.8 mg at 05/01/25 2021   [6] No Known Allergies

## 2025-05-03 LAB
ANION GAP SERPL CALCULATED.3IONS-SCNC: 9 MMOL/L (ref 10–20)
BACTERIA UR CULT: NORMAL
BUN SERPL-MCNC: 11 MG/DL (ref 6–23)
CALCIUM SERPL-MCNC: 7.7 MG/DL (ref 8.6–10.3)
CHLORIDE SERPL-SCNC: 100 MMOL/L (ref 98–107)
CO2 SERPL-SCNC: 32 MMOL/L (ref 21–32)
CREAT SERPL-MCNC: 0.7 MG/DL (ref 0.5–1.3)
EGFRCR SERPLBLD CKD-EPI 2021: >90 ML/MIN/1.73M*2
ERYTHROCYTE [DISTWIDTH] IN BLOOD BY AUTOMATED COUNT: 17.3 % (ref 11.5–14.5)
GLUCOSE SERPL-MCNC: 95 MG/DL (ref 74–99)
HCT VFR BLD AUTO: 24.4 % (ref 41–52)
HCT VFR BLD AUTO: 25.9 % (ref 41–52)
HGB BLD-MCNC: 7.3 G/DL (ref 13.5–17.5)
HGB BLD-MCNC: 8.1 G/DL (ref 13.5–17.5)
MAGNESIUM SERPL-MCNC: 1.6 MG/DL (ref 1.6–2.4)
MCH RBC QN AUTO: 23.8 PG (ref 26–34)
MCHC RBC AUTO-ENTMCNC: 29.9 G/DL (ref 32–36)
MCV RBC AUTO: 80 FL (ref 80–100)
NRBC BLD-RTO: 0 /100 WBCS (ref 0–0)
PLATELET # BLD AUTO: 274 X10*3/UL (ref 150–450)
POTASSIUM SERPL-SCNC: 3.3 MMOL/L (ref 3.5–5.3)
RBC # BLD AUTO: 3.07 X10*6/UL (ref 4.5–5.9)
SODIUM SERPL-SCNC: 138 MMOL/L (ref 136–145)
WBC # BLD AUTO: 5.6 X10*3/UL (ref 4.4–11.3)

## 2025-05-03 PROCEDURE — 36415 COLL VENOUS BLD VENIPUNCTURE: CPT | Performed by: NURSE PRACTITIONER

## 2025-05-03 PROCEDURE — 2500000001 HC RX 250 WO HCPCS SELF ADMINISTERED DRUGS (ALT 637 FOR MEDICARE OP): Performed by: NURSE PRACTITIONER

## 2025-05-03 PROCEDURE — 99232 SBSQ HOSP IP/OBS MODERATE 35: CPT | Performed by: INTERNAL MEDICINE

## 2025-05-03 PROCEDURE — 82105 ALPHA-FETOPROTEIN SERUM: CPT | Mod: WESLAB

## 2025-05-03 PROCEDURE — 86381 MITOCHONDRIAL ANTIBODY EACH: CPT | Mod: WESLAB

## 2025-05-03 PROCEDURE — 2500000004 HC RX 250 GENERAL PHARMACY W/ HCPCS (ALT 636 FOR OP/ED): Mod: JZ | Performed by: NURSE PRACTITIONER

## 2025-05-03 PROCEDURE — 85014 HEMATOCRIT: CPT | Performed by: NURSE PRACTITIONER

## 2025-05-03 PROCEDURE — 86256 FLUORESCENT ANTIBODY TITER: CPT | Mod: WESLAB

## 2025-05-03 PROCEDURE — 80048 BASIC METABOLIC PNL TOTAL CA: CPT | Performed by: NURSE PRACTITIONER

## 2025-05-03 PROCEDURE — 85027 COMPLETE CBC AUTOMATED: CPT | Performed by: NURSE PRACTITIONER

## 2025-05-03 PROCEDURE — 86038 ANTINUCLEAR ANTIBODIES: CPT | Mod: WESLAB

## 2025-05-03 PROCEDURE — 97161 PT EVAL LOW COMPLEX 20 MIN: CPT | Mod: GP

## 2025-05-03 PROCEDURE — 2500000002 HC RX 250 W HCPCS SELF ADMINISTERED DRUGS (ALT 637 FOR MEDICARE OP, ALT 636 FOR OP/ED): Performed by: NURSE PRACTITIONER

## 2025-05-03 PROCEDURE — 85018 HEMOGLOBIN: CPT | Performed by: NURSE PRACTITIONER

## 2025-05-03 PROCEDURE — 86235 NUCLEAR ANTIGEN ANTIBODY: CPT

## 2025-05-03 PROCEDURE — 99233 SBSQ HOSP IP/OBS HIGH 50: CPT | Performed by: NURSE PRACTITIONER

## 2025-05-03 PROCEDURE — 2060000001 HC INTERMEDIATE ICU ROOM DAILY

## 2025-05-03 PROCEDURE — 83735 ASSAY OF MAGNESIUM: CPT | Performed by: NURSE PRACTITIONER

## 2025-05-03 PROCEDURE — 2500000001 HC RX 250 WO HCPCS SELF ADMINISTERED DRUGS (ALT 637 FOR MEDICARE OP): Performed by: STUDENT IN AN ORGANIZED HEALTH CARE EDUCATION/TRAINING PROGRAM

## 2025-05-03 RX ORDER — MAGNESIUM SULFATE HEPTAHYDRATE 40 MG/ML
2 INJECTION, SOLUTION INTRAVENOUS ONCE
Status: COMPLETED | OUTPATIENT
Start: 2025-05-03 | End: 2025-05-03

## 2025-05-03 RX ORDER — POTASSIUM CHLORIDE 20 MEQ/1
20 TABLET, EXTENDED RELEASE ORAL ONCE
Status: COMPLETED | OUTPATIENT
Start: 2025-05-03 | End: 2025-05-03

## 2025-05-03 RX ADMIN — PANTOPRAZOLE SODIUM 40 MG: 40 TABLET, DELAYED RELEASE ORAL at 06:14

## 2025-05-03 RX ADMIN — DILTIAZEM HYDROCHLORIDE 300 MG: 120 CAPSULE, EXTENDED RELEASE ORAL at 21:34

## 2025-05-03 RX ADMIN — FUROSEMIDE 80 MG: 20 TABLET ORAL at 08:46

## 2025-05-03 RX ADMIN — Medication 5 MG: at 21:40

## 2025-05-03 RX ADMIN — POTASSIUM CHLORIDE 20 MEQ: 1500 TABLET, EXTENDED RELEASE ORAL at 08:46

## 2025-05-03 RX ADMIN — ATORVASTATIN CALCIUM 40 MG: 40 TABLET, FILM COATED ORAL at 21:34

## 2025-05-03 RX ADMIN — MAGNESIUM SULFATE IN WATER FOR 2 G: 40 INJECTION INTRAVENOUS at 10:59

## 2025-05-03 RX ADMIN — METOPROLOL TARTRATE 100 MG: 25 TABLET, FILM COATED ORAL at 08:46

## 2025-05-03 RX ADMIN — FUROSEMIDE 80 MG: 20 TABLET ORAL at 18:10

## 2025-05-03 RX ADMIN — METOPROLOL TARTRATE 100 MG: 25 TABLET, FILM COATED ORAL at 21:34

## 2025-05-03 RX ADMIN — TAMSULOSIN HYDROCHLORIDE 0.8 MG: 0.4 CAPSULE ORAL at 21:34

## 2025-05-03 ASSESSMENT — PAIN - FUNCTIONAL ASSESSMENT: PAIN_FUNCTIONAL_ASSESSMENT: 0-10

## 2025-05-03 ASSESSMENT — COGNITIVE AND FUNCTIONAL STATUS - GENERAL
MOVING TO AND FROM BED TO CHAIR: A LITTLE
TURNING FROM BACK TO SIDE WHILE IN FLAT BAD: A LITTLE
MOBILITY SCORE: 24
DAILY ACTIVITIY SCORE: 22
MOBILITY SCORE: 19
DAILY ACTIVITIY SCORE: 22
DRESSING REGULAR UPPER BODY CLOTHING: A LITTLE
CLIMB 3 TO 5 STEPS WITH RAILING: A LITTLE
DRESSING REGULAR UPPER BODY CLOTHING: A LITTLE
MOVING TO AND FROM BED TO CHAIR: A LITTLE
WALKING IN HOSPITAL ROOM: A LITTLE
DRESSING REGULAR LOWER BODY CLOTHING: A LITTLE
TURNING FROM BACK TO SIDE WHILE IN FLAT BAD: A LITTLE
STANDING UP FROM CHAIR USING ARMS: A LITTLE
STANDING UP FROM CHAIR USING ARMS: A LITTLE
MOBILITY SCORE: 18
CLIMB 3 TO 5 STEPS WITH RAILING: A LOT
WALKING IN HOSPITAL ROOM: A LITTLE
DRESSING REGULAR LOWER BODY CLOTHING: A LITTLE

## 2025-05-03 ASSESSMENT — PAIN SCALES - GENERAL: PAINLEVEL_OUTOF10: 0 - NO PAIN

## 2025-05-03 ASSESSMENT — ACTIVITIES OF DAILY LIVING (ADL): ADL_ASSISTANCE: INDEPENDENT

## 2025-05-03 NOTE — CARE PLAN
The patient's goals for the shift include      The clinical goals for the shift include no falls    Over the shift, the patient did not make progress toward the following goals. Barriers to progression include age/co-morbidities. Recommendations to address these barriers include pt/ot/assist with ambulation as necessary  Problem: Pain - Adult  Goal: Verbalizes/displays adequate comfort level or baseline comfort level  Outcome: Progressing     Problem: Safety - Adult  Goal: Free from fall injury  Outcome: Progressing     Problem: Discharge Planning  Goal: Discharge to home or other facility with appropriate resources  Outcome: Progressing     Problem: Chronic Conditions and Co-morbidities  Goal: Patient's chronic conditions and co-morbidity symptoms are monitored and maintained or improved  Outcome: Progressing     Problem: Nutrition  Goal: Nutrient intake appropriate for maintaining nutritional needs  Outcome: Progressing     Problem: Pain  Goal: Takes deep breaths with improved pain control throughout the shift  Outcome: Progressing  Goal: Turns in bed with improved pain control throughout the shift  Outcome: Progressing  Goal: Walks with improved pain control throughout the shift  Outcome: Progressing  Goal: Performs ADL's with improved pain control throughout shift  Outcome: Progressing  Goal: Participates in PT with improved pain control throughout the shift  Outcome: Progressing  Goal: Free from opioid side effects throughout the shift  Outcome: Progressing  Goal: Free from acute confusion related to pain meds throughout the shift  Outcome: Progressing     Problem: Fall/Injury  Goal: Not fall by end of shift  Outcome: Progressing  Goal: Be free from injury by end of the shift  Outcome: Progressing  Goal: Verbalize understanding of personal risk factors for fall in the hospital  Outcome: Progressing  Goal: Verbalize understanding of risk factor reduction measures to prevent injury from fall in the home  Outcome:  Progressing  Goal: Use assistive devices by end of the shift  Outcome: Progressing  Goal: Pace activities to prevent fatigue by end of the shift  Outcome: Progressing   .

## 2025-05-03 NOTE — PROGRESS NOTES
"Ming Bunch is a 84 y.o. male on day 2 of admission presenting with CAD (coronary artery disease).    Subjective   Patient states he feels well with no chest pain or shortness of breath.       Objective     Physical Exam  Eyes:      Conjunctiva/sclera: Conjunctivae normal.   Cardiovascular:      Rate and Rhythm: Normal rate. Rhythm irregularly irregular.      Heart sounds:      No gallop.   Pulmonary:      Breath sounds: Normal breath sounds. No wheezing, rhonchi or rales.   Abdominal:      Palpations: Abdomen is soft.   Neurological:      General: No focal deficit present.      Mental Status: He is alert.       Constitutional:       Appearance: Not in distress.   Eyes:      Conjunctiva/sclera: Conjunctivae normal.   Neck:      Vascular: JVD normal.   Pulmonary:      Breath sounds: Normal breath sounds. No wheezing. No rhonchi. No rales.   Cardiovascular:      Normal rate. Irregularly irregular rhythm.      Murmurs: There is no murmur.      No gallop.  No click. No rub.   Abdominal:      Palpations: Abdomen is soft.   Neurological:      General: No focal deficit present.      Mental Status: Alert.        Last Recorded Vitals  Blood pressure 107/62, pulse 103, temperature 36.9 °C (98.4 °F), temperature source Temporal, resp. rate 18, height 1.753 m (5' 9.02\"), weight 81.6 kg (179 lb 14.3 oz), SpO2 94%.  Intake/Output last 3 Shifts:  I/O last 3 completed shifts:  In: 1570 (19.2 mL/kg) [P.O.:1070; Blood:400; IV Piggyback:100]  Out: 2050 (25.1 mL/kg) [Urine:2050 (0.7 mL/kg/hr)]  Weight: 81.6 kg     Relevant Results                   Results for orders placed or performed during the hospital encounter of 05/01/25 (from the past 24 hours)   Hemoglobin and Hematocrit, Blood   Result Value Ref Range    Hemoglobin 8.2 (L) 13.5 - 17.5 g/dL    Hematocrit 26.0 (L) 41.0 - 52.0 %   Sterile Cup   Result Value Ref Range    Extra Tube Hold for add-ons.    Sterile Fluid Culture/Smear    Specimen: Ascites Fluid   Result Value Ref " Range    Gram Stain (4+) Abundant Polymorphonuclear leukocytes     Gram Stain No organisms seen    Body Fluid Cell Count   Result Value Ref Range    Color, Fluid Pink (A) Colorless, Straw, Yellow    Clarity, Fluid Cloudy (A) Clear    WBC, Fluid 2,125 See Comment /uL    RBC, Fluid 39,000 see comment /uL   Body Fluid Differential   Result Value Ref Range    Neutrophils %, Manual, Fluid 63 see comment %    Lymphocytes %, Manual, Fluid 12 see comment %    Mono/Macrophages %, Manual, Fluid 25 see comment %    Eosinophils %, Manual, Fluid 0 see comment %    Basophils %, Manual, Fluid 0 not established %    Immature Granulocytes %, Manual, Fluid 0 not established %    Blasts %, Manual, Fluid 0 not established %    Unclassified Cells %, Manual, Fluid 0 not established %    Plasma Cells %, Manual, Fluid 0 not established %    Total Cells Counted, Fluid 100    Albumin, Body Fluid   Result Value Ref Range    Albumin, Fluid 2.0 Not established g/dL   CBC   Result Value Ref Range    WBC 5.6 4.4 - 11.3 x10*3/uL    nRBC 0.0 0.0 - 0.0 /100 WBCs    RBC 3.07 (L) 4.50 - 5.90 x10*6/uL    Hemoglobin 7.3 (L) 13.5 - 17.5 g/dL    Hematocrit 24.4 (L) 41.0 - 52.0 %    MCV 80 80 - 100 fL    MCH 23.8 (L) 26.0 - 34.0 pg    MCHC 29.9 (L) 32.0 - 36.0 g/dL    RDW 17.3 (H) 11.5 - 14.5 %    Platelets 274 150 - 450 x10*3/uL   Basic Metabolic Panel   Result Value Ref Range    Glucose 95 74 - 99 mg/dL    Sodium 138 136 - 145 mmol/L    Potassium 3.3 (L) 3.5 - 5.3 mmol/L    Chloride 100 98 - 107 mmol/L    Bicarbonate 32 21 - 32 mmol/L    Anion Gap 9 (L) 10 - 20 mmol/L    Urea Nitrogen 11 6 - 23 mg/dL    Creatinine 0.70 0.50 - 1.30 mg/dL    eGFR >90 >60 mL/min/1.73m*2    Calcium 7.7 (L) 8.6 - 10.3 mg/dL   Magnesium   Result Value Ref Range    Magnesium 1.60 1.60 - 2.40 mg/dL               Assessment & Plan  CAD (coronary artery disease)    Anemia/hematuria  Chronic atrial fibrillation  Right-sided heart failure  History of nonobstructive coronary artery  disease  Cirrhosis  Hyperlipidemia  Tricuspid valve regurgitation     5/2: Patient found to have significant anemia and admitted for further treatment and investigation.  Reviewed Dr. Lee's notes.  Patient with significant tricuspid valve regurgitation and right-sided heart failure.  Underwent paracentesis procedure.  He is going to delay left heart catheterization until anemia stabilized, likely as an outpatient in coming weeks.  Heart rate adequately controlled on the combination of metoprolol and diltiazem.  No anticoagulation secondary to the anemia and hematuria.  But appears to be stabilized from a cardiac standpoint.  Will follow.     5/3: Cardiac wise stable.  Heart rates for the most adequately controlled on the combination of metoprolol and diltiazem.  Again hemoglobin remains depressed and on no anticoagulation therapy.  Serum potassium mildly depressed on morning labs and patient already ordered supplementation therapy.  He is now on oral furosemide at 80 mg twice daily attempt to reduce vascular congestion and edema.  Legs only mild edema on today's exam.  Overall appears to be stable from a cardiac standpoint and we would okay discharge home at any time.  He will follow-up with Dr. Lee as an outpatient we discussed diagnostic cardiac catheterization procedure.      I spent 15 minutes in the professional and overall care of this patient.      Pratik Blankenship,

## 2025-05-03 NOTE — PROGRESS NOTES
Occupational Therapy                 Occupational Therapy Screen    Patient Name: Ming Bunch  MRN: [id number]  Department: [facility]  Room: 10/10A  Today's Date: 5/3/2025     Discipline: Occupational Therapy          OT orders received, chart reviewed, RN cleared pt for therapy. Patient seen lying supine in bed, with Son in law at bedside. Patient reports no concerns for OT- states he's independent with ADLs and can get in and out of bed/in out of bathroom IND. Per PT, patient is IND with transfers and bed mobility. Patient declines acute OT services. No OT evaluation completed, OT screen only.

## 2025-05-03 NOTE — PROGRESS NOTES
"Ming Bunch is a 84 y.o. male on day 2 of admission presenting with CAD (coronary artery disease).    Subjective   He feels well.  He is not having pain.  He continues to have gross hematuria.  He is voiding adequately.  He has been transfused.  His hematocrit is still trending downward.       Objective     Physical Exam  Awake, alert, oriented  Normal respiratory pattern  Abdomen: Soft and nontender  Extremities: Moves all 4    Last Recorded Vitals  Blood pressure 107/62, pulse 103, temperature 36.9 °C (98.4 °F), temperature source Temporal, resp. rate 18, height 1.753 m (5' 9.02\"), weight 81.6 kg (179 lb 14.3 oz), SpO2 94%.  Intake/Output last 3 Shifts:  I/O last 3 completed shifts:  In: 1570 (19.2 mL/kg) [P.O.:1070; Blood:400; IV Piggyback:100]  Out: 2050 (25.1 mL/kg) [Urine:2050 (0.7 mL/kg/hr)]  Weight: 81.6 kg     Relevant Results  Scheduled medications  Scheduled Medications[1]  Continuous medications  Continuous Medications[2]  PRN medications  PRN Medications[3]    Results for orders placed or performed during the hospital encounter of 05/01/25 (from the past 24 hours)   Hemoglobin and Hematocrit, Blood   Result Value Ref Range    Hemoglobin 8.2 (L) 13.5 - 17.5 g/dL    Hematocrit 26.0 (L) 41.0 - 52.0 %   Sterile Cup   Result Value Ref Range    Extra Tube Hold for add-ons.    Sterile Fluid Culture/Smear    Specimen: Ascites Fluid   Result Value Ref Range    Sterile Fluid Culture/Smear No growth to date     Gram Stain (4+) Abundant Polymorphonuclear leukocytes     Gram Stain No organisms seen    Body Fluid Cell Count   Result Value Ref Range    Color, Fluid Pink (A) Colorless, Straw, Yellow    Clarity, Fluid Cloudy (A) Clear    WBC, Fluid 2,125 See Comment /uL    RBC, Fluid 39,000 see comment /uL   Body Fluid Differential   Result Value Ref Range    Neutrophils %, Manual, Fluid 63 see comment %    Lymphocytes %, Manual, Fluid 12 see comment %    Mono/Macrophages %, Manual, Fluid 25 see comment %    " Eosinophils %, Manual, Fluid 0 see comment %    Basophils %, Manual, Fluid 0 not established %    Immature Granulocytes %, Manual, Fluid 0 not established %    Blasts %, Manual, Fluid 0 not established %    Unclassified Cells %, Manual, Fluid 0 not established %    Plasma Cells %, Manual, Fluid 0 not established %    Total Cells Counted, Fluid 100    Albumin, Body Fluid   Result Value Ref Range    Albumin, Fluid 2.0 Not established g/dL   CBC   Result Value Ref Range    WBC 5.6 4.4 - 11.3 x10*3/uL    nRBC 0.0 0.0 - 0.0 /100 WBCs    RBC 3.07 (L) 4.50 - 5.90 x10*6/uL    Hemoglobin 7.3 (L) 13.5 - 17.5 g/dL    Hematocrit 24.4 (L) 41.0 - 52.0 %    MCV 80 80 - 100 fL    MCH 23.8 (L) 26.0 - 34.0 pg    MCHC 29.9 (L) 32.0 - 36.0 g/dL    RDW 17.3 (H) 11.5 - 14.5 %    Platelets 274 150 - 450 x10*3/uL   Basic Metabolic Panel   Result Value Ref Range    Glucose 95 74 - 99 mg/dL    Sodium 138 136 - 145 mmol/L    Potassium 3.3 (L) 3.5 - 5.3 mmol/L    Chloride 100 98 - 107 mmol/L    Bicarbonate 32 21 - 32 mmol/L    Anion Gap 9 (L) 10 - 20 mmol/L    Urea Nitrogen 11 6 - 23 mg/dL    Creatinine 0.70 0.50 - 1.30 mg/dL    eGFR >90 >60 mL/min/1.73m*2    Calcium 7.7 (L) 8.6 - 10.3 mg/dL   Magnesium   Result Value Ref Range    Magnesium 1.60 1.60 - 2.40 mg/dL     *Note: Due to a large number of results and/or encounters for the requested time period, some results have not been displayed. A complete set of results can be found in Results Review.       Imaging  US guided abdominal paracentesis  Result Date: 5/2/2025  Ultrasound-guided paracentesis.   Signed by: Gibran Nichols 5/2/2025 3:34 PM Dictation workstation:   [id number]    US renal complete  Result Date: 5/2/2025  1. Fluid level demonstrated in the bladder likely in relation to patient's known history of hematuria   2. No hydronephrosis..   MACRO: None.   Signed by: Maco Mercer 5/2/2025 3:18 PM Dictation workstation:   [id number]    US abdomen limited  Result Date:  5/1/2025  Ascites present in all 4 quadrants.  This is grossly similar to prior ultrasound of 4/18/2025. Signed by Chris Morales,     XR chest 1 view  Result Date: 5/1/2025  Stable cardiac enlargement with mild pulmonary vascular congestion and small right pleural effusion.   Signed by: Arely Wilkins 5/1/2025 10:15 AM Dictation workstation:   [id number]      Cardiology, Vascular, and Other Imaging  Procedure aborted - Cath  Result Date: 5/2/2025  Aborted invasive cardiology procedure -- see Procedure Log link for details.    ECG 12 Lead  Result Date: 5/1/2025  Atrial fibrillation Low voltage QRS ST & T wave abnormality, consider lateral ischemia Abnormal ECG No previous ECGs available                               Assessment & Plan  CAD (coronary artery disease)    Hematuria: He continues to have gross hematuria.  There is no evidence of infection.  He has been evaluated by  urology and it appears that the bleeding is from his prostate.  The fluid in his bladder likely is blood, however he does not need a catheter as he is voiding adequately.  Will continue to transfuse as needed and if the hematuria does not floyd he will likely need something done on his prostate to stop the recurrent bleeding.        Dioni Rosenbaum MD           [1] [Held by provider] aspirin, 81 mg, oral, Daily  atorvastatin, 40 mg, oral, Nightly  dilTIAZem CD, 300 mg, oral, Nightly  furosemide, 20 mg, intravenous, Once  furosemide, 80 mg, oral, BID  magnesium sulfate, 2 g, intravenous, Once  metoprolol tartrate, 100 mg, oral, BID  pantoprazole, 40 mg, oral, Daily before breakfast  tamsulosin, 0.8 mg, oral, Nightly    [2]    [3] PRN medications: melatonin

## 2025-05-03 NOTE — PROGRESS NOTES
Physical Therapy    Physical Therapy Evaluation    Patient Name: Ming Bunch  MRN: [id number]  Department: [facility]  Room: 10/10  Today's Date: 5/3/2025   Time Calculation  Start Time: 0830  Stop Time: 0840  Time Calculation (min): 10 min    Assessment/Plan   PT Assessment  PT Assessment Results: Decreased strength, Decreased endurance, Decreased mobility  Rehab Prognosis: Good  Barriers to Discharge Home: No anticipated barriers  Evaluation/Treatment Tolerance: Treatment limited secondary to medical complications (Comment) (elevated HR with activity)  Medical Staff Made Aware: Yes  Strengths: Ability to acquire knowledge, Housing layout, Premorbid level of function, Support of Caregivers  Barriers to Participation: Comorbidities  End of Session Communication: Bedside nurse  Assessment Comment: Intensity of session is intentially limited d/t medi chiquita concerns. Pt's resting HR on tele 105-113 bpm increases to 130's with short distance ambulation. Patient presents with BLE strength impairments, BLE pitting edema, and endurance deficits and would benefit from ongoing skilled PT in acute setting to prevent functional decline. Rec low intensity rehab upon discharge.  End of Session Patient Position: Up in chair, Alarm off, not on at start of session  IP OR SWING BED PT PLAN  Inpatient or Swing Bed: Inpatient  PT Plan  Treatment/Interventions: Gait training, Stair training, Neuromuscular re-education, Strengthening, Endurance training, Therapeutic exercise, Therapeutic activity, Home exercise program  PT Plan: Ongoing PT  PT Frequency: 3 times per week  PT Discharge Recommendations: Low intensity level of continued care  PT Recommended Transfer Status: Independent  PT - OK to Discharge: Yes    Subjective   General Visit Information:  General  Reason for Referral: Ming is an 83 y/o male admitted to hospital for severe hematuria. He had gone for a scheduled outpatient heart cath when he was found to have severe anemia and  then was transferred to hospital.  On 5/2 he recieved a blood transfusion and underwent a paracentesis. He is referred to PT services d/t impaired mobility.  Referred By: MENDEZ Arias-CNP  Past Medical History Relevant to Rehab: CAD,A-fib, BPH,HLD,AAA,  Family/Caregiver Present: No  Prior to Session Communication: Bedside nurse  Patient Position Received: Up in chair, Alarm off, not on at start of session  Preferred Learning Style: auditory  General Comment: RN clears patient for PT. Patient is greeted sitting up in bedside chair and is agreeable to PT eval/treat.  Home Living:  Home Living  Type of Home: Condo  Lives With: Spouse  Home Adaptive Equipment: None  Home Layout: One level  Home Access: Stairs to enter without rails  Entrance Stairs-Rails: None  Entrance Stairs-Number of Steps: 1  Prior Level of Function:  Prior Function Per Pt/Caregiver Report  Level of [place]: Independent with ADLs and functional transfers, Independent with homemaking with ambulation  ADL Assistance: Independent  Homemaking Assistance: Independent (shares responsibilities with spouse)  Ambulatory Assistance: Independent (without a device)  Prior Function Comments: +driving, enjoys golfing  Precautions:  Precautions  Medical Precautions: No known precautions/limitation      Date/Time Vitals Session Patient Position Pulse Resp SpO2 BP MAP (mmHg)    05/03/25 0830 Pre PT  --  105  --  --  --  --     05/03/25 0833 --  --  103  18  94 %  107/62  73                 Objective   Pain:  Pain Assessment  Pain Assessment: 0-10  0-10 (Numeric) Pain Score: 0 - No pain  Cognition:  Cognition  Overall Cognitive Status: Within Functional Limits (A & O x 4)    General Assessments:    Activity Tolerance  Endurance: Tolerates less than 10 min exercise with changes in vital signs, Other (Comment) (HR increases to 137 bpm on telemetry with 20' ambulation. Returns to 110's upon sitting.)  Activity Tolerance Comments: Pt reports dyspnea with  community distances. Trial community distance is deferred today d/t medical concerns with HR.    Sensation  Sensation Comment: Pt denies parasthesia, 2+ pitting edema BLE's    Strength  Strength Comments: BLE grossly 4/5  Strength  Strength Comments: BLE grossly 4/5      Coordination  Movements are Fluid and Coordinated: Yes  Coordination Comment: assessed via function    Postural Control  Postural Control: Within Functional Limits    Static Standing Balance  Static Standing-Balance Support: No upper extremity supported  Static Standing-Level of Assistance: Independent  Functional Assessments:  Transfers  Transfer: Yes  Transfer 1  Transfer From 1: Chair with arms to  Transfer to 1: Sit, Stand  Technique 1: Sit to stand, Stand to sit  Transfer Level of Assistance 1: Modified independent  Trials/Comments 1: No device    Ambulation/Gait Training  Ambulation/Gait Training Performed: Yes  Ambulation/Gait Training 1  Surface 1: Level tile  Device 1: No device  Assistance 1: Distant supervision  Comments/Distance (ft) 1: 20' without a device, PT provides supervision for safety and monitoring of vitals. Distance is intentially limited d/t medical concerns of HR. HR increases to 137 bpm on tele with ambulation and returns to 110's quickly upon sitting.    Stairs  Stairs: No  Extremity/Trunk Assessments:  RLE   RLE : Within Functional Limits (grossly 4/5)  LLE   LLE : Within Functional Limits (grossly 4/5)  Outcome Measures:  [facility] Basic Mobility  Turning from your back to your side while in a flat bed without using bedrails: None  Moving from lying on your back to sitting on the side of a flat bed without using bedrails: None  Moving to and from bed to chair (including a wheelchair): None  Standing up from a chair using your arms (e.g. wheelchair or bedside chair): None  To walk in hospital room: None  Climbing 3-5 steps with railing: None  Basic Mobility - Total Score: 24    Encounter Problems       Encounter Problems  (Active)       Mobility       LTG - Patient will ambulate 200' without a device independently  (Progressing)       Start:  05/03/25    Expected End:  05/17/25            STG - Patient will ascend and descend four to six stairs with handrail and mod I (Progressing)       Start:  05/03/25    Expected End:  05/17/25               Pain - Adult              Education Documentation  No documentation found.  Education Comments  No comments found.

## 2025-05-03 NOTE — PROGRESS NOTES
Ming Bunch is a 84 y.o. male on day 2 of admission presenting with CAD (coronary artery disease).      Subjective   Patient seen and examined.  Awake/alert/oriented.  Resting in bed.  Denies chest pain, shortness of breath, fevers, chills, nausea, or vomiting. No abdominal discomfort. Denies lightheadedness or dizziness.  Still having hematuria.  Had paracentesis yesterday with 4L removed. H/H decreased from yesterday, will repeat this afternoon.      Objective     Last Recorded Vitals  /62 (BP Location: Left arm, Patient Position: Sitting)   Pulse 103   Temp 36.9 °C (98.4 °F) (Temporal)   Resp 18   Wt 81.6 kg (179 lb 14.3 oz)   SpO2 94%   Intake/Output last 3 Shifts:    Intake/Output Summary (Last 24 hours) at 5/3/2025 0953  Last data filed at 5/3/2025 0600  Gross per 24 hour   Intake 1270 ml   Output 1300 ml   Net -30 ml       Admission Weight  Weight: 89.8 kg (197 lb 15.6 oz) (05/01/25 0721)    Daily Weight  05/03/25 : 81.6 kg (179 lb 14.3 oz)    Image Results  US guided abdominal paracentesis  Narrative: Interpreted By:  Gibran Nichols,   STUDY:  US GUIDED ABDOMINAL PARACENTESIS; 5/2/2025 3:07 pm      INDICATION:  Ascites.      COMPARISON:  None.      ACCESSION NUMBER(S):  [id number]      ORDERING CLINICIAN:  MAHSA CAMPA      TECHNIQUE:  Ultrasound-guided paracentesis      FINDINGS:  Informed consent obtained. Patient positioned supine. Right lower  quadrant prepped, draped and anesthetized. Under ultrasound guidance,  8 Micronesian centesis sheath needle inserted into peritoneal cavity. 4  Lof dark amberfluid aspiratedwith sample sent for analysis. Patient  tolerated procedure well      Impression: Ultrasound-guided paracentesis.      Signed by: Gibran Nichols 5/2/2025 3:34 PM  Dictation workstation:   [id number]  US renal complete  Narrative: Interpreted By:  Maco Mercer,   STUDY:  US RENAL COMPLETE; 5/2/2025 3:06 pm      INDICATION:  Signs/Symptoms:hematuria.       COMPARISON:  05/01/2025      ACCESSION NUMBER(S):  [id number]      ORDERING CLINICIAN:  TONYA MURCIA      TECHNIQUE:  Grayscale and color Doppler imaging of the kidneys.      FINDINGS:  The right kidney measures 11.9 cm.. There is a normal cortical  thickness. No hydronephrosis or nephrolithiasis. The left kidney  measures 13.2 cm. There is a normal cortical thickness. There is no  hydronephrosis or nephrolithiasis. There is a 4.4 cm cyst in the  midpole left kidney medially.          Visualized portions of the bladder demonstrates mild thick-walled  appearance with fluid level in the bladder with findings suggesting  patient has known history of hematuria. Bladder demonstrates  trabeculated configuration likely in relation to incomplete  distention. Bilateral ureteral jets noted.      Impression: 1. Fluid level demonstrated in the bladder likely in relation to  patient's known history of hematuria      2. No hydronephrosis..      MACRO:  None.      Signed by: Maco Mercer 5/2/2025 3:18 PM  Dictation workstation:   [id number]  Procedure aborted - Cath  Aborted invasive cardiology procedure -- see Procedure Log link for   details.      Physical Exam  Vitals reviewed.   Constitutional:       Comments: Elderly   HENT:      Head: Normocephalic and atraumatic.   Eyes:      Extraocular Movements: Extraocular movements intact.      Conjunctiva/sclera: Conjunctivae normal.   Cardiovascular:      Rate and Rhythm: Normal rate and regular rhythm.   Pulmonary:      Effort: Pulmonary effort is normal.      Breath sounds: No wheezing, rhonchi or rales.      Comments: Breath sounds clear, diminished bilaterally  Abdominal:      General: Bowel sounds are normal.      Palpations: Abdomen is soft.      Tenderness: There is no abdominal tenderness.   Musculoskeletal:      Right lower leg: Edema present.      Left lower leg: Edema present.   Skin:     General: Skin is warm and dry.   Neurological:      General: No focal  deficit present.      Mental Status: He is alert and oriented to person, place, and time.         Relevant Results  Lab Results   Component Value Date    GLUCOSE 95 05/03/2025    CALCIUM 7.7 (L) 05/03/2025     05/03/2025    K 3.3 (L) 05/03/2025    CO2 32 05/03/2025     05/03/2025    BUN 11 05/03/2025    CREATININE 0.70 05/03/2025     Lab Results   Component Value Date    WBC 5.6 05/03/2025    HGB 7.3 (L) 05/03/2025    HCT 24.4 (L) 05/03/2025    MCV 80 05/03/2025     05/03/2025        US abdomen limited  Result Date: 5/1/2025  Ascites present in all 4 quadrants.  This is grossly similar to prior ultrasound of 4/18/2025. Signed by Chris Morales,     XR chest 1 view  Result Date: 5/1/2025  Stable cardiac enlargement with mild pulmonary vascular congestion and small right pleural effusion.   Signed by: Arely Wilkins 5/1/2025 10:15 AM Dictation workstation:   [id number]    ECG 12 Lead  Result Date: 5/1/2025  Atrial fibrillation Low voltage QRS ST & T wave abnormality, consider lateral ischemia Abnormal ECG No previous ECGs available    US abdomen limited  Result Date: 4/19/2025  Large volume ascites.   MACRO: None   Signed by: Jef Fountain 4/19/2025 8:22 AM Dictation workstation:   [id number]                         Assessment & Plan  CAD (coronary artery disease)    Acute on chronic heart failure with preserved ejection fraction  Right sided heart failure   Tricuspid regurgitation  -2/4/2025 TTE showed EF of 62%, mildly enlarged right ventricle, left atrial severely dilated, right atrium severely dilated, mild to moderate mitral valve regurgitation, mild right ventricular pressure, severe tricuspid regurgitation, moderate aortic valve regurgitation  Was scheduled for cath 5/1/2025 however preop labs showed severe anemia  Notable fluid volume overload on exam  -Chest x-ray showed stable cardiac enlargement with mild pulmonary vascular congestion, small right pleural effusion  - Ultrasound the  abdomen showed moderate to large volume ascites-s/p para with 4L out  Resume home cardiac medications  Daily weights  Strict I/Os  BNP  Diuresis  Consult cardiology, appreciate recommendations   Plan for cath outpatient    Anemia  Likely secondary to acute blood loss from hematuria versus iron deficiency anemia  Will check iron panel-reviewed  Status post 2 unit of PRBCs   Trend H&H 6.4/21.9, 7.5/23.8, 6.9/22.9, 8.2/26, 7.3/24.4  Repeat H/H this afternoon  Transfuse for hemoglobin less than 7  PPI  Hold aspirin  Consult GI, appreciate recommendations  Check occult stool    Hematuria  BPH  Has had multiple cystoscopies in the past  No longer on blood thinners  Consult urology, appreciate recommendations  Resume home medications  Monitor I's and O's  Transfuse for hemoglobin less than 7  Renal ultrasound results reviewed, fluid level in he bladder likely in relation to hematuria, no hydronephrosis  UA showed large WBC and RBC, no leukocytes or nitrites, culture pending    Cirrhosis  With fluid volume overload  Ultrasound of the abdomen showed moderate to large volume ascites  Given IV albumin per GI last night  Ultrasound-guided paracentesis with 4L removed  GI consulted, appreciate recommendations    CAD  Discussed with cardiology NP who spoke with Dr. Lee.  No heart cath today, likely outpatient in a few weeks  Holding aspirin for now due to anemia, hematuria  On statin  Cardiology on consult    Hypertension  Resume home antihypertensives  Monitor blood pressure close    Atrial fibrillation  Resume home antiarrhythmics  Monitor on telemetry  No longer on blood thinners due to persistent hematuria, anemia    Hypokalemia  Potassium 2.8, replaced, 3.2, 3.3 this morning  Potassium replacement ordered, repeat in a.m.  Monitor on telemetry    Hypomagnesemia  Mag 1.6, replaced, repeat in AM      Disposition: Patient admitted to stepdown unit.   CBC, BMP, renal US, mag reviewed, vitals reviewed. Plan as above. Monitor  fluid and electrolytes, replace as needed. DVT prophylaxi: SCDs.  CBC, mag, and BMP in AM.  Discussed the plan with GI and urology.   Outpatient heart cath.  Anticipate discharge home in 2-3 days.      CODE STATUS discussed with the patient and family, patient is a full code.                 Ya Collier, MENDEZ-CNP

## 2025-05-03 NOTE — CONSULTS
Reason For Consult  Anemia, cirrhosis    History Of Present Illness  Ming Bunch is a 84 y.o. male presenting with chest pain and was supposed to go for cardiac cath.  Prior to procedure he was found to be profoundly anemic with a hemoglobin of 6.4, his procedure was postponed.  He reports that he has been having significant hematuria for the past several months and has been evaluated at  with multiple cystoscopies.  He does have a history of A-fib and was previously on Coumadin and transition to Eliquis, he has not been on any anticoagulation for almost a month.  He currently denies any overt bleeding or melena.  Denies any abdominal pain, nausea/vomiting, hematemesis, or weight loss.  He had a CT of his abdomen/pelvis which had an incidental finding of liver cirrhosis, however looking back it looks like he had this on imaging in 2022/thousand 23.  He does report history of alcohol use and drinks 8-10 mixed drinks per week    Labs show H/H of 6.4/21.9, MCV 79, platelets 298, iron of 44, TSAT 13%.  No LFTs available for review, previously normal last month.  INR of 1.2    Reports colonoscopy over 10 years ago he is unsure where he had done  Has never had an EGD     Past Medical History  He has a past medical history of Atrial fibrillation (Multi), BPH (benign prostatic hyperplasia), CAD (coronary artery disease), CHF (congestive heart failure), Cirrhosis (Multi), Hematuria, Hyperlipidemia, Hypertension, Personal history of diseases of the blood and blood-forming organs and certain disorders involving the immune mechanism (06/09/2021), Shortness of breath (02/19/2020), and Tricuspid regurgitation.    Surgical History  He has a past surgical history that includes Colonoscopy (06/25/2013); Other surgical history (04/22/2015); Pilonidal cyst drainage (09/11/2013); Appendectomy (09/11/2013); Cystoscopy; Thoracentesis; and Paracentesis.     Social History  He reports that he has never smoked. He has never used  "smokeless tobacco. He reports current alcohol use of about 10.0 standard drinks of alcohol per week. He reports that he does not currently use drugs.    Family History  Family History[1]     Allergies  Patient has no known allergies.    Review of Systems  See HPI     Physical Exam  Neuro: AOX3, appropriate affect, symmetric facial movements  Cardiac: S1/S2 equal, RRR, no clicks/gallops/murmurs  Resp: Lung sounds clear, no adventigous breath sounds auscultated  GI: abdomen is soft, nontender, no guarding or rebound tenderness  : no flank pain, no hematuria, or dysuria  M/S: normal strength, active range of motion  Skin: normal skin tone and color, no ertyhema       Last Recorded Vitals  Blood pressure 105/60, pulse 102, temperature 36.5 °C (97.7 °F), temperature source Temporal, resp. rate 17, height 1.753 m (5' 9.02\"), weight 81.6 kg (179 lb 14.3 oz), SpO2 92%.    Relevant Results  Results for orders placed or performed during the hospital encounter of 05/01/25 (from the past 24 hours)   Sterile Cup   Result Value Ref Range    Extra Tube Hold for add-ons.    Sterile Fluid Culture/Smear    Specimen: Ascites Fluid   Result Value Ref Range    Sterile Fluid Culture/Smear No growth to date     Gram Stain (4+) Abundant Polymorphonuclear leukocytes     Gram Stain No organisms seen    Body Fluid Cell Count   Result Value Ref Range    Color, Fluid Pink (A) Colorless, Straw, Yellow    Clarity, Fluid Cloudy (A) Clear    WBC, Fluid 2,125 See Comment /uL    RBC, Fluid 39,000 see comment /uL   Body Fluid Differential   Result Value Ref Range    Neutrophils %, Manual, Fluid 63 see comment %    Lymphocytes %, Manual, Fluid 12 see comment %    Mono/Macrophages %, Manual, Fluid 25 see comment %    Eosinophils %, Manual, Fluid 0 see comment %    Basophils %, Manual, Fluid 0 not established %    Immature Granulocytes %, Manual, Fluid 0 not established %    Blasts %, Manual, Fluid 0 not established %    Unclassified Cells %, Manual, " Fluid 0 not established %    Plasma Cells %, Manual, Fluid 0 not established %    Total Cells Counted, Fluid 100    Albumin, Body Fluid   Result Value Ref Range    Albumin, Fluid 2.0 Not established g/dL   CBC   Result Value Ref Range    WBC 5.6 4.4 - 11.3 x10*3/uL    nRBC 0.0 0.0 - 0.0 /100 WBCs    RBC 3.07 (L) 4.50 - 5.90 x10*6/uL    Hemoglobin 7.3 (L) 13.5 - 17.5 g/dL    Hematocrit 24.4 (L) 41.0 - 52.0 %    MCV 80 80 - 100 fL    MCH 23.8 (L) 26.0 - 34.0 pg    MCHC 29.9 (L) 32.0 - 36.0 g/dL    RDW 17.3 (H) 11.5 - 14.5 %    Platelets 274 150 - 450 x10*3/uL   Basic Metabolic Panel   Result Value Ref Range    Glucose 95 74 - 99 mg/dL    Sodium 138 136 - 145 mmol/L    Potassium 3.3 (L) 3.5 - 5.3 mmol/L    Chloride 100 98 - 107 mmol/L    Bicarbonate 32 21 - 32 mmol/L    Anion Gap 9 (L) 10 - 20 mmol/L    Urea Nitrogen 11 6 - 23 mg/dL    Creatinine 0.70 0.50 - 1.30 mg/dL    eGFR >90 >60 mL/min/1.73m*2    Calcium 7.7 (L) 8.6 - 10.3 mg/dL   Magnesium   Result Value Ref Range    Magnesium 1.60 1.60 - 2.40 mg/dL         Assessment/Plan     Cirrhosis- MELD of , likely alcohol induced vs ARCOS  - HCC: Check AFP  - HE: Aox3, no asterixis or enceophathy. No need for lactulose  - EV: no evidence of varices on imaging, but did note ascites with likely portal htn. Recommend outpatient EGD to assess for varices as well as LATONIA.   - Ascites: 4 L removed yesterday via paracnetesis. WBC level on body fluid was high but also had very tramatic with high rbc. On diuretics would continue.   2. Anemia  - Likely multifactorial given chronic disorders and hematuria. However, would recommend outpatient follow up for colonscopy and egd. Urology following for hematuria.  - Would start IV Venofer x 5 doses  - monitor hemoglobin, slightly down today at 7.3  - Pantoprazole 40 mg bid.         Evelio A Kinnaird, APRN-CNP         [1]   Family History  Problem Relation Name Age of Onset    Heart failure Mother      Atrial fibrillation Sister

## 2025-05-03 NOTE — CARE PLAN
The patient's goals for the shift include      The clinical goals for the shift include Progressively less blood in urine, Improvement in blood count.    Over the shift, the patient did not make progress toward the following goals. Barriers to progression include Encourage fluis and activity and I and O's. Recommendations to address these barriers include Stress the importance of fluids and keeping track of I and O's.

## 2025-05-03 NOTE — ASSESSMENT & PLAN NOTE
Acute on chronic heart failure with preserved ejection fraction  Right sided heart failure   Tricuspid regurgitation  -2/4/2025 TTE showed EF of 62%, mildly enlarged right ventricle, left atrial severely dilated, right atrium severely dilated, mild to moderate mitral valve regurgitation, mild right ventricular pressure, severe tricuspid regurgitation, moderate aortic valve regurgitation  Was scheduled for cath 5/1/2025 however preop labs showed severe anemia  Notable fluid volume overload on exam  -Chest x-ray showed stable cardiac enlargement with mild pulmonary vascular congestion, small right pleural effusion  - Ultrasound the abdomen showed moderate to large volume ascites-s/p para with 4L out  Resume home cardiac medications  Daily weights  Strict I/Os  BNP  Diuresis  Consult cardiology, appreciate recommendations   Plan for cath outpatient    Anemia  Likely secondary to acute blood loss from hematuria versus iron deficiency anemia  Will check iron panel-reviewed  Status post 2 unit of PRBCs   Trend H&H 6.4/21.9, 7.5/23.8, 6.9/22.9, 8.2/26, 7.3/24.4  Repeat H/H this afternoon  Transfuse for hemoglobin less than 7  PPI  Hold aspirin  Consult GI, appreciate recommendations  Check occult stool    Hematuria  BPH  Has had multiple cystoscopies in the past  No longer on blood thinners  Consult urology, appreciate recommendations  Resume home medications  Monitor I's and O's  Transfuse for hemoglobin less than 7  Renal ultrasound results reviewed, fluid level in he bladder likely in relation to hematuria, no hydronephrosis  UA showed large WBC and RBC, no leukocytes or nitrites, culture pending    Cirrhosis  With fluid volume overload  Ultrasound of the abdomen showed moderate to large volume ascites  Given IV albumin per GI last night  Ultrasound-guided paracentesis with 4L removed  GI consulted, appreciate recommendations    CAD  Discussed with cardiology NP who spoke with Dr. Lee.  No heart cath today, likely  outpatient in a few weeks  Holding aspirin for now due to anemia, hematuria  On statin  Cardiology on consult    Hypertension  Resume home antihypertensives  Monitor blood pressure close    Atrial fibrillation  Resume home antiarrhythmics  Monitor on telemetry  No longer on blood thinners due to persistent hematuria, anemia    Hypokalemia  Potassium 2.8, replaced, 3.2, 3.3 this morning  Potassium replacement ordered, repeat in a.m.  Monitor on telemetry    Hypomagnesemia  Mag 1.6, replaced, repeat in AM      Disposition: Patient admitted to stepdown unit.   CBC, BMP, renal US, mag reviewed, vitals reviewed. Plan as above. Monitor fluid and electrolytes, replace as needed. DVT prophylaxi: SCDs.  CBC, mag, and BMP in AM.  Discussed the plan with GI and urology.   Outpatient heart cath.  Anticipate discharge home in 2-3 days.      CODE STATUS discussed with the patient and family, patient is a full code.

## 2025-05-04 VITALS
HEART RATE: 94 BPM | OXYGEN SATURATION: 99 % | SYSTOLIC BLOOD PRESSURE: 110 MMHG | DIASTOLIC BLOOD PRESSURE: 57 MMHG | RESPIRATION RATE: 17 BRPM | WEIGHT: 176.15 LBS | TEMPERATURE: 97.9 F | BODY MASS INDEX: 26.09 KG/M2 | HEIGHT: 69 IN

## 2025-05-04 LAB
AFP SERPL-MCNC: <4 NG/ML (ref 0–9)
ANION GAP SERPL CALCULATED.3IONS-SCNC: 10 MMOL/L (ref 10–20)
ATRIAL RATE: 340 BPM
BACTERIA FLD CULT: NORMAL
BUN SERPL-MCNC: 10 MG/DL (ref 6–23)
CALCIUM SERPL-MCNC: 7.4 MG/DL (ref 8.6–10.3)
CHLORIDE SERPL-SCNC: 99 MMOL/L (ref 98–107)
CO2 SERPL-SCNC: 31 MMOL/L (ref 21–32)
CREAT SERPL-MCNC: 0.73 MG/DL (ref 0.5–1.3)
EGFRCR SERPLBLD CKD-EPI 2021: 90 ML/MIN/1.73M*2
ERYTHROCYTE [DISTWIDTH] IN BLOOD BY AUTOMATED COUNT: 17.9 % (ref 11.5–14.5)
GLUCOSE SERPL-MCNC: 95 MG/DL (ref 74–99)
GRAM STN SPEC: NORMAL
GRAM STN SPEC: NORMAL
HCT VFR BLD AUTO: 25.9 % (ref 41–52)
HGB BLD-MCNC: 7.7 G/DL (ref 13.5–17.5)
MAGNESIUM SERPL-MCNC: 1.83 MG/DL (ref 1.6–2.4)
MCH RBC QN AUTO: 23.7 PG (ref 26–34)
MCHC RBC AUTO-ENTMCNC: 29.7 G/DL (ref 32–36)
MCV RBC AUTO: 80 FL (ref 80–100)
NRBC BLD-RTO: 0 /100 WBCS (ref 0–0)
PLATELET # BLD AUTO: 261 X10*3/UL (ref 150–450)
POTASSIUM SERPL-SCNC: 3.3 MMOL/L (ref 3.5–5.3)
Q ONSET: 225 MS
QRS COUNT: 15 BEATS
QRS DURATION: 102 MS
QT INTERVAL: 344 MS
QTC CALCULATION(BAZETT): 436 MS
QTC FREDERICIA: 403 MS
R AXIS: 67 DEGREES
RBC # BLD AUTO: 3.25 X10*6/UL (ref 4.5–5.9)
SODIUM SERPL-SCNC: 137 MMOL/L (ref 136–145)
T AXIS: 270 DEGREES
T OFFSET: 397 MS
VENTRICULAR RATE: 97 BPM
WBC # BLD AUTO: 6.1 X10*3/UL (ref 4.4–11.3)

## 2025-05-04 PROCEDURE — 99232 SBSQ HOSP IP/OBS MODERATE 35: CPT | Performed by: INTERNAL MEDICINE

## 2025-05-04 PROCEDURE — 36415 COLL VENOUS BLD VENIPUNCTURE: CPT | Performed by: NURSE PRACTITIONER

## 2025-05-04 PROCEDURE — 2500000002 HC RX 250 W HCPCS SELF ADMINISTERED DRUGS (ALT 637 FOR MEDICARE OP, ALT 636 FOR OP/ED): Performed by: NURSE PRACTITIONER

## 2025-05-04 PROCEDURE — 99239 HOSP IP/OBS DSCHRG MGMT >30: CPT | Performed by: NURSE PRACTITIONER

## 2025-05-04 PROCEDURE — 85027 COMPLETE CBC AUTOMATED: CPT | Performed by: NURSE PRACTITIONER

## 2025-05-04 PROCEDURE — 83735 ASSAY OF MAGNESIUM: CPT | Performed by: NURSE PRACTITIONER

## 2025-05-04 PROCEDURE — 80048 BASIC METABOLIC PNL TOTAL CA: CPT | Performed by: NURSE PRACTITIONER

## 2025-05-04 PROCEDURE — 2500000001 HC RX 250 WO HCPCS SELF ADMINISTERED DRUGS (ALT 637 FOR MEDICARE OP): Performed by: NURSE PRACTITIONER

## 2025-05-04 RX ORDER — PANTOPRAZOLE SODIUM 40 MG/1
40 TABLET, DELAYED RELEASE ORAL
Qty: 30 TABLET | Refills: 1 | Status: SHIPPED | OUTPATIENT
Start: 2025-05-05

## 2025-05-04 RX ORDER — POTASSIUM CHLORIDE 20 MEQ/1
40 TABLET, EXTENDED RELEASE ORAL ONCE
Status: COMPLETED | OUTPATIENT
Start: 2025-05-04 | End: 2025-05-04

## 2025-05-04 RX ADMIN — METOPROLOL TARTRATE 100 MG: 25 TABLET, FILM COATED ORAL at 08:47

## 2025-05-04 RX ADMIN — FUROSEMIDE 80 MG: 20 TABLET ORAL at 08:47

## 2025-05-04 RX ADMIN — POTASSIUM CHLORIDE 40 MEQ: 1500 TABLET, EXTENDED RELEASE ORAL at 09:58

## 2025-05-04 RX ADMIN — PANTOPRAZOLE SODIUM 40 MG: 40 TABLET, DELAYED RELEASE ORAL at 06:33

## 2025-05-04 ASSESSMENT — COGNITIVE AND FUNCTIONAL STATUS - GENERAL
DAILY ACTIVITIY SCORE: 22
MOVING TO AND FROM BED TO CHAIR: A LITTLE
MOBILITY SCORE: 19
DRESSING REGULAR UPPER BODY CLOTHING: A LITTLE
DRESSING REGULAR LOWER BODY CLOTHING: A LITTLE
WALKING IN HOSPITAL ROOM: A LITTLE
TURNING FROM BACK TO SIDE WHILE IN FLAT BAD: A LITTLE
CLIMB 3 TO 5 STEPS WITH RAILING: A LITTLE
STANDING UP FROM CHAIR USING ARMS: A LITTLE

## 2025-05-04 ASSESSMENT — PAIN SCALES - GENERAL: PAINLEVEL_OUTOF10: 0 - NO PAIN

## 2025-05-04 NOTE — PROGRESS NOTES
05/04/25 1421   Discharge Planning   Who is requesting discharge planning? Provider   Home or Post Acute Services None   Expected Discharge Disposition Home     PT/OT skilled low intensity. Declines HHC.     DISCHARGE PLAN TO RETURN HOME NO SKILLED NEEDS.

## 2025-05-04 NOTE — CARE PLAN
Problem: Pain - Adult  Goal: Verbalizes/displays adequate comfort level or baseline comfort level  Outcome: Progressing     Problem: Safety - Adult  Goal: Free from fall injury  Outcome: Progressing     Problem: Discharge Planning  Goal: Discharge to home or other facility with appropriate resources  Outcome: Progressing     Problem: Chronic Conditions and Co-morbidities  Goal: Patient's chronic conditions and co-morbidity symptoms are monitored and maintained or improved  Outcome: Progressing     Problem: Nutrition  Goal: Nutrient intake appropriate for maintaining nutritional needs  Outcome: Progressing     Problem: Pain  Goal: Takes deep breaths with improved pain control throughout the shift  Outcome: Progressing  Goal: Turns in bed with improved pain control throughout the shift  Outcome: Progressing  Goal: Walks with improved pain control throughout the shift  Outcome: Progressing  Goal: Performs ADL's with improved pain control throughout shift  Outcome: Progressing  Goal: Participates in PT with improved pain control throughout the shift  Outcome: Progressing  Goal: Free from opioid side effects throughout the shift  Outcome: Progressing  Goal: Free from acute confusion related to pain meds throughout the shift  Outcome: Progressing     Problem: Fall/Injury  Goal: Not fall by end of shift  Outcome: Progressing  Goal: Be free from injury by end of the shift  Outcome: Progressing  Goal: Verbalize understanding of personal risk factors for fall in the hospital  Outcome: Progressing  Goal: Verbalize understanding of risk factor reduction measures to prevent injury from fall in the home  Outcome: Progressing  Goal: Use assistive devices by end of the shift  Outcome: Progressing  Goal: Pace activities to prevent fatigue by end of the shift  Outcome: Progressing   The patient's goals for the shift include      The clinical goals for the shift include no falls    Over the shift, the patient did not make progress  toward the following goals. Barriers to progression include age/co-morbidities. Recommendations to address these barriers include pt/ot/assist with ambulation as necessary.

## 2025-05-04 NOTE — PROGRESS NOTES
"Ming Bunch is a 84 y.o. male on day 3 of admission presenting with CAD (coronary artery disease).    Subjective   No reported overnight events.        Objective     Physical Exam  HENT:      Head: Normocephalic.      Right Ear: Tympanic membrane normal.      Mouth/Throat:      Mouth: Mucous membranes are moist.   Eyes:      Pupils: Pupils are equal, round, and reactive to light.   Cardiovascular:      Rate and Rhythm: Normal rate.   Abdominal:      Palpations: Abdomen is soft.   Musculoskeletal:         General: Normal range of motion.      Cervical back: Normal range of motion.   Skin:     General: Skin is warm.   Neurological:      General: No focal deficit present.      Mental Status: He is alert.   Psychiatric:         Mood and Affect: Mood normal.         Last Recorded Vitals  Blood pressure 116/69, pulse 100, temperature 36.8 °C (98.2 °F), temperature source Temporal, resp. rate 18, height 1.753 m (5' 9.02\"), weight 79.9 kg (176 lb 2.4 oz), SpO2 90%.  Intake/Output last 3 Shifts:  I/O last 3 completed shifts:  In: 920 (11.5 mL/kg) [P.O.:920]  Out: 2150 (26.9 mL/kg) [Urine:2150 (0.7 mL/kg/hr)]  Weight: 79.9 kg     Relevant Results      Scheduled medications  Scheduled Medications[1]  Continuous medications  Continuous Medications[2]  PRN medications  PRN Medications[3]  Results for orders placed or performed during the hospital encounter of 05/01/25 (from the past 24 hours)   Hemoglobin and Hematocrit, Blood   Result Value Ref Range    Hemoglobin 8.1 (L) 13.5 - 17.5 g/dL    Hematocrit 25.9 (L) 41.0 - 52.0 %   Basic Metabolic Panel   Result Value Ref Range    Glucose 95 74 - 99 mg/dL    Sodium 137 136 - 145 mmol/L    Potassium 3.3 (L) 3.5 - 5.3 mmol/L    Chloride 99 98 - 107 mmol/L    Bicarbonate 31 21 - 32 mmol/L    Anion Gap 10 10 - 20 mmol/L    Urea Nitrogen 10 6 - 23 mg/dL    Creatinine 0.73 0.50 - 1.30 mg/dL    eGFR 90 >60 mL/min/1.73m*2    Calcium 7.4 (L) 8.6 - 10.3 mg/dL   CBC   Result Value Ref Range "    WBC 6.1 4.4 - 11.3 x10*3/uL    nRBC 0.0 0.0 - 0.0 /100 WBCs    RBC 3.25 (L) 4.50 - 5.90 x10*6/uL    Hemoglobin 7.7 (L) 13.5 - 17.5 g/dL    Hematocrit 25.9 (L) 41.0 - 52.0 %    MCV 80 80 - 100 fL    MCH 23.7 (L) 26.0 - 34.0 pg    MCHC 29.7 (L) 32.0 - 36.0 g/dL    RDW 17.9 (H) 11.5 - 14.5 %    Platelets 261 150 - 450 x10*3/uL   Magnesium   Result Value Ref Range    Magnesium 1.83 1.60 - 2.40 mg/dL                            Assessment & Plan  CAD (coronary artery disease)    Cirrhosis- MELD of , likely alcohol induced vs ARCOS  - HCC: Check AFP  - HE: Aox3, no asterixis or enceophathy. No need for lactulose  - EV: no evidence of varices on imaging, but did note ascites with likely portal htn. Recommend outpatient EGD to assess for varices as well as LATONIA.   - Ascites: 4 L removed yesterday via paracnetesis. WBC level on body fluid was high but also had very tramatic with high rbc. On diuretics would continue.   2. Anemia  - Likely multifactorial given chronic disorders and hematuria. However, would recommend outpatient follow up for colonscopy and egd. Urology following for hematuria.  - Would start IV Venofer x 5 doses  - monitor hemoglobin, slightly down today at 7.3  - Pantoprazole 40 mg bid.     5/4  No active signs symptoms of GI bleeding.  Hgb stable at 7.7. He continues to have hematuria. Urology following.  No further GI interventions are warranted at this time, advised patient to follow-up as outpatient for liver cirrhosis surveillance.     Gabi Thomas, APRN-CNP           [1] [Held by provider] aspirin, 81 mg, oral, Daily  atorvastatin, 40 mg, oral, Nightly  dilTIAZem CD, 300 mg, oral, Nightly  furosemide, 20 mg, intravenous, Once  furosemide, 80 mg, oral, BID  metoprolol tartrate, 100 mg, oral, BID  pantoprazole, 40 mg, oral, Daily before breakfast  potassium chloride CR, 40 mEq, oral, Once  tamsulosin, 0.8 mg, oral, Nightly    [2]    [3] PRN medications: melatonin

## 2025-05-04 NOTE — PROGRESS NOTES
"Ming Bunch is a 84 y.o. male on day 3 of admission presenting with CAD (coronary artery disease).    Subjective   Patient states he feels fine.  No unusual shortness of breath or chest pains.  No palpitations.       Objective     Physical Exam  General: Awake no distress  Neck: Normal jugular venous pressures  Heart: Regular rate rhythm  Lungs: Clear without wheezing or crackles  Abdomen: Soft bowel sounds positive  Extremities: 2+ pitting edema bilaterally lower extremities  Neuro: Awake alert answering all questions appropriately    Last Recorded Vitals  Blood pressure 116/69, pulse 100, temperature 36.8 °C (98.2 °F), temperature source Temporal, resp. rate 18, height 1.753 m (5' 9.02\"), weight 79.9 kg (176 lb 2.4 oz), SpO2 90%.  Intake/Output last 3 Shifts:  I/O last 3 completed shifts:  In: 920 (11.5 mL/kg) [P.O.:920]  Out: 2150 (26.9 mL/kg) [Urine:2150 (0.7 mL/kg/hr)]  Weight: 79.9 kg     Relevant Results                   Results for orders placed or performed during the hospital encounter of 05/01/25 (from the past 24 hours)   Hemoglobin and Hematocrit, Blood   Result Value Ref Range    Hemoglobin 8.1 (L) 13.5 - 17.5 g/dL    Hematocrit 25.9 (L) 41.0 - 52.0 %   Basic Metabolic Panel   Result Value Ref Range    Glucose 95 74 - 99 mg/dL    Sodium 137 136 - 145 mmol/L    Potassium 3.3 (L) 3.5 - 5.3 mmol/L    Chloride 99 98 - 107 mmol/L    Bicarbonate 31 21 - 32 mmol/L    Anion Gap 10 10 - 20 mmol/L    Urea Nitrogen 10 6 - 23 mg/dL    Creatinine 0.73 0.50 - 1.30 mg/dL    eGFR 90 >60 mL/min/1.73m*2    Calcium 7.4 (L) 8.6 - 10.3 mg/dL   CBC   Result Value Ref Range    WBC 6.1 4.4 - 11.3 x10*3/uL    nRBC 0.0 0.0 - 0.0 /100 WBCs    RBC 3.25 (L) 4.50 - 5.90 x10*6/uL    Hemoglobin 7.7 (L) 13.5 - 17.5 g/dL    Hematocrit 25.9 (L) 41.0 - 52.0 %    MCV 80 80 - 100 fL    MCH 23.7 (L) 26.0 - 34.0 pg    MCHC 29.7 (L) 32.0 - 36.0 g/dL    RDW 17.9 (H) 11.5 - 14.5 %    Platelets 261 150 - 450 x10*3/uL   Magnesium   Result " Value Ref Range    Magnesium 1.83 1.60 - 2.40 mg/dL     *Note: Due to a large number of results and/or encounters for the requested time period, some results have not been displayed. A complete set of results can be found in Results Review.               Assessment & Plan  CAD (coronary artery disease)    Anemia/hematuria  Chronic atrial fibrillation  Right-sided heart failure  History of nonobstructive coronary artery disease  Cirrhosis  Hyperlipidemia  Tricuspid valve regurgitation     5/2: Patient found to have significant anemia and admitted for further treatment and investigation.  Reviewed Dr. Lee's notes.  Patient with significant tricuspid valve regurgitation and right-sided heart failure.  Underwent paracentesis procedure.  He is going to delay left heart catheterization until anemia stabilized, likely as an outpatient in coming weeks.  Heart rate adequately controlled on the combination of metoprolol and diltiazem.  No anticoagulation secondary to the anemia and hematuria.  But appears to be stabilized from a cardiac standpoint.  Will follow.     5/3: Cardiac wise stable.  Heart rates for the most adequately controlled on the combination of metoprolol and diltiazem.  Again hemoglobin remains depressed and on no anticoagulation therapy.  Serum potassium mildly depressed on morning labs and patient already ordered supplementation therapy.  He is now on oral furosemide at 80 mg twice daily attempt to reduce vascular congestion and edema.  Legs only mild edema on today's exam.  Overall appears to be stable from a cardiac standpoint and we would okay discharge home at any time.  He will follow-up with Dr. Lee as an outpatient we discussed diagnostic cardiac catheterization procedure.    5/4: Cardiac wise remained stable, heart rate controlled with the combination of metoprolol and diltiazem.  Hemoglobin also appears to have stabilized with a level of 7.7 on morning labs.  No anticoagulation therapy.   Serum potassium is mildly decreased on lab studies this morning and is rereceiving potassium supplementation.  No active cardiac issues at this time.  Patient will be okay for discharge at any time and then follow-up with Dr. Lee to discuss cardiac catheterization procedure once again.  We will sign off at this time, please call us back with any cardiology questions or concerns.         I spent 12 minutes in the professional and overall care of this patient.      Pratik Blankenship, DO

## 2025-05-04 NOTE — NURSING NOTE
Reviewed AVS with patient. All questions and concerns answered. All monitoring discontinued per policy. IV removed per policy with no complications

## 2025-05-04 NOTE — DISCHARGE SUMMARY
Discharge Diagnosis  CAD (coronary artery disease)    Issues Requiring Follow-Up  Follow up with GI, urology, cardiology, PCP outpatient    Test Results Pending At Discharge  Pending Labs       Order Current Status    Extra Urine Gray Tube Collected (05/01/25 6830)    HELENE with Reflex to PALMER In process    Alpha-Fetoprotein In process    Anti-smooth muscle antibody, IgG In process    Antimitochondrial antibody In process    Extra Tubes In process    Extra Tubes In process    Extra Tubes In process    Lavender Top In process    PST Top In process    PST Top In process    Urinalysis with Reflex Culture and Microscopic In process    Sterile Fluid Culture/Smear Preliminary result            Hospital Course   Ming Bunch is a 84 y.o. male with past medical history of hypertension, atrial fibrillation, no longer on anticoagulation, BPH with LUTS, hematuria status post multiple cystoscopies recently, coronary artery disease, hyperlipidemia, AAA, and heart failure who presented to [facility] for anticipated heart cath. Patient's daughter stated that patient had been being evaluated for Watchman procedure however patient was found to have tricuspid valve disorder.  Patient was scheduled for heart cath in preparation for tricuspid valve surgery.  Prior to heart cath, labs were obtained and showed severe anemia with a hemoglobin of 6.4.  Heart cath was postponed and decision was made for admission to the hospital under medicine team.  Patient does report having issues with hematuria for the last few months.  He has had multiple cystoscopies.  He does report hematuria however states it is more an tony color.  He had been on Coumadin in the past and was transitioned to Eliquis.  Continued to have hematuria so Eliquis was discontinued as well.  He has not been on Eliquis since 4/1/2025. Of note patient does have a history of thoracentesis in February as well as paracentesis.  Daughter stated that last ultrasound showed severe  ascites however patient has not had a paracentesis since this last study.  He does take Lasix 80 mg twice a day.  Patient did have abdominal distention and bilateral lower extremity edema.  2/4/2025 TTE: Left ventricular ejection fraction 62%, grade 3 restrictive pattern of left ventricular diastolic filling, mild to moderate mitral valve regurgitation, right atrium severely dilated, left atrial size severely dilated, severe tricuspid regurgitation, moderate aortic valve regurgitation. 4/17/2025 ultrasound of the abdomen showed large volume ascites  H&H 6.4/21.9.  No leukocytosis.  Platelets 298.  PT/INR 13.1/1.2.  Will check stat BMP, chest x-ray, ultrasound of the abdomen.  1 unit PRBCs ordered.  Cardiology, urology, GI consulted.  Patient admitted to [facility] for further evaluation and treatment.    Patient has been being followed by cardiology, urology, and GI.  Patient is now status post 2 units of PRBCs.  Continues to have hematuria however H&H has stabilized.  Discussed with urology who cleared patient for discharge.  He did have a paracentesis with 4 L of fluid removed.  No active signs of GI bleeding, cleared by GI for discharge.  Advised to follow-up outpatient for liver cirrhosis surveillance.  Recommend outpatient follow-up for colonoscopy and EGD.  Patient was given IV Venofer.  Symptoms improved.  Cleared by cardiology for discharge, recommending follow-up outpatient to arrange cardiac cath with his cardiologist Dr. Lee.  Seen by PT/OT, recommended low intensity rehab.  Patient declines home health care.  Stable for discharge home today.          Pertinent Physical Exam At Time of Discharge  Physical Exam  Vitals reviewed.   Constitutional:       Appearance: Normal appearance.   HENT:      Head: Normocephalic and atraumatic.   Eyes:      Extraocular Movements: Extraocular movements intact.      Conjunctiva/sclera: Conjunctivae normal.   Cardiovascular:      Rate and Rhythm: Normal rate and  regular rhythm.   Pulmonary:      Effort: Pulmonary effort is normal.      Breath sounds: No wheezing, rhonchi or rales.      Comments: Breath sounds clear, diminished bilaterally  Abdominal:      General: Bowel sounds are normal.      Palpations: Abdomen is soft.      Tenderness: There is no abdominal tenderness.   Musculoskeletal:         General: Normal range of motion.      Right lower leg: Edema present.      Left lower leg: Edema present.   Skin:     General: Skin is warm and dry.   Neurological:      General: No focal deficit present.      Mental Status: He is alert and oriented to person, place, and time.         Home Medications     Medication List      START taking these medications     pantoprazole 40 mg EC tablet; Commonly known as: ProtoNix; Take 1 tablet   (40 mg) by mouth once daily in the morning. Take before meals. Do not   crush, chew, or split.; Start taking on: May 5, 2025     CONTINUE taking these medications     atorvastatin 40 mg tablet; Commonly known as: Lipitor; Take 1 tablet (40   mg) by mouth once daily at bedtime.   dilTIAZem  mg 24 hr capsule; Commonly known as: Cardizem CD; Take   1 capsule (300 mg) by mouth once daily.   furosemide 80 mg tablet; Commonly known as: Lasix; Take 1 tablet (80 mg)   by mouth 2 times daily (morning and late afternoon).   metoprolol tartrate 100 mg tablet; Commonly known as: Lopressor; Take 1   tablet (100 mg) by mouth 2 times a day.   tamsulosin 0.4 mg 24 hr capsule; Commonly known as: Flomax; Take 2   capsules (0.8 mg) by mouth once daily.     STOP taking these medications     apixaban 5 mg tablet; Commonly known as: Eliquis   aspirin 81 mg EC tablet   tadalafil 5 mg tablet; Commonly known as: Cialis       Outpatient Follow-Up  Future Appointments   Date Time Provider Department Center   8/22/2025  9:45 AM Valencia Rock MD [id number]   9/25/2025  1:00 PM Kellee Burris DO [id number]     Time spent on discharge: 35  minutes    Ya Collier, APRJOHN-CNP

## 2025-05-04 NOTE — CARE PLAN
The patient's goals for the shift include      The clinical goals for the shift include no falls    Over the shift, the patient did not make progress toward the following goals. Problem: Pain - Adult  Goal: Verbalizes/displays adequate comfort level or baseline comfort level  Outcome: Progressing     Problem: Pain  Goal: Turns in bed with improved pain control throughout the shift  Outcome: Progressing

## 2025-05-05 ENCOUNTER — TELEPHONE (OUTPATIENT)
Dept: CARDIOLOGY | Facility: HOSPITAL | Age: 85
End: 2025-05-05
Payer: MEDICARE

## 2025-05-05 LAB
ANA PATTERN: ABNORMAL
ANA SER QL HEP2 SUBST: POSITIVE
ANA TITR SER IF: ABNORMAL {TITER}
BACTERIA FLD CULT: NORMAL
CENTROMERE B AB SER-ACNC: <0.2 AI
CHROMATIN AB SERPL-ACNC: <0.2 AI
DSDNA AB SER-ACNC: <1 IU/ML
ENA JO1 AB SER QL IA: <0.2 AI
ENA RNP AB SER IA-ACNC: 0.3 AI
ENA SCL70 AB SER QL IA: <0.2 AI
ENA SM AB SER IA-ACNC: <0.2 AI
ENA SM+RNP AB SER QL IA: <0.2 AI
ENA SS-A AB SER IA-ACNC: <0.2 AI
ENA SS-B AB SER IA-ACNC: <0.2 AI
GRAM STN SPEC: NORMAL
GRAM STN SPEC: NORMAL
RIBOSOMAL P AB SER-ACNC: <0.2 AI

## 2025-05-05 ASSESSMENT — PAIN SCALES - GENERAL: PAINLEVEL_OUTOF10: 0 - NO PAIN

## 2025-05-06 ENCOUNTER — PATIENT OUTREACH (OUTPATIENT)
Dept: PRIMARY CARE | Facility: CLINIC | Age: 85
End: 2025-05-06
Payer: MEDICARE

## 2025-05-06 LAB
MITOCHONDRIA AB SER QL IF: NEGATIVE
SMOOTH MUSCLE AB SER QL IF: ABNORMAL

## 2025-05-06 NOTE — PROGRESS NOTES
Discharge Facility: [facility]  Discharge Diagnosis: anemia, CAD (coronary artery disease),   Admission Date: 5/1/25  Discharge Date: 5/4/25    PCP Appointment Date: no appointments, message to office  Specialist Appointment Date: needs GI, urology, cardiology   Hospital Encounter and Summary Linked: Yes  Admission (Discharged) with Sangita Mares MD (05/01/2025)   See discharge assessment below for further details    Medications  Medications reviewed with patient/caregiver?: Yes (5/6/2025  9:34 AM)  Is the patient having any side effects they believe may be caused by any medication additions or changes?: No (5/6/2025  9:34 AM)  Does the patient have all medications ordered at discharge?: Yes (5/6/2025  9:34 AM)  Prescription Comments: START taking: pantoprazole (ProtoNix) STOP taking: apixaban 5 mg tablet (Eliquis) aspirin 81 mg EC tablet tadalafil 5 mg tablet (Cialis) (5/6/2025  9:34 AM)  Medication Comments: denies questions or concerns (5/6/2025  9:34 AM)    Appointments  Does the patient have a primary care provider?: Yes (5/6/2025  9:34 AM)  Care Management Interventions: Advised patient to make appointment (5/6/2025  9:34 AM)  Care Management Interventions: Advised to schedule with specialist (5/6/2025  9:34 AM)    Self Management  What is the home health agency?: n/a, patient denies (5/6/2025  9:34 AM)  What Durable Medical Equipment (DME) was ordered?: n/a (5/6/2025  9:34 AM)    Patient Teaching  Does the patient have access to their discharge instructions?: Yes (5/6/2025  9:34 AM)  Care Management Interventions: Reviewed instructions with patient (5/6/2025  9:34 AM)  What is the patient's perception of their health status since discharge?: Improving (5/6/2025  9:34 AM)  Patient/Caregiver Education Comments: Spoke with patient's daughter Isamar. She states patient has improved since hospitalization, she states 4 liters were taken off from abdomen that helped him a lot. She is aware of ordered labwork.  Medication changes reviewed. Encouraged follow up appointments. Cardiac cath likely rescheduled for next week. She denies any further questions or concerns. Contact information provided. (5/6/2025  9:34 AM)

## 2025-05-08 ENCOUNTER — OFFICE VISIT (OUTPATIENT)
Dept: PRIMARY CARE | Facility: CLINIC | Age: 85
End: 2025-05-08
Payer: MEDICARE

## 2025-05-08 VITALS
WEIGHT: 177.2 LBS | OXYGEN SATURATION: 96 % | DIASTOLIC BLOOD PRESSURE: 78 MMHG | BODY MASS INDEX: 26.16 KG/M2 | SYSTOLIC BLOOD PRESSURE: 128 MMHG | HEART RATE: 58 BPM

## 2025-05-08 DIAGNOSIS — R39.9 LOWER URINARY TRACT SYMPTOMS: ICD-10-CM

## 2025-05-08 DIAGNOSIS — A41.9 SEPSIS SECONDARY TO UTI (MULTI): ICD-10-CM

## 2025-05-08 DIAGNOSIS — D64.9 ANEMIA, UNSPECIFIED TYPE: ICD-10-CM

## 2025-05-08 DIAGNOSIS — I48.91 ATRIAL FIBRILLATION, UNSPECIFIED TYPE (MULTI): Primary | ICD-10-CM

## 2025-05-08 DIAGNOSIS — R31.0 GROSS HEMATURIA: ICD-10-CM

## 2025-05-08 DIAGNOSIS — N39.0 SEPSIS SECONDARY TO UTI (MULTI): ICD-10-CM

## 2025-05-08 LAB
ALBUMIN SERPL-MCNC: 3.2 G/DL (ref 3.6–5.1)
ANION GAP SERPL CALCULATED.4IONS-SCNC: 10 MMOL/L (CALC) (ref 7–17)
BUN SERPL-MCNC: 10 MG/DL (ref 7–25)
BUN/CREAT SERPL: ABNORMAL (CALC) (ref 6–22)
CALCIUM SERPL-MCNC: 8.1 MG/DL (ref 8.6–10.3)
CHLORIDE SERPL-SCNC: 96 MMOL/L (ref 98–110)
CO2 SERPL-SCNC: 31 MMOL/L (ref 20–32)
CREAT SERPL-MCNC: 0.74 MG/DL (ref 0.7–1.22)
EGFRCR SERPLBLD CKD-EPI 2021: 89 ML/MIN/1.73M2
ERYTHROCYTE [DISTWIDTH] IN BLOOD BY AUTOMATED COUNT: 16.7 % (ref 11–15)
GLUCOSE SERPL-MCNC: 92 MG/DL (ref 65–99)
HCT VFR BLD AUTO: 28.7 % (ref 38.5–50)
HGB BLD-MCNC: 8.2 G/DL (ref 13.2–17.1)
INR PPP: 1.1
MCH RBC QN AUTO: 23.6 PG (ref 27–33)
MCHC RBC AUTO-ENTMCNC: 28.6 G/DL (ref 32–36)
MCV RBC AUTO: 82.5 FL (ref 80–100)
PLATELET # BLD AUTO: 320 THOUSAND/UL (ref 140–400)
PMV BLD REES-ECKER: 8.6 FL (ref 7.5–12.5)
POTASSIUM SERPL-SCNC: 3.6 MMOL/L (ref 3.5–5.3)
PROT SERPL-MCNC: 5.9 G/DL (ref 6.1–8.1)
PROTHROMBIN TIME: 11.7 SEC (ref 9–11.5)
RBC # BLD AUTO: 3.48 MILLION/UL (ref 4.2–5.8)
SODIUM SERPL-SCNC: 137 MMOL/L (ref 135–146)
WBC # BLD AUTO: 4.6 THOUSAND/UL (ref 3.8–10.8)

## 2025-05-08 PROCEDURE — 99496 TRANSJ CARE MGMT HIGH F2F 7D: CPT | Performed by: FAMILY MEDICINE

## 2025-05-08 PROCEDURE — 1036F TOBACCO NON-USER: CPT | Performed by: FAMILY MEDICINE

## 2025-05-08 PROCEDURE — 1159F MED LIST DOCD IN RCRD: CPT | Performed by: FAMILY MEDICINE

## 2025-05-08 PROCEDURE — 1160F RVW MEDS BY RX/DR IN RCRD: CPT | Performed by: FAMILY MEDICINE

## 2025-05-08 PROCEDURE — 1111F DSCHRG MED/CURRENT MED MERGE: CPT | Performed by: FAMILY MEDICINE

## 2025-05-08 NOTE — PROGRESS NOTES
Subjective   Patient ID: Ming Bunch is a 84 y.o. male who presents for Hospital Follow-up.    HPI   Patient is in the office today for a follow-up. He is here for TCM.  Discharged from [facility] on 5/4/2025  He would like to discuss his lab results from 5/7/2025. He is still anemic, continues losing blood in urine.  He is off all anticoagulation in spite of afib.  Recent hgb mildly improved at 8.2.  he has low protein and albumin    Patient was admitted to the hospital for a programmed heart catheter in preparation for tricuspid valve surgery. Prior to hear cath, labs were obtained and showed severe anemia with hemoglobin of 6.4. Heart cath was postponed and decision was made for admission to the hospital under medicine team.  Patient does report having issues with hematuria for the last few months. 4/17/2025 ultrasound of the abdomen showed large volume ascites, and they performed a paracentesis. he received two blood transfusions at the hospital.      His blood pressure (110/68) was within good range when checked in the clinic today. He continues on diltiazem  mg daily, furosemide 80 mg daily, and metoprolol tartrate 100 mg (2 times daily) for treatment of atrial fibrillation. He continues to consult with his cardiology specialist (WENDY See) regularly. He denies any chest pain and/or SOB symptoms.  Dr Lee is his cardiologist.  Has been found to have cirrhosis also.  His INR was checked yesterday and is normal.  He is still having some ETOH, not willing to quit completely.  Discussed implications of this    He takes atorvastatin 40 mg nightly for treatment of hyperlipidemia/CAD. He is tolerating this dose of atorvastatin medication well. He denies any side-effects to his atorvastatin medication.     He has personal history of benign prostatic hyperplasia which is treated with tamsulosin 0.4 mg 2 daily. He denies any side effects to the medication.     He is using pantoprazole 40 mg daily to  treat his GERD. He states that his GERD is more controlled. He denies any side effects to the medication. He is taking it correctly    Review of Systems    Objective   /78   Pulse 58   Wt 80.4 kg (177 lb 3.2 oz)   SpO2 96%   BMI 26.16 kg/m²     Physical Exam  Constitutional:       Appearance: Normal appearance.   HENT:      Head: Normocephalic.   Neck:      Vascular: No carotid bruit.      Comments: No thyromegaly  Cardiovascular:      Rate and Rhythm: Normal rate and regular rhythm.      Pulses: Normal pulses.      Heart sounds: Normal heart sounds.   Pulmonary:      Effort: Pulmonary effort is normal.      Breath sounds: Normal breath sounds.   Abdominal:      General: Bowel sounds are normal.      Palpations: Abdomen is soft.   Musculoskeletal:         General: No tenderness. Normal range of motion.      Cervical back: Normal range of motion.   Lymphadenopathy:      Cervical: No cervical adenopathy.   Skin:     General: Skin is warm and dry.   Neurological:      General: No focal deficit present.      Mental Status: He is alert and oriented to person, place, and time.   Psychiatric:         Mood and Affect: Mood normal.         Thought Content: Thought content normal.         Judgment: Judgment normal.         Assessment/Plan   Diagnoses and all orders for this visit:  Atrial fibrillation, unspecified type (Multi)  Anemia, unspecified type  Lower urinary tract symptoms  Gross hematuria  Sepsis secondary to UTI (Multi)    Discussed lab results from 5/7/2025 which showed: hemoglobin 8.2, hematocrit 28.7, platelet [id number], glucose 92, Na 137, Cl 96, Ca 8.1, protein total 5.9, albumin 3.2, INR 1.1, PT 11.7.  Recommended follow-up with cardiologist, and urologist.   Recommended lab tests for follow-up. He is having heart cath next week and labs prior.  Discussed iron also and diet  Follow-up with Medicare Annual Wellness exam as scheduled or sooner prn.    Scribe Attestation  By signing my name below, I,  Emmanuelle Barraza   attest that this documentation has been prepared under the direction and in the presence of Kellee Burris DO.

## 2025-05-11 DIAGNOSIS — D64.9 ANEMIA, UNSPECIFIED TYPE: ICD-10-CM

## 2025-05-20 ENCOUNTER — HOSPITAL ENCOUNTER (OUTPATIENT)
Facility: HOSPITAL | Age: 85
Setting detail: OUTPATIENT SURGERY
Discharge: HOME | End: 2025-05-20
Attending: INTERNAL MEDICINE | Admitting: INTERNAL MEDICINE
Payer: MEDICARE

## 2025-05-20 VITALS
OXYGEN SATURATION: 95 % | HEART RATE: 62 BPM | BODY MASS INDEX: 26.16 KG/M2 | RESPIRATION RATE: 12 BRPM | WEIGHT: 177.25 LBS | SYSTOLIC BLOOD PRESSURE: 96 MMHG | DIASTOLIC BLOOD PRESSURE: 58 MMHG | TEMPERATURE: 98.2 F

## 2025-05-20 DIAGNOSIS — I50.23 ACUTE ON CHRONIC SYSTOLIC (CONGESTIVE) HEART FAILURE: ICD-10-CM

## 2025-05-20 DIAGNOSIS — I36.1 NONRHEUMATIC TRICUSPID (VALVE) INSUFFICIENCY: ICD-10-CM

## 2025-05-20 DIAGNOSIS — I36.1 NONRHEUMATIC TRICUSPID VALVE REGURGITATION: Primary | ICD-10-CM

## 2025-05-20 DIAGNOSIS — R06.02 SHORTNESS OF BREATH: ICD-10-CM

## 2025-05-20 DIAGNOSIS — I50.43 ACUTE ON CHRONIC COMBINED SYSTOLIC (CONGESTIVE) AND DIASTOLIC (CONGESTIVE) HEART FAILURE: ICD-10-CM

## 2025-05-20 DIAGNOSIS — R60.9 EDEMA: Primary | ICD-10-CM

## 2025-05-20 DIAGNOSIS — I25.10 ATHEROSCLEROTIC HEART DISEASE OF NATIVE CORONARY ARTERY WITHOUT ANGINA PECTORIS: ICD-10-CM

## 2025-05-20 LAB
ANION GAP BLDA CALCULATED.4IONS-SCNC: 4 MMO/L (ref 10–25)
BASE EXCESS BLDA CALC-SCNC: 9.1 MMOL/L (ref -2–3)
BASE EXCESS BLDV CALC-SCNC: 5.6 MMOL/L (ref -2–3)
BASE EXCESS BLDV CALC-SCNC: 7.5 MMOL/L (ref -2–3)
BODY TEMPERATURE: 37 DEGREES CELSIUS
CA-I BLDA-SCNC: 1.13 MMOL/L (ref 1.1–1.33)
CHLORIDE BLDA-SCNC: 103 MMOL/L (ref 98–107)
ERYTHROCYTE [DISTWIDTH] IN BLOOD BY AUTOMATED COUNT: 19.5 % (ref 11.5–14.5)
GLUCOSE BLD MANUAL STRIP-MCNC: 84 MG/DL (ref 74–99)
GLUCOSE BLDA-MCNC: 101 MG/DL (ref 74–99)
HCO3 BLDA-SCNC: 34.1 MMOL/L (ref 22–26)
HCO3 BLDV-SCNC: 30.6 MMOL/L (ref 22–26)
HCO3 BLDV-SCNC: 32.7 MMOL/L (ref 22–26)
HCT VFR BLD AUTO: 27.9 % (ref 41–52)
HCT VFR BLD EST: 24 % (ref 41–52)
HGB BLD-MCNC: 8.4 G/DL (ref 13.5–17.5)
HGB BLDA-MCNC: 8.1 G/DL (ref 13.5–17.5)
LACTATE BLDA-SCNC: 0.8 MMOL/L (ref 0.4–2)
MCH RBC QN AUTO: 23.8 PG (ref 26–34)
MCHC RBC AUTO-ENTMCNC: 30.1 G/DL (ref 32–36)
MCV RBC AUTO: 79 FL (ref 80–100)
NRBC BLD-RTO: 0 /100 WBCS (ref 0–0)
OXYHGB MFR BLDA: 84.4 % (ref 94–98)
OXYHGB MFR BLDV: 58.9 % (ref 45–75)
OXYHGB MFR BLDV: 59.7 % (ref 45–75)
PCO2 BLDA: 49 MM HG (ref 38–42)
PCO2 BLDV: 45 MM HG (ref 41–51)
PCO2 BLDV: 47 MM HG (ref 41–51)
PH BLDA: 7.45 PH (ref 7.38–7.42)
PH BLDV: 7.44 PH (ref 7.33–7.43)
PH BLDV: 7.45 PH (ref 7.33–7.43)
PLATELET # BLD AUTO: 221 X10*3/UL (ref 150–450)
PO2 BLDA: 57 MM HG (ref 85–95)
PO2 BLDV: 33 MM HG (ref 35–45)
PO2 BLDV: 35 MM HG (ref 35–45)
POTASSIUM BLDA-SCNC: 3.1 MMOL/L (ref 3.5–5.3)
RBC # BLD AUTO: 3.53 X10*6/UL (ref 4.5–5.9)
SAO2 % BLDA: 86 % (ref 94–100)
SAO2 % BLDV: 60 % (ref 45–75)
SAO2 % BLDV: 61 % (ref 45–75)
SODIUM BLDA-SCNC: 138 MMOL/L (ref 136–145)
WBC # BLD AUTO: 4.3 X10*3/UL (ref 4.4–11.3)

## 2025-05-20 PROCEDURE — 99153 MOD SED SAME PHYS/QHP EA: CPT | Performed by: INTERNAL MEDICINE

## 2025-05-20 PROCEDURE — 82810 BLOOD GASES O2 SAT ONLY: CPT | Performed by: INTERNAL MEDICINE

## 2025-05-20 PROCEDURE — 2550000001 HC RX 255 CONTRASTS: Mod: JW | Performed by: INTERNAL MEDICINE

## 2025-05-20 PROCEDURE — C1751 CATH, INF, PER/CENT/MIDLINE: HCPCS | Performed by: INTERNAL MEDICINE

## 2025-05-20 PROCEDURE — 82805 BLOOD GASES W/O2 SATURATION: CPT | Performed by: INTERNAL MEDICINE

## 2025-05-20 PROCEDURE — 83605 ASSAY OF LACTIC ACID: CPT | Performed by: INTERNAL MEDICINE

## 2025-05-20 PROCEDURE — 93460 R&L HRT ART/VENTRICLE ANGIO: CPT | Performed by: INTERNAL MEDICINE

## 2025-05-20 PROCEDURE — 36415 COLL VENOUS BLD VENIPUNCTURE: CPT | Performed by: NURSE PRACTITIONER

## 2025-05-20 PROCEDURE — 82947 ASSAY GLUCOSE BLOOD QUANT: CPT

## 2025-05-20 PROCEDURE — 7100000009 HC PHASE TWO TIME - INITIAL BASE CHARGE: Performed by: INTERNAL MEDICINE

## 2025-05-20 PROCEDURE — 99152 MOD SED SAME PHYS/QHP 5/>YRS: CPT | Performed by: INTERNAL MEDICINE

## 2025-05-20 PROCEDURE — 85027 COMPLETE CBC AUTOMATED: CPT | Performed by: NURSE PRACTITIONER

## 2025-05-20 PROCEDURE — 2720000007 HC OR 272 NO HCPCS: Performed by: INTERNAL MEDICINE

## 2025-05-20 PROCEDURE — 7100000010 HC PHASE TWO TIME - EACH INCREMENTAL 1 MINUTE: Performed by: INTERNAL MEDICINE

## 2025-05-20 PROCEDURE — 2500000004 HC RX 250 GENERAL PHARMACY W/ HCPCS (ALT 636 FOR OP/ED): Performed by: INTERNAL MEDICINE

## 2025-05-20 RX ORDER — FUROSEMIDE 80 MG/1
80 TABLET ORAL DAILY
Qty: 90 TABLET | Refills: 3 | Status: SHIPPED | OUTPATIENT
Start: 2025-05-20 | End: 2026-05-20

## 2025-05-20 RX ORDER — MIDAZOLAM HYDROCHLORIDE 1 MG/ML
INJECTION, SOLUTION INTRAMUSCULAR; INTRAVENOUS AS NEEDED
Status: DISCONTINUED | OUTPATIENT
Start: 2025-05-20 | End: 2025-05-20 | Stop reason: HOSPADM

## 2025-05-20 RX ORDER — FUROSEMIDE 40 MG/1
40 TABLET ORAL DAILY
Qty: 90 TABLET | Refills: 3 | Status: SHIPPED | OUTPATIENT
Start: 2025-05-20 | End: 2026-05-20

## 2025-05-20 RX ORDER — FENTANYL CITRATE 50 UG/ML
INJECTION, SOLUTION INTRAMUSCULAR; INTRAVENOUS AS NEEDED
Status: DISCONTINUED | OUTPATIENT
Start: 2025-05-20 | End: 2025-05-20 | Stop reason: HOSPADM

## 2025-05-20 RX ORDER — ACETAMINOPHEN 325 MG/1
650 TABLET ORAL EVERY 6 HOURS PRN
Status: DISCONTINUED | OUTPATIENT
Start: 2025-05-20 | End: 2025-05-20 | Stop reason: HOSPADM

## 2025-05-20 RX ORDER — LIDOCAINE HYDROCHLORIDE 20 MG/ML
INJECTION, SOLUTION INFILTRATION; PERINEURAL AS NEEDED
Status: DISCONTINUED | OUTPATIENT
Start: 2025-05-20 | End: 2025-05-20 | Stop reason: HOSPADM

## 2025-05-20 ASSESSMENT — PAIN SCALES - GENERAL: PAINLEVEL_OUTOF10: 0 - NO PAIN

## 2025-05-20 NOTE — PROGRESS NOTES
Referral from Dr. Lee. Ming comes to discuss treatment recommendations for valvular heart disease, echo showing mr, tr, ar lvef 62%. Experiencing pedal edema, sob. History of a fib on warfarin, anemia, ascites. Sophie Renee RN

## 2025-05-20 NOTE — Clinical Note
Single view of the left ventricle obtained using power injection. Rate = 10 mL/sec. Total volume = 30 mL.

## 2025-05-20 NOTE — POST-PROCEDURE NOTE
Physician Transition of Care Summary  Invasive Cardiovascular Lab    Procedure Date: 5/20/2025  Attending:    * Marlon Lee - Primary  Resident/Fellow/Other Assistant: Surgeons and Role:  * No surgeons found with a matching role *    Indications:   Pre-op Diagnosis      * Atherosclerotic heart disease of native coronary artery without angina pectoris [I25.10]     * Acute on chronic combined systolic (congestive) and diastolic (congestive) heart failure [I50.43]    Post-procedure diagnosis:   Post-op Diagnosis     * Atherosclerotic heart disease of native coronary artery without angina pectoris [I25.10]     * Acute on chronic combined systolic (congestive) and diastolic (congestive) heart failure [I50.43]    Procedure(s):   Left & Right Heart Cath w Angiography & LV  79304 - CO R & L HRT CATH WINJX HRT ART& L VENTR IMG        Procedure Findings:   The left heart catheterization demonstrated a normal LMCA.  The LAD extended to the LV apex and gives rise to a moderate-sized first diagonal branch and contains mild less than 30% luminal irregularities.  But nondominant LCx also has minimal disease.  The very large dominant RCA is moderately calcified throughout but exhibits only mild diffuse luminal irregularities.  The left ventriculogram showed a intact LV ejection fraction in the range of 55%.  The right heart catheterization showed high normal pulmonary artery pressures mean PA pressure of 23 mmHg borderline elevated pulmonary capillary wedge pressure.    Description of the Procedure:   The left heart catheterization was performed under local lidocaine anesthesia via the right femoral artery utilizing 5 Kazakh JL 4 no torque JR4 and angled pigtail catheter.  The right heart catheterization was performed via the right femoral vein using a 7 Kazakh S tipped thermodilution Whitney-Dwight catheter.  Cardiac outputs were done by thermodilution technique and O2 saturations recorded from the pulmonary artery right atrium and  "femoral artery.    Complications:   None    Stents/Implants:   Not applicable  Implants       No implant documentation for this case.            Anticoagulation/Antiplatelet Plan:       Estimated Blood Loss:   5 mL    Anesthesia: Moderate Sedation Anesthesia Staff: No anesthesia staff entered.    Any Specimen(s) Removed:   Order Name Source Comment Collection Info Order Time   CBC Blood, Venous  Collected By: Marlon Shields RN 5/20/2025  7:23 AM     Release result to MyChart   Immediate        BLOOD GAS ARTERIAL FULL PANEL Blood, Arterial Pre-op diagnosis:  Atherosclerotic heart disease of native coronary artery without angina pectoris [I25.10]  Acute on chronic combined systolic (congestive) and diastolic (congestive) heart failure [I50.43] Collected By: Marlon Lee MD 5/20/2025  8:41 AM     Release result to MyChart   Immediate        BLOOD GAS VENOUS Blood, Venous Pre-op diagnosis:  Atherosclerotic heart disease of native coronary artery without angina pectoris [I25.10]  Acute on chronic combined systolic (congestive) and diastolic (congestive) heart failure [I50.43] Collected By: Marlon Lee MD 5/20/2025  9:09 AM     Release result to MyChart   Immediate        BLOOD GAS VENOUS Blood, Venous Pre-op diagnosis:  Atherosclerotic heart disease of native coronary artery without angina pectoris [I25.10]  Acute on chronic combined systolic (congestive) and diastolic (congestive) heart failure [I50.43] Collected By: Marlon Lee MD 5/20/2025  9:19 AM     Release result to MyChart   Immediate            Disposition:   Home.  Office follow-up 2 weeks.  Patient referred to structural heart program to be considered for tricuspid valve repair versus tricuspid valve \"and possible Watchman device.      Electronically signed by: Marlon Lee MD, 5/20/2025 9:40 AM  "

## 2025-05-20 NOTE — NURSING NOTE
END OF PROCEDURE MONITORING CRITERIA  01. BP is within +/- 20% of preprocedure  02. Oxygen sat is at 92% or above on RA, or existing order for O2 treatment, or at pre-sedation levels, otherwise new O2 order needed.  03. Unless the patient has a pre-procedure history of diminished level of consciousness, s/he is easily arousable and when aroused is able to responds appropriately for his/her age.  04. Significant complications related to the specific procedure are absent, have been controlled, or have been evaluated, including:      A. Pain.      B. Wound drainage.      C. All drains and tubes are patent.      D. Nausea and Vomiting. Vomiting is not persisten and has not occurred within 15 minutes prior to discharge.      E. Bladder distention. Voided bladder and/or no symptoms or urinary retention (e.g., bladder distention, frequent voiding in small amounts).      F. Neurovascular status.      G. Level of Consciousness consistent with pre procedural status.        daughter(s)  affirmed that they were driving the patient home.      Pt ambulated

## 2025-05-21 ENCOUNTER — OFFICE VISIT (OUTPATIENT)
Dept: CARDIOLOGY | Facility: HOSPITAL | Age: 85
End: 2025-05-21
Payer: MEDICARE

## 2025-05-21 ENCOUNTER — OFFICE VISIT (OUTPATIENT)
Dept: CARDIAC SURGERY | Facility: HOSPITAL | Age: 85
End: 2025-05-21
Payer: MEDICARE

## 2025-05-21 VITALS
BODY MASS INDEX: 25.68 KG/M2 | HEART RATE: 88 BPM | WEIGHT: 174 LBS | DIASTOLIC BLOOD PRESSURE: 59 MMHG | SYSTOLIC BLOOD PRESSURE: 102 MMHG | OXYGEN SATURATION: 98 %

## 2025-05-21 DIAGNOSIS — I38 VALVULAR HEART DISEASE: ICD-10-CM

## 2025-05-21 DIAGNOSIS — I36.1 NONRHEUMATIC TRICUSPID VALVE REGURGITATION: ICD-10-CM

## 2025-05-21 PROCEDURE — 1126F AMNT PAIN NOTED NONE PRSNT: CPT | Performed by: INTERNAL MEDICINE

## 2025-05-21 PROCEDURE — 1111F DSCHRG MED/CURRENT MED MERGE: CPT | Performed by: THORACIC SURGERY (CARDIOTHORACIC VASCULAR SURGERY)

## 2025-05-21 PROCEDURE — 1159F MED LIST DOCD IN RCRD: CPT | Performed by: INTERNAL MEDICINE

## 2025-05-21 PROCEDURE — 1111F DSCHRG MED/CURRENT MED MERGE: CPT | Performed by: INTERNAL MEDICINE

## 2025-05-21 PROCEDURE — 99212 OFFICE O/P EST SF 10 MIN: CPT

## 2025-05-21 PROCEDURE — 99205 OFFICE O/P NEW HI 60 MIN: CPT | Performed by: THORACIC SURGERY (CARDIOTHORACIC VASCULAR SURGERY)

## 2025-05-21 PROCEDURE — 99214 OFFICE O/P EST MOD 30 MIN: CPT | Performed by: INTERNAL MEDICINE

## 2025-05-21 PROCEDURE — 99215 OFFICE O/P EST HI 40 MIN: CPT | Performed by: THORACIC SURGERY (CARDIOTHORACIC VASCULAR SURGERY)

## 2025-05-21 ASSESSMENT — ENCOUNTER SYMPTOMS
OCCASIONAL FEELINGS OF UNSTEADINESS: 0
DEPRESSION: 0
LOSS OF SENSATION IN FEET: 0

## 2025-05-21 ASSESSMENT — PAIN SCALES - GENERAL: PAINLEVEL_OUTOF10: 0-NO PAIN

## 2025-05-21 NOTE — PROGRESS NOTES
PCP: Dr. Burris  Cards: Dr. Lee    HPI    84 y.o. y/o male with PMH of past medical history of hypertension, atrial fibrillation, no longer on anticoagulation, BPH with LUTS, hematuria status post multiple cystoscopies recently, coronary artery disease, hyperlipidemia, AAA, and heart failure presents for evaluation of severe, symptomatic Tricuspid regurgitation.     Patient with significant tricuspid valve regurgitation and right-sided heart failure. Underwent paracentesis procedure in may 2025. Also reports no anticoagulation secondary to the anemia and hematuria, and recent blood transfusions for that reason. Reports complains about increased abdominal girth, edema and leg swelling.    Denies overt orthopnea, PND, exertional CP. Denies syncope or presyncope.    Full 14 point ROS complete and negative except as noted above.     Structural Workup:   - Echo: EF 62%, Severe TR, mild/ mod MR  - EKG:  Encounter Date: 05/01/25   ECG 12 Lead   Result Value    Ventricular Rate 97    Atrial Rate 340    QRS Duration 102    QT Interval 344    QTC Calculation(Bazett) 436    R Axis 67    T Axis 270    QRS Count 15    Q Onset 225    T Offset 397    QTC Fredericia 403    Narrative    Atrial fibrillation  Low voltage QRS  ST & T wave abnormality, consider lateral ischemia  Abnormal ECG  No previous ECGs available  Confirmed by Arnold Jacobson ([id number]) on 5/4/2025 12:54:14 PM    - NYHA: 2/3  - LHC: 05/20/25  - CT TTVR: Pending  - dental clearance:  regular dentist visits q6 months, no issues.   - EFT 0/5  - STS   Procedure Type: Isolated TV Repair  Perioperative Outcome Estimate %  Operative Mortality 2.92%  Morbidity & Mortality 10.7%  Stroke 0.607%  Renal Failure 3.14%  Reoperation 4.09%  Prolonged Ventilation 7.16%  Deep Sternal Wound Infection NA  Long Hospital Stay (>14 days) 12.2%  Short Hospital Stay (<6 days)* 33.4%      Social History     Tobacco Use    Smoking status: Never    Smokeless tobacco: Never   Substance  Use Topics    Alcohol use: Yes     Alcohol/week: 10.0 standard drinks of alcohol     Types: 10 Standard drinks or equivalent per week        Family History[1]     RX Allergies[2]     Current Outpatient Medications   Medication Instructions    atorvastatin (LIPITOR) 40 mg, oral, Nightly    dilTIAZem CD (CARDIZEM CD) 300 mg, oral, Daily    furosemide (LASIX) 80 mg, oral, Daily, Take one tablet 80mg daily in the morning    furosemide (LASIX) 40 mg, oral, Daily, Take one table 40 mg daily in the afternoon    metoprolol tartrate (LOPRESSOR) 100 mg, oral, 2 times daily    pantoprazole (PROTONIX) 40 mg, oral, Daily before breakfast, Do not crush, chew, or split.        Vitals:    05/21/25 0949   BP: 102/59   Pulse: 88   SpO2: 98%        Physical Exam:     General:  Alert, calm, well-developed   HEENT:  Pupils equal, round, reactive to light and accommodation. Extraocular movements   Neck:  Supple, without lymphadenopathy or thyromegaly. No carotid bruits.  Lymph:  No axillary, cervical, supraclavicular, pre-auricular, submental, or occipital lymphadenopathy,  Cardiovascular:  Regular rate and rhythm, with normal S1 and S2. Pulmonary murmurs 2/6, no rubs or gallops. No JVD. 2+ pulses bilaterally - dorsalis pedis and radial.  Lungs:  lung clear. No wheezes. No accessory muscle use or cyanosis. No tenderness to palpation.  Abdomen:  Ascitic, Normoactive bowel sounds. Soft, flat, non-tender, and non-distended. No hepatosplenomegaly; liver span approximately 10 cm.  Skin:  Warm, dry, well-perfused. No rashes or other lesions.  Extremities:  2+ pulses in upper and lower extremities. lower extremity edema; legs are symmetric in appearance.  Neuro:  Alert and oriented to person, place, and time. Able to communicate well. 5/5 strength in all extremities bilaterally. Sensation intact in all extremities. Normal gait.        Lab Results   Component Value    CR 0.74     HGB 8.4     ALBUMIN 3.2                  Impression:    84 y.o. y/o male with heart failure symptoms that presents for evaluation of severe, symptomatic Tricuspid regurgitation and right-sided heart failure. Underwent paracentesis procedure in may 2025. Adjusting diuretic therapy. We will consider this patient for MTEER x TTVR post completing his workup.    Plan:   He will need a RHC  He will need a EARNEST (tricuspid protocol)  We will get CT TAVR protocol (TTVR)    The overall decision regarding the best treatment for this condition is complex.  We discussed options of both transcatheter tricuspid valve replacement and transcatheter edge to edge repair along with risks and benefits involved with both of them in detail.    We discussed all the risks associated with the procedure, including but not limited to stroke, MI, pericardial tamponade, vascular complications, infection and death were discussed with the patient. The risk of abort the procedure depending on the evaluation of the gradient before the deployment of the clip was discussed in details. The patient verbalized understanding and decided to proceed with the procedure.     We will discuss this patient's case at our Valve Team meeting with representatives from [facility]. Our nurse navigators will contact patient with further diagnostic needs and formal plan.        [1]   Family History  Problem Relation Name Age of Onset    Heart failure Mother      Atrial fibrillation Sister     [2] No Known Allergies

## 2025-05-21 NOTE — PROGRESS NOTES
[facility] Questionnaire      1  Heart failure affects different people in different ways. Some feel shortness of breath while others feel fatigue. Please indicate how much you are limited by heart failure (shortness of breath or fatigue) in your ability to do the following activities over the past 2 weeks. PRE PROCEDURE    A.) Showering/bathing  5. Not at All  B.) Walking 1 block on level ground 5. Not at All  C.) Hurrying or Jogging   3. Moderately    2.  Over the past 2 weeks, how many times did you have swelling in your feet, ankles or legs when you woke up in the morning? 3. 1-2 times a week    3.  Over the past 2 weeks, on average, how many times has fatigue limited your ability to do what you wanted? 6. Less than once a week    4.  Over the past 2 weeks, on average, how many times has shortness of breath limited your ability to do what you wanted? 6. Less than once a week    5.  Over the past 2 weeks, on average, how many times have you been forced to sleep sitting up in a chair or with at least 3 pillows to prop you up because of shortness of breath? Never    6. Over the past 2 weeks, how much has your heart failure limited your enjoyment of life? It has not limited my enjoyment of life    7. If you had to spend the rest of your life with your heart failure the way it is right now, how would you feel about this? 4. Mostly satisfied    8. How much does your heart failure affect your lifestyle? Please indicate how your heart failure may have limited yourparticipation in the following activities over the past 2 weeks    A.)  Hobbies, recreational activities  4. Slightly limited    B.) Working or doing household chores  5. Did not limit at all    C.) Visiting family or friends out of your home  5. Did not limit at all    5 Meter Walk 8 seconds

## 2025-05-21 NOTE — PROGRESS NOTES
PCP: Dr. Burris  Cards: Dr. Lee     HPI     84 y.o. y/o male with PMH of past medical history of hypertension, atrial fibrillation, no longer on anticoagulation, BPH with LUTS, hematuria status post multiple cystoscopies recently, coronary artery disease, hyperlipidemia, AAA, and heart failure presents for evaluation of severe, symptomatic Tricuspid regurgitation.      Patient with significant tricuspid valve regurgitation and right-sided heart failure. Underwent paracentesis procedure in may 2025. Also reports no anticoagulation secondary to the anemia and hematuria, and recent blood transfusions for that reason. Reports complains about increased abdominal girth, edema and leg swelling.     Denies overt orthopnea, PND, exertional CP. Denies syncope or presyncope.    Full 14 point ROS complete and negative except as noted above.      Structural Workup:   - Echo: EF 62%, Severe TR, mild/ mod MR  - EKG:       Encounter Date: 05/01/25   ECG 12 Lead   Result Value     Ventricular Rate 97     Atrial Rate 340     QRS Duration 102     QT Interval 344     QTC Calculation(Bazett) 436     R Axis 67     T Axis 270     QRS Count 15     Q Onset 225     T Offset 397     QTC Fredericia 403     Narrative     Atrial fibrillation  Low voltage QRS  ST & T wave abnormality, consider lateral ischemia  Abnormal ECG  No previous ECGs available  Confirmed by Arnold Jacobson ([id number]) on 5/4/2025 12:54:14 PM    - NYHA: 2/3  - LHC: 05/20/25  - CT TTVR: Pending  - dental clearance:  regular dentist visits q6 months, no issues.   - EFT 0/5  - STS   Procedure Type: Isolated TV Repair  Perioperative OutcomeEstimate %  Operative Mortality2.92%  Morbidity & Yvmhwojun47.7%  Stroke0.607%  Renal Failure3.14%  Reoperation4.09%  Prolonged Ventilation7.16%  Deep Sternal Wound InfectionNA  Long Hospital Stay (>14 days)12.2%  Short Hospital Stay (<6 days)*33.4%        Social History            Tobacco Use    Smoking status: Never    Smokeless  tobacco: Never   Substance Use Topics    Alcohol use: Yes       Alcohol/week: 10.0 standard drinks of alcohol       Types: 10 Standard drinks or equivalent per week         [Family History]     [Family History]         Problem Relation Name Age of Onset    Heart failure Mother        Atrial fibrillation Sister            [RX Allergies]     [RX Allergies]  Allergies  No Known Allergies          Current Outpatient Medications   Medication Instructions    atorvastatin (LIPITOR) 40 mg, oral, Nightly    dilTIAZem CD (CARDIZEM CD) 300 mg, oral, Daily    furosemide (LASIX) 80 mg, oral, Daily, Take one tablet 80mg daily in the morning    furosemide (LASIX) 40 mg, oral, Daily, Take one table 40 mg daily in the afternoon    metoprolol tartrate (LOPRESSOR) 100 mg, oral, 2 times daily    pantoprazole (PROTONIX) 40 mg, oral, Daily before breakfast, Do not crush, chew, or split.             Vitals:     05/21/25 0949   BP: 102/59   Pulse: 88   SpO2: 98%         Physical Exam:      General:  Alert, calm, well-developed   HEENT:  Pupils equal, round, reactive to light and accommodation. Extraocular movements   Neck:  Supple, without lymphadenopathy or thyromegaly. No carotid bruits.  Lymph:  No axillary, cervical, supraclavicular, pre-auricular, submental, or occipital lymphadenopathy,  Cardiovascular:  Regular rate and rhythm, with normal S1 and S2. Pulmonary murmurs 2/6, no rubs or gallops. No JVD. 2+ pulses bilaterally - dorsalis pedis and radial.  Lungs:  lung clear. No wheezes. No accessory muscle use or cyanosis. No tenderness to palpation.  Abdomen:  Ascitic, Normoactive bowel sounds. Soft, flat, non-tender, and non-distended. No hepatosplenomegaly; liver span approximately 10 cm.  Skin:  Warm, dry, well-perfused. No rashes or other lesions.  Extremities:  2+ pulses in upper and lower extremities. lower extremity edema; legs are symmetric in appearance.  Neuro:  Alert and oriented to person, place, and time. Able to  communicate well. 5/5 strength in all extremities bilaterally. Sensation intact in all extremities. Normal gait.               Lab Results   Component Value     CR 0.74      HGB 8.4      ALBUMIN 3.2                                Impression:   84 y.o. y/o male with heart failure symptoms that presents for evaluation of severe, symptomatic Tricuspid regurgitation and right-sided heart failure. Underwent paracentesis procedure in may 2025. Adjusting diuretic therapy. We will consider this patient for MTEER x TTVR post completing his workup.     Plan:   He will need a RHC  He will need a EARNEST (tricuspid protocol)  We will get CT TAVR protocol (TTVR)     The overall decision regarding the best treatment for this condition is complex.  We discussed options of both transcatheter tricuspid valve replacement and transcatheter edge to edge repair along with risks and benefits involved with both of them in detail.     We discussed all the risks associated with the procedure, including but not limited to stroke, MI, pericardial tamponade, vascular complications, infection and death were discussed with the patient. The risk of abort the procedure depending on the evaluation of the gradient before the deployment of the clip was discussed in details. The patient verbalized understanding and decided to proceed with the procedure.      We will discuss this patient's case at our Valve Team meeting with representatives from [facility]. Our nurse navigators will contact patient with further diagnostic needs and formal plan.

## 2025-05-22 DIAGNOSIS — I36.1 NONRHEUMATIC TRICUSPID VALVE REGURGITATION: Primary | ICD-10-CM

## 2025-05-23 ENCOUNTER — PATIENT OUTREACH (OUTPATIENT)
Dept: PRIMARY CARE | Facility: CLINIC | Age: 85
End: 2025-05-23
Payer: MEDICARE

## 2025-05-23 NOTE — PROGRESS NOTES
Completed telephonic follow-up with patient after recent visit with Dr. Pereira    Spoke to patient during outreach call.    Patient reports feeling: Improved    Patient has questions or concerns about medications: No    Have all prescribed medications been filled? Yes    Patient has necessary resources to manage their care? Yes    Patient has questions or concerns? No    Next care management follow-up approximately within one month.  Patient reports his daughter Caren still has contact information for this RN.

## 2025-05-24 LAB
ALBUMIN SERPL-MCNC: 3.6 G/DL (ref 3.6–5.1)
BUN SERPL-MCNC: 16 MG/DL (ref 7–25)
BUN/CREAT SERPL: ABNORMAL (CALC) (ref 6–22)
CALCIUM SERPL-MCNC: 8.4 MG/DL (ref 8.6–10.3)
CHLORIDE SERPL-SCNC: 98 MMOL/L (ref 98–110)
CO2 SERPL-SCNC: 33 MMOL/L (ref 20–32)
CREAT SERPL-MCNC: 0.92 MG/DL (ref 0.7–1.22)
EGFRCR SERPLBLD CKD-EPI 2021: 82 ML/MIN/1.73M2
GLUCOSE SERPL-MCNC: 94 MG/DL (ref 65–99)
PHOSPHATE SERPL-MCNC: 4.3 MG/DL (ref 2.1–4.3)
POTASSIUM SERPL-SCNC: 3.6 MMOL/L (ref 3.5–5.3)
SODIUM SERPL-SCNC: 139 MMOL/L (ref 135–146)

## 2025-05-27 DIAGNOSIS — I36.1 NONRHEUMATIC TRICUSPID VALVE REGURGITATION: Primary | ICD-10-CM

## 2025-05-28 DIAGNOSIS — I36.1 NONRHEUMATIC TRICUSPID VALVE REGURGITATION: Primary | ICD-10-CM

## 2025-06-01 ASSESSMENT — ENCOUNTER SYMPTOMS
DYSPNEA ON EXERTION: 1
GASTROINTESTINAL NEGATIVE: 1
CONSTITUTIONAL NEGATIVE: 1
MUSCULOSKELETAL NEGATIVE: 1
NEUROLOGICAL NEGATIVE: 1
RESPIRATORY NEGATIVE: 1

## 2025-06-01 NOTE — PROGRESS NOTES
Chief Complaint:   No chief complaint on file.    History Of Present Illness:    .Mr Bunch returns in follow up with his daughter.  Denies chest pain, sob, palpitations, but has pitting bilateral pedal edema.  Couch.  Did not make much progress with furosemide 40 mg twice daily and will increase to 80 mg twice daily.  Return as scheduled in two weeks with bmp.            Last Recorded Vitals:  There were no vitals taken for this visit.     Past Medical History:  Past Medical History:   Diagnosis Date    Atrial fibrillation (Multi)     BPH (benign prostatic hyperplasia)     CAD (coronary artery disease)     CHF (congestive heart failure)     Cirrhosis (Multi)     Hematuria     Hyperlipidemia     Hypertension     Personal history of diseases of the blood and blood-forming organs and certain disorders involving the immune mechanism 06/09/2021    History of thrombocytopenia    Shortness of breath 02/19/2020    Shortness of breath on exertion    Tricuspid regurgitation         Past Surgical History:  Past Surgical History:   Procedure Laterality Date    APPENDECTOMY  09/11/2013    Appendectomy    CARDIAC CATHETERIZATION N/A 5/20/2025    Procedure: Left & Right Heart Cath w Angiography & LV;  Surgeon: Marlon Lee MD;  Location: [facility];  Service: Cardiovascular;  Laterality: N/A;    COLONOSCOPY  06/25/2013    Complete Colonoscopy    CYSTOSCOPY      OTHER SURGICAL HISTORY  04/22/2015    Removal Of Lesion    PARACENTESIS      PILONIDAL CYST DRAINAGE  09/11/2013    Pilonidal Cyst Resection    THORACENTESIS         Social History:  Social History     Socioeconomic History    Marital status:    Tobacco Use    Smoking status: Never    Smokeless tobacco: Never   Substance and Sexual Activity    Alcohol use: Yes     Alcohol/week: 10.0 standard drinks of alcohol     Types: 10 Standard drinks or equivalent per week    Drug use: Not Currently     Social Drivers of Health     Financial Resource Strain: Low  Risk  (5/2/2025)    Overall Financial Resource Strain (CARDIA)     Difficulty of Paying Living Expenses: Not hard at all   Food Insecurity: No Food Insecurity (5/2/2025)    Hunger Vital Sign     Worried About Running Out of Food in the Last Year: Never true     Ran Out of Food in the Last Year: Never true   Transportation Needs: No Transportation Needs (5/2/2025)    PRAPARE - Transportation     Lack of Transportation (Medical): No     Lack of Transportation (Non-Medical): No   Intimate Partner Violence: Not At Risk (5/2/2025)    Humiliation, Afraid, Rape, and Kick questionnaire     Fear of Current or Ex-Partner: No     Emotionally Abused: No     Physically Abused: No     Sexually Abused: No   Housing Stability: Low Risk  (5/2/2025)    Housing Stability Vital Sign     Unable to Pay for Housing in the Last Year: No     Number of Times Moved in the Last Year: 0     Homeless in the Last Year: No       Family History:  Family History   Problem Relation Name Age of Onset    Heart failure Mother      Atrial fibrillation Sister           Allergies:  Patient has no known allergies.    Outpatient Medications:  Current Outpatient Medications   Medication Sig Dispense Refill    atorvastatin (Lipitor) 40 mg tablet Take 1 tablet (40 mg) by mouth once daily at bedtime. 90 tablet 3    dilTIAZem CD (Cardizem CD) 300 mg 24 hr capsule Take 1 capsule (300 mg) by mouth once daily. 90 capsule 3    furosemide (Lasix) 40 mg tablet Take 1 tablet (40 mg) by mouth once daily. Take one table 40 mg daily in the afternoon 90 tablet 3    furosemide (Lasix) 80 mg tablet Take 1 tablet (80 mg) by mouth once daily. Take one tablet 80mg daily in the morning 90 tablet 3    metoprolol tartrate (Lopressor) 100 mg tablet Take 1 tablet (100 mg) by mouth 2 times a day. 180 tablet 3    pantoprazole (ProtoNix) 40 mg EC tablet Take 1 tablet (40 mg) by mouth once daily in the morning. Take before meals. Do not crush, chew, or split. 30 tablet 1     No current  facility-administered medications for this visit.        Physical Exam:  Cardiovascular:      PMI at left midclavicular line. Normal rate. Regular rhythm. Normal S1. Normal S2.       Murmurs: There is no murmur.      No gallop.  No click. No rub.   Pulses:     Intact distal pulses.   Edema:     Ankle: bilateral 2+ pitting edema of the ankle.      ROS:  Review of Systems   Constitutional: Negative.   Cardiovascular:  Positive for dyspnea on exertion and leg swelling.   Respiratory: Negative.     Skin: Negative.    Musculoskeletal: Negative.    Gastrointestinal: Negative.    Genitourinary: Negative.    Neurological: Negative.           Last Labs: reviewed  CBC -  Lab Results   Component Value Date    WBC 4.3 (L) 05/20/2025    WBC 4.6 05/07/2025    HGB 8.4 (L) 05/20/2025    HGB 8.2 (L) 05/07/2025    HCT 27.9 (L) 05/20/2025    HCT 28.7 (L) 05/07/2025    MCV 79 (L) 05/20/2025    MCV 82.5 05/07/2025     05/20/2025     05/07/2025       CMP -  Lab Results   Component Value Date    CALCIUM 8.4 (L) 05/23/2025    PHOS 4.3 05/23/2025    PROT 5.9 (L) 05/07/2025    ALBUMIN 3.6 05/23/2025    AST 6 (L) 04/17/2025    ALT 6 (L) 04/17/2025    ALKPHOS 60 04/17/2025    BILITOT 0.7 04/17/2025       LIPID PANEL -   Lab Results   Component Value Date    CHOL 103 10/01/2024    TRIG 56 10/01/2024    HDL 53.0 10/01/2024    CHHDL 1.9 10/01/2024    LDLF 37 06/05/2023    VLDL 11 10/01/2024    NHDL 50 10/01/2024       RENAL FUNCTION PANEL -   Lab Results   Component Value Date    GLUCOSE 94 05/23/2025     05/23/2025    K 3.6 05/23/2025    CL 98 05/23/2025    CO2 33 (H) 05/23/2025    ANIONGAP 10 05/07/2025    BUN 16 05/23/2025    CREATININE 0.92 05/23/2025    GFRMALE 87 06/05/2023    CALCIUM 8.4 (L) 05/23/2025    PHOS 4.3 05/23/2025    ALBUMIN 3.6 05/23/2025        Lab Results   Component Value Date     (H) 05/01/2025    HGBA1C 5.2 01/05/2024         Assessment/Plan   Problem List Items Addressed This Visit     None          Assessment:     1. Low-grade CAD by cardiac cath, 09/15/2007. The patient is doing well with no unusual chest discomfort. At the time of his catheterization, he was found to have normal coronary arteries other than for minor 20% narrowing.of the diagonal branch. This patient was seen at urgent care on 9/7/2015 with atypical chest pain that occurred when every he would take a deep breath. EKG evidently did not demonstrate any ST segment abnormality. His chest x-ray showed mild interstitial prominence. The amylase and lipase values were normal and his liver function panel was also normal. Cardiac enzymes were negative. The SMA panel included a creatinine of 0.77 a CBC included hematocrit of 43.3. At this point will postpone repeat nuclear stress testing. Patient denies chest pain at today's office visit.   2. Chronic atrial fibrillation. At the time of a previous visit the patient was noted to have a somewhat rapid ventricular response to his atrial fibrillation despite being on metoprolol 100 mg twice a day. At that time the metoprolol dose was increased to 150 mg twice a day. Since that time the patient has remained generally fatigued with some vague muscle aching. Due to the perceived side effects the dose of metoprolol will be reduced back to 100 mg twice a day and he was started on Cardizem  mg daily to assist in ventricular rate control. HHe subsequently had the dose of Cardizem CD reduced to 120 mg daily. e did have an echocardiogram performed 11/4/2014 which demonstrated normal LV chamber size with low normal LV ejection fraction of 55%. There was moderate to severe dilatation of the left atrium. There is moderate dilatation of the right-sided chambers with mild mitral valve regurgitation mild to moderate tricuspid valve regurgitation and mild pulmonary hypertension with an estimated PA systolic pressure of 40 mmHg. There is also mild dilatation of the aortic root and ascending thoracic  aorta. The presence of underlying COPD would explain the mild to moderate right-sided chamber dilatation. The patient did have a repeat echocardiogram performed today on 01/23/2018 which demonstrates an LV ejection fraction of approximately 55% with mild aortic valve insufficiency, mild to moderate mitral valve regurgitation, moderate tricuspid regurgitation, mild pulmonic insufficiency along with moderate left atrial enlargement and mild to moderate right atrial enlargement. The patient had been essentially asymptomatic until he was admitted to [facility] in 2/2020 with a urinary tract infection with bacteremia. He was noted to have Proteus mirabilis bacteremia with acute emphysematous cystitis. During that admission he had atrial fibrillation rapid ventricular rate of all 110â€“1 140/m. Ultimately he was placed on a combination of both diltiazem and metoprolol. The dose of diltiazem was increased from 120-240 mg daily and he was kept on metoprolol tartrate 100 mg twice a day. He did have an echocardiogram that showed an LV ejection fraction of 55â€“60 percent with moderate left atrial enlargement 1+ aortic valve insufficiency and moderately dilated aortic root at 4.1 cm. The ventricular rate to the atrial fibrillation is now well controlled on the present combination of the Cardizem  mg daily along with metoprolol tartrate 100 mg twice daily.  He has declined trying metoprolol succinate.  HR today 90.  Will continue diltiazem to 300 mg daily and have him return in two weeks as scheduled.  Echo 02/2025  CONCLUSIONS:   1. Left ventricular ejection fraction is normal, calculated by Brandon's biplane at 62%.   2. Spectral Doppler shows a Grade III (restrictive pattern) of left ventricular diastolic filling with an elevated left atrial pressure.   3. There is normal right ventricular global systolic function.   4. Mildly enlarged right ventricle.   5. The left atrial size is severely dilated.   6. The  right atrium is severely dilated.   7. Mild to moderate mitral valve regurgitation.   8. Mildly elevated right ventricular systolic pressure.   9. Severe tricuspid regurgitation visualized.  10. Moderate aortic valve regurgitation.   3. Coumadin anticoagulation with one episode of significant epistaxis. The patient has had no recurrent episodes of epistaxis since his last visit. The patient prefers to remain on Coumadin rather than switching to a direct oral anticoagulant, however per urology and several incidents with hematuria, will switch to Eliquis.  Patient has remained off of Eliquis over the past several weeks due to hematuria.  He patient is described below and does have evidence of hepatic cirrhosis possibly related to right heart failure.  He has been seen by interventional cardiology for possible watchman.  For now he will remain off Eliquis anticoagulation.  If patient requires a surgical tricuspid valve repair then a left atrial appendage ligation could conceivably bleed be performed.  4. Hyperlipidemia. The patient did have lab work performed on 11//2022 which included a lipid panel with cholesterol 119 LDL 51 HDL 57 triglyceride 52. The patient's panel is improved now on atorvastatin 40 mg daily rather than the previous simvastatin 40 mg daily.   5. Remote former smoker. Patient discontinued smoking 35 years ago.  6.? COPD.  7. History of urosepsis 2/2020. This patient was admitted to [facility] from 02/02/2020â€“02/05/2020 with sepsis due to a urinary tract infection. The patient presented with hematuria and burning upon urination. The patient initially had a Kirby catheter placed in the office which had subsequently been removed. At the time of his admission to [facility] he was noted to be in his atrial fibrillation but with a more rapid ventricular response. Serial troponins were negative. The patient had already been on Coumadin anticoagulation. The patient was placed on IV  Zosyn and IV vancomycin for the urosepsis. He was initially placed on both diltiazem and metoprolol for ventricular rate control and the dose of diltiazem was increased from 120 mg daily to 240 mg daily. He also had an echocardiogram on 02/04/2020 that showed an LV ejection fraction 55â€“60 percent with moderate left atrial enlargement with 1+ aortic valve insufficiency and a moderately dilated aortic root at 4.1 cm. The patient was found to have Proteus mirabilis bacteremia with acute emphysematous cystitis. He ultimately was discharged home on ampicillin was also started on tamsulosin. The patient had a recent PSA of 2.36 on 5/20/2020. He had a visit with urology and had a postvoid residual of urine of only 29 cc on 7/9/2020.  8. Hx of covid-19 vaccine #1 and #2.       9.  Congestive heart failure/severe tricuspid valve regurgitation/right-sided CHF.  This patient had been seen earlier this year because of increasing shortness of breath along with lower extremity edema and increasing abdominal distention.  Patient was actually admitted to [facility] 2/3/2025 - 2/13/2025 because of hematuria.  Patient had a cystoscopy with clot evacuation.  Abdominal CT scan performed 2/3/2025 demonstrated an undulating hepatic contour consistent with cirrhosis along with mild to moderate volume ascites and bilateral pleural effusions.  Abdominal ultrasound performed on 2/4/2025 demonstrated a large right pleural effusion hepatic cirrhosis and right upper quadrant ascites.  The patient ultimately had a right-sided thoracentesis performed with 950 cc removed.  The patient also had a paracentesis performed of 1 L of serosanguineous ascitic fluid.  Echocardiogram on 2/4/2025 showed a preserved LV ejection fraction as 62% with 1-2+ mitral valve regurgitation 2+ aortic valve insufficiency severe left atrial enlargement severe tricuspid valve regurgitation severe right atrial enlargement and mild pulmonary hypertension.  At  time of office follow-up patient had been started on Lasix 40 mg twice daily 3/11/2025.  He continues to have persistent  lower extremity edema and abdominal ascites.  It appears at this point that the patient has significant right-sided heart failure with the significant tricuspid valve regurgitation being a primary culprit with dilated neck veins.  Patient will be scheduled for right and left heart catheterization within the next several weeks.  Continue current Lasix 80 mg twice daily for now.  Will also repeat abdominal ultrasound and check CMP PT/INR today.  The patient's original cardiac catheterization was scheduled on 5/1/2025 but delayed due to anemia for which she was briefly admitted to [facility] from 5/1/2025 - 5/24/2025..  The patient did receive a 1 unit PRBC transfusion.  He did undergo an abdominal ultrasound guided paracentesis on 5/2/2025 and 5 L of fluid were removed.  His hemoglobin improved to 7.7 at the time of discharge.  His Lasix dose was increased to 80 mg twice daily with subsequent stabilization of his ascites and weight which currently is approximately 170 pounds.  The cardiac cath procedure was subsequently performed on 5/20/2025.  The left heart catheterization demonstrated a normal LMCA. The LAD extended to the LV apex and gives rise to a moderate-sized first diagonal branch and contains mild less than 30% luminal irregularities. But nondominant LCx also has minimal disease. The very large dominant RCA is moderately calcified throughout but exhibits only mild diffuse luminal irregularities. The left ventriculogram showed a intact LV ejection fraction in the range of 55%. The right heart catheterization showed high normal pulmonary artery pressures mean PA pressure of 23 mmHg borderline elevated pulmonary capillary wedge pressure.  The patient's dose of Lasix at that point was reduced to 80 mg every morning and 40 mg in the evening.  Lab work 5/23/2025 includes creatinine 0.92  potassium 3.6.  Patient was referred to the structural heart program for consideration of either tricuspid valve repair or surgical versus a tricuspid valve clip.  The patient is scheduled for CT TAVR on 6/17/2025 and a trans esophageal echo on 6/23/2025 to further assess option of tricuspid valve clip versus surgical tricuspid valve repair.  The patient is also interested in having a Watchman device placed for his atrial fibrillation and he has already been seen by Dr. Banks in this regard.  He was evidently advised that the tricuspid valve repair should be performed initially before the watchman.  Patient is to be reassessed by structural heart program following his upcoming procedures and will return in 4 weeks for cardiology follow-up.  He will be allowed to reduce Lasix further to 40 mg twice daily but will need to monitor weight and increase the dose if he gains more than 4 to 5 pounds.  It is anticipated that he hopefully would be able to have his procedure performed in the latter half of 7/2025.     10.  Hematuria.  Will refer for Watchman. (See TC 03/31/2025)

## 2025-06-02 ENCOUNTER — APPOINTMENT (OUTPATIENT)
Dept: UROLOGY | Facility: CLINIC | Age: 85
End: 2025-06-02
Payer: MEDICARE

## 2025-06-03 ENCOUNTER — OFFICE VISIT (OUTPATIENT)
Dept: CARDIOLOGY | Facility: CLINIC | Age: 85
End: 2025-06-03
Payer: MEDICARE

## 2025-06-03 VITALS
DIASTOLIC BLOOD PRESSURE: 58 MMHG | BODY MASS INDEX: 24.73 KG/M2 | WEIGHT: 172.7 LBS | HEART RATE: 98 BPM | SYSTOLIC BLOOD PRESSURE: 98 MMHG | OXYGEN SATURATION: 99 % | HEIGHT: 70 IN

## 2025-06-03 DIAGNOSIS — I50.43 ACUTE ON CHRONIC COMBINED SYSTOLIC AND DIASTOLIC CONGESTIVE HEART FAILURE: Primary | ICD-10-CM

## 2025-06-03 DIAGNOSIS — I25.10 CORONARY ARTERY DISEASE INVOLVING NATIVE CORONARY ARTERY OF NATIVE HEART WITHOUT ANGINA PECTORIS: ICD-10-CM

## 2025-06-03 PROCEDURE — 99214 OFFICE O/P EST MOD 30 MIN: CPT | Performed by: INTERNAL MEDICINE

## 2025-06-03 PROCEDURE — 1126F AMNT PAIN NOTED NONE PRSNT: CPT | Performed by: INTERNAL MEDICINE

## 2025-06-03 PROCEDURE — 1159F MED LIST DOCD IN RCRD: CPT | Performed by: INTERNAL MEDICINE

## 2025-06-03 PROCEDURE — 1111F DSCHRG MED/CURRENT MED MERGE: CPT | Performed by: INTERNAL MEDICINE

## 2025-06-03 PROCEDURE — 99212 OFFICE O/P EST SF 10 MIN: CPT | Performed by: INTERNAL MEDICINE

## 2025-06-03 PROCEDURE — G2211 COMPLEX E/M VISIT ADD ON: HCPCS | Performed by: INTERNAL MEDICINE

## 2025-06-03 PROCEDURE — 1160F RVW MEDS BY RX/DR IN RCRD: CPT | Performed by: INTERNAL MEDICINE

## 2025-06-03 RX ORDER — TAMSULOSIN HYDROCHLORIDE 0.4 MG/1
0.4 CAPSULE ORAL DAILY
COMMUNITY
Start: 2025-05-29

## 2025-06-03 ASSESSMENT — PATIENT HEALTH QUESTIONNAIRE - PHQ9
1. LITTLE INTEREST OR PLEASURE IN DOING THINGS: NOT AT ALL
SUM OF ALL RESPONSES TO PHQ9 QUESTIONS 1 AND 2: 0
2. FEELING DOWN, DEPRESSED OR HOPELESS: NOT AT ALL

## 2025-06-03 ASSESSMENT — PAIN SCALES - GENERAL: PAINLEVEL_OUTOF10: 0-NO PAIN

## 2025-06-17 ENCOUNTER — HOSPITAL ENCOUNTER (OUTPATIENT)
Dept: RADIOLOGY | Facility: HOSPITAL | Age: 85
Discharge: HOME | End: 2025-06-17
Payer: MEDICARE

## 2025-06-17 DIAGNOSIS — I36.1 NONRHEUMATIC TRICUSPID VALVE REGURGITATION: ICD-10-CM

## 2025-06-17 PROCEDURE — 74174 CTA ABD&PLVS W/CONTRAST: CPT

## 2025-06-17 PROCEDURE — 2550000001 HC RX 255 CONTRASTS: Performed by: INTERNAL MEDICINE

## 2025-06-17 RX ADMIN — IOHEXOL 80 ML: 350 INJECTION, SOLUTION INTRAVENOUS at 07:23

## 2025-06-20 ENCOUNTER — TELEPHONE (OUTPATIENT)
Dept: CARDIOLOGY | Facility: HOSPITAL | Age: 85
End: 2025-06-20
Payer: MEDICARE

## 2025-06-20 RX ORDER — SODIUM CHLORIDE 9 MG/ML
100 INJECTION, SOLUTION INTRAVENOUS CONTINUOUS
Status: CANCELLED | OUTPATIENT
Start: 2025-06-20 | End: 2025-06-21

## 2025-06-20 NOTE — TELEPHONE ENCOUNTER
IInstructions for Transesophageal Echocardiogram (EARNEST):    Spoke to: Daughter  Test: Transesophageal Echocardiogram (EARNEST)    Please arrive 1 hour early to surgery waiting room on 2nd floor  This test takes approximately 2-2.5 hours  No food or drink 8 hours before your test  If your EARNEST is outpatient, we require you to have a friend or family member drive you home because of the sedation  Take your usual morning medications with a sip of water, unless otherwise instructed by your provider

## 2025-06-23 ENCOUNTER — ANESTHESIA EVENT (OUTPATIENT)
Dept: CARDIOLOGY | Facility: HOSPITAL | Age: 85
End: 2025-06-23
Payer: MEDICARE

## 2025-06-23 ENCOUNTER — ANESTHESIA (OUTPATIENT)
Dept: CARDIOLOGY | Facility: HOSPITAL | Age: 85
End: 2025-06-23
Payer: MEDICARE

## 2025-06-23 ENCOUNTER — HOSPITAL ENCOUNTER (OUTPATIENT)
Dept: CARDIOLOGY | Facility: HOSPITAL | Age: 85
Discharge: HOME | End: 2025-06-23
Payer: MEDICARE

## 2025-06-23 VITALS
TEMPERATURE: 98.3 F | OXYGEN SATURATION: 95 % | HEART RATE: 60 BPM | SYSTOLIC BLOOD PRESSURE: 108 MMHG | DIASTOLIC BLOOD PRESSURE: 58 MMHG | RESPIRATION RATE: 16 BRPM

## 2025-06-23 DIAGNOSIS — R06.02 SHORTNESS OF BREATH: ICD-10-CM

## 2025-06-23 DIAGNOSIS — R60.9 EDEMA: ICD-10-CM

## 2025-06-23 DIAGNOSIS — I25.10 ATHEROSCLEROTIC HEART DISEASE OF NATIVE CORONARY ARTERY WITHOUT ANGINA PECTORIS: ICD-10-CM

## 2025-06-23 DIAGNOSIS — I36.1 NONRHEUMATIC TRICUSPID VALVE REGURGITATION: ICD-10-CM

## 2025-06-23 DIAGNOSIS — I50.43 ACUTE ON CHRONIC COMBINED SYSTOLIC (CONGESTIVE) AND DIASTOLIC (CONGESTIVE) HEART FAILURE: ICD-10-CM

## 2025-06-23 PROBLEM — E78.5 HYPERLIPIDEMIA: Status: ACTIVE | Noted: 2025-06-23

## 2025-06-23 PROBLEM — J44.9 CHRONIC OBSTRUCTIVE PULMONARY DISEASE (MULTI): Status: ACTIVE | Noted: 2025-06-23

## 2025-06-23 PROBLEM — I27.20 PULMONARY HYPERTENSION (MULTI): Status: ACTIVE | Noted: 2025-06-23

## 2025-06-23 PROBLEM — I50.810 RIGHT VENTRICULAR FAILURE (MULTI): Status: ACTIVE | Noted: 2025-06-23

## 2025-06-23 PROBLEM — I10 HTN (HYPERTENSION): Status: ACTIVE | Noted: 2025-06-23

## 2025-06-23 PROBLEM — I38 VALVULAR HEART DISEASE: Status: ACTIVE | Noted: 2025-06-23

## 2025-06-23 LAB
AORTIC VALVE MEAN GRADIENT: 4 MMHG
AORTIC VALVE PEAK VELOCITY: 1.27 M/S
AV PEAK GRADIENT: 6 MMHG
AVA (PEAK VEL): 3 CM2
AVA (VTI): 3.46 CM2
EJECTION FRACTION: 53 %
LEFT ATRIUM VOLUME AREA LENGTH INDEX BSA: 92.1 ML/M2
LEFT VENTRICLE INTERNAL DIMENSION DIASTOLE: 6.27 CM (ref 3.5–6)
LEFT VENTRICULAR OUTFLOW TRACT DIAMETER: 2.45 CM
RIGHT VENTRICLE PEAK SYSTOLIC PRESSURE: 31 MMHG
TRICUSPID ANNULAR PLANE SYSTOLIC EXCURSION: 1.5 CM

## 2025-06-23 PROCEDURE — 93312 ECHO TRANSESOPHAGEAL: CPT

## 2025-06-23 PROCEDURE — 2500000005 HC RX 250 GENERAL PHARMACY W/O HCPCS

## 2025-06-23 PROCEDURE — A93312 PR ECHO HEART,TRANSESOPHAGEAL,COMPLETE: Performed by: ANESTHESIOLOGY

## 2025-06-23 PROCEDURE — 7100000009 HC PHASE TWO TIME - INITIAL BASE CHARGE

## 2025-06-23 PROCEDURE — 93319 3D ECHO IMG CGEN CAR ANOMAL: CPT

## 2025-06-23 PROCEDURE — A93312 PR ECHO HEART,TRANSESOPHAGEAL,COMPLETE: Performed by: ANESTHESIOLOGIST ASSISTANT

## 2025-06-23 PROCEDURE — 3700000002 HC GENERAL ANESTHESIA TIME - EACH INCREMENTAL 1 MINUTE

## 2025-06-23 PROCEDURE — 2500000004 HC RX 250 GENERAL PHARMACY W/ HCPCS (ALT 636 FOR OP/ED): Performed by: ANESTHESIOLOGIST ASSISTANT

## 2025-06-23 PROCEDURE — 99100 ANES PT EXTEME AGE<1 YR&>70: CPT | Performed by: ANESTHESIOLOGY

## 2025-06-23 PROCEDURE — 99222 1ST HOSP IP/OBS MODERATE 55: CPT | Performed by: NURSE PRACTITIONER

## 2025-06-23 PROCEDURE — 3700000001 HC GENERAL ANESTHESIA TIME - INITIAL BASE CHARGE

## 2025-06-23 PROCEDURE — 7100000010 HC PHASE TWO TIME - EACH INCREMENTAL 1 MINUTE

## 2025-06-23 PROCEDURE — 2500000004 HC RX 250 GENERAL PHARMACY W/ HCPCS (ALT 636 FOR OP/ED): Mod: JW | Performed by: ANESTHESIOLOGIST ASSISTANT

## 2025-06-23 PROCEDURE — 93320 DOPPLER ECHO COMPLETE: CPT

## 2025-06-23 RX ORDER — DROPERIDOL 2.5 MG/ML
0.62 INJECTION, SOLUTION INTRAMUSCULAR; INTRAVENOUS ONCE AS NEEDED
OUTPATIENT
Start: 2025-06-23

## 2025-06-23 RX ORDER — ALBUTEROL SULFATE 0.83 MG/ML
2.5 SOLUTION RESPIRATORY (INHALATION) ONCE AS NEEDED
OUTPATIENT
Start: 2025-06-23

## 2025-06-23 RX ORDER — LIDOCAINE HYDROCHLORIDE 20 MG/ML
SOLUTION OROPHARYNGEAL AS NEEDED
Status: DISCONTINUED | OUTPATIENT
Start: 2025-06-23 | End: 2025-06-23 | Stop reason: HOSPADM

## 2025-06-23 RX ORDER — ONDANSETRON HYDROCHLORIDE 2 MG/ML
4 INJECTION, SOLUTION INTRAVENOUS ONCE AS NEEDED
OUTPATIENT
Start: 2025-06-23

## 2025-06-23 RX ORDER — FUROSEMIDE 40 MG/1
40 TABLET ORAL
COMMUNITY
Start: 2025-06-23

## 2025-06-23 RX ORDER — PHENYLEPHRINE HCL IN 0.9% NACL 1 MG/10 ML
SYRINGE (ML) INTRAVENOUS AS NEEDED
Status: DISCONTINUED | OUTPATIENT
Start: 2025-06-23 | End: 2025-06-23

## 2025-06-23 RX ORDER — PROPOFOL 10 MG/ML
INJECTION, EMULSION INTRAVENOUS AS NEEDED
Status: DISCONTINUED | OUTPATIENT
Start: 2025-06-23 | End: 2025-06-23

## 2025-06-23 RX ADMIN — PROPOFOL 20 MG: 10 INJECTION, EMULSION INTRAVENOUS at 14:24

## 2025-06-23 RX ADMIN — Medication 100 MCG: at 14:26

## 2025-06-23 RX ADMIN — SODIUM CHLORIDE: 9 INJECTION, SOLUTION INTRAVENOUS at 13:27

## 2025-06-23 RX ADMIN — PROPOFOL 20 MG: 10 INJECTION, EMULSION INTRAVENOUS at 14:23

## 2025-06-23 RX ADMIN — BENZOCAINE, BUTAMBEN, AND TETRACAINE HYDROCHLORIDE 2 SPRAY: .028; .004; .004 AEROSOL, SPRAY TOPICAL at 13:38

## 2025-06-23 RX ADMIN — PROPOFOL 20 MG: 10 INJECTION, EMULSION INTRAVENOUS at 14:08

## 2025-06-23 RX ADMIN — PROPOFOL 20 MG: 10 INJECTION, EMULSION INTRAVENOUS at 13:53

## 2025-06-23 RX ADMIN — PROPOFOL 20 MG: 10 INJECTION, EMULSION INTRAVENOUS at 14:12

## 2025-06-23 RX ADMIN — PROPOFOL 20 MG: 10 INJECTION, EMULSION INTRAVENOUS at 14:18

## 2025-06-23 RX ADMIN — LIDOCAINE HYDROCHLORIDE 15 ML: 20 SOLUTION ORAL; TOPICAL at 13:38

## 2025-06-23 RX ADMIN — PROPOFOL 30 MG: 10 INJECTION, EMULSION INTRAVENOUS at 13:52

## 2025-06-23 RX ADMIN — PROPOFOL 20 MG: 10 INJECTION, EMULSION INTRAVENOUS at 14:14

## 2025-06-23 RX ADMIN — Medication 100 MCG: at 14:01

## 2025-06-23 RX ADMIN — PROPOFOL 20 MG: 10 INJECTION, EMULSION INTRAVENOUS at 13:58

## 2025-06-23 RX ADMIN — PROPOFOL 20 MG: 10 INJECTION, EMULSION INTRAVENOUS at 14:04

## 2025-06-23 RX ADMIN — PROPOFOL 20 MG: 10 INJECTION, EMULSION INTRAVENOUS at 14:07

## 2025-06-23 RX ADMIN — PROPOFOL 20 MG: 10 INJECTION, EMULSION INTRAVENOUS at 13:54

## 2025-06-23 RX ADMIN — PROPOFOL 30 MG: 10 INJECTION, EMULSION INTRAVENOUS at 13:55

## 2025-06-23 RX ADMIN — Medication 100 MCG: at 14:10

## 2025-06-23 RX ADMIN — Medication 100 MCG: at 13:59

## 2025-06-23 ASSESSMENT — PAIN SCALES - GENERAL
PAINLEVEL_OUTOF10: 0 - NO PAIN
PAIN_LEVEL: 0
PAINLEVEL_OUTOF10: 0 - NO PAIN

## 2025-06-23 ASSESSMENT — ENCOUNTER SYMPTOMS
MUSCULOSKELETAL NEGATIVE: 1
PSYCHIATRIC NEGATIVE: 1
ENDOCRINE NEGATIVE: 1
GASTROINTESTINAL NEGATIVE: 1
CONSTITUTIONAL NEGATIVE: 1
EYES NEGATIVE: 1
HEMATOLOGIC/LYMPHATIC NEGATIVE: 1
SHORTNESS OF BREATH: 1
NEUROLOGICAL NEGATIVE: 1
CARDIOVASCULAR NEGATIVE: 1
ALLERGIC/IMMUNOLOGIC NEGATIVE: 1

## 2025-06-23 ASSESSMENT — PAIN - FUNCTIONAL ASSESSMENT
PAIN_FUNCTIONAL_ASSESSMENT: 0-10

## 2025-06-23 NOTE — ANESTHESIA POSTPROCEDURE EVALUATION
Patient: Ming Bunch    Procedure Summary       Date: 06/23/25 [facility]    Anesthesia Start: 1327 Anesthesia Stop: 1432    Procedure: TRANSESOPHAGEAL ECHO (EARNEST) Diagnosis:       Nonrheumatic tricuspid valve regurgitation      (tricuspid regurgitation, TR protocol)    Scheduled Providers: Cy Alfred MD; Zaire Bates MD Responsible Provider: Zaire Bates MD    Anesthesia Type: general ASA Status: 3            Anesthesia Type: general    Vitals Value Taken Time   /59 06/23/25 14:49   Temp 36.8 06/23/25 15:03   Pulse 66 06/23/25 14:49   Resp 16 06/23/25 14:49   SpO2 95 % 06/23/25 14:49       Anesthesia Post Evaluation    Patient location during evaluation: PACU  Patient participation: complete - patient participated  Level of consciousness: awake  Pain score: 0  Pain management: adequate  Airway patency: patent  Cardiovascular status: acceptable  Respiratory status: acceptable  Hydration status: acceptable  Postoperative Nausea and Vomiting: none        No notable events documented.

## 2025-06-23 NOTE — DISCHARGE INSTRUCTIONS
Follow up with Dr. Lee as scheduled on 7/8/25. Dr. Marshall and the Structural Heart Team will review results from today to determine next steps.       Transesophageal Echocardiogram:  Post-Procedure and Homegoing Instructions    Please follow the instructions below after your transesophageal echocardiogram:  1) Do not drive or operate machinery for 24 hours after your procedure.  2) Do not eat or drink anything for two hours after your procedure, as directed by nurse.   Start with a little bit of water to be sure you can swallow without coughing.  3) Avoid alcohol for at least 24 hours after the test.  5) You may have a mild sore throat for a day or two. Cough drops may help after the  two hour wait. Tylenol may also be taken once you can swallow.  6) Although the symptoms below are rare, call your doctor at once, or seek emergency  care if you have:  - Bright red blood in your sputum - Sustained chest pain  - Severe shortness of breath - Severe abdominal pain  - Allergy symptoms (hives, rash, nausea, vomiting, difficulty breathing  7) If you are an out-patient, a nurse will have placed an IV during the study for sedation  and contrast injection. The nurse will remove the IV before she sends you home. Leave  the band-aid on for 24 hours and then check the IV site for signs of infection, such as:  ? Increasing soreness  ? Redness  ? Drainage from the puncture site    If you notice any of these conditions, you should contact your doctor immediately.

## 2025-06-23 NOTE — ANESTHESIA PREPROCEDURE EVALUATION
Patient: Ming Bunch    Procedure Information       Date/Time: 06/23/25 1330    Scheduled providers: Cy Alfred MD; Zaire Bates MD    Procedure: TRANSESOPHAGEAL ECHO (EARNEST)    [facility]            Relevant Problems   Cardiac  Right ventricular failure   (+) Acute on chronic combined systolic and diastolic congestive heart failure   (+) Atrial fibrillation (Multi)   (+) CAD (coronary artery disease)   (+) Coronary artery disease   (+) HTN (hypertension)   (+) Hyperlipidemia   (+) Right ventricular failure (Multi)   (+) Valvular heart disease (AI, MR, TR)      Pulmonary   (+) Chronic obstructive pulmonary disease (Multi)   (+) Pulmonary hypertension (Multi)   (+) Shortness of breath on exertion      Neuro (within normal limits)      GI (within normal limits)      /Renal (within normal limits)      Liver (within normal limits)      Endocrine (within normal limits)      Hematology   (+) Anemia      Musculoskeletal (within normal limits)      HEENT (within normal limits)      Circulatory   (+) Aortic root dilatation       Clinical information reviewed:    Allergies  Meds               NPO Detail:  No data recorded     Physical Exam    Airway  Mallampati: III  TM distance: >3 FB  Neck ROM: full  Mouth opening: 3 or more finger widths     Cardiovascular   Rhythm: irregular  Rate: normal     Dental - normal exam    (+) upper dentures     Pulmonary - normal exam   Abdominal            Anesthesia Plan    History of general anesthesia?: yes  History of complications of general anesthesia?: no    ASA 3     general     intravenous induction   Anesthetic plan and risks discussed with patient.  Use of blood products discussed with patient who.    Plan discussed with CRNA and CAA.

## 2025-06-23 NOTE — H&P
History Of Present Illness  Ming Bunch is a 84 y.o. male presenting with severe TR, here for EARNEST as part of T-EER/TTVR workup. PMHx significant for right heart failure/ascites s/p paracentesis with 4L removed 5/2025, hypertension, atrial fibrillation, no longer on anticoagulation in the setting of hematuria, BPH with LUTS, anemia, hematuria status post multiple cystoscopies, coronary artery disease, hyperlipidemia, AAA, and HFpEF.     Past Medical History:  Medical History[1]     Past Surgical History:  Surgical History[2]       Social History:  Social History[3]    Family History:  Family History[4]     Allergies:  RX Allergies[5]     Home Medications:  Current Outpatient Medications   Medication Instructions    atorvastatin (LIPITOR) 40 mg, oral, Nightly    dilTIAZem CD (CARDIZEM CD) 300 mg, oral, Daily    furosemide (LASIX) 80 mg, oral, Daily, Take one tablet 80mg daily in the morning    furosemide (LASIX) 40 mg, oral, Daily, Take one table 40 mg daily in the afternoon    metoprolol tartrate (LOPRESSOR) 100 mg, oral, 2 times daily    pantoprazole (PROTONIX) 40 mg, oral, Daily before breakfast, Do not crush, chew, or split.    tamsulosin (FLOMAX) 0.4 mg, Daily       Inpatient Medications:  Scheduled Medications[6]  PRN Medications[7]  Continuous Medications[8]      Review of Systems   Constitutional: Negative.    HENT: Negative.     Eyes: Negative.    Respiratory:  Positive for shortness of breath (ROMAN).    Cardiovascular: Negative.    Gastrointestinal: Negative.    Endocrine: Negative.    Genitourinary: Negative.    Musculoskeletal: Negative.    Skin: Negative.    Allergic/Immunologic: Negative.    Neurological: Negative.    Hematological: Negative.    Psychiatric/Behavioral: Negative.            Physical Exam  General:  Elderly male. Patient is awake, alert, and oriented.  Patient is in no acute distress.  HEENT:  Pupils equal and reactive.  Normocephalic.  Moist mucosa.    Neck:  No JVD.   Cardiovascular:   Regular rate and rhythm with some ectopy.  +Murmur. Radial pulses 2+.   Pulmonary:  Clear to auscultation bilaterally.  Abdomen:  Soft. Non-tender.   Non-distended.  Positive bowel sounds.  Lower Extremities:  Pedal pulses 2+ No LE edema.  Neurologic:  Cranial nerves II-XII grossly intact.   No focal deficit.   Skin: Skin warm and dry, no lesions. Normal skin turgor.   Psychiatric: Normal affect.     Sedation Plan    ASA 3     Mallampati class: III.    Risks, benefits, and alternatives discussed with patient.         NPO since 0000    Last Recorded Vitals  Blood pressure 117/58, pulse 66, temperature 36.8 °C (98.3 °F), temperature source Temporal, resp. rate 18, SpO2 96%.         Vitals from the Past 24 Hours  Heart Rate:  [66]   Temp:  [36.8 °C (98.3 °F)]   Resp:  [18]   BP: (117)/(58)   SpO2:  [96 %]          Relevant Results    Labs    POCT Glucose:      POCT Urine Pregnancy:       CBC:   Recent Labs     05/20/25  0736 05/07/25  0823 05/04/25  0505 05/03/25  1452 05/03/25  0509 05/02/25  1227 05/02/25  0540 05/01/25  1750 05/01/25  0731   WBC 4.3* 4.6 6.1  --  5.6  --  6.1  --  5.4   HGB 8.4* 8.2* 7.7* 8.1* 7.3* 8.2* 6.9*   < > 6.4*   HCT 27.9* 28.7* 25.9* 25.9* 24.4* 26.0* 22.9*   < > 21.9*    320 261  --  274  --  288  --  298   MCV 79* 82.5 80  --  80  --  78*  --  79*    < > = values in this interval not displayed.     BMP/CMP:   Recent Labs     05/23/25  0904 05/07/25  0823 05/07/25  0819 05/04/25  0505 05/03/25  0509 05/02/25  0540 05/01/25  1750 04/17/25  1146 02/13/25  0717 02/12/25  0648 02/11/25  0808 02/10/25  0720 02/09/25  0526    137  --  137 138 139 139 137 137 138 138 137 137   K 3.6 3.6  --  3.3* 3.3* 3.2* 2.8* 4.1 4.0 3.8 3.6 3.8 3.7   CL 98 96*  --  99 100 100 98 99 106 105 106 104 104   BUN 16 10  --  10 11 10 11 17 11 10 10 11 14   CREATININE 0.92 0.74  --  0.73 0.70 0.83 0.82 0.89 0.55 0.62 0.60 0.64 0.65   CO2 33* 31  --  31 32 32 33* 30 26 28 28 27 29   CALCIUM 8.4* 8.1*  --   "7.4* 7.7* 7.7* 8.1* 8.2* 8.0* 8.0* 7.9* 8.3* 7.7*   PROT  --   --  5.9*  --   --   --   --  6.0* 5.7* 5.5* 5.7* 6.3* 5.4*   BILITOT  --   --   --   --   --   --   --  0.7 0.6 0.8 0.8 0.9 0.9   ALKPHOS  --   --   --   --   --   --   --  60 67 70 63 70 64   ALT  --   --   --   --   --   --   --  6* 16 16 12 12 11   AST  --   --   --   --   --   --   --  6* 9 9 8* 7* 7*   GLUCOSE 94 92  --  95 95 99 108* 130* 141* 91 94 105* 92      Magnesium:   Recent Labs     05/04/25  0505 05/03/25  0509 02/07/25  0522 02/06/25  0453 02/05/25  0445 02/04/20  0535   MG 1.83 1.60 1.66 1.66 1.73 1.70     Lipid Panel:   Recent Labs     10/01/24  0850 02/08/24  0829 06/05/23  0824 11/17/22  0813 11/10/21  0752 05/28/21  0905 05/20/20  0905 05/17/19  0857 05/07/18  0845 10/26/17  1135   CHOL 103 109 110 119 116 114 112 112 110 131   HDL 53.0 60.0 59.8 57.5 57.0 57.4 53.9 51.5 51.3 56.8   CHHDL 1.9 1.8 1.8 2.1 2.0 2.0 2.1 2.2 2.1 2.3   VLDL 11 10 13 10 16 11 11 12 17 15   TRIG 56 51 65 52 79 53 54 62 86 74   NHDL 50 49  --   --   --   --   --   --   --   --      Cardiac       No lab exists for component: \"CK\", \"CKMBP\"   Hemoglobin A1C:   Recent Labs     01/05/24  0843 06/05/23  0824 11/17/22  0813 11/10/21  0752 05/28/21  0905 05/20/20  0905 05/17/19  0857 10/26/17  1135   HGBA1C 5.2 5.4 5.1 5.3 5.4 5.4 5.1 5.3     TSH/ Free T4:   Recent Labs     10/01/24  0850 06/05/23  0824 11/17/22  0813 05/28/21  0905 05/20/20  0905 02/03/20  0657 05/17/19  0857 05/07/18  0845   TSH 2.70 2.73 3.04 2.89 2.89 1.74 2.18 2.33     Iron:   Recent Labs     05/01/25  1750 05/01/25  0731 02/03/25  1215 10/01/24  0850 06/21/23  0805   FERRITIN  --   --  31 29 201   TIBC 331  --  330 343 338   IRONSAT 13*  --  8* 22* 20*   BNP  --  257* 267*  --   --      Coag:     ABO: No results found for: \"ABO\"    Past Cardiology Tests (Last 3 Years):    EKG:  Recent Labs     05/01/25  0745 02/26/25  1315 02/03/25  1214 02/03/20  1230   ATRRATE 340 125  --  227   VENTRATE 97 " 119 81 100   QRSDUR 102 90 96 86   QTCFRED 403 419 404 393   QTCCALCB 436 469 425 428     Encounter Date: 05/01/25   ECG 12 Lead   Result Value    Ventricular Rate 97    Atrial Rate 340    QRS Duration 102    QT Interval 344    QTC Calculation(Bazett) 436    R Axis 67    T Axis 270    QRS Count 15    Q Onset 225    T Offset 397    QTC Fredericia 403    Narrative    Atrial fibrillation  Low voltage QRS  ST & T wave abnormality, consider lateral ischemia  Abnormal ECG  No previous ECGs available  Confirmed by Arnold Jacobson ([id number]) on 5/4/2025 12:54:14 PM     Echo:  Echocardiogram:   Transthoracic Echo (TTE) Complete 02/04/2025    [facility], [address] [place] [address]  Tel [phone] and Fax [phone]    TRANSTHORACIC ECHOCARDIOGRAM REPORT      Patient Name:       EVERETTE Kramer Physician:   [id number] Andrea Torres DO  Study Date:         2/4/2025            Ordering Provider:   [id number] KENZIE SMITH  MRN/PID:            [id number]            Fellow:  Accession#:         [id number]        Nurse:  Date of Birth/Age:  1940 / 84      Sonographer:         Selene Kline RDCS  years  Gender assigned at  M                   Additional Staff:  Birth:  Height:             175.00 cm           Admit Date:          2/4/2025  Weight:             93.00 kg            Admission Status:    Observation -  Priority discharge  BSA / BMI:          2.09 m2 / 30.37     Encounter#:          [id number]  kg/m2  Blood Pressure:     119/72 mmHg         Department Location: [facility] Non  Invasive    Study Type:    TRANSTHORACIC ECHO (TTE) COMPLETE  Diagnosis/ICD: Pleural Effusion-J90  Indication:    ascites, new pleural efussion  CPT Code:      Echo Complete w Full Doppler-[id number]    Patient History:  Pertinent History: A-Fib and CAD. Aortic root diltatiation.    Study Detail: The following Echo studies were performed: 2D, M-Mode, Doppler and  color flow.      PHYSICIAN  INTERPRETATION:  Left Ventricle: Left ventricular ejection fraction is normal, calculated by Brandon's biplane at 62%. There are no regional left ventricular wall motion abnormalities. The left ventricular cavity size is normal. There is normal septal and mildly increased posterior left ventricular wall thickness. Spectral Doppler shows a Grade III (restrictive pattern) of left ventricular diastolic filling with an elevated left atrial pressure.  Left Atrium: The left atrial size is severely dilated.  Right Ventricle: The right ventricle is mildly enlarged. There is normal right ventricular global systolic function.  Right Atrium: The right atrium is severely dilated.  Aortic Valve: The aortic valve is trileaflet. The aortic valve dimensionless index is 0.49. There is moderate aortic valve regurgitation. The peak instantaneous gradient of the aortic valve is 11 mmHg. The mean gradient of the aortic valve is 5 mmHg.  Mitral Valve: The mitral valve is normal in structure. There is mild to moderate mitral valve regurgitation which is centrally directed. The mitral regurgitant orifice area is 23 mm2. The mitral regurgitant volume is 32.03 ml.  Tricuspid Valve: The tricuspid valve is structurally normal. There is severe tricuspid regurgitation. The tricuspid valve regurgitant jet is eccentrically directed. The Doppler estimated RVSP is mildly elevated at 40.7 mmHg. Reported right ventricular systolic pressure may be underestimated due to severe tricuspid regurgitation.  Pulmonic Valve: The pulmonic valve is structurally normal. There is mild pulmonic valve regurgitation.  Pericardium: There is no pericardial effusion noted.  Aorta: The aortic root is abnormal. The Ao Sinus is 4.30 cm. The Asc Ao is 3.90 cm. There is mild dilatation of the ascending aorta. There is mild dilatation of the aortic root.  Systemic Veins: The inferior vena cava appears dilated.  In comparison to the previous echocardiogram(s): Compared with  study dated 10/24/2023,. The LVEF remains stable. The Biatrial dilation remains severe. RVSP is likely understimated due to eccentric TR.      CONCLUSIONS:  1. Left ventricular ejection fraction is normal, calculated by Brandon's biplane at 62%.  2. Spectral Doppler shows a Grade III (restrictive pattern) of left ventricular diastolic filling with an elevated left atrial pressure.  3. There is normal right ventricular global systolic function.  4. Mildly enlarged right ventricle.  5. The left atrial size is severely dilated.  6. The right atrium is severely dilated.  7. Mild to moderate mitral valve regurgitation.  8. Mildly elevated right ventricular systolic pressure.  9. Severe tricuspid regurgitation visualized.  10. Moderate aortic valve regurgitation.    QUANTITATIVE DATA SUMMARY:    2D MEASUREMENTS:          Normal Ranges:  LAs:             5.59 cm  (2.7-4.0cm)  IVSd:            0.87 cm  (0.6-1.1cm)  LVPWd:           1.09 cm  (0.6-1.1cm)  LVIDd:           5.20 cm  (3.9-5.9cm)  LVIDs:           3.73 cm  LV Mass Index:   90 g/m2  LVEDV Index:     64 ml/m2  LV % FS          28.4 %      LEFT ATRIUM:                  Normal Ranges:  LA Vol A4C:        179.5 ml   (22+/-6mL/m2)  LA Vol A2C:        203.2 ml  LA Vol BP:         201.3 ml  LA Vol Index A4C:  86.1 ml/m2  LA Vol Index A2C:  97.4 ml/m2  LA Vol Index BP:   96.5 ml/m2  LA Area A4C:       43.6 cm2  LA Area A2C:       44.0 cm2  LA Major Axis A4C: 9.0 cm  LA Major Axis A2C: 8.1 cm  LA Volume Index:   96.6 ml/m2  LA Vol A4C:        172.1 ml  LA Vol A2C:        195.7 ml  LA Vol Index BSA:  88.2 ml/m2      RIGHT ATRIUM:          Normal Ranges:  RA Area A4C:  36.1 cm2      M-MODE MEASUREMENTS:         Normal Ranges:  Ao Root:             4.00 cm (2.0-3.7cm)  LAs:                 5.95 cm (2.7-4.0cm)      AORTA MEASUREMENTS:         Normal Ranges:  Ao Sinus, d:        4.30 cm (2.1-3.5cm)  Ao STJ, d:          3.90 cm (1.7-3.4cm)  Asc Ao, d:          3.90 cm  (2.1-3.4cm)      LV SYSTOLIC FUNCTION:  Normal Ranges:  EF-A4C View:    59 % (>=55%)  EF-A2C View:    69 %  EF-Biplane:     62 %  LV EF Reported: 62 %      LV DIASTOLIC FUNCTION:            Normal Ranges:  MV Peak E:             0.89 m/s   (0.7-1.2 m/s)  MV Peak A:             0.26 m/s   (0.42-0.7 m/s)  E/A Ratio:             3.42       (1.0-2.2)  MV e'                  0.110 m/s  (>8.0)  MV lateral e'          0.12 m/s  MV medial e'           0.10 m/s  E/e' Ratio:            8.10       (<8.0)  PulmV Sys Jose L:         34.48 cm/s  PulmV Riddle Jose L:        80.10 cm/s  PulmV S/D Jose L:         0.43  PulmV A Revs Jose L:      13.17 cm/s  PulmV A Revs Dur:      53.28 msec      MITRAL VALVE:          Normal Ranges:  MV DT:        129 msec (150-240msec)      MITRAL INSUFFICIENCY:             Normal Ranges:  PISA Radius:          0.7 cm  MR VTI:               138.37 cm  MR Vmax:              453.68 cm/s  MR Alias Jose L:         30.7 cm/s  MR Volume:            32.03 ml  MR Flow Rt:           105.02 ml/s  MR EROA:              23 mm2      AORTIC VALVE:                      Normal Ranges:  AoV Vmax:                1.64 m/s  (<=1.7m/s)  AoV Peak PG:             10.7 mmHg (<20mmHg)  AoV Mean P.9 mmHg  (1.7-11.5mmHg)  LVOT Max Jose L:            0.94 m/s  (<=1.1m/s)  AoV VTI:                 32.03 cm  (18-25cm)  LVOT VTI:                15.62 cm  LVOT Diameter:           2.14 cm   (1.8-2.4cm)  AoV Area, VTI:           1.76 cm2  (2.5-5.5cm2)  AoV Area,Vmax:           2.07 cm2  (2.5-4.5cm2)  AoV Dimensionless Index: 0.49      AORTIC INSUFFICIENCY:  AI Vmax:       3.75 m/s  AI Half-time:  331 msec  AI Decel Time: 1141 msec  AI Decel Rate: 330.18 cm/s2      RIGHT VENTRICLE:  RV Basal 4.70 cm  RV Mid   4.10 cm  RV Major 8.9 cm  TAPSE:   17.0 mm  RV s'    0.07 m/s      TRICUSPID VALVE/RVSP:          Normal Ranges:  Peak TR Velocity:     3.07 m/s  Est. RA Pressure:     3 mmHg  RV Syst Pressure:     41 mmHg  (< 30mmHg)  IVC Diam:              2.50 cm      PULMONIC VALVE:          Normal Ranges:  PV Accel Time:  95 msec  (>120ms)  PV Max Jose L:     0.9 m/s  (0.6-0.9m/s)  PV Max PG:      3.0 mmHg      PULMONARY VEINS:  PulmV A Revs Dur: 53.28 msec  PulmV A Revs Jose L: 13.17 cm/s  PulmV Riddle Jose L:   80.10 cm/s  PulmV S/D Jose L:    0.43  PulmV Sys Jose L:    34.48 cm/s      [id number] Andrea Brian HUDSON  Electronically signed on 2/4/2025 at 4:52:23 PM        ** Final **    Ejection Fractions:  LV EF   Date/Time Value Ref Range Status   02/04/2025 04:07 PM 62 %      Cath:  Coronary Angiography:   Left & Right Heart Cath w Angiography & LV 05/20/2025    [place] [address]  Phone [phone]    Cardiovascular Catheterization Report    Patient Name:     EVERETTE IQBAL      Performing Physician:  Basia Lee MD  Study Date:       5/20/2025           Verifying Physician:   Basia Lee MD  MRN/PID:          [id number]            Cardiologist/Co-Scrub:  Accession#:       [id number]        Ordering Provider:     Basia LEE  Date of           1940 / 84      Cardiologist:  Birth/Age:        years  Gender:           M                   Fellow:  Encounter#:       [id number]          Surgeon:      Study:            Right Heart Cath  Additional Study: Left Heart Cath  Additional Study: Coronary Arteriogram  Additional Study: Left Ventriculogram      Indications:  EVERETTE IQBAL is a 84 year old male who presents with dyslipidemia, atrial fibrillation, Tobacco Use - Former, hypertension and diabetes. EVERETTE IQBAL is a 84 year old male who presents with atrial fibrillation, diabetes, dyslipidemia, coronary artery disease, hypertension and a non-anginal chest pain assessment. Valvular disease and stable known coronary artery disease. Heart failure. Atrial fibrillation/flutter requiring treatment. Tricuspid valve regurgitation.  Stress test performed: No. CTA performed: Cortez Augustine  accessed: No. LVEF  Assessed: Yes. LVEF = 60%.  Cardiac surgical consult: No.  Frailty status of patient entering lab: 5 = Mildly frail.      Procedure Description:  After infiltration with 1% Lidocaine, the right femoral artery was cannulated with a modified Seldinger technique. Subsequently a 5 Wolof sheath was placed retrograde in the right femoral artery. After infiltration of local anesthetic, the right femoral vein was cannulated with a percutaneous technique. A 7 Wolof sheath was placed in the vein. Selective coronary catheterization was performed using a 5 Fr catheter(s) exchanged over a guide wire to cannulate the coronary arteries. A JL 4 tip catheter was used for left coronary injections.  Additional catheter(s) used to visualize the coronary arteries were: Larry. Multiple injections of contrast were made into the left and right coronary arteries with angiograms recorded in multiple projections. A balloon tipped catheter was advanced through the right heart to record pressures. Cardiac output was calculated via the Radha method and measured via thermodilution. After completion of the procedure, the arterial sheath was pulled and pressure was applied to the site. Post-procedure, the venous sheath was pulled and pressure was applied to the site.    Coronary Angiography:  The coronary circulation is right dominant.    Left Main Coronary Artery:  The left main coronary artery is a medium-sized caliber vessel. The left main arises normally from the left coronary sinus of Valsalva and bifurcates into the LAD and circumflex coronary arteries. The left main coronary artery showed a normal vessel.    Left Anterior Descending Coronary Artery Distribution:  The left anterior descending coronary artery is a medium-sized caliber vessel. The LAD arises normally from the left main coronary artery, gives rise to the 1st diagonal branch and reaches the apex of the LV. The LAD demonstrated moderate proximal to mid  calcification and no significant disease or stenosis greater than 30%. The 1st diagonal branch is a medium-sized caliber vessel. The 1st diagonal branch showed no significant disease or stenosis greater than 30%.    Circumflex Coronary Artery Distribution:  The circumflex coronary artery is a medium-sized caliber vessel. The circumflex arises normally from the left main coronary artery, giving rise to the first obtuse marginal and second obtuse marginal. The 1st obtuse marginal branch is a medium-sized caliber vessel. The 1st obtuse marginal branch showed no significant disease or stenosis greater than 30%. The 2nd obtuse marginal branch is a small caliber vessel. The 2nd obtuse marginal branch demonstrated no significant disease or stenosis greater than 30%.    Right Heart Catheterization:  A balloon tipped catheter was advanced through the right heart to record pressures. Cardiac output was calculated via the Radha method and measured via thermodilution. Elevated left sided filling pressures with normal cardiac output. Elevated ventricular filling pressure. Cardiac output is normal. Preserved cardiac output at rest.    Right Coronary Artery Distribution:    The right coronary artery is a large caliber vessel. The RCA arises normally from the right sinus of Valsalva, supplies the right posterolateral descending artery and supplies the posterolateral system. The RCA showed moderate proximal to mid calcification and no significant disease or stenosis greater than 30%. The right posterolateral branch is a medium-sized caliber vessel. The right posterolateral branch showed a normal vessel. The right posterior descending artery is a medium-sized caliber vessel. The right posterior descending artery showed a normal vessel.    Aorta:  The aortic root and ascending aorta were structurally normal, with no evidence of dilatation or obstruction. An aortogram was not performed.      Left Ventriculography:  The size of the left  ventricle is normal. Global left ventricular systolic function was normal. The LV ejection fraction was 55 to 60%. All left ventricular regional wall segments contract normally.      Valve Findings:  No aortic valve stenosis is visualized. 2+ mitral valve insufficiency was noted. 4+ tricuspid valve insufficiency was visualized.      Heart Rhythm:  Patient's heart rhythm was atrial fibrillation.    Hemo Personnel:  +--------------+---------+  Name          Duty       +--------------+---------+  Adi Lee MD 1  +--------------+---------+      Hemodynamic Pressures:    +----+----------+---------+-------------+-------------+---+----+-------+-------+  SiteDate Time   Phase  Systolic mmHg  Diastolic  ED MeanA-Wave V-Wave                   Name                    mmHg     mmHmmHg mmHg   mmHg                                                      g                     +----+----------+---------+-------------+-------------+---+----+-------+-------+    AO 5/20/2025 AIR REST           96           39     60                    8:41:54 AM                                                          +----+----------+---------+-------------+-------------+---+----+-------+-------+    LV 5/20/2025 AIR REST           92            0 10                        8:51:10 AM                                                          +----+----------+---------+-------------+-------------+---+----+-------+-------+    LV 5/20/2025 AIR REST           96            0  8                        8:51:21 AM                                                          +----+----------+---------+-------------+-------------+---+----+-------+-------+   LVp 5/20/2025 AIR REST           87           -2 15                        8:54:10 AM                                                           +----+----------+---------+-------------+-------------+---+----+-------+-------+   AOp 5/20/2025 AIR REST           99           40     67                    8:54:20 AM                                                          +----+----------+---------+-------------+-------------+---+----+-------+-------+    PA 5/20/2025 AIR REST           33           14     21                    9:09:07 AM                                                          +----+----------+---------+-------------+-------------+---+----+-------+-------+    PW 5/20/2025 AIR REST                               16     20     27      9:10:00 AM                                                          +----+----------+---------+-------------+-------------+---+----+-------+-------+    PA 5/20/2025 AIR REST           36           14     23                    9:16:29 AM                                                          +----+----------+---------+-------------+-------------+---+----+-------+-------+    RV 5/20/2025 AIR REST           36            3 11                        9:16:59 AM                                                          +----+----------+---------+-------------+-------------+---+----+-------+-------+    RA 5/20/2025 AIR REST                               13     12     17      9:17:36 AM                                                          +----+----------+---------+-------------+-------------+---+----+-------+-------+        Oxygen Saturation %:  +-----------+----------+------------+  Sample SiteO2 Sat (%)HB (g/100ml)  +-----------+----------+------------+           FA        86         8.4  +-----------+----------+------------+           RA        61         8.4  +-----------+----------+------------+           FA        86         8.4  +-----------+----------+------------+            PA        61         8.4  +-----------+----------+------------+      Cardiac Outputs:  +----------------+---------------+--------+--------------+-------------+-------+         Thermo CO      Thermo CI  Thermo       SONAM CO      SONAM CIFICK SV           (l/min)     (l/min/m2)      SV       (l/min)   (l/min/m2)         +----------------+---------------+--------+--------------+-------------+-------+               4.5            2.3    62.9           9.1          4.7  126.9  +----------------+---------------+--------+--------------+-------------+-------+      Vascular Resistance Calculated Values (Wood Units):  +-----+---+----+-------+---+----+---+----+---+----+-------+  PhasePVRPVRIPVR/SVRSVRSVRITPRTPRITVRTVRITPR/TVR  +-----+---+----+-------+---+----+---+----+---+----+-------+  0    0.61.1 0      [id number] [id number]        +-----+---+----+-------+---+----+---+----+---+----+-------+      Complications:  No in-lab complications observed.    Cardiac Cath Post Procedure Notes:  Post Procedure Diagnosis: Mild CAD and Severe tricuspid valve regurgitation.  Blood Loss:               Estimated blood loss during the procedure was 20 cc  mls.  Specimens Removed:        Number of specimen(s) removed: none.      Recommendations:  Maximize medical therapy.  Agressive risk factor modification efforts.  Tricuspid valve repair or clip for severe tricuspid valve regurgitation.    ____________________________________________________________________________________  CONCLUSIONS:  1. Moderate mitral valve insufficiency.  2. Severe tricuspid valve insufficiency.  3. Global left ventricular systolic function was normal.  4. Left ventricular ejection fraction was 55 to 60%.  5. Triple vessel coronary artery disease without proximal left anterior descending involvement and non-obstructive coronary artery disease.    ICD 10 Codes:  Acute on chronic systolic (congestive) heart failure  (CHF)-I50.23; Nonrheumatic tricuspid (valve) insufficiency-I36.1    CPT Codes:  Right & Left Heart Cath w/ventriculography/Coronary angio (RHC)([facility])-71612; Moderate Sedation Services initial 15 minutes patient >5 years-[id number]; Moderate Sedation Services 1st additional 15 minutes patient >5 years-[id number]; Moderate Sedation Services 2nd additional 15 minutes patient >5 years-[id number]    [id number] Marlon Lee MD  Performing Physician  Electronically signed by [id number] Marlon Lee MD on 5/24/2025 at 6:16:25 PM          ** Final **    Right Heart Cath: No results found for this or any previous visit from the past 1800 days.    Stress Test:  Nuclear:No results found for this or any previous visit from the past 1800 days.    Metabolic Stress: No results found for this or any previous visit from the past 1800 days.    Cardiac Imaging:  Cardiac Scoring: No results found for this or any previous visit from the past 1800 days.    Cardiac MRI: No results found for this or any previous visit from the past 1800 days.         Assessment/Plan  Assessment/Plan   Assessment & Plan  Nonrheumatic tricuspid valve regurgitation    -EARNEST with Dr. Alfred as part of as part of T-EER/TTVR workup      NP discussed with Dr. Alfred regarding plan of care/ discharge plan      I spent 30 minutes in the professional and overall care of this patient.      Kellee Briggs, APRN-CNP         [1]   Past Medical History:  Diagnosis Date    Atrial fibrillation (Multi)     BPH (benign prostatic hyperplasia)     CAD (coronary artery disease)     CHF (congestive heart failure)     Cirrhosis (Multi)     Hematuria     Hyperlipidemia     Hypertension     Personal history of diseases of the blood and blood-forming organs and certain disorders involving the immune mechanism 06/09/2021    History of thrombocytopenia    Shortness of breath 02/19/2020    Shortness of breath on exertion    Tricuspid regurgitation    [2]   Past Surgical History:  Procedure Laterality  Date    APPENDECTOMY  09/11/2013    Appendectomy    CARDIAC CATHETERIZATION N/A 5/20/2025    Procedure: Left & Right Heart Cath w Angiography & LV;  Surgeon: Marlon Lee MD;  Location: [facility] Cardiac Cath Lab;  Service: Cardiovascular;  Laterality: N/A;    COLONOSCOPY  06/25/2013    Complete Colonoscopy    CYSTOSCOPY      OTHER SURGICAL HISTORY  04/22/2015    Removal Of Lesion    PARACENTESIS      PILONIDAL CYST DRAINAGE  09/11/2013    Pilonidal Cyst Resection    THORACENTESIS     [3]   Social History  Tobacco Use    Smoking status: Never    Smokeless tobacco: Never   Substance Use Topics    Alcohol use: Yes     Alcohol/week: 10.0 standard drinks of alcohol     Types: 10 Standard drinks or equivalent per week    Drug use: Not Currently   [4]   Family History  Problem Relation Name Age of Onset    Heart failure Mother      Atrial fibrillation Sister     [5] No Known Allergies  [6] [7] [8]

## 2025-06-27 ENCOUNTER — APPOINTMENT (OUTPATIENT)
Dept: PRIMARY CARE | Facility: CLINIC | Age: 85
End: 2025-06-27
Payer: MEDICARE

## 2025-07-01 ENCOUNTER — CARDIOLOGY CONFERENCE (OUTPATIENT)
Dept: CARDIOLOGY | Facility: HOSPITAL | Age: 85
End: 2025-07-01
Payer: MEDICARE

## 2025-07-06 ASSESSMENT — ENCOUNTER SYMPTOMS
MUSCULOSKELETAL NEGATIVE: 1
GASTROINTESTINAL NEGATIVE: 1
DYSPNEA ON EXERTION: 1
NEUROLOGICAL NEGATIVE: 1
RESPIRATORY NEGATIVE: 1
CONSTITUTIONAL NEGATIVE: 1

## 2025-07-06 NOTE — PROGRESS NOTES
Chief Complaint:   No chief complaint on file.    History Of Present Illness:    .Mr Bunch returns in follow up with his daughter.  Denies chest pain, sob, palpitations, but has pitting bilateral pedal edema.  Couch.  Did not make much progress with furosemide 40 mg twice daily and will increase to 80 mg twice daily.  Return as scheduled in two weeks with bmp.            Last Recorded Vitals:  There were no vitals taken for this visit.     Past Medical History:  Past Medical History:   Diagnosis Date    Abnormal ECG     Atrial fibrillation (Multi)     BPH (benign prostatic hyperplasia)     CAD (coronary artery disease)     CHF (congestive heart failure)     Cirrhosis (Multi)     Congenital heart disease     Hematuria     Hyperlipidemia     Hypertension     Personal history of diseases of the blood and blood-forming organs and certain disorders involving the immune mechanism 06/09/2021    History of thrombocytopenia    Shortness of breath 02/19/2020    Shortness of breath on exertion    Tricuspid regurgitation         Past Surgical History:  Past Surgical History:   Procedure Laterality Date    APPENDECTOMY  09/11/2013    Appendectomy    CARDIAC CATHETERIZATION N/A 5/20/2025    Procedure: Left & Right Heart Cath w Angiography & LV;  Surgeon: Marlon Lee MD;  Location: [facility] Cardiac Cath Lab;  Service: Cardiovascular;  Laterality: N/A;    COLONOSCOPY  06/25/2013    Complete Colonoscopy    CYSTOSCOPY      OTHER SURGICAL HISTORY  04/22/2015    Removal Of Lesion    PARACENTESIS      PILONIDAL CYST DRAINAGE  09/11/2013    Pilonidal Cyst Resection    THORACENTESIS         Social History:  Social History     Socioeconomic History    Marital status:    Tobacco Use    Smoking status: Never    Smokeless tobacco: Never   Substance and Sexual Activity    Alcohol use: Yes     Alcohol/week: 2.0 standard drinks of alcohol     Types: 2 Standard drinks or equivalent per week    Drug use: Not Currently    Sexual activity: Not  Currently     Partners: Female     Birth control/protection: Abstinence     Social Drivers of Health     Financial Resource Strain: Low Risk  (5/2/2025)    Overall Financial Resource Strain (CARDIA)     Difficulty of Paying Living Expenses: Not hard at all   Food Insecurity: No Food Insecurity (5/2/2025)    Hunger Vital Sign     Worried About Running Out of Food in the Last Year: Never true     Ran Out of Food in the Last Year: Never true   Transportation Needs: No Transportation Needs (5/2/2025)    PRAPARE - Transportation     Lack of Transportation (Medical): No     Lack of Transportation (Non-Medical): No   Intimate Partner Violence: Not At Risk (5/2/2025)    Humiliation, Afraid, Rape, and Kick questionnaire     Fear of Current or Ex-Partner: No     Emotionally Abused: No     Physically Abused: No     Sexually Abused: No   Housing Stability: Low Risk  (5/2/2025)    Housing Stability Vital Sign     Unable to Pay for Housing in the Last Year: No     Number of Times Moved in the Last Year: 0     Homeless in the Last Year: No       Family History:  Family History   Problem Relation Name Age of Onset    Heart failure Mother      Atrial fibrillation Sister           Allergies:  Patient has no known allergies.    Outpatient Medications:  Current Outpatient Medications   Medication Sig Dispense Refill    atorvastatin (Lipitor) 40 mg tablet Take 1 tablet (40 mg) by mouth once daily at bedtime. 90 tablet 3    dilTIAZem CD (Cardizem CD) 300 mg 24 hr capsule Take 1 capsule (300 mg) by mouth once daily. 90 capsule 3    furosemide (Lasix) 40 mg tablet Take 1 tablet (40 mg) by mouth 2 times daily (morning and late afternoon). Take 1 tablet (40 mg ) twice daily (per Dr. Lee 6/3/25)      metoprolol tartrate (Lopressor) 100 mg tablet Take 1 tablet (100 mg) by mouth 2 times a day. 180 tablet 3    pantoprazole (ProtoNix) 40 mg EC tablet Take 1 tablet (40 mg) by mouth once daily in the morning. Take before meals. Do not crush,  chew, or split. 30 tablet 1    tamsulosin (Flomax) 0.4 mg 24 hr capsule Take 1 capsule (0.4 mg) by mouth once daily.       No current facility-administered medications for this visit.        Physical Exam:  Cardiovascular:      PMI at left midclavicular line. Normal rate. Regular rhythm. Normal S1. Normal S2.       Murmurs: There is no murmur.      No gallop.  No click. No rub.   Pulses:     Intact distal pulses.   Edema:     Ankle: bilateral 2+ pitting edema of the ankle.      ROS:  Review of Systems   Constitutional: Negative.   Cardiovascular:  Positive for dyspnea on exertion and leg swelling.   Respiratory: Negative.     Skin: Negative.    Musculoskeletal: Negative.    Gastrointestinal: Negative.    Genitourinary: Negative.    Neurological: Negative.           Last Labs: reviewed  CBC -  Lab Results   Component Value Date    WBC 4.3 (L) 05/20/2025    WBC 4.6 05/07/2025    HGB 8.4 (L) 05/20/2025    HGB 8.2 (L) 05/07/2025    HCT 27.9 (L) 05/20/2025    HCT 28.7 (L) 05/07/2025    MCV 79 (L) 05/20/2025    MCV 82.5 05/07/2025     05/20/2025     05/07/2025       CMP -  Lab Results   Component Value Date    CALCIUM 8.4 (L) 05/23/2025    PHOS 4.3 05/23/2025    PROT 5.9 (L) 05/07/2025    ALBUMIN 3.6 05/23/2025    AST 6 (L) 04/17/2025    ALT 6 (L) 04/17/2025    ALKPHOS 60 04/17/2025    BILITOT 0.7 04/17/2025       LIPID PANEL -   Lab Results   Component Value Date    CHOL 103 10/01/2024    TRIG 56 10/01/2024    HDL 53.0 10/01/2024    CHHDL 1.9 10/01/2024    LDLF 37 06/05/2023    VLDL 11 10/01/2024    NHDL 50 10/01/2024       RENAL FUNCTION PANEL -   Lab Results   Component Value Date    GLUCOSE 94 05/23/2025     05/23/2025    K 3.6 05/23/2025    CL 98 05/23/2025    CO2 33 (H) 05/23/2025    ANIONGAP 10 05/07/2025    BUN 16 05/23/2025    CREATININE 0.92 05/23/2025    GFRMALE 87 06/05/2023    CALCIUM 8.4 (L) 05/23/2025    PHOS 4.3 05/23/2025    ALBUMIN 3.6 05/23/2025        Lab Results   Component Value  Date     (H) 05/01/2025    HGBA1C 5.2 01/05/2024         Assessment/Plan   Problem List Items Addressed This Visit    None          Assessment:     1. Low-grade CAD by cardiac cath, 09/15/2007. The patient is doing well with no unusual chest discomfort. At the time of his catheterization, he was found to have normal coronary arteries other than for minor 20% narrowing.of the diagonal branch. This patient was seen at urgent care on 9/7/2015 with atypical chest pain that occurred when every he would take a deep breath. EKG evidently did not demonstrate any ST segment abnormality. His chest x-ray showed mild interstitial prominence. The amylase and lipase values were normal and his liver function panel was also normal. Cardiac enzymes were negative. The SMA panel included a creatinine of 0.77 a CBC included hematocrit of 43.3. At this point will postpone repeat nuclear stress testing. Patient denies chest pain at today's office visit.   2. Chronic atrial fibrillation. At the time of a previous visit the patient was noted to have a somewhat rapid ventricular response to his atrial fibrillation despite being on metoprolol 100 mg twice a day. At that time the metoprolol dose was increased to 150 mg twice a day. Since that time the patient has remained generally fatigued with some vague muscle aching. Due to the perceived side effects the dose of metoprolol will be reduced back to 100 mg twice a day and he was started on Cardizem  mg daily to assist in ventricular rate control. e subsequently had the dose of Cardizem CD reduced to 120 mg daily. e did have an echocardiogram performed 11/4/2014 which demonstrated normal LV chamber size with low normal LV ejection fraction of 55%. There was moderate to severe dilatation of the left atrium. There is moderate dilatation of the right-sided chambers with mild mitral valve regurgitation mild to moderate tricuspid valve regurgitation and mild pulmonary hypertension  with an estimated PA systolic pressure of 40 mmHg. There is also mild dilatation of the aortic root and ascending thoracic aorta. The presence of underlying COPD would explain the mild to moderate right-sided chamber dilatation. The patient did have a repeat echocardiogram performed today on 01/23/2018 which demonstrates an LV ejection fraction of approximately 55% with mild aortic valve insufficiency, mild to moderate mitral valve regurgitation, moderate tricuspid regurgitation, mild pulmonic insufficiency along with moderate left atrial enlargement and mild to moderate right atrial enlargement. The patient had been essentially asymptomatic until he was admitted to [facility] in 2/2020 with a urinary tract infection with bacteremia. He was noted to have Proteus mirabilis bacteremia with acute emphysematous cystitis. During that admission he had atrial fibrillation rapid ventricular rate of all 110â€“1 140/m. Ultimately he was placed on a combination of both diltiazem and metoprolol. The dose of diltiazem was increased from 120-240 mg daily and he was kept on metoprolol tartrate 100 mg twice a day. He did have an echocardiogram that showed an LV ejection fraction of 55â€“60 percent with moderate left atrial enlargement 1+ aortic valve insufficiency and moderately dilated aortic root at 4.1 cm. The ventricular rate to the atrial fibrillation is now well controlled on the present combination of the Cardizem  mg daily along with metoprolol tartrate 100 mg twice daily.  He has declined trying metoprolol succinate.  HR today 90.  Will continue diltiazem to 300 mg daily and have him return in two weeks as scheduled.  Echo 02/2025  CONCLUSIONS:   1. Left ventricular ejection fraction is normal, calculated by Brandon's biplane at 62%.   2. Spectral Doppler shows a Grade III (restrictive pattern) of left ventricular diastolic filling with an elevated left atrial pressure.   3. There is normal right  ventricular global systolic function.   4. Mildly enlarged right ventricle.   5. The left atrial size is severely dilated.   6. The right atrium is severely dilated.   7. Mild to moderate mitral valve regurgitation.   8. Mildly elevated right ventricular systolic pressure.   9. Severe tricuspid regurgitation visualized.  10. Moderate aortic valve regurgitation.   3. Coumadin anticoagulation with one episode of significant epistaxis. The patient has had no recurrent episodes of epistaxis since his last visit. The patient prefers to remain on Coumadin rather than switching to a direct oral anticoagulant, however per urology and several incidents with hematuria, will switch to Eliquis.  Patient has remained off of Eliquis over the past several weeks due to hematuria.  He patient is described below and does have evidence of hepatic cirrhosis possibly related to right heart failure.  He has been seen by interventional cardiology for possible watchman.  For now he will remain off Eliquis anticoagulation.  If patient requires a surgical tricuspid valve repair then a left atrial appendage ligation could conceivably bleed be performed.  4. Hyperlipidemia. The patient did have lab work performed on 11//2022 which included a lipid panel with cholesterol 119 LDL 51 HDL 57 triglyceride 52. The patient's panel is improved now on atorvastatin 40 mg daily rather than the previous simvastatin 40 mg daily.   5. Remote former smoker. Patient discontinued smoking 35 years ago.  6.? COPD.  7. History of urosepsis 2/2020. This patient was admitted to [facility] from 02/02/2020â€“02/05/2020 with sepsis due to a urinary tract infection. The patient presented with hematuria and burning upon urination. The patient initially had a Kirby catheter placed in the office which had subsequently been removed. At the time of his admission to [facility] he was noted to be in his atrial fibrillation but with a more rapid ventricular  response. Serial troponins were negative. The patient had already been on Coumadin anticoagulation. The patient was placed on IV Zosyn and IV vancomycin for the urosepsis. He was initially placed on both diltiazem and metoprolol for ventricular rate control and the dose of diltiazem was increased from 120 mg daily to 240 mg daily. He also had an echocardiogram on 02/04/2020 that showed an LV ejection fraction 55â€“60 percent with moderate left atrial enlargement with 1+ aortic valve insufficiency and a moderately dilated aortic root at 4.1 cm. The patient was found to have Proteus mirabilis bacteremia with acute emphysematous cystitis. He ultimately was discharged home on ampicillin was also started on tamsulosin. The patient had a recent PSA of 2.36 on 5/20/2020. He had a visit with urology and had a postvoid residual of urine of only 29 cc on 7/9/2020.  8. Hx of covid-19 vaccine #1 and #2.       9.  Congestive heart failure/severe tricuspid valve regurgitation/right-sided CHF.  This patient had been seen earlier this year because of increasing shortness of breath along with lower extremity edema and increasing abdominal distention.  Patient was actually admitted to [facility] 2/3/2025 - 2/13/2025 because of hematuria.  Patient had a cystoscopy with clot evacuation.  Abdominal CT scan performed 2/3/2025 demonstrated an undulating hepatic contour consistent with cirrhosis along with mild to moderate volume ascites and bilateral pleural effusions.  Abdominal ultrasound performed on 2/4/2025 demonstrated a large right pleural effusion hepatic cirrhosis and right upper quadrant ascites.  The patient ultimately had a right-sided thoracentesis performed with 950 cc removed.  The patient also had a paracentesis performed of 1 L of serosanguineous ascitic fluid.  Echocardiogram on 2/4/2025 showed a preserved LV ejection fraction as 62% with 1-2+ mitral valve regurgitation 2+ aortic valve insufficiency severe left  atrial enlargement severe tricuspid valve regurgitation severe right atrial enlargement and mild pulmonary hypertension.  At time of office follow-up patient had been started on Lasix 40 mg twice daily 3/11/2025.  He continues to have persistent  lower extremity edema and abdominal ascites.  It appears at this point that the patient has significant right-sided heart failure with the significant tricuspid valve regurgitation being a primary culprit with dilated neck veins.  Patient will be scheduled for right and left heart catheterization within the next several weeks.  Continue current Lasix 80 mg twice daily for now.  Will also repeat abdominal ultrasound and check CMP PT/INR today.  The patient's original cardiac catheterization was scheduled on 5/1/2025 but delayed due to anemia for which she was briefly admitted to [facility] [place] from 5/1/2025 - 5/24/2025..  The patient did receive a 1 unit PRBC transfusion.  He did undergo an abdominal ultrasound guided paracentesis on 5/2/2025 and 5 L of fluid were removed.  His hemoglobin improved to 7.7 at the time of discharge.  His Lasix dose was increased to 80 mg twice daily with subsequent stabilization of his ascites and weight which currently is approximately 170 pounds.  The cardiac cath procedure was subsequently performed on 5/20/2025.  The left heart catheterization demonstrated a normal LMCA. The LAD extended to the LV apex and gives rise to a moderate-sized first diagonal branch and contains mild less than 30% luminal irregularities. But nondominant LCx also has minimal disease. The very large dominant RCA is moderately calcified throughout but exhibits only mild diffuse luminal irregularities. The left ventriculogram showed a intact LV ejection fraction in the range of 55%. The right heart catheterization showed high normal pulmonary artery pressures mean PA pressure of 23 mmHg borderline elevated pulmonary capillary wedge pressure.  The patient's dose of  Lasix at that point was reduced to 80 mg every morning and 40 mg in the evening.  Lab work 5/23/2025 includes creatinine 0.92 potassium 3.6.  Patient was referred to the structural heart program for consideration of either tricuspid valve repair or surgical versus a tricuspid valve clip.  The patient is scheduled for CT TAVR on 6/17/2025 and a trans esophageal echo on 6/23/2025 to further assess option of tricuspid valve clip versus surgical tricuspid valve repair.  The patient is also interested in having a Watchman device placed for his atrial fibrillation and he has already been seen by Dr. Banks in this regard.  He was evidently advised that the tricuspid valve repair should be performed initially before the watchman.  Patient is to be reassessed by structural heart program following his upcoming procedures and will return in 4 weeks for cardiology follow-up.  He will be allowed to reduce Lasix further to 40 mg twice daily but will need to monitor weight and increase the dose if he gains more than 4 to 5 pounds.  It is anticipated that he hopefully would be able to have his procedure performed in the latter half of 7/2025.  Patient seen for follow-up visit on 7/8/2025.  The patient did have his TAVR CT scan performed on 6/17/2025.  This demonstrated an ascending thoracic aorta mildly dilated at 4.1 cm along with a mildly dilated pulmonary artery.  Additional findings included biatrial enlargement mild centrilobular and paraseptal emphysema and a cirrhotic liver.  He subsequently had a transesophageal echocardiogram on 6/23/2025 estimating LV ejection fraction at 50-55% 3+ mitral valve regurgitation 4+ tricuspid valve regurgitation mild pulmonary hypertension estimated PA systolic pressure 43 mmHg 1+ aortic valve insufficiency.  Patient awaiting final review by structural heart team with respect to the tricuspid plus minus mitral valve insufficiencies along with previously planned deployment of a Watchman to his  left atrial appendage due to history of GI bleeding.  Patient is clinically stable with stable home weights ranging between 169-171 pounds.  The patient does remain off the eliquis.  He is currently taking Lasix 80 mg every morning 40 mg every afternoon.  He will be allowed to reduce the Lasix to 80 mg once daily with instructions to take extra 40 mg if his weight increases over 175 pounds.  Return to office in 9/2025 presumably after his procedures have been completed.  Will need to determine whether the Watchman procedure should precede his valvular intervention or surgical repair.     10.  Hematuria.  Will refer for Watchman. (See TC 03/31/2025)

## 2025-07-08 ENCOUNTER — OFFICE VISIT (OUTPATIENT)
Dept: CARDIOLOGY | Facility: CLINIC | Age: 85
End: 2025-07-08
Payer: MEDICARE

## 2025-07-08 VITALS
HEART RATE: 76 BPM | SYSTOLIC BLOOD PRESSURE: 98 MMHG | WEIGHT: 174.2 LBS | DIASTOLIC BLOOD PRESSURE: 52 MMHG | OXYGEN SATURATION: 97 % | HEIGHT: 70 IN | BODY MASS INDEX: 24.94 KG/M2

## 2025-07-08 DIAGNOSIS — I50.43 ACUTE ON CHRONIC COMBINED SYSTOLIC (CONGESTIVE) AND DIASTOLIC (CONGESTIVE) HEART FAILURE: Primary | ICD-10-CM

## 2025-07-08 DIAGNOSIS — I36.1 NONRHEUMATIC TRICUSPID VALVE REGURGITATION: ICD-10-CM

## 2025-07-08 DIAGNOSIS — I25.10 ATHEROSCLEROSIS OF NATIVE CORONARY ARTERY OF NATIVE HEART WITHOUT ANGINA PECTORIS: ICD-10-CM

## 2025-07-08 PROCEDURE — 99212 OFFICE O/P EST SF 10 MIN: CPT

## 2025-07-08 PROCEDURE — 3078F DIAST BP <80 MM HG: CPT | Performed by: INTERNAL MEDICINE

## 2025-07-08 PROCEDURE — 99214 OFFICE O/P EST MOD 30 MIN: CPT | Performed by: INTERNAL MEDICINE

## 2025-07-08 PROCEDURE — 3074F SYST BP LT 130 MM HG: CPT | Performed by: INTERNAL MEDICINE

## 2025-07-08 PROCEDURE — G2211 COMPLEX E/M VISIT ADD ON: HCPCS | Performed by: INTERNAL MEDICINE

## 2025-07-08 PROCEDURE — 1126F AMNT PAIN NOTED NONE PRSNT: CPT | Performed by: INTERNAL MEDICINE

## 2025-07-08 PROCEDURE — 1159F MED LIST DOCD IN RCRD: CPT | Performed by: INTERNAL MEDICINE

## 2025-07-08 PROCEDURE — 1160F RVW MEDS BY RX/DR IN RCRD: CPT | Performed by: INTERNAL MEDICINE

## 2025-07-08 ASSESSMENT — ENCOUNTER SYMPTOMS
LOSS OF SENSATION IN FEET: 0
DEPRESSION: 0
OCCASIONAL FEELINGS OF UNSTEADINESS: 0

## 2025-07-08 ASSESSMENT — PAIN SCALES - GENERAL: PAINLEVEL_OUTOF10: 0-NO PAIN

## 2025-07-08 NOTE — PATIENT INSTRUCTIONS
Change to Furosemide 40 mg tablets : Take 2 tablets (80 mg) once   daily in the morning     Labs done this year     Follow up in 2 months

## 2025-07-11 NOTE — PROGRESS NOTES
TRICUSPID VALVE WORK GROUP MEETING  Ming Bunch  [id number]  1940     He was reviewed at the multidisciplinary structural meeting on 07/01/2025.      In Attendance were:   [x]  Ukaigwe  [x]  Abu-Jaquan  []  Layton  [x]  Elgendy  []  Donald  []  Schilz  []  Alvarez  []  Chelsea  [x]  Soria  [x]  Gabi WASHINGTONN  []  Keshia WASHINGTONN  Vandana Maldonado, RN   Lisa Bryant RN    Reason: Severe TR  84 y.o. y/o male with PMH of past medical history of hypertension, atrial fibrillation, no longer on anticoagulation, BPH with LUTS, hematuria status post multiple cystoscopies recently, coronary artery disease, hyperlipidemia, AAA, and heart failure presents for evaluation of severe, symptomatic Tricuspid regurgitation. Patient with significant tricuspid valve regurgitation and right-sided heart failure. Underwent paracentesis procedure in may 2025. Also reports no anticoagulation secondary to the anemia and hematuria, and recent blood transfusions for that reason. Reports complains about increased abdominal girth, edema and leg swelling. Denies overt orthopnea, PND, exertional CP. Denies syncope or presyncope.      PATIENT INFO REFERRING TV [facility]  TEAM TESTING OPTIMIZATION    PROCEDURAL   RISK CONCLUSION   Ming Bunch  [id number]     84 y.o.male     History:  [] Afib  [] PPM/ICD  [] MR  [] MS  [x] CAD  [] OHS  [] CKD  [] COPD  [] PAH  [] Cirrhosis  [x] HFpEF  [] HFmrEF  [] HFrEF   []     Symptoms:   [x] SOB  [x] Swelling  []  PCP: Kellee Burris DO     Cardiologist:   Dr. Lee      Surgeon:  Dr. Plunkett  [x] Simón  [x] Abu-Jaquan  [] Layton  [x] Eloli  [] Odilon  [] Celestine  [] Alvarez  [] Chelsea  [x] Yang Plunkett  LABS:   HGB: 5/20/2025: 8.4  AST: 4/17/2025: 6, ALT: 6, Bili: No results found for requested labs within last 365 days.  BUN/Cr (GFR): 5/23/2025: 16/0.92 (82)  INR    CARDIAC IMAGING  TTE (02/04/2025): EF 62%, Mild to moderate mitral valve regurgitation, Severe  tricuspid regurgitation visualized, Moderate aortic valve regurgitation   EARNEST (06/23/2025): EF 50-55%, Moderate to severe mitral valve regurgitation, A three-leaflet tricuspid valve is observed with functional regurgitation. There is an elongated, central coaptation gap with a maximal anteroposterior diameter of 18 mm, occupying space between the septal/anterior and septal/posterior leaflets. The coaptation gap between the septal and anterior leaflets measures 8 mm, and between the septal and posterior leaflets it measures 6 mm. A torrential regurgitant jet is observed (ERO area by PISA: 1.2 cm2). Leaflet lengths are as follows: septal leaflet 18 mm, anterior leaflet 21 mm, posterior leaflet 23 mm. Satisfactory image quality was obtained in the supine position for both deep esophageal and transgastric views. Adequate 3D reconstruction was achieved from the deep esophageal window.   8. Severe tricuspid regurgitation visualized   HC (05/20/25):     1. Moderate mitral valve insufficiency.   2. Severe tricuspid valve insufficiency.   3. Global left ventricular systolic function was normal.   4. Left ventricular ejection fraction was 55 to 60%.   5. Triple vessel coronary artery disease without proximal left anterior descending involvement and non-obstructive coronary artery disease.  EKG: atrial fibrillation   CT (06/17/25):    1. Severe biatrial enlargement with the right atrium measuring  approximately 3.9 cm 2 in the left atrium measuring 4.9 cm 2  2. Mild dilation of the ascending thoracic aorta measuring up to 4.1  cm in diameter.  3. Mild dilation of the main pulmonary artery, finding commonly seen  in pulmonary arterial hypertension.  4. Mild centrilobular and paraseptal emphysematous changes with small  right-sided and trace left-sided pleural effusion as well as  bibasilar subpleural reticulations. There is mild basilar  interstitial edema.  5. Cirrhotic appearing liver with associated sclerosis  and  hypoattenuating lesions favoring simple cysts.   Imaging Adequate:  [x] Yes  [] No: need     Additional Medical Opinion:     Dental: regular dentist visits q6 months, no issues.     NYHA: II-III       OPERATIVE RISK:  TRI-SCORE:   Mortality %    STS for TVr   Operative Mortality 2.92%   STS for TVR         [x]  Suitable for TTVr or TTVR?     [x] Anatomically suitable for T-ENRRIQUE     Mildly reduced RV function, concern for wide coaptation gap- will need final determination with ICE prior to T-ENRRIQUE/Triclip.     Patient will also need admission 2 days prior to T-ENRRIQUE for optimization.     Hx of bleeding issues(hematuria) - concern for AC tolerance following TTVR    All pertinent history and cardiac imaging were reviewed.    Summary/Discussion  - Severity confirmed- Yes  - Previous treatment and risks for progression- None   - Rhythm and device management plan- no current device in place, pacing indication as needed following TR procedure   - Reason for high surgical risk- STS, fragility   - Anatomic and clinical considerations for Transcatheter- Mildly reduced RV function, concern for wide coaptation gap- will need final determination with ICE prior to T-ENRRIQUE/Triclip.   - Clinical trial suitability- N/A     Recommendation /Conclusion:   Admit 2 days prior to T-ENRRIQUE/Triclip with Dr. Ramos for optimization, ICE needed for final determination prior to T-ENRRIQUE   7 tower bed 7w/MedSurg

## 2025-07-14 ENCOUNTER — TELEPHONE (OUTPATIENT)
Dept: CARDIOLOGY | Facility: HOSPITAL | Age: 85
End: 2025-07-14
Payer: MEDICARE

## 2025-07-23 ENCOUNTER — TELEPHONE (OUTPATIENT)
Dept: CARDIOLOGY | Facility: HOSPITAL | Age: 85
End: 2025-07-23
Payer: MEDICARE

## 2025-07-23 NOTE — TELEPHONE ENCOUNTER
Return call regarding inquiry to schedule T-ENRRIQUE. Called & left message awaiting date availability to schedule & will call back to schedule upon confirmation.

## 2025-07-25 ENCOUNTER — TELEPHONE (OUTPATIENT)
Dept: CARDIOLOGY | Facility: HOSPITAL | Age: 85
End: 2025-07-25
Payer: MEDICARE

## 2025-07-25 DIAGNOSIS — I48.91 ATRIAL FIBRILLATION, UNSPECIFIED TYPE (MULTI): ICD-10-CM

## 2025-07-25 DIAGNOSIS — Z00.6 ENCOUNTER FOR EXAMINATION FOR NORMAL COMPARISON AND CONTROL IN CLINICAL RESEARCH PROGRAM: ICD-10-CM

## 2025-07-25 NOTE — TELEPHONE ENCOUNTER
Left voicemail for patient regarding scheduling the Watchman procedure, the patient is encouraged to call back.

## 2025-08-06 ENCOUNTER — TELEPHONE (OUTPATIENT)
Dept: CARDIOLOGY | Facility: HOSPITAL | Age: 85
End: 2025-08-06
Payer: MEDICARE

## 2025-08-06 DIAGNOSIS — I36.1 NONRHEUMATIC TRICUSPID VALVE REGURGITATION: Primary | ICD-10-CM

## 2025-08-06 PROBLEM — I07.1 SEVERE TRICUSPID REGURGITATION: Status: ACTIVE | Noted: 2025-08-06

## 2025-08-06 NOTE — TELEPHONE ENCOUNTER
[facility]Q/WALK Questionnaire      1  Heart failure affects different people in different ways. Some feel shortness of breath while others feel fatigue. Please indicate how much you are limited by heart failure (shortness of breath or fatigue) in your ability to do the following activities over the past 2 weeks.     A.) Showering/bathing  5. Not at All  B.) Walking 1 block on level ground 4. Slightly  C.) Hurrying or Jogging   6. Limited for other reastons    2.  Over the past 2 weeks, how many times did you have swelling in your feet, ankles or legs when you woke up in the morning? 5. Never    3.  Over the past 2 weeks, on average, how many times has fatigue limited your ability to do what you wanted? 4. 3 or more times a week but not every day    4.  Over the past 2 weeks, on average, how many times has shortness of breath limited your ability to do what you wanted? 5. 1-2 times a week    5.  Over the past 2 weeks, on average, how many times have you been forced to sleep sitting up in a chair or with at least 3 pillows to prop you up because of shortness of breath? Never    6. Over the past 2 weeks, how much has your heart failure limited your enjoyment of life? It has slightly limited my enjoyment of life    7. If you had to spend the rest of your life with your heart failure the way it is right now, how would you feel about this? 5. Completely satisfied    8. How much does your heart failure affect your lifestyle? Please indicate how your heart failure may have limited yourparticipation in the following activities over the past 2 weeks    A.)  Hobbies, recreational activities  4. Slightly limited    B.) Working or doing household chores  5. Did not limit at all    C.) Visiting family or friends out of your home  5. Did not limit at all      5m walk:  8.69 seconds

## 2025-08-11 ENCOUNTER — APPOINTMENT (OUTPATIENT)
Dept: UROLOGY | Facility: CLINIC | Age: 85
End: 2025-08-11
Payer: MEDICARE

## 2025-08-18 DIAGNOSIS — R06.02 SHORTNESS OF BREATH: ICD-10-CM

## 2025-08-18 DIAGNOSIS — I50.43 ACUTE ON CHRONIC COMBINED SYSTOLIC (CONGESTIVE) AND DIASTOLIC (CONGESTIVE) HEART FAILURE: ICD-10-CM

## 2025-08-18 DIAGNOSIS — R60.9 EDEMA: ICD-10-CM

## 2025-08-18 DIAGNOSIS — I25.10 ATHEROSCLEROTIC HEART DISEASE OF NATIVE CORONARY ARTERY WITHOUT ANGINA PECTORIS: ICD-10-CM

## 2025-08-18 RX ORDER — FUROSEMIDE 40 MG/1
80 TABLET ORAL DAILY
Qty: 75 TABLET | Refills: 1 | Status: SHIPPED | OUTPATIENT
Start: 2025-08-18

## 2025-08-22 ENCOUNTER — APPOINTMENT (OUTPATIENT)
Dept: UROLOGY | Facility: CLINIC | Age: 85
End: 2025-08-22
Payer: MEDICARE

## 2025-08-25 LAB
ANION GAP SERPL CALCULATED.4IONS-SCNC: 10 MMOL/L (CALC) (ref 7–17)
BUN SERPL-MCNC: 16 MG/DL (ref 7–25)
BUN/CREAT SERPL: ABNORMAL (CALC) (ref 6–22)
CALCIUM SERPL-MCNC: 8.5 MG/DL (ref 8.6–10.3)
CHLORIDE SERPL-SCNC: 100 MMOL/L (ref 98–110)
CO2 SERPL-SCNC: 28 MMOL/L (ref 20–32)
CREAT SERPL-MCNC: 0.99 MG/DL (ref 0.7–1.22)
EGFRCR SERPLBLD CKD-EPI 2021: 75 ML/MIN/1.73M2
ERYTHROCYTE [DISTWIDTH] IN BLOOD BY AUTOMATED COUNT: 16.7 % (ref 11–15)
GLUCOSE SERPL-MCNC: 91 MG/DL (ref 65–99)
HCT VFR BLD AUTO: 29.8 % (ref 38.5–50)
HGB BLD-MCNC: 8.7 G/DL (ref 13.2–17.1)
MCH RBC QN AUTO: 23.7 PG (ref 27–33)
MCHC RBC AUTO-ENTMCNC: 29.2 G/DL (ref 32–36)
MCV RBC AUTO: 81.2 FL (ref 80–100)
PLATELET # BLD AUTO: 212 THOUSAND/UL (ref 140–400)
PMV BLD REES-ECKER: 8.9 FL (ref 7.5–12.5)
POTASSIUM SERPL-SCNC: 4 MMOL/L (ref 3.5–5.3)
RBC # BLD AUTO: 3.67 MILLION/UL (ref 4.2–5.8)
SODIUM SERPL-SCNC: 138 MMOL/L (ref 135–146)
WBC # BLD AUTO: 4.6 THOUSAND/UL (ref 3.8–10.8)

## 2025-08-26 DIAGNOSIS — I48.91 ATRIAL FIBRILLATION, UNSPECIFIED TYPE (MULTI): ICD-10-CM

## 2025-09-25 ENCOUNTER — APPOINTMENT (OUTPATIENT)
Dept: PRIMARY CARE | Facility: CLINIC | Age: 85
End: 2025-09-25
Payer: MEDICARE

## 2025-11-28 ENCOUNTER — APPOINTMENT (OUTPATIENT)
Dept: UROLOGY | Facility: CLINIC | Age: 85
End: 2025-11-28
Payer: MEDICARE

## (undated) DEVICE — CATHETER, FOLEY, 3WAY, 22FR, 30CC, LUBRI-SIL, LF

## (undated) DEVICE — SHEATH, PINNACLE, W/.038 GW 10CM, 5FR INTRODUCER, 2.5 CM DIALATOR

## (undated) DEVICE — Device

## (undated) DEVICE — PACK, ANGIO P2, CUSTOM, LAKE

## (undated) DEVICE — COLLECTION BAG, FLUID, NON-STERILE

## (undated) DEVICE — KIT, MANIFOLD NAMIC, STANDARD, LHK,PRM,3V,ON,DT,PS,CMSQ,S10

## (undated) DEVICE — CATHETER, INFINITI DIAGNOSTIC, 5 FR 100CM 3DRC, WILLIAMS RIGHT OR NO TORQUE

## (undated) DEVICE — BAG, DRAINAGE, ANTI-REFLUX CHAMBER, 2000ML

## (undated) DEVICE — SHEATH, PINNACLE, W/.038 GUIDEWIRE, 10 CM,  7FR INTRODUCER, 7FR DIA, 2.5 CM DIALATOR

## (undated) DEVICE — CATHETER, ANGIO, IMPULSE, FL4, 5 FR X 100 CM

## (undated) DEVICE — GLOVE, SURGEON, PREMIERPRO PI, SZ-7.5, PF, WH

## (undated) DEVICE — CATHETER, ANGIO, IMPULSE, PIG 145, 5 FR X 110 CM (5 PK)

## (undated) DEVICE — GUIDEWIRE, J TIP, 3 MM, 0.035 IN X 180 CM, PTFE